# Patient Record
Sex: MALE | Race: WHITE | NOT HISPANIC OR LATINO | Employment: OTHER | URBAN - METROPOLITAN AREA
[De-identification: names, ages, dates, MRNs, and addresses within clinical notes are randomized per-mention and may not be internally consistent; named-entity substitution may affect disease eponyms.]

---

## 2017-01-23 ENCOUNTER — GENERIC CONVERSION - ENCOUNTER (OUTPATIENT)
Dept: OTHER | Facility: OTHER | Age: 47
End: 2017-01-23

## 2017-01-24 ENCOUNTER — GENERIC CONVERSION - ENCOUNTER (OUTPATIENT)
Dept: OTHER | Facility: OTHER | Age: 47
End: 2017-01-24

## 2017-01-25 ENCOUNTER — GENERIC CONVERSION - ENCOUNTER (OUTPATIENT)
Dept: OTHER | Facility: OTHER | Age: 47
End: 2017-01-25

## 2017-01-27 ENCOUNTER — GENERIC CONVERSION - ENCOUNTER (OUTPATIENT)
Dept: OTHER | Facility: OTHER | Age: 47
End: 2017-01-27

## 2017-02-07 ENCOUNTER — ALLSCRIPTS OFFICE VISIT (OUTPATIENT)
Dept: OTHER | Facility: CLINIC | Age: 47
End: 2017-02-07

## 2017-03-10 ENCOUNTER — GENERIC CONVERSION - ENCOUNTER (OUTPATIENT)
Dept: OTHER | Facility: OTHER | Age: 47
End: 2017-03-10

## 2017-03-29 ENCOUNTER — ALLSCRIPTS OFFICE VISIT (OUTPATIENT)
Dept: OTHER | Facility: CLINIC | Age: 47
End: 2017-03-29

## 2017-03-29 ENCOUNTER — GENERIC CONVERSION - ENCOUNTER (OUTPATIENT)
Dept: OTHER | Facility: OTHER | Age: 47
End: 2017-03-29

## 2017-04-01 DIAGNOSIS — Z13.6 ENCOUNTER FOR SCREENING FOR CARDIOVASCULAR DISORDERS: ICD-10-CM

## 2017-04-01 DIAGNOSIS — B20 HUMAN IMMUNODEFICIENCY VIRUS (HIV) DISEASE (HCC): ICD-10-CM

## 2017-04-01 DIAGNOSIS — Z11.3 ENCOUNTER FOR SCREENING FOR INFECTIONS WITH PREDOMINANTLY SEXUAL MODE OF TRANSMISSION: ICD-10-CM

## 2017-04-26 ENCOUNTER — GENERIC CONVERSION - ENCOUNTER (OUTPATIENT)
Dept: OTHER | Facility: OTHER | Age: 47
End: 2017-04-26

## 2017-04-28 ENCOUNTER — GENERIC CONVERSION - ENCOUNTER (OUTPATIENT)
Dept: OTHER | Facility: OTHER | Age: 47
End: 2017-04-28

## 2017-05-02 ENCOUNTER — GENERIC CONVERSION - ENCOUNTER (OUTPATIENT)
Dept: OTHER | Facility: OTHER | Age: 47
End: 2017-05-02

## 2017-05-02 ENCOUNTER — ALLSCRIPTS OFFICE VISIT (OUTPATIENT)
Dept: OTHER | Facility: CLINIC | Age: 47
End: 2017-05-02

## 2017-05-16 ENCOUNTER — ALLSCRIPTS OFFICE VISIT (OUTPATIENT)
Dept: OTHER | Facility: CLINIC | Age: 47
End: 2017-05-16

## 2017-05-24 ENCOUNTER — GENERIC CONVERSION - ENCOUNTER (OUTPATIENT)
Dept: OTHER | Facility: OTHER | Age: 47
End: 2017-05-24

## 2017-06-28 ENCOUNTER — ALLSCRIPTS OFFICE VISIT (OUTPATIENT)
Dept: OTHER | Facility: CLINIC | Age: 47
End: 2017-06-28

## 2017-07-01 DIAGNOSIS — B20 HUMAN IMMUNODEFICIENCY VIRUS (HIV) DISEASE (HCC): ICD-10-CM

## 2017-07-24 ENCOUNTER — GENERIC CONVERSION - ENCOUNTER (OUTPATIENT)
Dept: OTHER | Facility: OTHER | Age: 47
End: 2017-07-24

## 2017-07-26 ENCOUNTER — GENERIC CONVERSION - ENCOUNTER (OUTPATIENT)
Dept: OTHER | Facility: OTHER | Age: 47
End: 2017-07-26

## 2017-08-01 ENCOUNTER — ALLSCRIPTS OFFICE VISIT (OUTPATIENT)
Dept: OTHER | Facility: CLINIC | Age: 47
End: 2017-08-01

## 2017-08-15 ENCOUNTER — ALLSCRIPTS OFFICE VISIT (OUTPATIENT)
Dept: OTHER | Facility: CLINIC | Age: 47
End: 2017-08-15

## 2017-09-26 ENCOUNTER — ALLSCRIPTS OFFICE VISIT (OUTPATIENT)
Dept: OTHER | Facility: CLINIC | Age: 47
End: 2017-09-26

## 2017-09-26 DIAGNOSIS — B20 HUMAN IMMUNODEFICIENCY VIRUS (HIV) DISEASE (HCC): ICD-10-CM

## 2017-09-26 DIAGNOSIS — M79.10 MYALGIA: ICD-10-CM

## 2017-10-01 DIAGNOSIS — B20 HUMAN IMMUNODEFICIENCY VIRUS (HIV) DISEASE (HCC): ICD-10-CM

## 2017-10-10 ENCOUNTER — GENERIC CONVERSION - ENCOUNTER (OUTPATIENT)
Dept: OTHER | Facility: OTHER | Age: 47
End: 2017-10-10

## 2017-11-07 ENCOUNTER — ALLSCRIPTS OFFICE VISIT (OUTPATIENT)
Dept: OTHER | Facility: CLINIC | Age: 47
End: 2017-11-07

## 2017-11-14 ENCOUNTER — ALLSCRIPTS OFFICE VISIT (OUTPATIENT)
Dept: OTHER | Facility: CLINIC | Age: 47
End: 2017-11-14

## 2018-01-01 DIAGNOSIS — Z11.3 ENCOUNTER FOR SCREENING FOR INFECTIONS WITH PREDOMINANTLY SEXUAL MODE OF TRANSMISSION: ICD-10-CM

## 2018-01-01 DIAGNOSIS — B20 HUMAN IMMUNODEFICIENCY VIRUS (HIV) DISEASE (HCC): ICD-10-CM

## 2018-01-01 DIAGNOSIS — Z20.2 CONTACT WITH AND (SUSPECTED) EXPOSURE TO INFECTIONS WITH A PREDOMINANTLY SEXUAL MODE OF TRANSMISSION: ICD-10-CM

## 2018-01-09 NOTE — PROGRESS NOTES
Assessment   1  HIV disease (042) (B20)  2  MRSA infection (041 12) (A49 02)  3  Syphilis (097 9) (A53 9)    Discussion/Summary    Mr Yonatan Armstrong is a 40 yo white man with HIV and history of repeated low level viremia  1  DTG/TDF/FTC with subsequent viral suppression, VL pending  VL<20, CD4 529 April 2016  1     1  2  No longer on iron supplementation  3  RPR titer 1:4, >4-fold decline  Follow-up RPR 1:16 on 4/12/16  [address] [place] [facility] [place]  Repeated RPR of 1:8 WITHOUT PCN  Will repeat RPR in 1-2 months to confirm  LAS  1    4  Low energy reported and concern for low testosterone, will check free and total testosterone with blood draw  Will follow-up on whether this was completed  5  Bone health patient on TDF with strict vegan diet  May well have no need for increase in calcium intake as he eats leafy green vegetables  Consider switch to TAF/FTC  Possible side effects of new medications were reviewed with the patient/guardian today  The treatment plan was reviewed with the patient/guardian  The patient/guardian understands and agrees with the treatment plan     Education   general HIV education  adherence  prevention care  Education   general HIV education  adherence  prevention care  1 Amended By: Nohemi Bell; May 23 2016 1:09 PM EST    Chief Complaint  Pt offers no c/o at this time  Patient is here today for follow up of chronic conditions described in HPI  History of Present Illness    Pain Assessment   the patient states they do not have pain  Abuse And Domestic Violence Screen    Yes, the patient is safe at home  The patient states no one is hurting them  Depression And Suicide Screen  No, the patient has not had thoughts of hurting themself  No, the patient has not felt depressed in the past 7 days  The patient is being seen for a routine clinic follow-up of HIV infection  The last clinic visit was 3 month(s) ago   Management changes made at the last visit include ordering HIV VL and CD4  The patient is currently asymptomatic  No exacerbating factors are noted  No relieving factors are noted  No associated symptoms are reported  Current treatment includes antiretroviral regimen  By report, there is good compliance with treatment, good tolerance of treatment and good symptom control  The initial diagnosis of HIV was year(s) ago  The last CD4 count was 421 and performed on 1/7/16  The CD4 trend has been stable  The last viral load was 49 and performed on 1/7/16  The viral load has been stable  Since diagnosis the disease has been stable  Recently, the disease has been stable  There are no known disease complications  Pertinent medical history:  syphilis  The patient is currently able to do activities of daily living without limitations  He was previously evaluated in this clinic 3 month(s) ago  Past evaluation has included CD4 count, plasma viral load, complete blood count, urinalysis, metabolic profile, liver function panel, syphilis serology and RPR 1:16 after Bicillin 8/2015  Past treatment has included antiretroviral regimen of   The patient is being seen for a routine clinic follow-up of syphilis  Initial presentation was 8 month(s) ago  Presentation included early latent syphilis determined by RPR titer  Past evaluation has included RPR  Past treatment has included benzathine penicillin G  (Bicillin 8/2015 x3 IM) The patient is currently asymptomatic  No associated symptoms are reported  The patient is not currently being treated for this problem  (HIV treatment)   The patient is being seen for a routine clinic follow-up of methicillin-resistant Staphylococcus aureus  The patient is currently asymptomatic  No associated symptoms are reported  Review of Systems    Constitutional: No fever or chills, feels well, no tiredness, no recent weight gain or weight loss  Eyes: No complaints of eye pain, no red eyes, no discharge from eyes, no itchy eyes  ENT: no complaints of earache, no hearing loss, no nosebleeds, no nasal discharge, no sore throat, no hoarseness  Cardiovascular: No complaints of slow heart rate, no fast heart rate, no chest pain, no palpitations, no leg claudication, no lower extremity  Respiratory: No complaints of shortness of breath, no wheezing, no cough, no SOB on exertion, no orthopnea or PND  Gastrointestinal: No complaints of abdominal pain, no constipation, no nausea or vomiting, no diarrhea or bloody stools  Genitourinary: No complaints of dysuria, no incontinence, no hesitancy, no nocturia, no genital lesion, no testicular pain  Musculoskeletal: No complaints of arthralgia, no myalgias, no joint swelling or stiffness, no limb pain or swelling  Integumentary: No complaints of skin rash or skin lesions, no itching, no skin wound, no dry skin  Neurological: No compliants of headache, no confusion, no convulsions, no numbness or tingling, no dizziness or fainting, no limb weakness, no difficulty walking  Psychiatric: Is not suicidal, no sleep disturbances, no anxiety or depression, no change in personality, no emotional problems  Endocrine: No complaints of proptosis, no hot flashes, no muscle weakness, no erectile dysfunction, no deepening of the voice, no feelings of weakness  Hematologic/Lymphatic: No complaints of swollen glands, no swollen glands in the neck, does not bleed easily, no easy bruising  Active Problems    1  History of Anxiety (300 00) (F41 9)  2  History of stomatitis (V12 79) (Z87 19)  3  MRSA infection (041 12) (A49 02)  4  Musculoskeletal pain (729 1) (M79 1)  5  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)  6  Syphilis (097 9) (A53 9)    HIV disease (042) (B20)     Past Medical History   1  History of Anxiety (300 00) (F41 9)  2  History of Chest tightness (786 59) (R07 89)  3  History of Cough (786 2) (R05)  4  History of drug abuse (305 93) (Z87 898)  5   History of stomatitis (V12 79) (Z87 19)  6  History of syphilis (V12 09) (Z86 19)  7  History of tuberculosis (V12 01) (Z86 11)  8  History of Wound of right leg (891 0) (S81 801A)    The active problems and past medical history were reviewed and updated today  Surgical History    The surgical history was reviewed and updated today  Family History   1  Adopted (V68 89) (Z02 82)   2  Adopted (U69 55) (Z02 82)    The family history was reviewed and updated today  Social History    · Alcohol use (V49 89) (Z78 9)   · History of drug use (305 93) (F19 21)   · Housing   · Never a smoker   · No drug use   · Sexual abstinence   · Vegan (V49 89) (Z78 9)  The social history was reviewed and updated today  The social history was reviewed and is unchanged  Current Meds  1  Tivicay 50 MG Oral Tablet; take 1 tablet by mouth daily; Therapy: 20Zxl2934 to (Evaluate:39Mps7985)  Requested for: 63OAK9563; Last   Rx:26Gex8045 Ordered  2  Truvada 200-300 MG Oral Tablet; take 1 tablet by mouth daily; Therapy: 67ZEG0572 to (Evaluate:61Ark2194)  Requested for: 44KTF0998; Last   Rx:86Cty3758 Ordered    The medication list was reviewed and updated today  Allergies   1  codeine    Vitals  Signs [Data Includes: Current Encounter]   Recorded: 12Apr2016 02:15PM   Temperature: 98 F  Heart Rate: 64  Systolic: 90  Diastolic: 68  Weight: 518 lb 2 oz  BMI Calculated: 23 75  BSA Calculated: 2 01    Physical Exam    Constitutional   General appearance: No acute distress, well appearing and well nourished  Eyes   Conjunctiva and lids: No swelling, erythema or discharge  Pupils and irises: Equal, round and reactive to light  Ears, Nose, Mouth, and Throat   External inspection of ears and nose: Normal     Otoscopic examination: Tympanic membranes translucent with normal light reflex  Canals patent without erythema  Nasal mucosa, septum, and turbinates: Normal without edema or erythema      Oropharynx: Normal with no erythema, edema, exudate or lesions  Pulmonary   Respiratory effort: No increased work of breathing or signs of respiratory distress  Auscultation of lungs: Clear to auscultation  Cardiovascular   Palpation of heart: Normal PMI, no thrills  Auscultation of heart: Normal rate and rhythm, normal S1 and S2, without murmurs  Examination of extremities for edema and/or varicosities: Normal     Carotid pulses: Normal     Abdomen   Abdomen: Non-tender, no masses  Liver and spleen: No hepatomegaly or splenomegaly  Lymphatic   Palpation of lymph nodes in neck: No lymphadenopathy  Musculoskeletal   Gait and station: Normal     Digits and nails: Normal without clubbing or cyanosis  Inspection/palpation of joints, bones, and muscles: Normal     Muscle strength: Normal strength throughout  Skin   Skin and subcutaneous tissue: Normal without rashes or lesions  Neurologic   Cranial nerves: Cranial nerves 2-12 intact  Reflexes: 2+ and symmetric  Sensation: No sensory loss  Psychiatric   Orientation to person, place, and time: Normal     Mood and affect: Normal        Future Appointments    Date/Time Provider Specialty Site   06/15/2016 11:30 AM Christine Goetz [place] [id number]   08/02/2016 01:30 PM Ibrahima Solis MD PhD Internal Medicine [facility] [place]     HIV Health Forms    1  How often do you feel that you have difficulty taking your HIV medications on time? By 'on time' we mean no more than 2 hours before or 2 hours after the time your doctor told you to take it  (Que tan an menudo tiene usted dificulatad tomando prasad medicamentos de VIH a tiempo? "A tiempo" queremos decir no mas de 2 horas antes o dos horas despues del tiempo que scott doctor le dijo los tomara )   4  Never (Nunce)     2  On average, how many days per week would you say that you missed at least one dose of your HIV medication?  (En promedio, cuantos rivera a la semana diria useted que dejo de ana al Arlyne Douglas chaz dosis de scott medicamento de VIH?   6  Never (Nunce)     3  When was the last time you missed at least one dose of your HIV medication? (Cucando fue la ultima vez que usted dego de ana por lo menos chaz dosis de scott medicamento de VIH?   4  Between 1 and 3 months ago ([facility] 1 y 3 meses atras)   Score (Resultado) = 14      1  How often do you feel that you have difficulty taking your HIV medications on time? By 'on time' we mean no more than 2 hours before or 2 hours after the time your doctor told you to take it  (Que tan an menudo tiene usted dificulatad tomando prasad medicamentos de VIH a tiempo? "A tiempo" queremos decir no mas de 2 horas antes o dos horas despues del tiempo que scott doctor le dijo los tomara )   4  Never (Nunce)     2  On average, how many days per week would you say that you missed at least one dose of your HIV medication? (En promedio, cuantos rivera a la semana diria useted que dejo de ana al menos chaz dosis de scott medicamento de VIH?   6  Never (Nunce)     3  When was the last time you missed at least one dose of your HIV medication? (Cucando fue la ultima vez que usted dego de ana por lo menos chaz dosis de scott medicamento de VIH?   4  Between 1 and 3 months ago ([facility] 1 y 3 meses atras)   Score (Resultado) = 14        Signatures   Electronically signed by : Nba Demarco MD PhD; Apr 14 2016 12:03PM EST                       (Author)    Electronically signed by : Nba Demarco MD PhD; May 23 2016  1:09PM EST                       (Author)

## 2018-01-10 NOTE — PROGRESS NOTES
Assessment    1  HIV disease (042) (B20)   2  Nicotine dependence (305 1) (F17 200)   3  Occasional fatigue (780 99) (R68 89)   4  Syphilis (097 9) (A53 9)    Plan     1  3 - Jayna Nelson RD Dietitian Co-Management  *  Status: Hold For - Scheduling    Requested for: 55USM2655  are Referring to a non- Preferred Provider : Established Patient  Care Summary provided  : Yes    2  *1 - [facility] BEHAVIORAL HEALTH CONSULTANT Co-Management  *  Status: Hold For -   Scheduling  Requested for: 23VCU9243  Care Summary provided  : Yes    Follow-up visit in 3 months Evaluation and Treatment  Follow-up  Status: Hold For - Scheduling  Requested for: 28TQJ3716  Ordered; For: Syphilis; Ordered By: Jared Zapata  Performed:   Due: 68WGP2617     Discussion/Summary  Discussion Summary:   Yves Fatima is a 82-year-old male presents to the clinic today for three-month follow-up of chronic conditions  HIV: CD4 658 VL 75 ART Tivicay and Descovy  Continues to have a low level detectable VL despite pt  report of adherence  Educated on the importance of adherence and reminded to take medication separate from any supplements  Also educated to take medication with food  Will discuss with ID at f/u ID clinic scheduled 11/14/17  Syphilis: Repeat RPR titer 1:4  Last titer 1:2  Has not had any new sexual partners  We will discuss with ID if additional treatment is required  Occasional fatigue / concerns about inability to gain weight: Testosterone and TSH within normal limits  Andrea Cordova has had a 4 lb weight gain since September  BMI 22  Discussed in detail the important of eating a well balanced diet  Educated patient on realistic body expectations  Will refer her to dietitian for additional education  Nicotine dependence: Andrea Cordova continues to smoke cigarettes on social occasions  Reviewed health risks of smoking and continue to offer encouragement towards complete cessation   Referred to Isaac Bledsoe [facility] for support in smoking cessation program     PCP f/u scheduled for 3 months  Counseling Documentation With Imm: The patient was counseled regarding  Medication SE Review and Pt Understands Tx: Possible side effects of new medications were reviewed with the patient/guardian today  The treatment plan was reviewed with the patient/guardian  The patient/guardian understands and agrees with the treatment plan      Chief Complaint  Chief Complaint Free Text Note Form: Pt offers no c/o at this time  History of Present Illness  HPI: Bob Patricia is a 42year old male who presents to the clinic today for 3 month PCP follow up of chronic conditions  PMHx significant for HIV, syphilis, HZV, and nicotine dependence  Shannon Garcia was last seen at clinic September 26,/2017 and complained about inability to gain weight  Testosterone and TSH were evaluated and Magdy wishes to review those lab results today  Hospital Based Practices Required Assessment:   Pain Assessment   the patient states they do not have pain  Abuse And Domestic Violence Screen    Yes, the patient is safe at home  The patient states no one is hurting them  Depression And Suicide Screen  No, the patient has not had thoughts of hurting themself  No, the patient has not felt depressed in the past 7 days  Syphilis: The patient is being seen for a routine clinic follow-up of syphilis  The patient is currently asymptomatic  Smoking Cessation (Brief): The patient is being seen for clinic follow-up for tobacco cessation assistance  The patient has low interest in quitting  He has tried to quit several times  Patient perceived smoking benefits include fitting in with peers  Current treatment includes tobacco cessation program    HIV Follow-up (Brief): The patient is being seen for a routine clinic follow-up of HIV infection   Symptoms:  no fever, no lethargy, no depression, no weight loss, no decreased appetite, no cough, no shortness of breath, no skin lesions, no thrush, no nausea, no vomiting, no diarrhea and no headache  Current treatment includes antiretroviral regimen  By report, there is good compliance with treatment and good tolerance of treatment  Review of Systems  Focused-Male:   Constitutional: no fever or chills, feels well, no tiredness, no recent weight loss or weight gain  ENT: no complaints of earache, no loss of hearing, no nosebleeds or nasal discharge, no sore throat or hoarseness  Cardiovascular: no complaints of slow or fast heart rate, no chest pain, no palpitations, no leg claudication or lower extremity edema  Respiratory: no complaints of shortness of breath, no wheezing or cough, no dyspnea on exertion, no orthopnea or PND  Gastrointestinal: no complaints of abdominal pain, no constipation, no nausea or vomiting, no diarrhea or bloody stools  Genitourinary: no complaints of dysuria or incontinence, no hesitancy, no nocturia, no genital lesion, no inadequacy of penile erection  Musculoskeletal: no complaints of arthralgia, no myalgia, no joint swelling or stiffness, no limb pain or swelling  Integumentary: no complaints of skin rash or lesion, no itching or dry skin, no skin wounds  Neurological: no complaints of headache, no confusion, no numbness or tingling, no dizziness or fainting  Active Problems    1  History of Anxiety (300 00) (F41 9)   2  Atypical mole (216 9) (D22 9)   3  Encounter for screening examination for sexually transmitted disease (V74 5) (Z11 3)   4  Encounter for screening for cardiovascular disorders (V81 2) (Z13 6)   5  History of stomatitis (V12 79) (Z87 19)   6  HIV disease (042) (B20)   7  MRSA infection (041 12) (A49 02)   8  Musculoskeletal pain (729 1) (M79 1)   9  Need for prophylactic vaccination and inoculation against influenza (V04 81) (Z23)   10  Nicotine dependence (305 1) (F17 200)   11  Occasional fatigue (780 99) (R68 89)   12  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)   13   Rash of unknown cause (782 1) (R21)   14  Shingles (053 9) (B02 9)   15  Syphilis (097 9) (A53 9)   16  Viral upper respiratory infection (465 9) (J06 9,B97 89)    Past Medical History    1  History of Anxiety (300 00) (F41 9)   2  History of Chest tightness (786 59) (R07 89)   3  History of Cough (786 2) (R05)   4  History of drug abuse (305 93) (Z87 898)   5  History of stomatitis (V12 79) (Z87 19)   6  History of syphilis (V12 09) (Z86 19)   7  History of tuberculosis (V12 01) (Z86 11)   8  History of Wound of right leg (891 0) (U98 933Q)  Active Problems And Past Medical History Reviewed: The active problems and past medical history were reviewed and updated today  Family History  Mother    1  Adopted (V68 89) (Z02 82)  Father    2  Adopted (V68 89) (Z02 82)  Family History Reviewed: The family history was reviewed and updated today  Social History    · Alcohol use (V49 89) (Z78 9)   · History of drug use (305 93) (Z87 898)   · Housing   · History of Never a smoker   · No drug use   · Sexual abstinence   · Vegan (V49 89) (Z78 9)  Social History Reviewed: The social history was reviewed and updated today  The social history was reviewed and is unchanged  Surgical History  Surgical History Reviewed: The surgical history was reviewed and updated today  Current Meds   1  Descovy 200-25 MG Oral Tablet; Take 1 tablet daily; Therapy: 79Hre4582 to (Evaluate:15Pml3553)  Requested for: 02Ygz7648; Last   Rx:88Alc9674 Ordered   2  Tivicay 50 MG Oral Tablet; take 1 tablet by mouth daily; Therapy: 07Iye2220 to (Evaluate:06Pdu1940)  Requested for: 65MZJ2784; Last   Rx:00Ahq4112 Ordered  Medication List Reviewed: The medication list was reviewed and updated today        Allergies    1  codeine    Vitals  Signs   Recorded: 26OQD2797 11:00AM   Temperature: 97 9 F  Heart Rate: 69  Systolic: 548  Diastolic: 72  Height: 6 ft   Weight: 167 lb 8 oz  BMI Calculated: 22 72  BSA Calculated: 1 98  O2 Saturation: 98    Physical Exam    Constitutional   General appearance: No acute distress, well appearing and well nourished  Eyes   Conjunctiva and lids: No swelling, erythema or discharge  Pupils and irises: Equal, round and reactive to light  Ears, Nose, Mouth, and Throat   External inspection of ears and nose: Normal     Otoscopic examination: Tympanic membranes translucent with normal light reflex  Canals patent without erythema  Nasal mucosa, septum, and turbinates: Normal without edema or erythema  Oropharynx: Normal with no erythema, edema, exudate or lesions  Pulmonary   Respiratory effort: No increased work of breathing or signs of respiratory distress  Auscultation of lungs: Clear to auscultation  Cardiovascular   Auscultation of heart: Normal rate and rhythm, normal S1 and S2, without murmurs  Examination of extremities for edema and/or varicosities: Normal     Abdomen   Abdomen: Non-tender, no masses  Liver and spleen: No hepatomegaly or splenomegaly  Lymphatic   Palpation of lymph nodes in neck: No lymphadenopathy  Musculoskeletal   Gait and station: Normal     Digits and nails: Normal without clubbing or cyanosis  Inspection/palpation of joints, bones, and muscles: Normal     Muscle strength: Normal strength throughout  Skin   Skin and subcutaneous tissue: Normal without rashes or lesions  Psychiatric   Orientation to person, place, and time: Normal     Mood and affect: Normal        Attending Note  Collaborating Physician Note: Collaborating Physician: I agree with the Advanced Practitioner note        Future Appointments    Date/Time Provider Specialty Site   11/14/2017 05:15 PM Yodit Lovell MD Infectious Disease [facility]   02/06/2018 11:00 AM ROMAN [place] [facility]iology/[facility]   Electronically signed by : Marina Bermeo; Nov 7 2017 12:10PM EST                       (Author) Electronically signed by : Sam Salamanca DO; Nov 8 2017  8:53AM EST                       (Author)

## 2018-01-10 NOTE — PROGRESS NOTES
History of Present Illness  [facility]:   He is being seen in follow-up  Smoking Cessation (Brief): The patient is being seen for clinic follow-up for tobacco cessation assistance  The last clinic visit was 5 week(s) ago  Management changes made at the last visit include adding Chantix  The patient has high interest in quitting  He has tried to quit several times  Current treatment includes varenicline (Chantix)  The patient has quit tobacco use for 4 weeks  Physical Exam    Objective: Orientation: oriented to person, oriented to place and oriented to time  Appearance: well developed, appears healthy, well nourished, well groomed and no decreased eye contact  Observed mood and affect: euthymic, but appropriate  Harm to self or others: none reported or observed  Substance abuse: none reported or observed  Assessment    1  HIV disease (042) (B20)   2  Nicotine dependence (305 1) (F17 200)   3  Syphilis (097 9) (A53 9)   4  Rash of unknown cause (782 1) (R21)    Plan    1  (1) CBC/PLT/DIFF; Status:Active; Requested for:96Hfp5301;    2  (1) COMPREHENSIVE METABOLIC PANEL; Status:Active; Requested for:73Qnc6760;    3  (1) HEP C ANTIBODY; Status:Active; Requested for:81Lyo9873;    4  (1) HIV-1 RNA QUANTITATIVE; [Do Not Release]; Status:Active; Requested for:12Sgn8630;      5  (1) T LYMPH SUBSET (CD4); Status:Active; Requested for:58Axb1065;     6  Follow-up visit in 3 months Evaluation and Treatment  Follow-up  Status: Complete    Done: 33VML1764    Discussion/Summary  [facility]: Today, patient presents with no major issues or concerns  Discussion Summary [facility]:   [facility] checked in on PT's mental status and followed up on his smoking cessation progress  PT reported that he has no issues  In addition, PT reported that he has now been smoke free for 4 weeks  [facility] explored behavioral changes supporting PT's new smoke free status   PT explained that he has scrubbed/cleaned out his smoking room as well as removed all evidences of being a smoker from his home such as jenise trays  In addition, PT stated that he does not even tell people that he is a recovering smoker but rather he refers to himself as a non-smoker  PT confessed that it is still hard for him in some environments such as clubs where smoking is allowed because therein, the smoke is in his face all the time and he cannot escape because he is working ([address])  [employer] [facility] [place] encouraged PT to continue to use his coping mechanisms and to have plans of what to do if the urge to smoke becomes too strong in the club environment  PT agreed        Future Appointments    Date/Time Provider Specialty Site   08/01/2017 04:45 PM Hardie Phoenix, MD Infectious Disease [facility] AT [facility]   06/28/2017 09:30 AM ROMAN Hunt Epidemiology/Public Health ASC AT [address] [facility]   Electronically signed by : Kamran Krause Darius 87; May 16 2017  2:14PM EST                       (Author)

## 2018-01-10 NOTE — PROGRESS NOTES
Assessment    1  HIV disease (042) (B20)   2  Nicotine dependence (305 1) (F17 200)   3  Syphilis (097 9) (A53 9)   4  Rash of unknown cause (782 1) (R21)    Plan    1  (1) CBC/PLT/DIFF; Status:Active; Requested for:49Hzd3356;    2  (1) COMPREHENSIVE METABOLIC PANEL; Status:Active; Requested for:22Iww8121;    3  (1) HEP C ANTIBODY; Status:Active; Requested for:75Ukl9865;    4  (1) HIV-1 RNA QUANTITATIVE; [Do Not Release]; Status:Active; Requested for:97Ffu4050;      5  (1) T LYMPH SUBSET (CD4); Status:Active; Requested for:27Hcs9117;     6  Follow-up visit in 3 months Evaluation and Treatment  Follow-up  Status: Hold For -   Scheduling  Requested for: 63TTC5231    Discussion/Summary    HIV-doing well on Tivicay and Descovy with CD4 count in the 500s  He continues to have a low level detectable viral load  Continue ART, recheck labs in 2 months, follow-up in 3 months  Syphilis-status post treatment course, and the RPR has decreased to 1:2  We'll continue to monitor the RPR annually    Nicotine dependence-patient has now quit smoking and has stopped the Chantix  Encourage patient to continue tobacco cessation  Rash-possible mild zoster although the lesions have been present for about a week without transition to crusting  The rash is a bit atypical and he is only having pruritus  If this is zoster, it is probably too late to treat and the patient's immune system is relatively intact  We'll observe for now  If the lesions were progressed or persist would consider an empiric trial of Valtrex 1 g p o  q 8 hours for 7 days  Possible side effects of new medications were reviewed with the patient/guardian today  The treatment plan was reviewed with the patient/guardian  The patient/guardian understands and agrees with the treatment plan   The patient was counseled regarding diagnostic results, instructions for management, prognosis, risks and benefits of treatment options  Education   general HIV education  adherence  prevention care  Chief Complaint  Patient here for HIV f/u  Patient believes he has shingles his back and on his stomach  Patient states it started over the weekend      History of Present Illness  Routine follow-up for HIV  Patient claims 100% adherence with Tivicay and Descovy  He denies any notable side effects  He developed a few pruritic papules on the right side of his abdomen last week  Lesions have been present for almost a week  He is not having any pain, and the lesions have not crusted  Pain Assessment   the patient states they do not have pain  Abuse And Domestic Violence Screen    Yes, the patient is safe at home  The patient states no one is hurting them  Depression And Suicide Screen  No, the patient has not had thoughts of hurting themself  No, the patient has not felt depressed in the past 7 days  no fever no lethargy no depression no night sweats no weight loss no decreased appetite no cough no shortness of breath skin lesions no mouth sores no thrush no nausea no vomiting no diarrhea      Active Problems    1  History of Anxiety (300 00) (F41 9)   2  Atypical mole (216 9) (D22 9)   3  Encounter for screening examination for sexually transmitted disease (V74 5) (Z11 3)   4  Encounter for screening for cardiovascular disorders (V81 2) (Z13 6)   5  History of stomatitis (V12 79) (Z87 19)   6  HIV disease (042) (B20)   7  MRSA infection (041 12) (A49 02)   8  Musculoskeletal pain (729 1) (M79 1)   9  Need for prophylactic vaccination and inoculation against influenza (V04 81) (Z23)   10  Nicotine dependence (305 1) (F17 200)   11  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)   12  Shingles (053 9) (B02 9)   13  Syphilis (097 9) (A53 9)    Past Medical History    1  History of Anxiety (300 00) (F41 9)   2  History of Chest tightness (786 59) (R07 89)   3  History of Cough (786 2) (R05)   4  History of drug abuse (305 93) (Z87 898)   5   History of stomatitis (V12 79) (Z87 19)   6  History of syphilis (V12 09) (Z86 19)   7  History of tuberculosis (V12 01) (Z86 11)   8  History of Wound of right leg (891 0) (S81 801A)    Family History  Mother    1  Adopted (V68 89) (Z02 82)  Father    2  Adopted (V68 89) (Z02 82)    Social History    · Alcohol use (V49 89) (Z78 9)   · History of drug use (305 93) (Z87 898)   · Housing   · Never a smoker   · No drug use   · Sexual abstinence   · Vegan (V49 89) (Z78 9)    Current Meds   1  Chantix Starting Month Godfrey 0 5 MG X 11 & 1 MG X 42 Oral Tablet; take as directed; Therapy: 32YPB4099 to (Evaluate:28Apr2017)  Requested for: 31AMH2934; Last   Rx:29Mar2017 Ordered   2  Descovy 200-25 MG Oral Tablet; Take 1 tablet daily; Therapy: 29Apr2016 to (Evaluate:54Iqg1807)  Requested for: 20ZOX0706; Last   Rx:99Btq3448 Ordered   3  Tivicay 50 MG Oral Tablet; take 1 tablet by mouth daily; Therapy: 30Wre3656 to (Evaluate:21Jun2017)  Requested for: 57Azo8015; Last   Rx:51Cjj7426 Ordered    Allergies    1  codeine    Vitals  Signs   Recorded: 13EXD7999 04:53PM   Temperature: 97 7 F  Heart Rate: 68  Systolic: 834  Diastolic: 62  Height: 6 ft   Weight: 174 lb 6 4 oz  BMI Calculated: 23 65  BSA Calculated: 2 01  O2 Saturation: 97    Physical Exam    Constitutional   General appearance: No acute distress, well appearing and well nourished  Ears, Nose, Mouth, and Throat   Oropharynx: Normal with no erythema, edema, exudate or lesions  Pulmonary   Respiratory effort: No increased work of breathing or signs of respiratory distress  Auscultation of lungs: Clear to auscultation  Cardiovascular   Auscultation of heart: Normal rate and rhythm, normal S1 and S2, without murmurs  Examination of extremities for edema and/or varicosities: Normal     Abdomen   Abdomen: Non-tender, no masses  Liver and spleen: No hepatomegaly or splenomegaly  Lymphatic   Palpation of lymph nodes in neck: No lymphadenopathy      Skin   Skin and subcutaneous tissue: Abnormal   A few small papules right side of the abdomen, few papules also on the right flank  No vesicles or crusting        Future Appointments    Date/Time Provider Specialty Site   06/28/2017 09:30 AM ROMAN Calderon Epidemiology/Public Health [facility] AT [facility]     Signatures   Electronically signed by : Shankar Ashford MD; May  2 2017  5:12PM EST                       (Author)

## 2018-01-10 NOTE — PROGRESS NOTES
Assessment    1  HIV disease (042) (B20)   2  Syphilis (097 9) (A53 9)   3  Viral upper respiratory infection (465 9) (J06 9,B97 89)   4  Nicotine dependence (305 1) (F17 200)    Plan    1  Descovy 200-25 MG Oral Tablet; Take 1 tablet daily   2  (1) CBC/PLT/DIFF; Status:Active; Requested for:01Oct2017;    3  (1) COMPREHENSIVE METABOLIC PANEL; Status:Active; Requested for:01Oct2017;    4  (1) HEP C ANTIBODY; Status:Active; Requested for:01Oct2017;    5  (1) HIV-1 RNA QUANTITATIVE; [Do Not Release]; Status:Active; Requested for:01Oct2017;      6  (1) RPR; Status:Active; Requested for:01Oct2017;    7  (1) T LYMPH SUBSET (CD4); Status:Active; Requested for:01Oct2017;    8  Hepatitis B    9  Follow-up visit in 3 months Evaluation and Treatment  Follow-up  Status: Hold For -   Scheduling  Requested for: 75VSF7074    Discussion/Summary    HIV-doing reasonably well on Tivicay and Descovy but still with a low level detectable viral load  His CD4 count is in the 400s  Stressed adherence  Continue ART, recheck labs in 2 months, follow-up in 3 months  Syphilis-status post treatment with a good response with the last RPR titer at 1:2  We'll recheck labs next visit  Probable viral URI-patient with no clear overt evidence of an exudate in pharyngitis although he does have a sore throat  Symptomatic treatment for now  Told the patient to call back if the symptoms are worsening or do not clear  Nicotine dependence-the patient has quit smoking  Encouraged the patient to continue to be abstinent from tobacco use  Possible side effects of new medications were reviewed with the patient/guardian today  The treatment plan was reviewed with the patient/guardian  The patient/guardian understands and agrees with the treatment plan   The patient was counseled regarding diagnostic results, instructions for management, prognosis, risks and benefits of treatment options, importance of compliance with treatment       Education general HIV education  adherence  prevention care  Chief Complaint  Pt here for routine f/u  SPNS = 11      History of Present Illness  Routine follow-up for HIV  Patient claims near 100% adherence with Tivicay and Descovy  He missed one dose on a weekend  Last week he had a very brief febrile illness and now has a bit of a sore throat  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  Pain Assessment   the patient states they have pain  The pain is located in the throat, right ear  (on a scale of 0 to 10, the patient rates the pain at 4 )   Abuse And Domestic Violence Screen    Yes, the patient is safe at home  The patient states no one is hurting them  Depression And Suicide Screen  No, the patient has not had thoughts of hurting themself  No, the patient has not felt depressed in the past 7 days  The patient is being seen for a routine clinic follow-up of HIV infection  The patient is currently asymptomatic  fever lethargy depression night sweats weight loss decreased appetite cough shortness of breath skin lesions mouth sores thrush nausea vomiting diarrhea headache Associated symptoms:  Mild sore throat on the right  Active Problems    1  History of Anxiety (300 00) (F41 9)   2  Atypical mole (216 9) (D22 9)   3  Encounter for screening examination for sexually transmitted disease (V74 5) (Z11 3)   4  Encounter for screening for cardiovascular disorders (V81 2) (Z13 6)   5  History of stomatitis (V12 79) (Z87 19)   6  HIV disease (042) (B20)   7  MRSA infection (041 12) (A49 02)   8  Musculoskeletal pain (729 1) (M79 1)   9  Need for prophylactic vaccination and inoculation against influenza (V04 81) (Z23)   10  Nicotine dependence (305 1) (F17 200)   11  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)   12  Rash of unknown cause (782 1) (R21)   13  Shingles (053 9) (B02 9)   14  Syphilis (097 9) (A53 9)    Past Medical History    1   History of Anxiety (300 00) (F41 9)   2  History of Chest tightness (786 59) (R07 89)   3  History of Cough (786 2) (R05)   4  History of drug abuse (305 93) (Z87 898)   5  History of stomatitis (V12 79) (Z87 19)   6  History of syphilis (V12 09) (Z86 19)   7  History of tuberculosis (V12 01) (Z86 11)   8  History of Wound of right leg (891 0) (S81 801A)    Family History  Mother    1  Adopted (V68 89) (Z02 82)  Father    2  Adopted (V68 89) (Z02 82)    Social History    · Alcohol use (V49 89) (Z78 9)   · History of drug use (305 93) (Z87 898)   · Housing   · Never a smoker   · No drug use   · Sexual abstinence   · Vegan (V49 89) (Z78 9)    Current Meds   1  Descovy 200-25 MG Oral Tablet; Take 1 tablet daily; Therapy: 29Apr2016 to (Evaluate:84Mvh6200)  Requested for: 96UIS9277; Last   Rx:18Qbn3628 Ordered   2  Tivicay 50 MG Oral Tablet; take 1 tablet by mouth daily; Therapy: 67Ikl2223 to [phone])  Requested for: 28Jun2017; Last   Rx:28Jun2017 Ordered    Allergies    1  codeine    Vitals  Signs   Recorded: 15Aug2017 05:10PM   Temperature: 98 8 F  Heart Rate: 66  Systolic: 069  Diastolic: 82  Height: 6 ft   Weight: 169 lb 8 oz  BMI Calculated: 22 99  BSA Calculated: 1 99  O2 Saturation: 96    Physical Exam    Constitutional   General appearance: No acute distress, well appearing and well nourished  Ears, Nose, Mouth, and Throat   Oropharynx: Normal with no erythema, edema, exudate or lesions  Pulmonary   Respiratory effort: No increased work of breathing or signs of respiratory distress  Auscultation of lungs: Clear to auscultation  Cardiovascular   Auscultation of heart: Normal rate and rhythm, normal S1 and S2, without murmurs  Examination of extremities for edema and/or varicosities: Normal     Abdomen   Abdomen: Non-tender, no masses  Liver and spleen: No hepatomegaly or splenomegaly  Lymphatic   Palpation of lymph nodes in neck: No lymphadenopathy         Future Appointments    Date/Time Provider Specialty Site   09/26/2017 11:00 AM ROMAN Vallecillo Epidemiology/Public Health ASC AT [address] [facility]   Electronically signed by : Karan Marte MD; Aug 15 2017  5:41PM EST                       (Author)

## 2018-01-11 NOTE — PROGRESS NOTES
History of Present Illness  [facility]:   He is being seen in follow-up  The patient is being seen regarding wellness screener   Support/Coping: becoming more active in the community  [facility] Profile- [facility]:         1  I like who I am  Yes, describes me exactly (12)   2  I am not an easy person to get along with    No, doesn't describe me at all (22)   3  I am basically a healthy person    Yes, describes me exactly (32)   4  I give up to easily    No, doesn't describe me at all (42)   5  I have difficulty concentrating    No, doesn't describe me at all (52)   6  I am happy with my family relationships    Yes, describes me exactly (62)   7  I am comfortable being around people    Yes, describes me exactly (72)     8  Walking up a flight of stairs    None (82)   9  Running the length of a football field    None (92)     10  Sleeping    None (102)   11  Hurting or aching in any part of your body    None (112)   12  Getting tired easily    None (122)   13  Feeling depressed or sad    None (132)   14  Nervousness    None (142)     15  Socialize with other people (talk or visit with friends or relatives A Lot ()   12  Take part in social, Moravian, or recreation activities (meetings, Anabaptist, movies, sports, parties)    Monia Le A Lot (162)   17   Stay in your home, nursing home, or hospital because of sickness, injury, or other health problem None (172)   [address]     8 = 2   9 = 2   10 = 2   11 = 2   12 = 2   Sum = 10   PHYSICAL HEALTH SCORE 100      1 = 2   4 = 2   5 = 2   13 = 2   14 = 2   Sum = 10   MENTAL HEALTH SCORE 100      2 = 2   6 = 2   7 = 2   15 = 2   16 = 2 Sum = 10   SOCIAL HEALTH SCORE 100  Physical Health score = 100   Mental Health score = 100   Social Health score = 100   Sum = 300   GENERAL HEALTH SCORE 100      3 = 2   PERCEIVED HEALTH SCORE 100      1 = 2   2 = 2   4 = 2   6 = 2   7 = 2   Sum = 10   SELF-ESTEEM SCORE 100            2 = 2 and 0   5 = 2 and 0   7 = 2 and 0   10 = 2 and 0   12 = 2 and 0   14 = 2 and 0   Sum = 0   ANXIETY SCORE 0      4 = 2 and 0   5 = 2 and 0   10 = 2 and 0   12 = 2 and 0   13 = 2 and 0   Sum = 0   DEPRESSION SCORE 0      4 = 2, 0, 2 and 0   7 = 2 and 0   10 = 2 and 0   12 = 2 and 0   13 = 2 and 0   14 = 2 and 0   Sum = 0   ANXIETY-DEPRESSION ([facility]-AD) SCORE 0      11 = 2 and 0   PAIN SCORE 0      17 = 2 and 0   DISABILITY SCORE 0  Physical Exam    Objective: Orientation: oriented to person, oriented to place and oriented to time  Appearance: well developed, well nourished and appearance reflects stated age  Observed mood and affect: appropriate  Harm to self or others: None reported or observed  Substance abuse: None reported or observed  Discussion/Summary  [facility]: Today, patient presents with no major concerns  Patient will likely benefit from continued maintenance of wellbeing  The stage of change is maintenance  Behavioral recommendations: 1  F/U with [facility] as needed  2  Continue to use positive coping skills and supports  3  Stay active in the community   Discussion Summary [employer]:   PT was seen for a behavioral health consultation with [facility] services were reviewed  PT completed his yearly [facility] screener  PT denies any major concerns  PT reports that he has decided to put his old "self" in the past and focus on making his present self better  He has gotten a new job as a  and his Sandeep Done has picked up  PT is very happy with his life changes  [facility] encouraged him to continue staying active in his community and using his positive supports        Future Appointments    Date/Time Provider Specialty Site   06/15/2016 11:30 AM Earlyne FridayROMAN Epidemiology/Public Health [facility] SERVICES [place]   08/02/2016 01:30 PM Wen Pereyra MD PhD Internal Medicine [facility] [place]     Signatures   Electronically signed by : Celester Runner, MS; Apr 12 2016  3:46PM EST                       (Author)

## 2018-01-11 NOTE — MISCELLANEOUS
Provider Comments  Provider Comments:   Pt nc/ns for today's appt        Signatures   Electronically signed by : Freddie Olson; Nov 23 2016  1:52PM EST                       (Author)

## 2018-01-11 NOTE — PROGRESS NOTES
Patient Health Assessment    Date:            01/22/2016  Blood Pressure:  115/65  Pulse:           65  Age:             45  Weight:          172 lbs  Height/Length:   5' 11"  Body Mass Index: 24 0  Provider:        ZANDRA  Clinic:          [facility]  Medical Alert: [facility]  Medications: Tivicay    T[place]  Allergies:      Codeine  Since Last Visit: Medical Alert: No Change    Medications: No Change    Allergies:        No Change  Pain Scale Type: Numeric Pain ScalePain Level: 0  Description:  S: My gums are bleeding when I floss and it is a lot of blood       Pt reports that platelet count was 771  O: Sub g calc on the lower right anteriors  A: Localized inflammation of areas of his gingiva    PLAN:   Selective scale of area and rx for Peridex   nv 30 mod and re eval the gingiva    ----- Signed on Friday, January 22, 2016 at 8:38:48 AM  -----  ----- Provider: ZANDRA - Stan Ayoub DMD -- Clinic: [facility] -----

## 2018-01-12 NOTE — PROGRESS NOTES
Assessment    1  Shingles (053 9) (B02 9)   2  HIV disease (042) (B20)   3  Syphilis (097 9) (A53 9)    Plan  HIV disease    · Hepatitis B    Discussion/Summary    HIV: CD4 522 VL 77 ART Descovy and Tivicay, Revealed at last ID clinic that he was taking medication at sporadic intervals during the day  Has committed to taking medication first thing in the morning  Denies missing any doses and states he has better adherence  Stressed the importance of taking medication every day at the same time in order to prevent resistance  Given lab work today and instructed to have completed in January  ID follow-up scheduled February 7, 2017  Herpes Zoster: 3-4cm dermatomal crusted lesion located on the abdomen superior to the umbilicus  Reports prior history of herpes zoster and now has periodic outbreaks during times of stress and illness  Discussed treatment options, including use of valacyclovir  Due to small size of lesion, will use OTC [place] solution for supportive therapy  Educated to call clinic if outbreak increases in size  Syphilis: Serofast with last RPR 1:4  Will monitor annually  Health Maintenance: Magdy follows a strict vegan diet  Discussed the importance of getting adequate nutrients and obtaining protein from alternate sources  Continues to be sexually abstinent  Discussed the need to use condoms when he become sexually active  Reviewed signs and symptoms of prostate disease  We will perform a prostate exam if he experiences any symptoms  PCP f/u scheduled for 3 months  Possible side effects of new medications were reviewed with the patient/guardian today  The treatment plan was reviewed with the patient/guardian  The patient/guardian understands and agrees with the treatment plan   The patient was counseled regarding risks and benefits of treatment options, importance of compliance with treatment  Education   general HIV education  adherence  prevention care        Chief Complaint  Pt c/o dry cough, congestion and irritated throat x 3 days  History of Present Illness  Brian Molina is a 43-year-old male here today for primary care follow-up  Complains of viral URI symptoms  Afebrile  No dyspnea  Taking vitamin C, OTC cold medicine,resting and increasing fluid intake  Symptoms are steadily improving  No other complaints  Pain Assessment   the patient states they do not have pain  Abuse And Domestic Violence Screen    Yes, the patient is safe at home  The patient states no one is hurting them  Depression And Suicide Screen  No, the patient has not had thoughts of hurting themself  No, the patient has not felt depressed in the past 7 days  The patient is being seen for a routine clinic follow-up of HIV infection  no fever no lethargy no depression no night sweats no weight loss no decreased appetite no cough no shortness of breath no thrush no nausea no vomiting no diarrhea no headache no visual changes Current treatment includes antiretroviral regimen  By report, there is good compliance with treatment and good tolerance of treatment  CD4: 522  VL: 66    Time spent: 15 minutes  Strength: Strong desire to live a healthy lifestyle  Weakness: Active social life  Plan of Action: Take medication first thing upon waking  SUBSTANCE ABUSE: ETOH use  amount: once a week  not using drugs  SMOKING: He is a current smoker, uses cigs and occas  when he drinks packs per day  SEXUALLY ACTIVE: He is not sexually active  HOUSING: He has stable housing  There are 1 people living in the household (including children)  The household income is $1475 00/month  HEALTH MAINTENANCE: His last dental exam was 10/2016  His last eye exam was 10/2016  The patient is being seen for an initial evaluation of an existing diagnosis of herpes zoster  Symptoms:  dermatomal pain, skin redness and lesion crusting  The patient is currently experiencing symptoms   Associated symptoms:  no fever, no chills, no myalgias, no headache, no decreased visual acuity, no hearing loss and no vertigo  The patient is not currently being treated for this problem  Pertinent medical history:  chicken pox, previous herpes zoster and HIV infection  The patient is being seen for a routine clinic follow-up of syphilis  The patient is currently asymptomatic  Review of Systems    Constitutional: No fever or chills, feels well, no tiredness, no recent weight gain or weight loss  Eyes: No complaints of eye pain, no red eyes, no discharge from eyes, no itchy eyes  ENT: as noted in HPI  Cardiovascular: No complaints of slow heart rate, no fast heart rate, no chest pain, no palpitations, no leg claudication, no lower extremity  Respiratory: No complaints of shortness of breath, no wheezing, no cough, no SOB on exertion, no orthopnea or PND  Gastrointestinal: No complaints of abdominal pain, no constipation, no nausea or vomiting, no diarrhea or bloody stools  Genitourinary: No complaints of dysuria, no incontinence, no hesitancy, no nocturia, no genital lesion, no testicular pain  Musculoskeletal: No complaints of arthralgia, no myalgias, no joint swelling or stiffness, no limb pain or swelling  Integumentary: skin lesion and small 3-4 cm linear rash on abdomen, but no rashes, no itching, no dry skin and no skin wound  Neurological: No compliants of headache, no confusion, no convulsions, no numbness or tingling, no dizziness or fainting, no limb weakness, no difficulty walking  Psychiatric: Is not suicidal, no sleep disturbances, no anxiety or depression, no change in personality, no emotional problems  Endocrine: No complaints of proptosis, no hot flashes, no muscle weakness, no erectile dysfunction, no deepening of the voice, no feelings of weakness  Hematologic/Lymphatic: No complaints of swollen glands, no swollen glands in the neck, does not bleed easily, no easy bruising  Active Problems    1   History of Anxiety (300 00) (F41 9)   2  Encounter for screening examination for sexually transmitted disease (V74 5) (Z11 3)   3  History of stomatitis (V12 79) (Z87 19)   4  HIV disease (042) (B20)   5  MRSA infection (041 12) (A49 02)   6  Musculoskeletal pain (729 1) (M79 1)   7  Need for prophylactic vaccination and inoculation against influenza (V04 81) (Z23)   8  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)   9  Syphilis (097 9) (A53 9)    Past Medical History    1  History of Anxiety (300 00) (F41 9)   2  History of Chest tightness (786 59) (R07 89)   3  History of Cough (786 2) (R05)   4  History of drug abuse (305 93) (Z87 898)   5  History of stomatitis (V12 79) (Z87 19)   6  History of syphilis (V12 09) (Z86 19)   7  History of tuberculosis (V12 01) (Z86 11)   8  History of Wound of right leg (891 0) (S81 801A)    The active problems and past medical history were reviewed and updated today  Surgical History    The surgical history was reviewed and updated today  Family History  Mother    1  Adopted (V68 89) (Z02 82)  Father    2  Adopted (U38 06) (Z02 82)    The family history was reviewed and updated today  Social History    · Alcohol use (V49 89) (Z78 9)   · History of drug use (305 93) (Z87 898)   · Housing   · Never a smoker   · No drug use   · Sexual abstinence   · Vegan (V49 89) (Z78 9)  The social history was reviewed and updated today  The social history was reviewed and is unchanged  Current Meds   1  Descovy 200-25 MG Oral Tablet; Take 1 tablet daily; Therapy: 92Ruo1370 to (Evaluate:95Bvk9431)  Requested for: 70YQG4980; Last   Rx:[id number] Ordered   2  Tivicay 50 MG Oral Tablet; take 1 tablet by mouth daily; Therapy: 41Jez7833 to (Evaluate:72Onh0437)  Requested for: 43Tbo8059; Last   Rx:[id number] Ordered    The medication list was reviewed and updated today        Allergies    1  codeine    Vitals  Signs   Recorded: 47XTA5842 09:23AM   Temperature: 98 4 F  Heart Rate: 80  Systolic: 810  Diastolic: 80  Weight: 353 lb 4 oz  BMI Calculated: 23 63  BSA Calculated: 2 01    Physical Exam    Constitutional   General appearance: No acute distress, well appearing and well nourished  Eyes   Conjunctiva and lids: No swelling, erythema or discharge  Pupils and irises: Equal, round and reactive to light  Ears, Nose, Mouth, and Throat   External inspection of ears and nose: Normal     Otoscopic examination: Tympanic membranes translucent with normal light reflex  Canals patent without erythema  Nasal mucosa, septum, and turbinates: Abnormal   There was clear rhinorrhea from both nares  The bilateral nasal mucosa was edematous and red  Oropharynx: Abnormal   The posterior pharynx was not erythematous and did not have an exudate  Oral mucosa was moist, but was normal  The palate examination showed no abnormalities  The tongue was normal    Pulmonary   Respiratory effort: No increased work of breathing or signs of respiratory distress  Auscultation of lungs: Clear to auscultation  Cardiovascular   Auscultation of heart: Normal rate and rhythm, normal S1 and S2, without murmurs  Examination of extremities for edema and/or varicosities: Normal     Abdomen   Abdomen: Non-tender, no masses  Liver and spleen: No hepatomegaly or splenomegaly  Lymphatic   Palpation of lymph nodes in neck: No lymphadenopathy  Musculoskeletal   Gait and station: Normal     Digits and nails: Normal without clubbing or cyanosis  Inspection/palpation of joints, bones, and muscles: Normal     Muscle strength: Normal strength throughout  Skin crusted dermatomal lesion superior to umbilicus  Neurologic   Cranial nerves: Cranial nerves 2-12 intact  Reflexes: 2+ and symmetric  Sensation: No sensory loss  Psychiatric   Orientation to person, place, and time: Normal     Mood and affect: Normal     Lips, Teeth and Gums: The lips were normal with no lesions   Examination of the teeth revealed normal dentition  Examination of the gingiva showed no abnormalities  Attending Note  Collaborating Physician: I agree with the Advanced Practitioner note        Future Appointments    Date/Time Provider Specialty Site   02/07/2017 05:15 PM John Becerra MD Internal Medicine [facility]   03/15/2017 09:30 AM [facility]AN [place] [facility] [place] [address] [id number]   Electronically signed by : Marina Bermeo; Dec 14 2016 12:41PM EST                       (Author)    Electronically signed by : Maksim Vazquez DO; Dec 14 2016  5:43PM EST                       (Author)

## 2018-01-12 NOTE — PROGRESS NOTES
Patient Health Assessment    Date:            01/27/2017  Blood Pressure:  111/70  Pulse:           59  Age:             46  Weight:          172 lbs  Height/Length:   5' 11"  Body Mass Index: 24 0  Provider:        [email or web address]  Clinic:          [facility]      RECALL EXAM, ADULT PROPHY AND 4 BWX  Medical Alert: [facility]  Medications: Peridex 16 oz    Tivicay    Truvada  Allergies:      Codeine  Since Last Visit: Medical Alert: No Change    Medications: No Change    Allergies:        No Change  Pain Scale Type: Numeric Pain ScalePain Level: 0  Description: LL possibly broken tooth/ no pain/ just sharp to tongue  scaled, polished and flossed - cavitroned- light calculus, and stains, bleeding    fairly good home care/ bone levels look good  IntraOral exam= performed with negative findings  dr Julienne Rogers exam=  nv= op with Julienne Rogers    ----- Signed on Friday, January 27, 2017 at 9:55:41 AM  -----  ----- Provider: [email or web address] - Ken Sanabria RDH -- Clinic: [facility] -----

## 2018-01-12 NOTE — PROGRESS NOTES
Patient Health Assessment    Date:            07/20/2016  Blood Pressure:  119/83  Pulse:           95  Age:             45  Weight:          0 lbs  Height/Length:   0' 0"  Body Mass Index: 0 0  Provider:        ZANDRA  Clinic:          [facility]  Medical Alert: [facility]  Medications: Peridex 16 oz    Tivicay    Truvada  Allergies:      Codeine  Since Last Visit: Medical Alert: No Change    Medications: No Change    Allergies:        No Change  Pain Scale Type: Numeric Pain ScalePain Level: 0  Description:      Caries starting in places, pt told again to get rid of the "[employer]"  30 distal is cracked, resin on 4 is  from the tooth, pt could not  keep his mouth open for the appointment  He was told again to wear his  appliance  Lingual of 9 is suspect, attrition from wear  Perio seems stable and pt is happy with his appearance  AS this is a difficult jaw relationship it is requested that he be scheduled  w an attending              nv  simple restorative     ----- Signed on Wednesday, July 20, 2016 at 8:59:29 AM  -----  ----- Provider: ZANDRA - Renuka Mckeon DMD -- Clinic: [facility] -----

## 2018-01-12 NOTE — PROGRESS NOTES
Patient Health Assessment    Date:            08/31/2016  Blood Pressure:  115/71  Pulse:           57  Age:             45  Weight:          172 lbs  Height/Length:   5' 11"  Body Mass Index: 24 0  Provider:        ZANDRA  Clinic:          [facility]  Medical Alert: [facility]  Medications: Peridex 16 oz    Tivicay    Truvada  Allergies:      Codeine  Since Last Visit: Medical Alert: No Change    Medications: No Change    Allergies:        No Change  Pain Scale Type: Numeric Pain ScalePain Level: 0  Description:     30 MOD  etch vivid bond shofu gurjit and lopez A2   1 7 ml 4% octavio 100 epi            nv  more resins    ----- Signed on Wednesday, August 31, 2016 at 9:14:30 AM  -----  ----- Provider: ZANDRA - Ab Tyler DMD -- Clinic: [facility] -----

## 2018-01-13 VITALS
SYSTOLIC BLOOD PRESSURE: 110 MMHG | DIASTOLIC BLOOD PRESSURE: 82 MMHG | BODY MASS INDEX: 22.96 KG/M2 | OXYGEN SATURATION: 96 % | HEIGHT: 72 IN | WEIGHT: 169.5 LBS | TEMPERATURE: 98.8 F | HEART RATE: 66 BPM

## 2018-01-13 NOTE — MISCELLANEOUS
Provider Comments  Provider Comments:   Pt nc/ns for today's appt        Signatures   Electronically signed by : Zahida Vides; Jun 28 2016  2:06PM EST                       (Author)

## 2018-01-13 NOTE — PROGRESS NOTES
History of Present Illness  [facility]: The patient is being seen regarding smoking cessation follow up and wellness screener  [facility]-   [facility]:         1  I like who I am  Yes, describes me exactly (12)   2  I am not an easy person to get along with    No, doesn't describe me at all (22)   3  I am basically a healthy person    Yes, describes me exactly (32)   4  I give up to easily    No, doesn't describe me at all (42)   5  I have difficulty concentrating    Somewhat describes me (51)   6  I am happy with my family relationships    Yes, describes me exactly (62)   7  I am comfortable being around people    Yes, describes me exactly (72)     8  Walking up a flight of stairs    None (82)   9  Running the length of a football field    None (92)     10  Sleeping    None (102)   11  Hurting or aching in any part of your body    None (112)   12  Getting tired easily    None (122)   13  Feeling depressed or sad    None (132)   14  Nervousness    Some (141)     15  Socialize with other people (talk or visit with friends or relatives A Lot ()   12  Take part in social, Catholic, or recreation activities (meetings, Hindu, movies, sports, parties)    Karrie Nissen A Lot (162)   17  Stay in your home, nursing home, or hospital because of sickness, injury, or other health problem None (172)   [address]     8 = 2   9 = 2   10 = 2   11 = 2   12 = 2   Sum = 10   PHYSICAL HEALTH SCORE 100      1 = 2   4 = 2   5 = 1   13 = 2   14 = 1   Sum = 8   MENTAL HEALTH SCORE 80      2 = 2   6 = 2   7 = 2   15 = 2   16 = 2   Sum = 10   SOCIAL HEALTH SCORE 100     Physical Health score = [id number]   Mental Health score = 80   Social Health score = 100   Sum = 280   GENERAL HEALTH SCORE 93      3 = 2   PERCEIVED HEALTH SCORE 100      1 = 2   2 = 2   4 = 2   6 = 2   7 = 2   Sum = 10   SELF-ESTEEM SCORE 100            2 = 2 and 0   5 = 1 and 1   7 = 2 and 0   10 = 2 and 0   12 = 2 and 0   14 = 1 and 1   Sum = 2   ANXIETY SCORE 17      4 = 2 and 0   5 = 1 and 1   10 = 2 and 0   12 = 2 and 0   13 = 2 and 0   Sum = 1   DEPRESSION SCORE 10      4 = 2, 0, 1 and 1   7 = 2 and 0   10 = 2 and 0   12 = 2 and 0   13 = 2 and 0   14 = 1 and 1   Sum = 2   ANXIETY-DEPRESSION ([facility]) SCORE 14      11 = 2 and 0   PAIN SCORE 0      17 = 2 and 0   DISABILITY SCORE 0  Smoking Cessation (Brief): The patient is being seen for clinic follow-up for tobacco cessation assistance  Current treatment includes varenicline (Chantix)  The patient has quit tobacco use for 2+ months  Physical Exam    Objective: Orientation: oriented to person, oriented to place and oriented to time  Appearance: well developed, appears healthy, well nourished, well groomed and no decreased eye contact  Observed mood and affect: euthymic, but appropriate  Harm to self or others: denied  Substance abuse: none reported or observed  Assessment    1  HIV disease (042) (B20)   2  Nicotine dependence (305 1) (F17 200)   3  Syphilis (097 9) (A53 9)   4  Shingles (053 9) (B02 9)    Plan    1  Tivicay 50 MG Oral Tablet; take 1 tablet by mouth daily   2  Menactra Intramuscular Injectable    Discussion/Summary  [facility]: Today, patient presents with no major issues or concerns  Discussion Summary [facility]:   PT was seen for smoking cessation follow up and to complete wellness screener  PT was glad to report that he is still off tobacco and that the CRNP just evaluated his lungs and told him that they sound great   [facility] then asked PT if he noticed any physical differences since he stopped smoking to which PT responded that he has been able to do more aerobics and circuit training, things he shied away from in the past  PT did point out that although he has been experiencing this smoking cessation success, he would not say that he has lost all cravings  [employer] [facility] asked PT how he's coping  PT stated that he is modifying his habits that revolved around smoking such as drinking coffee as soon as he wakes up and having a cigarette along with it  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

## 2018-01-13 NOTE — PROGRESS NOTES
Patient Health Assessment    Date:            03/10/2017  Blood Pressure:  110/73  Pulse:           62  Age:             46  Weight:          172 lbs  Height/Length:   5' 11"  Body Mass Index: 24 0  Provider:        ZANDRA  Clinic:          [facility]  Medical Alert: [facility]  Medications: Peridex 16 oz    Tivicay    Truvada  Allergies:      Codeine  Since Last Visit: Medical Alert: No Change    Medications: No Change    Allergies:        No Change  Pain Scale Type: Numeric Pain ScalePain Level: 0  Description:    Rebuild of 20  etch (full) viva bond peason A2  1 7 ml 4% octavio 100 epi  Very large restoration, could not close the contact  Tooth needs endo and  the restoration but pt does not feel pain on this tooth at this time  He  will let us know if it bothers him         nv prophy    ----- Signed on Friday, March 10, 2017 at 9:16:07 AM  -----  ----- Provider: ZANDRA - Melisa Tony DMD -- Clinic: [facility] -----

## 2018-01-13 NOTE — PROGRESS NOTES
Assessment    1  Atypical mole (216 9) (D22 9)   2  HIV disease (042) (B20)   3  Nicotine dependence (305 1) (F17 200)    Plan   Nicotine dependence    · Chantix Starting Month Godfrey 0 5 MG X 11 & 1 MG X 42 Oral Tablet; take as directed    *0 - 254 [facility] Physician Referral  Consult Only: the expectation is that the referring provider will communicate back to the patient on treatment options  Evaluation and Treatment: the expectation is that the referred to provider will communicate back to the patient on treatment options  Status: Hold For - Scheduling  Requested for: 93VFF9805  Ordered; For: Atypical mole;  Ordered By: Caren Howell  Performed:   Due: 03Apr2017  *[facility] Physician Referral  Consult Only: the expectation is that the referring provider will communicate back to the patient on treatment options  Evaluation and Treatment: the expectation is that the referred to provider will communicate back to the patient on treatment options  Status: Hold For - Scheduling  Requested for: 93UQF1064  Ordered; For: Nicotine dependence;  Ordered By: Caren Howell  Performed:   Due: [facility], [address] [place] [address] Physician Referral  Consult Only: the expectation is that the referring provider will communicate back to the patient on treatment options  Evaluation and Treatment: the expectation is that the referred to provider will communicate back to the patient on treatment options  Status: Hold For - Scheduling  Requested for: 60MCF8750  Ordered; For: HIV disease;  Ordered By: Caren Howell  Performed:   Due: 03Apr2017  Follow-up visit in 3 months Evaluation and Treatment  Follow-up  Status: Hold For - Scheduling  Requested for: 96AOS0392  Ordered; For: HIV disease, Nicotine dependence;  Ordered By: Caren Howell  Performed:   Due: 03Apr2017     Discussion/Summary    Sherry Aragon is a 42year old male here today for 3 month PCP f/u       HIV: CD4 531 VL 32 Adherent with Descovy and Tivicay  Takes medication first thing after rising from bed  Instructed to keep all supplements separate from HIV medication by at least 12 hours  Skin lesion: Magdy complained of a small 0 2 cm red area on nose at last PCP appointment  Area has not healed and Magdy notes some discoloration around lesion  Refer to dermatology for further evaluation  Nicotine dependence: Counseled for greater than 15 minutes on the importance of smoking cessation  Educated on the health risks of smoking including COPD and elevated cardiac risk  Sheran Collet is willing to try Chantix in an effort to quit smoking  Will refer to Kanwal Mcmahon Dr for additional intervention  Health maintenance: Sheran Collet has slight concern regarding increase in body fat  Explained that Sheran Collet is at a healthy weight  Sheran Collet denies body dysmorphia and states he likes what he sees in the mirror  Continues to exercise daily and eat a low-fat high-protein vegan diet  Refer to dietician for further education  PCP f/u scheduled for one month  Possible side effects of new medications were reviewed with the patient/guardian today  The treatment plan was reviewed with the patient/guardian  The patient/guardian understands and agrees with the treatment plan   The patient was counseled regarding instructions for management, risks and benefits of treatment options, importance of compliance with treatment  Education   general HIV education  adherence  prevention care  Chief Complaint  Pt c/o spot on nose x few months  Pt c/o lower back pain x 1 month  History of Present Illness  Sheran Collet is a pleasant 61-year-old male who presents to the clinic today for primary care follow-up  Past medical history significant for HIV  Complains of nonhealing 0 2 cm red area on bridge of nose  Spot has remained the same in the size but Magdy now notices surrounding discoloration  Sheran Collet also complains of slight back pain upon rising from sleep   States this is most likely due to to poor quality of mattress and sleeping on the couch and floor  Pain Assessment   the patient states they do not have pain  Abuse And Domestic Violence Screen    Yes, the patient is safe at home  The patient states no one is hurting them  Depression And Suicide Screen  No, the patient has not had thoughts of hurting themself  No, the patient has not felt depressed in the past 7 days  The patient is being seen for a routine clinic follow-up of HIV infection  no fever no lethargy no depression no night sweats no weight loss no decreased appetite no cough no shortness of breath   The patient presents with complaints of skin lesions (0 2 cm Erythemic area on the bridge of the nose) no mouth sores no thrush no nausea no vomiting no diarrhea no headache no visual changes Current treatment includes antiretroviral regimen  By report, there is good compliance with treatment and good tolerance of treatment  CD4: 531  VL: 32  Time spent: 20 minutes  Strength: Strong desire to be well  Weakness: Variable schedule, takes multiple supplements  Plan of Action: Educated in the importance of taking supplements 12 hours apart from ART  SUBSTANCE ABUSE: ETOH use  amount: few drinks  week  not using drugs  SMOKING: He is not a current smoker  SEXUALLY ACTIVE: He is not sexually active  HOUSING: He has stable housing  There are 1 people living in the household (including children)  The household income is $1500 00/month  HEALTH MAINTENANCE: His last dental exam was 3/2017  His last eye exam was 9/2016  Associated symptoms include single skin lesion, but no fever and no bleeding  The patient is being seen for initial evaluation for tobacco cessation assistance  The patient smokes cigarettes  The patient has high interest in quitting  He has tried to quit several times  Patient perceived smoking benefits include stress management and fitting in with peers   Patient recognizes that smoking cessation benefits include saving money and improved health  The patient is not currently being treated for this problem  Review of Systems    Constitutional: No fever or chills, feels well, no tiredness, no recent weight gain or weight loss  Eyes: No complaints of eye pain, no red eyes, no discharge from eyes, no itchy eyes  ENT: no complaints of earache, no hearing loss, no nosebleeds, no nasal discharge, no sore throat, no hoarseness  Cardiovascular: No complaints of slow heart rate, no fast heart rate, no chest pain, no palpitations, no leg claudication, no lower extremity  Respiratory: No complaints of shortness of breath, no wheezing, no cough, no SOB on exertion, no orthopnea or PND  Gastrointestinal: No complaints of abdominal pain, no constipation, no nausea or vomiting, no diarrhea or bloody stools  Genitourinary: No complaints of dysuria, no incontinence, no hesitancy, no nocturia, no genital lesion, no testicular pain  Musculoskeletal: occasional back pain  Integumentary: as noted in HPI, no rashes, no itching, no dry skin and no skin wound  Neurological: No compliants of headache, no confusion, no convulsions, no numbness or tingling, no dizziness or fainting, no limb weakness, no difficulty walking  Psychiatric: Is not suicidal, no sleep disturbances, no anxiety or depression, no change in personality, no emotional problems  Endocrine: No complaints of proptosis, no hot flashes, no muscle weakness, no erectile dysfunction, no deepening of the voice, no feelings of weakness  Hematologic/Lymphatic: No complaints of swollen glands, no swollen glands in the neck, does not bleed easily, no easy bruising  Active Problems    1  History of Anxiety (300 00) (F41 9)   2  Encounter for screening examination for sexually transmitted disease (V74 5) (Z11 3)   3  Encounter for screening for cardiovascular disorders (V81 2) (Z13 6)   4  History of stomatitis (V12 79) (Z87 19)   5  HIV disease (042) (B20)   6  MRSA infection (041 12) (A49 02)   7  Musculoskeletal pain (729 1) (M79 1)   8  Need for prophylactic vaccination and inoculation against influenza (V04 81) (Z23)   9  Nicotine dependence (305 1) (F17 200)   10  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)   11  Shingles (053 9) (B02 9)   12  Syphilis (097 9) (A53 9)    Past Medical History    1  History of Anxiety (300 00) (F41 9)   2  History of Chest tightness (786 59) (R07 89)   3  History of Cough (786 2) (R05)   4  History of drug abuse (305 93) (Z87 898)   5  History of stomatitis (V12 79) (Z87 19)   6  History of syphilis (V12 09) (Z86 19)   7  History of tuberculosis (V12 01) (Z86 11)   8  History of Wound of right leg (891 0) (S81 801A)    The active problems and past medical history were reviewed and updated today  Surgical History    The surgical history was reviewed and updated today  Family History  Mother    1  Adopted (V68 89) (Z02 82)  Father    2  Adopted (V30 16) (Z02 82)    The family history was reviewed and updated today  Social History    · Alcohol use (V49 89) (Z78 9)   · History of drug use (305 93) (Z87 898)   · Housing   · Never a smoker   · No drug use   · Sexual abstinence   · Vegan (V49 89) (Z78 9)  The social history was reviewed and updated today  The social history was reviewed and is unchanged  Current Meds   1  Descovy 200-25 MG Oral Tablet; Take 1 tablet daily; Therapy: 30Xaf5115 to (Evaluate:42Hik5419)  Requested for: 37WQA1062; Last   Rx:06Lth5920 Ordered   2  Tivicay 50 MG Oral Tablet; take 1 tablet by mouth daily; Therapy: 98Zit4386 to (Evaluate:21Jun2017)  Requested for: 89Vlq9739; Last   Rx:02Emn6551 Ordered    The medication list was reviewed and updated today        Allergies    1  codeine    Vitals  Signs   Recorded: 28CVE3262 09:31AM   Temperature: 98 F  Heart Rate: 64  Systolic: 577  Diastolic: 70  Height: 6 ft   Weight: 171 lb 4 oz  BMI Calculated: 23 23  BSA Calculated: 1 99    Physical Exam    Constitutional   General appearance: No acute distress, well appearing and well nourished  Eyes   Conjunctiva and lids: No swelling, erythema or discharge  Pupils and irises: Equal, round and reactive to light  Ears, Nose, Mouth, and Throat   External inspection of ears and nose: Normal     Otoscopic examination: Tympanic membranes translucent with normal light reflex  Canals patent without erythema  Nasal mucosa, septum, and turbinates: Normal without edema or erythema  Oropharynx: Normal with no erythema, edema, exudate or lesions  Pulmonary   Respiratory effort: No increased work of breathing or signs of respiratory distress  Auscultation of lungs: Clear to auscultation  Cardiovascular   Auscultation of heart: Normal rate and rhythm, normal S1 and S2, without murmurs  Examination of extremities for edema and/or varicosities: Normal     Abdomen   Abdomen: Non-tender, no masses  Liver and spleen: No hepatomegaly or splenomegaly  Lymphatic   Palpation of lymph nodes in neck: No lymphadenopathy  Musculoskeletal   Gait and station: Normal     Digits and nails: Normal without clubbing or cyanosis  Inspection/palpation of joints, bones, and muscles: Normal     Muscle strength: Normal strength throughout  Skin   Skin and subcutaneous tissue: Normal without rashes or lesions  0 2 cm lesion on bridge of nose  Psychiatric   Orientation to person, place, and time: Normal     Mood and affect: Normal     Lips, Teeth and Gums: The lips were normal with no lesions  Examination of the teeth revealed normal dentition  Examination of the gingiva showed no abnormalities        Future Appointments    Date/Time Provider Specialty Site   05/09/2017 05:15 PM Sameera Rodriguez MD Infectious Disease [facility] AT [facility]   06/28/2017 09:30 AM ROMAN Figueroa [facility]iology[facility] AT [address] [facility] Electronically signed by : Cherri Coombs; Mar 29 2017 10:53AM EST                       (Author)    Electronically signed by : Mai Hill DO; Mar 29 2017  5:08PM EST                       (Author)

## 2018-01-13 NOTE — PROGRESS NOTES
Assessment    1  HIV disease (042) (B20)   2  Musculoskeletal pain (729 1) (M79 1)   3  Syphilis (097 9) (A53 9)    Plan    1  Cyclobenzaprine HCl - 5 MG Oral Tablet   2  Ibuprofen 800 MG Oral Tablet; TAKE 1 TABLET 3 TIMES DAILY WITH MEALS    3  Bicillin L-A 7332698 UNIT/4ML Intramuscular Suspension    Discussion/Summary    Mr Jaden Saavedra is a 38 yo white man with HIV and history of repeated low level viremia  1  DTG/TDF/FTC with subsequent viral suppression Now reporting episode of non-adherence over the holidays and returned low level viremia  2  No longer on iron supplementation  3  RPR titer 1:4, >4-fold decline  4  Low energy reported and concern for low testosterone, will check free and total testosterone with blood draw  Will follow-up on whether this was completed  5  Bone health patient on TDF with strict vegan diet  May well have no need for increase in calcium intake as he eats leafy green vegetables  Consider switch to TAF/FTC  Possible side effects of new medications were reviewed with the patient/guardian today  The treatment plan was reviewed with the patient/guardian  The patient/guardian understands and agrees with the treatment plan     Education   general HIV education  adherence  prevention care  Chief Complaint  Pt c/o lower back pain  Patient is here today for follow up of chronic conditions described in HPI  History of Present Illness    Pain Assessment   the patient states they have pain  The pain is located in the lower back  The patient describes the pain as dull  (on a scale of 0 to 10, the patient rates the pain at 4 )   Abuse And Domestic Violence Screen    Yes, the patient is safe at home  The patient states no one is hurting them  Depression And Suicide Screen  No, the patient has not had thoughts of hurting themself  No, the patient has not felt depressed in the past 7 days  The patient is being seen for a routine clinic follow-up of HIV infection  The last clinic visit was 3 month(s) ago  Management changes made at the last visit include ordering HIV VL and CD4  The patient is currently experiencing symptoms  of lower back pain Onset was gradual 1 week(s) ago  The symptoms occur intermittently  Current treatment includes antiretroviral regimen  By report, there is fair compliance with treatment, good tolerance of treatment and good symptom control  The initial diagnosis of HIV was year(s) ago  The last CD4 count was 421 and performed on 1/7/16  The CD4 trend has been stable  The last viral load was 49 and performed on 1/7/16  The viral load has been low level viremia  Since diagnosis the disease has been stable  Recently, the disease has been stable  There are no known disease complications  Pertinent medical history:  syphilis, but no hepatitis B, no hepatitis C, no tuberculosis, no pneumonia, no herpes simplex, no alcohol dependency, no drug dependency and no IV drug use  The patient is currently able to do activities of daily living without limitations  He was previously evaluated in this clinic 3 month(s) ago  Past evaluation has included CD4 count, plasma viral load, complete blood count, urinalysis, metabolic profile, liver function panel, hepatitis panel, tuberculosis skin testing and syphilis serology  Past treatment has included antiretroviral regimen of   Review of Systems    Constitutional: No fever or chills, feels well, no tiredness, no recent weight gain or weight loss  Eyes: No complaints of eye pain, no red eyes, no discharge from eyes, no itchy eyes  ENT: no complaints of earache, no hearing loss, no nosebleeds, no nasal discharge, no sore throat, no hoarseness  Cardiovascular: No complaints of slow heart rate, no fast heart rate, no chest pain, no palpitations, no leg claudication, no lower extremity  Respiratory: No complaints of shortness of breath, no wheezing, no cough, no SOB on exertion, no orthopnea or PND  Gastrointestinal: No complaints of abdominal pain, no constipation, no nausea or vomiting, no diarrhea or bloody stools  Genitourinary: No complaints of dysuria, no incontinence, no hesitancy, no nocturia, no genital lesion, no testicular pain  Musculoskeletal: as noted in HPI  Integumentary: No complaints of skin rash or skin lesions, no itching, no skin wound, no dry skin  Neurological: No compliants of headache, no confusion, no convulsions, no numbness or tingling, no dizziness or fainting, no limb weakness, no difficulty walking  Psychiatric: Is not suicidal, no sleep disturbances, no anxiety or depression, no change in personality, no emotional problems  Endocrine: No complaints of proptosis, no hot flashes, no muscle weakness, no erectile dysfunction, no deepening of the voice, no feelings of weakness  Hematologic/Lymphatic: No complaints of swollen glands, no swollen glands in the neck, does not bleed easily, no easy bruising  Active Problems    1  History of Anxiety (300 00) (F41 9)   2  History of stomatitis (V12 79) (Z87 19)   3  HIV disease (042) (B20)   4  Musculoskeletal pain (729 1) (M79 1)   5  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)   6  Syphilis (097 9) (A53 9)    Past Medical History    1  History of Anxiety (300 00) (F41 9)   2  History of Chest tightness (786 59) (R07 89)   3  History of Cough (786 2) (R05)   4  History of drug abuse (305 93) (F19 90)   5  History of stomatitis (V12 79) (Z87 19)   6  History of syphilis (V12 09) (Z86 19)   7  History of tuberculosis (V12 01) (Z86 11)   8  History of Wound of right leg (891 0) (S81 801A)    The active problems and past medical history were reviewed and updated today  Surgical History    The surgical history was reviewed and updated today  Family History    1  Adopted (V68 89) (Z02 82)    2  Adopted (R27 32) (Z02 82)    The family history was reviewed and updated today         Social History · Alcohol use (V49 89) (F10 99)   · History of drug use (305 93) (F19 21)   · Housing   · Never a smoker   · No drug use   · Sexual abstinence   · Vegan (V49 89) (Z78 9)  The social history was reviewed and updated today  The social history was reviewed and is unchanged  Current Meds   1  Bicillin L-A [id number] UNIT/4ML Intramuscular Suspension; 2 4 million units IM weekly x 3   doses; IM dose to be received at our office; Therapy: 41LRU6665 to (Last Rx:58Ows1063)  Requested for: 96Hlq9873 Ordered   2  Cyclobenzaprine HCl - 5 MG Oral Tablet; TAKE 1 TABLET 3 TIMES DAILY; Therapy: 53YLL7720 to (Evaluate:10Jan2016)  Requested for: 13Dpm5749; Last   Rx:02Sqk2423 Ordered   3  Multivitamin TABS; TAKE 1 TABLET DAILY; Therapy: (Recorded:50Uvf3902) to Recorded   4  Tivicay 50 MG Oral Tablet; TAKE 1 TABLET BY MOUTH DAILY (DISCONTINUE PREZISTA   AND NORVIR); Therapy: 71Trz4995 to (Evaluate:15Eem6617)  Requested for: 70Flw7228; Last   Rx:20Kbz0312 Ordered   5  Truvada 200-300 MG Oral Tablet; take 1 tablet by mouth daily; Therapy: 17MVK5517 to (Evaluate:19Jan2016)  Requested for: 85ODJ2279; Last   Rx:94Sub6498 Ordered    The medication list was reviewed and updated today  Allergies    1  codeine    Vitals  Signs [Data Includes: Current Encounter]   Recorded: 12Jan2016 01:27PM   Temperature: 97 6 F  Heart Rate: 64  Systolic: 997  Diastolic: 68  Weight: 044 lb   BMI Calculated: 23 06  BSA Calculated: 1 99    Physical Exam    Constitutional   General appearance: No acute distress, well appearing and well nourished  Eyes   Conjunctiva and lids: No swelling, erythema or discharge  Pupils and irises: Equal, round and reactive to light  Ears, Nose, Mouth, and Throat   External inspection of ears and nose: Normal     Otoscopic examination: Tympanic membranes translucent with normal light reflex  Canals patent without erythema  Nasal mucosa, septum, and turbinates: Normal without edema or erythema  Oropharynx: Normal with no erythema, edema, exudate or lesions  Pulmonary   Respiratory effort: No increased work of breathing or signs of respiratory distress  Auscultation of lungs: Clear to auscultation  Cardiovascular   Palpation of heart: Normal PMI, no thrills  Auscultation of heart: Normal rate and rhythm, normal S1 and S2, without murmurs  Examination of extremities for edema and/or varicosities: Normal     Carotid pulses: Normal     Abdomen   Abdomen: Non-tender, no masses  Liver and spleen: No hepatomegaly or splenomegaly  Lymphatic   Palpation of lymph nodes in neck: No lymphadenopathy  Musculoskeletal   Gait and station: Normal     Digits and nails: Normal without clubbing or cyanosis  Inspection/palpation of joints, bones, and muscles: Normal     Muscle strength: Normal strength throughout  Skin   Skin and subcutaneous tissue: Normal without rashes or lesions  Neurologic   Cranial nerves: Cranial nerves 2-12 intact  Reflexes: 2+ and symmetric  Sensation: No sensory loss  Psychiatric   Orientation to person, place, and time: Normal     Mood and affect: Normal        Future Appointments    Date/Time Provider Specialty Site   03/21/2016 12:30 PM Christine Dior [place] [address]   04/12/2016 02:00 PM Delroy Ambrosio MD PhD Internal Medicine [facility] SERVICES [place]     HIV Health Forms    1  How often do you feel that you have difficulty taking your HIV medications on time? By 'on time' we mean no more than 2 hours before or 2 hours after the time your doctor told you to take it  (Que tan an menudo tiene usted dificulatad tomando prasad medicamentos de VIH a tiempo? "A tiempo" queremos decir no mas de 2 horas antes o dos horas despues del tiempo que scott doctor le dijo los tomara )   4  Never (Nunce)     2  On average, how many days per week would you say that you missed at least one dose of your HIV medication?  (En promedio, cuantos rivera a la semana diria useted que dejo de ana al menos chaz dosis de scott medicamento de VIH?   6  Never (Nunce)     3  When was the last time you missed at least one dose of your HIV medication? (Cucando fue la ultima vez que usted dego de ana por lo menos chaz dosis de scott medicamento de VIH?   3  3-4 weeks ago (3 a 4 semanas atras)   Score (Resultado) = 13        Signatures   Electronically signed by : Milena Awad MD PhD; Jan 13 2016  9:30PM EST                       (Author)

## 2018-01-13 NOTE — PROGRESS NOTES
Assessment    1  HIV disease (042) (B20)    Plan    1  (1) CHLAMYDIA/GC AMPLIFIED DNA, PCR; Source:Urine, Unspecified Source;   Status:Active; Requested WNM:80LOL0578;     2  Follow-up visit in 3 months Evaluation and Treatment  Follow-up  Status: Hold For -   Scheduling  Requested for: 52LFC4097   3  (1) CBC/PLT/DIFF; Status:Active; Requested ZRR:71ZCO0225;    4  (1) COMPREHENSIVE METABOLIC PANEL; Status:Active; Requested MEGHA:71RAI8703;    5  (1) HIV-1 RNA QUANTITATIVE; [Do Not Release]; Status:Active; Requested   IXW:65ABR0965;    6  (1) QUANTIFERON - TB GOLD; Status:Active; Requested ZKI:28SDV2944;    7  (1) T LYMPH SUBSET (CD4); Status:Active; Requested WNH:99ZVE9390;    8  (1) URINALYSIS (will reflex a microscopy if leukocytes, occult blood, protein or nitrites are   not within normal limits); Status:Active; Requested OJU:96NWO0809;     9  Flulaval Quadrivalent Intramuscular Suspension; INJECT 0 5  ML   Intramuscular; To Be Done: 07ZOT9206    Discussion/Summary    HIV-doing relatively well and Tivicay and Descovy  His CD4 count has climbed over 500  However, he still remains with a low level detectable viral load  I suspect that his inconsistent timing of his doses during the day may be contributing to that  I also encouraged patient to make sure he's not taking any vitamins within 5 or 6 hours of taking the Tivicay  Recheck labs in 2 months and follow-up in 3 months  Education   general HIV education  adherence  prevention care  Chief Complaint  Pt here for routine f/u  History of Present Illness  Routine follow-up for HIV  Patient claims 100% adherence with Descovy and Tivicay  However he has been taking the medication inconsistent times sometimes in the morning and sometimes in the evening  He also takes some vitamins  He denies any notable side effects  Pain Assessment   the patient states they do not have pain  Abuse And Domestic Violence Screen    Yes, the patient is safe at home  The patient states no one is hurting them  Depression And Suicide Screen  No, the patient has not had thoughts of hurting themself  No, the patient has not felt depressed in the past 7 days  no fever no night sweats no weight loss no cough no shortness of breath no thrush no nausea no vomiting no diarrhea      Active Problems    1  History of Anxiety (300 00) (F41 9)   2  Encounter for screening examination for sexually transmitted disease (V74 5) (Z11 3)   3  History of stomatitis (V12 79) (Z87 19)   4  HIV disease (042) (B20)   5  MRSA infection (041 12) (A49 02)   6  Musculoskeletal pain (729 1) (M79 1)   7  Need for prophylactic vaccination and inoculation against influenza (V04 81) (Z23)   8  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)   9  Syphilis (097 9) (A53 9)    Past Medical History    1  History of Anxiety (300 00) (F41 9)   2  History of Chest tightness (786 59) (R07 89)   3  History of Cough (786 2) (R05)   4  History of drug abuse (305 93) (Z87 898)   5  History of stomatitis (V12 79) (Z87 19)   6  History of syphilis (V12 09) (Z86 19)   7  History of tuberculosis (V12 01) (Z86 11)   8  History of Wound of right leg (891 0) (S81 801A)    Family History  Mother    1  Adopted (V68 89) (Z02 82)  Father    2  Adopted (V68 89) (Z02 82)    Social History    · Alcohol use (V49 89) (Z78 9)   · History of drug use (305 93) (F19 21)   · Housing   · Never a smoker   · No drug use   · Sexual abstinence   · Vegan (V49 89) (Z78 9)    Current Meds   1  Descovy 200-25 MG Oral Tablet; Take 1 tablet daily; Therapy: 45Zmr2621 to (Evaluate:79Shy2618)  Requested for: 98SZC3424; Last   Rx:95Nyn4714 Ordered   2  Tivicay 50 MG Oral Tablet; take 1 tablet by mouth daily;    Therapy: 30Tyr0647 to (Evaluate:45Lgv4154)  Requested for: 94Vqf5214; Last   Rx:27Csc8599 Ordered    Allergies    1  codeine    Vitals  Signs   Recorded: 34ENR3480 07:88IR   Systolic: 487  Diastolic: 70  Heart Rate: 68  Temperature: 98 8 F  O2 Saturation: 98  Weight: 166 lb   BMI Calculated: 22 51  BSA Calculated: 1 97    Physical Exam    Constitutional   General appearance: No acute distress, well appearing and well nourished  Ears, Nose, Mouth, and Throat   Oropharynx: Normal with no erythema, edema, exudate or lesions  Pulmonary   Respiratory effort: No increased work of breathing or signs of respiratory distress  Auscultation of lungs: Clear to auscultation  Cardiovascular   Auscultation of heart: Normal rate and rhythm, normal S1 and S2, without murmurs  Examination of extremities for edema and/or varicosities: Normal     Abdomen   Abdomen: Non-tender, no masses  Liver and spleen: No hepatomegaly or splenomegaly  Lymphatic   Palpation of lymph nodes in neck: No lymphadenopathy         Future Appointments    Date/Time Provider Specialty Site   11/23/2016 09:30 AM ROMAN Salguero Epidemiology/Public Health [facility] [address] AT [address] [facility]   Electronically signed by : Donna Paz MD; Nov 1 2016  5:46PM EST                       (Author)

## 2018-01-14 VITALS
WEIGHT: 167.5 LBS | SYSTOLIC BLOOD PRESSURE: 106 MMHG | OXYGEN SATURATION: 98 % | DIASTOLIC BLOOD PRESSURE: 72 MMHG | BODY MASS INDEX: 22.69 KG/M2 | TEMPERATURE: 97.9 F | HEART RATE: 69 BPM | HEIGHT: 72 IN

## 2018-01-14 VITALS
HEIGHT: 72 IN | OXYGEN SATURATION: 97 % | WEIGHT: 174.4 LBS | BODY MASS INDEX: 23.62 KG/M2 | DIASTOLIC BLOOD PRESSURE: 62 MMHG | TEMPERATURE: 97.7 F | SYSTOLIC BLOOD PRESSURE: 108 MMHG | HEART RATE: 68 BPM

## 2018-01-15 NOTE — PROGRESS NOTES
Assessment    1  HIV disease (042) (B20)    Discussion/Summary    Intervention Diet Prescription   Energy 2200 kcal   28kcal /79kg   Protein 79g   1 0g /79kg   Fluid 2200ml   28ml /79kg <2300g Na  Estimated Intake: 2300kcal 80g Protein   His current intake is meeting estimated nutrition needs  Goal #1 - Improve/Maintain  Maintain good dietary intake r/t vegan diet  Goal initiated  Time Frame For Accomplishment: By next follow-up  Intervention Nutrition Education Provided  Person Educated: patient   Topics Discussed: Balanced vegan diet intake   Barriers To Learning: none  Readiness to Learn: Receptive  Teaching Method: verbal   Evaluation Of Learning: verbalized/demonstrated understanding   Juan Miguel Walter continues at a healthy wt of 174#, BMI 23  Regular exercise and active lifestyle  Pt maintains a vegan diet with B12 supplement of 2 4mcg 3-4x weekly  Continued dietary education r/t vegan intake  Encouraged healthy lifestyle  No nutritional needs at this time  History of Present Illness  Assessment: Clinical Data/Client History HIV - Yes  AIDS - No    His socio-economic status includes  cooks  He lives in [facility] apartment  Living Environment - He has access to refrigerator, stove and microwave  His functional status is  ambulatory  His activity level is  regular exercise  He eats breakfast at  Toast, banana, oatmeal, apple  He eats lunch at  [employer], whole wheat noodles, sauce w/ peppers, onions, mushrooms, banana  He eats dinner at  Peanut butter sandwiches, vegetables, banana  He snacks Detox juice, vegan protein supplement daily  His appetite is  good   He takes supplements  Nutrition Diagnosis   No nutrition diagnosis at this time  Active Problems    1  History of Anxiety (300 00) (F41 9)   2  Atypical mole (216 9) (D22 9)   3  Encounter for screening examination for sexually transmitted disease (V74 5) (Z11 3)   4   Encounter for screening for cardiovascular disorders (V81 2) (Z13 6)   5  History of stomatitis (V12 79) (Z87 19)   6  HIV disease (042) (B20)   7  MRSA infection (041 12) (A49 02)   8  Musculoskeletal pain (729 1) (M79 1)   9  Need for prophylactic vaccination and inoculation against influenza (V04 81) (Z23)   10  Nicotine dependence (305 1) (F17 200)   11  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)   12  Rash of unknown cause (782 1) (R21)   13  Shingles (053 9) (B02 9)   14  Syphilis (097 9) (A53 9)    Past Medical History    1  History of Anxiety (300 00) (F41 9)   2  History of Chest tightness (786 59) (R07 89)   3  History of Cough (786 2) (R05)   4  History of drug abuse (305 93) (Z87 898)   5  History of stomatitis (V12 79) (Z87 19)   6  History of syphilis (V12 09) (Z86 19)   7  History of tuberculosis (V12 01) (Z86 11)   8  History of Wound of right leg (891 0) (S81 801A)    Family History  Mother    1  Adopted (V68 89) (Z02 82)  Father    2  Adopted (V68 89) (Z02 82)    Social History    · Alcohol use (V49 89) (Z78 9)   · History of drug use (305 93) (Z87 898)   · Housing   · Never a smoker   · No drug use   · Sexual abstinence   · Vegan (V49 89) (Z78 9)    Current Meds   1  Chantix Starting Month Godfrey 0 5 MG X 11 & 1 MG X 42 Oral Tablet; take as directed; Therapy: 55INH3564 to (Evaluate:28Apr2017)  Requested for: 37RPY1366; Last Rx:29Mar2017   Ordered   2  Descovy 200-25 MG Oral Tablet; Take 1 tablet daily; Therapy: 29Apr2016 to (Evaluate:76Jap3340)  Requested for: 84KCF8391; Last Rx:37Cpo0920   Ordered   3  Tivicay 50 MG Oral Tablet; take 1 tablet by mouth daily;    Therapy: 93Wgn5835 to (Evaluate:21Jun2017)  Requested for: 29Akf4299; Last Rx:16Lml5655   Ordered    Allergies    1  codeine    Vitals  Vitals   Recorded: 85UAW5126 81:72NC   Systolic: 138  Diastolic: 62  Temperature: 97 7 F  Heart Rate: 68  Height: 6 ft   Weight: 174 lb 6 4 oz  BMI Calculated: 23 65  BSA Calculated: 2 01  O2 Saturation: 97  Recorded: 05FAY1761 09:31AM Systolic: 648  Diastolic: 70  Temperature: 98 F  Heart Rate: 64  Height: 6 ft   Weight: 171 lb 4 oz  BMI Calculated: 23 23  BSA Calculated: 1 99  Recorded: 18ZOV1938 55:30JL   Systolic: 364  Diastolic: 90  Temperature: 98 F  Heart Rate: 69  Weight: 173 lb 4 oz  BMI Calculated: 23 5  BSA Calculated: 2  O2 Saturation: 96  Recorded: 36KTX5883 57:43FO   Systolic: 985  Diastolic: 80  Temperature: 98 4 F  Heart Rate: 80  Weight: 174 lb 4 oz  BMI Calculated: 23 63  BSA Calculated: 2 01  Recorded: 14NKM0070 18:19IO   Systolic: 395  Diastolic: 70  Temperature: 98 8 F  Heart Rate: 68  Weight: 166 lb   BMI Calculated: 22 51  BSA Calculated: 1 97  O2 Saturation: 98  Recorded: 72XKK4718 23:99WB   Systolic: 869  Diastolic: 74  Temperature: 98 3 F  Heart Rate: 64  Weight: 169 lb 8 oz  BMI Calculated: 22 99  BSA Calculated: 1 99  Recorded: 14RUN8946 78:57ZB   Systolic: 647  Diastolic: 72  Temperature: 98 F  Heart Rate: 72  Weight: 168 lb 2 oz  BMI Calculated: 22 8  BSA Calculated: 1 98  Recorded: 63KDP0290 65:32TO   Systolic: 90  Diastolic: 68  Temperature: 98 F  Heart Rate: 64  Weight: 175 lb 2 oz  BMI Calculated: 23 75  BSA Calculated: 2 01  Recorded: 20FCS7642 28:92HG   Systolic: 92  Diastolic: 72  Temperature: 98 1 F  Heart Rate: 64  Weight: 172 lb 6 oz  BMI Calculated: 23 38  BSA Calculated: 2  Recorded: 26ODS4452 13:36BH   Systolic: 98  Diastolic: 66  Temperature: 98 5 F  Heart Rate: 64  Weight: 174 lb 2 oz  BMI Calculated: 23 62  BSA Calculated: 2 01  Recorded: 90XQC1003 45:96CX   Systolic: 881  Diastolic: 68  Temperature: 97 6 F  Heart Rate: 64  Weight: 170 lb   BMI Calculated: 23 06  BSA Calculated: 1 99  Recorded: 99FGX5211 23:22QB   Systolic: 978  Diastolic: 78  Temperature: 98 5 F  Heart Rate: 72  Weight: 165 lb 8 oz  BMI Calculated: 22 45  BSA Calculated: 1 97  Recorded: 85GTT3837 98:69SB   Systolic: 697  Diastolic: 72  Temperature: 98 3 F  Heart Rate: 76  Weight: 168 lb 4 oz  BMI Calculated: 22 82  BSA Calculated: 1 98  Recorded: 08HJZ7152 15:70XW   Systolic: 263  Diastolic: 72  Temperature: 97 9 F  Heart Rate: 68  Weight: 174 lb 1 9 oz  BMI Calculated: 23 61  BSA Calculated: 2 01  Recorded: 75GXU2062 93:14ZE   Systolic: 747  Diastolic: 72  Temperature: 98 4 F  Heart Rate: 72  Height: 6 ft   Weight: 169 lb 8 oz  BMI Calculated: 22 99  BSA Calculated: 1 99  Recorded: 46EOV4443 90:54OT   Systolic: 694  Diastolic: 72  Temperature: 98 2 F, Oral  Heart Rate: 68, L Radial  Height: 6 ft   Weight: 172 lb   BMI Calculated: 23 33  BSA Calculated: 2    Future Appointments    Date/Time Provider Specialty Site   08/01/2017 04:45 PM Yony Vasquez MD Infectious Disease [facility] AT [facility]   06/28/2017 09:30 AM Malon Riedel, CRNP Epidemiology/Public Health [facility] AT [address] [facility]   Electronically signed by :  JAZMIN Will; May  3 2017 10:54AM EST                       (Author)

## 2018-01-15 NOTE — MISCELLANEOUS
Provider Comments  Provider Comments:   Pt nc/ns for today's appt        Signatures   Electronically signed by : Karolyn Youngblood MD; Aug  1 2017  5:54PM EST                       (Author)

## 2018-01-15 NOTE — PROGRESS NOTES
Assessment    1  HIV disease (042) (B20)   2  Nicotine dependence (305 1) (F17 200)   3  Occasional fatigue (780 99) (R68 89)   4  Syphilis (097 9) (A53 9)    Plan    1  3 - Jayna Nelson RD Dietitian Co-Management  *  Status: Hold For - Scheduling  Requested for:   79MPU9504  are Referring to a non- Preferred Provider : Established Patient  Care Summary provided  : Yes    2  *1 - [facility] BEHAVIORAL HEALTH CONSULTANT Co-Management  *  Status: Hold For - Scheduling    Requested for: 10RGA0723  Care Summary provided  : Yes    History of Present Illness    Assessment:    PT desires wt gain/ continues with vegan diet  Nutrition Diagnosis Continue Previous Nutrition Diagnosis   Intervention Nutrition Education and recipes   Monitor And Evaluation Goal #1 - Maintain/Gain Wt   PT was concerned today with wanting to gain wt and feeling as though he is struggling to do so  Discussed current nutritional intake and increase in PA  PT currently reports weight lifiting 4x weekly/swimming  Continues with vegan diet  Discussed options for protein and fat and need to increase calories r/t increased PA for desired wt gain  PT will work towards increasing portion sizes at each meal  Provided recipe for bread and bean burgers for addition of added proteins such as seeds/fats  Wt is stable at 168# up 5# since last visit on 9/26/17  Will continue to follow as needed  Active Problems    1  History of Anxiety (300 00) (F41 9)   2  Atypical mole (216 9) (D22 9)   3  Encounter for screening examination for sexually transmitted disease (V74 5) (Z11 3)   4  Encounter for screening for cardiovascular disorders (V81 2) (Z13 6)   5  History of stomatitis (V12 79) (Z87 19)   6  HIV disease (042) (B20)   7  MRSA infection (041 12) (A49 02)   8  Musculoskeletal pain (729 1) (M79 1)   9  Need for prophylactic vaccination and inoculation against influenza (V04 81) (Z23)   10  Nicotine dependence (305 1) (F17 200)   11   Occasional fatigue (780 99) (R68 89)   12  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)   13  Rash of unknown cause (782 1) (R21)   14  Shingles (053 9) (B02 9)   15  Syphilis (097 9) (A53 9)   16  Viral upper respiratory infection (465 9) (J06 9,B97 89)    Past Medical History    1  History of Anxiety (300 00) (F41 9)   2  History of Chest tightness (786 59) (R07 89)   3  History of Cough (786 2) (R05)   4  History of drug abuse (305 93) (Z87 898)   5  History of stomatitis (V12 79) (Z87 19)   6  History of syphilis (V12 09) (Z86 19)   7  History of tuberculosis (V12 01) (Z86 11)   8  History of Wound of right leg (891 0) (S81 801A)    Family History  Mother    1  Adopted (V68 89) (Z02 82)  Father    2  Adopted (V68 89) (Z02 82)    Social History    · Alcohol use (V49 89) (Z78 9)   · History of drug use (305 93) (Z87 898)   · Housing   · History of Never a smoker   · No drug use   · Sexual abstinence   · Vegan (V49 89) (Z78 9)    Current Meds   1  Descovy 200-25 MG Oral Tablet; Take 1 tablet daily; Therapy: 49Vth1309 to (Evaluate:35Kxo1285)  Requested for: 85Mxa6010; Last Rx:67Vto4886   Ordered   2  Tivicay 50 MG Oral Tablet; take 1 tablet by mouth daily; Therapy: 24Nyp8720 to (Evaluate:34Hgy3089)  Requested for: 30Oct2017; Last Rx:31Kxr1999   Ordered    Allergies    1  codeine    Vitals  Signs   Recorded: 55EIM1811 11:00AM   Temperature: 97 9 F  Heart Rate: 69  Systolic: 497  Diastolic: 72  Height: 6 ft   Weight: 167 lb 8 oz  BMI Calculated: 22 72  BSA Calculated: 1 98  O2 Saturation: 98    Future Appointments    Date/Time Provider Specialty Site   11/14/2017 05:15 PM José Manuel Asif MD Infectious Disease ASC AT [facility]   02/06/2018 11:00 AM ROMAN Pretty Epidemiology/Public Health [facility]   Electronically signed by :  Astrid Dubin, LDN; Nov 7 2017  4:35PM EST                       (Author)

## 2018-01-15 NOTE — PROGRESS NOTES
Assessment    1  HIV disease (042) (B20)   2  Nicotine dependence (305 1) (F17 200)    Plan    1  (1) RPR; Status:Active; Requested for:01Apr2017;     2  (1) LIPID PANEL, FASTING; Status:Active; Requested for:01Apr2017;    3  (1) T LYMPH SUBSET (CD4); Status:Active; Requested for:01Apr2017;     4  (1) CBC/PLT/DIFF; Status:Active; Requested for:01Apr2017;    5  (1) COMPREHENSIVE METABOLIC PANEL; Status:Active; Requested for:01Apr2017;    6  (1) HIV-1 RNA QUANTITATIVE; [Do Not Release]; Status:Active; Requested for:01Apr2017;      7  Hepatitis B; INJECT 2  ML Intramuscular; To Be Done: 09NME7643    8  Follow-up visit in 3 months Evaluation and Treatment  Follow-up  Status: Hold For -   Scheduling  Requested for: 61ZQM9731    Discussion/Summary    HIV-doing reasonably well on Tivicay and Descovy  He continues to have low level detectable virus but it is lower than last visit  He feels like his adherence has been much better  The CD4 count remains above 500  Continue ART, recheck labs in 2 months and follow-up in 3 months  Nicotine dependence-the patient is definitely interested in tobacco cessation  I discussed this with the primary will intervene  I stressed the importance of smoking cessation with the patient  Possible side effects of new medications were reviewed with the patient/guardian today  The treatment plan was reviewed with the patient/guardian  The patient/guardian understands and agrees with the treatment plan     Education   general HIV education  adherence  prevention care  Chief Complaint  Pt here for routine f/u  History of Present Illness  Routine follow-up for HIV  Patient claims 100% adherence with Descovy and Tivicay  He denies any notable side effects  The patient has been smoking regularly  Pain Assessment   the patient states they do not have pain  Abuse And Domestic Violence Screen    Yes, the patient is safe at home  The patient states no one is hurting them  Depression And Suicide Screen  No, the patient has not had thoughts of hurting themself  No, the patient has not felt depressed in the past 7 days  no fever no depression no night sweats no weight loss no cough no shortness of breath no thrush no nausea no vomiting no diarrhea      Active Problems    1  History of Anxiety (300 00) (F41 9)   2  Encounter for screening examination for sexually transmitted disease (V74 5) (Z11 3)   3  Encounter for screening for cardiovascular disorders (V81 2) (Z13 6)   4  History of stomatitis (V12 79) (Z87 19)   5  HIV disease (042) (B20)   6  MRSA infection (041 12) (A49 02)   7  Musculoskeletal pain (729 1) (M79 1)   8  Need for prophylactic vaccination and inoculation against influenza (V04 81) (Z23)   9  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)   10  Shingles (053 9) (B02 9)   11  Syphilis (097 9) (A53 9)    Past Medical History    1  History of Anxiety (300 00) (F41 9)   2  History of Chest tightness (786 59) (R07 89)   3  History of Cough (786 2) (R05)   4  History of drug abuse (305 93) (Z87 898)   5  History of stomatitis (V12 79) (Z87 19)   6  History of syphilis (V12 09) (Z86 19)   7  History of tuberculosis (V12 01) (Z86 11)   8  History of Wound of right leg (891 0) (S81 801A)    Family History  Mother    1  Adopted (V68 89) (Z02 82)  Father    2  Adopted (V68 89) (Z02 82)    Social History    · Alcohol use (V49 89) (Z78 9)   · History of drug use (305 93) (Z87 898)   · Housing   · Never a smoker   · No drug use   · Sexual abstinence   · Vegan (V49 89) (Z78 9)    Current Meds   1  Descovy 200-25 MG Oral Tablet; Take 1 tablet daily; Therapy: 43Opy3708 to (Evaluate:75Fuc8128)  Requested for: 00FYW7697; Last   Rx:16Ztd3398 Ordered   2  Tivicay 50 MG Oral Tablet; take 1 tablet by mouth daily;    Therapy: 27Res8765 to (Evaluate:01Daz1569)  Requested for: 16Ehj1554; Last   Rx:61Hap4119 Ordered    Allergies    1  codeine    Vitals  Signs Recorded: 22VJS2653 05:13PM   Temperature: 98 F  Heart Rate: 69  Systolic: 121  Diastolic: 90  Weight: 213 lb 4 oz  BMI Calculated: 23 5  BSA Calculated: 2  O2 Saturation: 96    Physical Exam    Constitutional   General appearance: No acute distress, well appearing and well nourished  Ears, Nose, Mouth, and Throat   Oropharynx: Normal with no erythema, edema, exudate or lesions  Pulmonary   Respiratory effort: No increased work of breathing or signs of respiratory distress  Auscultation of lungs: Clear to auscultation  Cardiovascular   Auscultation of heart: Normal rate and rhythm, normal S1 and S2, without murmurs  Examination of extremities for edema and/or varicosities: Normal     Abdomen   Abdomen: Non-tender, no masses  Liver and spleen: No hepatomegaly or splenomegaly  Lymphatic   Palpation of lymph nodes in neck: No lymphadenopathy         Future Appointments    Date/Time Provider Specialty Site   03/15/2017 09:30 AM ROMAN Guidry Epidemiology/Public Health ASC AT [facility]     Signatures   Electronically signed by : Venetta Mohs, MD; Feb 7 2017  5:40PM EST                       (Author)

## 2018-01-15 NOTE — PROGRESS NOTES
History of Present Illness  [facility]:   He is being seen for an initial consultation  The patient is being seen regarding meeting new [facility], reviewing [facility] services and checking for any MH needs  Smoking Cessation (Brief): The patient is being seen for clinic follow-up for and smoking cessation  PT agreed to give Chantix a try for tobacco cessation assistance  The patient has high interest in quitting  Physical Exam    Objective: Orientation: oriented to person, oriented to place and oriented to time  Appearance: within normal limits of ideal weight, but well developed, appears healthy, well nourished, well groomed, no decreased eye contact and appearance reflects stated age  Observed mood and affect: euthymic, but appropriate  Harm to self or others: none reported or observed  Substance abuse: none reported or observed  Assessment    1  Atypical mole (216 9) (D22 9)   2  HIV disease (042) (B20)   3  Nicotine dependence (305 1) (F17 200)    Plan     1  Chantix Starting Month Godfrey 0 5 MG X 11 & 1 MG X 42 Oral Tablet; take as directed    *1 - [facility] Physician Referral  Consult Only: the expectation is that the referring provider will communicate back to the patient on treatment options  Evaluation and Treatment: the expectation is that the referred to provider will communicate back to the patient on treatment options  Status: Hold For - Scheduling  Requested for: [id number]  Ordered; For: Atypical mole;  Ordered By: Bere Lerma  Performed:   Due: 03Apr2017     [address] [facility]: Today, patient presents with no major issues or concerns  Patient will likely benefit from continued maintenance of well being  The stage of change is maintenance  Behavioral recommendations: 1  F/U with [facility] as needed   Discussion Summary [facility]:   [facility] introduced self and services to pt  Pt stated that he is doing great   [facility] asked about pt's activity level as it appeared that pt leads an active lifestyle  Pt confirmed observation by stating that he is heading to the gym right after this appt  and that he tries to walk or take the bus to everywhere he can rather than drive  For work, 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 [facility] complimented pt for filling his life with positive pursuits and encouraged him to keep it up to maintain his mental well-being  Pt agreed        Future Appointments    Date/Time Provider Specialty Site   05/09/2017 05:15 PM Vanessa Escobedo MD Infectious Disease ASC AT [facility]   06/28/2017 09:30 AM ROMAN Britton Epidemiology/Public Health ASC AT [id number] [place] [address]   Electronically signed by : Kamran Hall Darius 87; Mar 31 2017 10:48AM EST                       (Author)    Electronically signed by : Rocio Aragon MS; Apr 10 2017  2:30PM EST                       (Author)

## 2018-01-15 NOTE — PROGRESS NOTES
Assessment    1  HIV disease (042) (B20)   2  Nicotine dependence (305 1) (F17 200)   3  Syphilis (097 9) (A53 9)   4  Shingles (053 9) (B02 9)    Plan  HIV disease    · Tivicay 50 MG Oral Tablet; take 1 tablet by mouth daily   · Menactra Intramuscular Injectable    Discussion/Summary    Shannon Garcia is a 27-year-old male who presents with clinic today for primary care follow-up of chronic conditions  HIV: CD4 533 VL 35  ART Tivicay and Descovy  Educated Magdy to take medications separate from all other supplements  Stressed the importance of adherence  Reminded of ID appointment scheduled 8/1/17  Syphilis: Status post treatment  RPR1:2  Continue to monitor annually, due April 2018  Currently Magdy reports abstinence  Educated to use condoms if he decides to pursue a sexual relationship to prevent risk of STIs  HZV: Recent small outbreak  Lesions have disappeared after a 7 day course of valacyclovir  Nicotine dependence: Continues to report complete cessation  Encouraged continued progress and reviewed the health benefits of abstaining from smoking  Health maintenance: UTD with vision and dental screening  Magdy eats a healthy diet and exercises regularly  PCP f/u scheduled for 3 months  Possible side effects of new medications were reviewed with the patient/guardian today  The treatment plan was reviewed with the patient/guardian  The patient/guardian understands and agrees with the treatment plan   The patient was counseled regarding risks and benefits of treatment options, importance of compliance with treatment  Education   general HIV education  adherence  prevention care  Chief Complaint  Pt offers no c/o at this time  History of Present Illness  Shannon Garcia is a 27-year-old male who presents to the clinic today for primary care follow-up of chronic conditions  Past medical history significant for HIV, HZV, syphilis, and nicotine dependence   Last seen in the clinic 5/16/17 for a small outbreak of HZV  Treated successfully with a short course of valacyclovir per ID recommendation  Raiza Kent is doing quite well and offers no acute complaints  Pain Assessment   the patient states they do not have pain  Abuse And Domestic Violence Screen    Yes, the patient is safe at home  The patient states no one is hurting them  Depression And Suicide Screen  No, the patient has not had thoughts of hurting themself  No, the patient has not felt depressed in the past 7 days  The patient is being seen for a routine clinic follow-up of HIV infection  no fever no depression no weight loss no decreased appetite no shortness of breath no nausea no vomiting no diarrhea no headache Current treatment includes antiretroviral regimen  By report, there is good compliance with treatment and good tolerance of treatment  CD4: 533  VL: 35  Time spent: 20 minutes  Strength: strong desire to be well  Weakness: busy travel schedule  Plan of Action: discuss ways to remember medication  SUBSTANCE ABUSE: ETOH use  amount: few drinks 3x /week  not using drugs  SMOKING: He is not a current smoker and uses cigs  He is a former smoker  SEXUALLY ACTIVE: He is not sexually active  HOUSING: He has stable housing  There are 1 people living in the household (including children)  The household income is $1600 00/month  HEALTH MAINTENANCE: His last dental exam was 5/2017  His last eye exam was 1/2017  The patient is being seen for a routine clinic follow-up of syphilis  Symptoms:  no genital ulcer and no rash  The patient is currently asymptomatic  Review of Systems    Constitutional: No fever or chills, feels well, no tiredness, no recent weight gain or weight loss  Eyes: No complaints of eye pain, no red eyes, no discharge from eyes, no itchy eyes  ENT: no complaints of earache, no hearing loss, no nosebleeds, no nasal discharge, no sore throat, no hoarseness     Cardiovascular: No complaints of slow heart rate, no fast heart rate, no chest pain, no palpitations, no leg claudication, no lower extremity  Respiratory: No complaints of shortness of breath, no wheezing, no cough, no SOB on exertion, no orthopnea or PND  Gastrointestinal: No complaints of abdominal pain, no constipation, no nausea or vomiting, no diarrhea or bloody stools  Genitourinary: No complaints of dysuria, no incontinence, no hesitancy, no nocturia, no genital lesion, no testicular pain  Musculoskeletal: No complaints of arthralgia, no myalgias, no joint swelling or stiffness, no limb pain or swelling  Integumentary: No complaints of skin rash or skin lesions, no itching, no skin wound, no dry skin  Neurological: No compliants of headache, no confusion, no convulsions, no numbness or tingling, no dizziness or fainting, no limb weakness, no difficulty walking  Psychiatric: Is not suicidal, no sleep disturbances, no anxiety or depression, no change in personality, no emotional problems  Endocrine: No complaints of proptosis, no hot flashes, no muscle weakness, no erectile dysfunction, no deepening of the voice, no feelings of weakness  Hematologic/Lymphatic: No complaints of swollen glands, no swollen glands in the neck, does not bleed easily, no easy bruising  Active Problems    1  History of Anxiety (300 00) (F41 9)   2  Atypical mole (216 9) (D22 9)   3  Encounter for screening examination for sexually transmitted disease (V74 5) (Z11 3)   4  Encounter for screening for cardiovascular disorders (V81 2) (Z13 6)   5  History of stomatitis (V12 79) (Z87 19)   6  HIV disease (042) (B20)   7  MRSA infection (041 12) (A49 02)   8  Musculoskeletal pain (729 1) (M79 1)   9  Need for prophylactic vaccination and inoculation against influenza (V04 81) (Z23)   10  Nicotine dependence (305 1) (F17 200)   11  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)   12  Rash of unknown cause (782 1) (R21)   13   Shingles (053  9) (B02 9)   14  Syphilis (097 9) (A53 9)    Past Medical History    1  History of Anxiety (300 00) (F41 9)   2  History of Chest tightness (786 59) (R07 89)   3  History of Cough (786 2) (R05)   4  History of drug abuse (305 93) (Z87 898)   5  History of stomatitis (V12 79) (Z87 19)   6  History of syphilis (V12 09) (Z86 19)   7  History of tuberculosis (V12 01) (Z86 11)   8  History of Wound of right leg (891 0) (S81 801A)    The active problems and past medical history were reviewed and updated today  Surgical History    The surgical history was reviewed and updated today  Family History  Mother    1  Adopted (V68 89) (Z02 82)  Father    2  Adopted (W29 57) (Z02 82)    The family history was reviewed and updated today  Social History    · Alcohol use (V49 89) (Z78 9)   · History of drug use (305 93) (Z87 898)   · Housing   · Never a smoker   · No drug use   · Sexual abstinence   · Vegan (V49 89) (Z78 9)  The social history was reviewed and updated today  The social history was reviewed and is unchanged  Current Meds   1  Descovy 200-25 MG Oral Tablet; Take 1 tablet daily; Therapy: 29Apr2016 to (Evaluate:79Stg3229)  Requested for: 49AYB9953; Last   Rx:60Ipe0188 Ordered   2  Tivicay 50 MG Oral Tablet; take 1 tablet by mouth daily; Therapy: 83Wjr3258 to (Evaluate:21Jun2017)  Requested for: 34Hgj4557; Last   Rx:99Sam0760 Ordered   3  ValACYclovir HCl - 1 GM Oral Tablet; TAKE 1 TABLET Every 8 hours; Therapy: 63QSP4322 to (Brock Terrell)  Requested for: 57ZLQ8644; Last   Rx:03Khb2427 Ordered    The medication list was reviewed and updated today  Allergies    1  codeine    Vitals  Signs   Recorded: 28Jun2017 09:54AM   Temperature: 98 4 F  Heart Rate: 72  Systolic: 088  Diastolic: 68  Height: 6 ft   Weight: 173 lb 2 oz  BMI Calculated: 23 48  BSA Calculated: 2    Physical Exam    Constitutional   General appearance: No acute distress, well appearing and well nourished      Eyes Conjunctiva and lids: No swelling, erythema or discharge  Pupils and irises: Equal, round and reactive to light  Ears, Nose, Mouth, and Throat   External inspection of ears and nose: Normal     Otoscopic examination: Tympanic membranes translucent with normal light reflex  Canals patent without erythema  Nasal mucosa, septum, and turbinates: Normal without edema or erythema  Oropharynx: Normal with no erythema, edema, exudate or lesions  Pulmonary   Respiratory effort: No increased work of breathing or signs of respiratory distress  Auscultation of lungs: Clear to auscultation  Cardiovascular   Auscultation of heart: Normal rate and rhythm, normal S1 and S2, without murmurs  Examination of extremities for edema and/or varicosities: Normal     Abdomen   Abdomen: Non-tender, no masses  Liver and spleen: No hepatomegaly or splenomegaly  Lymphatic   Palpation of lymph nodes in neck: No lymphadenopathy  Musculoskeletal   Gait and station: Normal     Digits and nails: Normal without clubbing or cyanosis  Inspection/palpation of joints, bones, and muscles: Normal     Muscle strength: Normal strength throughout  Skin   Skin and subcutaneous tissue: Normal without rashes or lesions  Psychiatric   Orientation to person, place, and time: Normal     Mood and affect: Normal     Lips, Teeth and Gums: The lips were normal with no lesions  Examination of the teeth revealed normal dentition  Examination of the gingiva showed no abnormalities  Attending Note  Collaborating Physician: I agree with the Advanced Practitioner note        Future Appointments    Date/Time Provider Specialty Site   08/01/2017 04:45 PM Marva Early MD Infectious Disease ASC AT [facility]   09/26/2017 11:00 AM ROMAN Mixon Epidemiology/Public Health [address] [place] [address]   Electronically signed by : Dario Norwood; Jun 28 2017  1:40PM EST                       (Author) Electronically signed by : Jodie Be DO; Jun 29 2017  4:26PM EST                       (Author)

## 2018-01-15 NOTE — PROGRESS NOTES
Medical Alert: ASC  Medications: Peridex 16 oz    Tivicay    OTHER  Allergies:      Codeine  Since Last Visit: Medical Alert: No Change    Medications: No Change    Allergies:        No Change  Pain Scale Type: Numeric Pain ScalePain Level: 0  Description:    Patient Health Assessment    Date:            10/10/2017  Blood Pressure:  113/78  Pulse:           79  Age:             46  Weight:          175 lbs  Height/Length:   5' 11"  Body Mass Index: 24 3  Provider:        [email or web address]  Clinic:          [place]      Pt presents for limited exam with CC of my tooth filling chipped off   clinical findings revealed lingual cusp of #4 was fractured off and patient  complained of sensitivity  etched, bonded, and placed flowable onto the lingual   fractured cusp to help relieve the sensitivity    patient to follow up at [place] [address] clinic for treatment planning to complete   a crown on #4    /ALEXUS    ----- Signed on Tuesday, October 10, 2017 at 4:41:49 PM  -----  ----- Provider: SOM_P - [facility], Dentist -- Clinic: [facility] -----

## 2018-01-15 NOTE — PROGRESS NOTES
Assessment    1  HIV disease (042) (B20)    Plan    1  Follow-up visit in 3 months Evaluation and Treatment  Follow-up  Status: Hold For -   Scheduling  Requested for: 02Aug2016   2  (1) CBC/PLT/DIFF; Status:Active; Requested for:01Oct2016;    3  (1) COMPREHENSIVE METABOLIC PANEL; Status:Active; Requested for:01Oct2016;    4  (1) HEP B SURFACE ANTIBODY; Status:Active; Requested for:01Oct2016;    5  (1) HIV-1 RNA QUANTITATIVE; [Do Not Release]; Status:Active; Requested for:01Oct2016;      6  (1) T LYMPH SUBSET (CD4); Status:Active; Requested for:01Oct2016;     7  (1) RPR; Status:Active; Requested for:01Oct2016;     Discussion/Summary    HIV-overall doing relatively well on Tivicay and Descovy  He is noted to have a small blip in his viral load  His CD4 count has also drifted below 400  Suspect that the blip is clinically inconsequential  He seems to be tolerating the medications well  We'll recheck labs in 2 months and follow-up in 3 months  Syphilis-appears to be serofast status post treatment in the past  No need for additional treatment for now  Continue to monitor RPR  Education   general HIV education  adherence  prevention care  Chief Complaint  Pt here for routine f/u  History of Present Illness  Routine follow-up for HIV  Patient claims 100% adherence with Tivicay and is Descovy  He overall feels well and is staying very busy this summer  Pain Assessment   the patient states they do not have pain  Abuse And Domestic Violence Screen    Yes, the patient is safe at home  The patient states no one is hurting them  Depression And Suicide Screen  No, the patient has not had thoughts of hurting themself  No, the patient has not felt depressed in the past 7 days  no fever no depression no night sweats no weight loss no cough no shortness of breath no thrush no nausea no vomiting no diarrhea      Active Problems    1  History of Anxiety (300 00) (F41 9)   2   History of stomatitis (V12 79) (Z87 19)   3  HIV disease (042) (B20)   4  MRSA infection (041 12) (A49 02)   5  Musculoskeletal pain (729 1) (M79 1)   6  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)   7  Syphilis (097 9) (A53 9)    Past Medical History    1  History of Anxiety (300 00) (F41 9)   2  History of Chest tightness (786 59) (R07 89)   3  History of Cough (786 2) (R05)   4  History of drug abuse (305 93) (Z87 898)   5  History of stomatitis (V12 79) (Z87 19)   6  History of syphilis (V12 09) (Z86 19)   7  History of tuberculosis (V12 01) (Z86 11)   8  History of Wound of right leg (891 0) (S81 801A)    Family History  Mother    1  Adopted (V68 89) (Z02 82)  Father    2  Adopted (V68 89) (Z02 82)    Social History    · Alcohol use (V49 89) (Z78 9)   · History of drug use (305 93) (F19 21)   · Housing   · Never a smoker   · No drug use   · Sexual abstinence   · Vegan (V49 89) (Z78 9)    Current Meds   1  Bicillin L-A [id number] UNIT/4ML Intramuscular Suspension; INJECT 4  ML Intramuscular   in office; Therapy: 08Iuj6080 to (Last Rx:43Wtp7695)  Requested for: 29Kio7246 Ordered   2  Descovy 200-25 MG Oral Tablet; Take 1 tablet daily; Therapy: 19Nmw0651 to (Evaluate:16Nov2016)  Requested for: 51WPD2800; Last   Rx:95Llr9400 Ordered   3  Tivicay 50 MG Oral Tablet; take 1 tablet by mouth daily; Therapy: 08Xbl8301 to (Evaluate:91Wws2684)  Requested for: 54LYS2403; Last   Rx:06Evd2032 Ordered    Allergies    1  codeine    Vitals  Signs   Recorded: 91ZDE9031 59:12PK   Systolic: 338  Diastolic: 72  Heart Rate: 72  Temperature: 98 F  Weight: 168 lb 2 oz  BMI Calculated: 22 8  BSA Calculated: 1 98    Physical Exam    Constitutional   General appearance: No acute distress, well appearing and well nourished  Ears, Nose, Mouth, and Throat   Oropharynx: Normal with no erythema, edema, exudate or lesions  Pulmonary   Respiratory effort: No increased work of breathing or signs of respiratory distress      Auscultation of lungs: Clear to auscultation  Cardiovascular   Auscultation of heart: Normal rate and rhythm, normal S1 and S2, without murmurs  Examination of extremities for edema and/or varicosities: Normal     Abdomen   Abdomen: Non-tender, no masses  Liver and spleen: No hepatomegaly or splenomegaly  Lymphatic   Palpation of lymph nodes in neck: No lymphadenopathy         Future Appointments    Date/Time Provider Specialty Site   08/09/2016 01:00 PM ROMAN Goetz Epidemiology/Public Health [address] [place] [address]   Electronically signed by : Kade Rodrigues MD; Aug  2 2016  5:55PM EST                       (Author)

## 2018-01-15 NOTE — PROGRESS NOTES
Assessment    1  HIV disease (042) (B20)   2  Musculoskeletal pain (729 1) (M79 1)   3  Syphilis (097 9) (A53 9)    Plan    1  [facility] Physician Referral  Consult  Status: Hold For - Scheduling  Requested   for: 44HTL1720  are Referring to a non- Preferred Provider : Established Patient  Care Summary provided  : Yes    2  Tivicay 50 MG Oral Tablet; take 1 tablet by mouth daily    History of Present Illness    Assessment:    Healthy eating for HIV//vegan diet/past periodental issues  Nutrition Diagnosis Continue Previous Nutrition Diagnosis   Intervention Nutrition Education and Handouts r/t vegan diet   Monitor And Evaluation Goal #1 - Balanced nutritional intake, Goal #1 is extended  Pt continues with a healthy nutrition intake, normal BMI, daily physical activity  Previous dental issues resolved as per pt  Discussed vegan diet, protein and B12 intake  Provided handouts r/t various protein sources, vegetarian diet  Active Problems    1  History of Anxiety (300 00) (F41 9)   2  History of stomatitis (V12 79) (Z87 19)   3  HIV disease (042) (B20)   4  MRSA infection (041 12) (A49 02)   5  Musculoskeletal pain (729 1) (M79 1)   6  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)   7  Syphilis (097 9) (A53 9)    Past Medical History    1  History of Anxiety (300 00) (F41 9)   2  History of Chest tightness (786 59) (R07 89)   3  History of Cough (786 2) (R05)   4  History of drug abuse (305 93) (Z87 898)   5  History of stomatitis (V12 79) (Z87 19)   6  History of syphilis (V12 09) (Z86 19)   7  History of tuberculosis (V12 01) (Z86 11)   8  History of Wound of right leg (891 0) (S81 801A)    Family History  Mother    1  Adopted (V68 89) (Z02 82)  Father    2   Adopted (V68 89) (Z02 82)    Social History    · Alcohol use (V49 89) (Z78 9)   · History of drug use (305 93) (F19 21)   · Housing   · Never a smoker   · No drug use   · Sexual abstinence   · Vegan (V49 89) (Z78 9)    Current Meds   1  Descovy 200-25 MG Oral Tablet; Take 1 tablet daily; Therapy: 24Zrn0720 to (Evaluate:16Nov2016)  Requested for: 36JEA1797; Last Rx:47Qlo6071   Ordered   2  Tivicay 50 MG Oral Tablet; take 1 tablet by mouth daily; Therapy: 43Trv7915 to (Evaluate:71Hjg6450)  Requested for: 77CDR7255; Last Rx:75Prf8460   Ordered    Allergies    1  codeine    Vitals  Signs   Recorded: 97CRZ1197 86:59NT   Systolic: 434  Diastolic: 74  Heart Rate: 64  Temperature: 98 3 F  Weight: 169 lb 8 oz  BMI Calculated: 22 99  BSA Calculated: 1 99    Future Appointments    Date/Time Provider Specialty Site   11/01/2016 04:45 PM Opal Padilla MD Internal Medicine [facility] AT [facility]   11/23/2016 09:30 AM ROMAN Ramirez Epidemiology/Public Health ASC AT [address] [facility]   Electronically signed by : JAZMIN Patricia;  Aug 26 2016  9:15AM EST                       (Author)

## 2018-01-16 NOTE — PROGRESS NOTES
Assessment    1  MRSA infection (041 12) (A49 02)   2  HIV disease (042) (B20)    Plan    1  1 - Anurag Guo MD Infectious Disease Physician Referral  Consult  Status: Hold For -   Scheduling  Requested for: [id number]  Care Summary provided  : Yes    2  Sulfamethoxazole-Trimethoprim 800-160 MG Oral Tablet (Bactrim DS); Take 1   tablet daily    Discussion/Summary  Discussion Summary:   Gabriel Denver is an active, healthy 39year old  male who presents to the clinic today with compliant of abscess formation on the right side of his gluteus steve  Has had previous MRSA infection and states symptoms are very similar  Noticed eruption on Friday after shaving hair on that area  Increased tenderness but pain has resolved slightly since carbuncle started draining  Today he has three 1-2cm lesions on the right side of gluteus  Area is firm and draining white thick purulent fluid  I&D evacuated small amount of pus  Wound cleaned and dressed  Bactrim X10 days prescribed and will have follow up visit in 7 days  Instructed to use warm compresses to encourage drainage and use 1/2 cup of bleach in bath water to encourage healing  Counseling Documentation With Imm: The patient was counseled regarding diagnostic results, instructions for management, risk factor reductions, prognosis, patient and family education, impressions, risks and benefits of treatment options, importance of compliance with treatment  Medication SE Review and Pt Understands Tx: Possible side effects of new medications were reviewed with the patient/guardian today  The treatment plan was reviewed with the patient/guardian  The patient/guardian understands and agrees with the treatment plan      Chief Complaint  Chief Complaint Free Text Note Form: Pt c/o 3 bumps on right side of buttocks x 3 days  The largest broke open yesterday        History of Present Illness  Hospital Based Practices Required Assessment:   Pain Assessment   the patient states they have pain  The pain is located in the right buttock  The patient describes the pain as dull  (on a scale of 0 to 10, the patient rates the pain at 3 )   Abuse And Domestic Violence Screen    Yes, the patient is safe at home  The patient states no one is hurting them  Depression And Suicide Screen  No, the patient has not had thoughts of hurting themself  No, the patient has not felt depressed in the past 7 days  Methicillin Resistant Staph Aureus: The patient is being seen for an initial evaluation of methicillin-resistant Staphylococcus aureus  Disease involvement: methicillin-resistant Staphylococcus aureus skin abscess  Symptoms:  skin lesion(s), skin redness, skin tenderness and lesion drainage, but no chest pain, no cough, no shortness of breath, no fever and no chills  The patient is currently experiencing symptoms  Associated symptoms:  no fatigue, no myalgias, no arthralgias, no headache and no lightheadedness  The patient has had 3 previous episodes of MRSA  Pertinent medical history:  HIV infection  Risk factors:  known methicillin-resistant Staphylococcus aureus exposure  He was previously evaluated in this clinic  Previous presentation included skin ulcer and lesion drainage  Past treatment has included trimethoprim/sulfamethoxazole, incision and drainage and surgical debridement  HIV Adherence [facility] [place]:   CD4: 421  VL: 49  Time spent: 10 minutes  Strength: good insight into disease process  Weakness: occasional indulgence in unhealthy activities  Plan of Action: encourage safe sex practices and aviodance of ETOH  Review of Systems  Focused-Male:   Constitutional: no fever or chills, feels well, no tiredness, no recent weight loss or weight gain  ENT: no complaints of earache, no loss of hearing, no nosebleeds or nasal discharge, no sore throat or hoarseness     Cardiovascular: no complaints of slow or fast heart rate, no chest pain, no palpitations, no leg claudication or lower extremity edema  Respiratory: no complaints of shortness of breath, no wheezing or cough, no dyspnea on exertion, no orthopnea or PND  Gastrointestinal: no complaints of abdominal pain, no constipation, no nausea or vomiting, no diarrhea or bloody stools  Genitourinary: no complaints of dysuria or incontinence, no hesitancy, no nocturia, no genital lesion, no inadequacy of penile erection  Musculoskeletal: no complaints of arthralgia, no myalgia, no joint swelling or stiffness, no limb pain or swelling  Integumentary: skin lesion, but as noted in HPI, no rashes, no itching and no dry skin  Neurological: no complaints of headache, no confusion, no numbness or tingling, no dizziness or fainting  Active Problems    1  History of Anxiety (300 00) (F41 9)   2  History of stomatitis (V12 79) (Z87 19)   3  HIV disease (042) (B20)   4  Musculoskeletal pain (729 1) (M79 1)   5  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)   6  Syphilis (097 9) (A53 9)    Past Medical History    1  History of Anxiety (300 00) (F41 9)   2  History of Chest tightness (786 59) (R07 89)   3  History of Cough (786 2) (R05)   4  History of drug abuse (305 93) (Z87 898)   5  History of stomatitis (V12 79) (Z87 19)   6  History of syphilis (V12 09) (Z86 19)   7  History of tuberculosis (V12 01) (Z86 11)   8  History of Wound of right leg (891 0) (Y00 143L)  Active Problems And Past Medical History Reviewed: The active problems and past medical history were reviewed and updated today  Family History    1  Adopted (V68 89) (Z02 82)    2  Adopted (V68 89) (Z02 82)  Family History Reviewed: The family history was reviewed and updated today  Social History    · Alcohol use (V49 89) (Z78 9)   · History of drug use (305 93) (F19 21)   · Housing   · Never a smoker   · No drug use   · Sexual abstinence   · Vegan (V49 89) (Z78 9)  Social History Reviewed: The social history was reviewed and updated today   The social history was reviewed and is unchanged  Surgical History  Surgical History Reviewed: The surgical history was reviewed and updated today  Current Meds   1  Tivicay 50 MG Oral Tablet; take 1 tablet by mouth daily; Therapy: 04Gwz2625 to (Evaluate:45Znw1465)  Requested for: 45EMW4120; Last   Rx:01Ewl9692 Ordered   2  Truvada 200-300 MG Oral Tablet; take 1 tablet by mouth daily; Therapy: 96GOQ9817 to (Evaluate:56Uya5361)  Requested for: 71OAT4586; Last   Rx:43Xod5418 Ordered  Medication List Reviewed: The medication list was reviewed and updated today  Allergies    1  codeine    Vitals  Signs [Data Includes: Current Encounter]   Recorded: 79RCV2223 11:24AM   Temperature: 98 5 F  Heart Rate: 64  Systolic: 98  Diastolic: 66  Weight: 088 lb 2 oz  BMI Calculated: 23 62  BSA Calculated: 2 01    Physical Exam    Constitutional   General appearance: No acute distress, well appearing and well nourished  Eyes   Conjunctiva and lids: No swelling, erythema or discharge  Pupils and irises: Equal, round and reactive to light  Ears, Nose, Mouth, and Throat   External inspection of ears and nose: Normal     Otoscopic examination: Tympanic membranes translucent with normal light reflex  Canals patent without erythema  Nasal mucosa, septum, and turbinates: Normal without edema or erythema  Oropharynx: Normal with no erythema, edema, exudate or lesions  Pulmonary   Respiratory effort: No increased work of breathing or signs of respiratory distress  Auscultation of lungs: Clear to auscultation  Cardiovascular   Palpation of heart: Normal PMI, no thrills  Auscultation of heart: Normal rate and rhythm, normal S1 and S2, without murmurs  Examination of extremities for edema and/or varicosities: Normal     Carotid pulses: Normal     Abdomen   Abdomen: Non-tender, no masses  Liver and spleen: No hepatomegaly or splenomegaly      Lymphatic   Palpation of lymph nodes in neck: No lymphadenopathy  Musculoskeletal   Gait and station: Normal     Digits and nails: Normal without clubbing or cyanosis  Inspection/palpation of joints, bones, and muscles: Normal     Muscle strength: Normal strength throughout  Skin   Examination of the skin for lesions: Abnormal   Multiple abscess(es)  The abscesses range in size from 1 cm to 2 5 cm  Described as exudative, tender and hard, but not warm and non-malodorous  Neurologic   Cranial nerves: Cranial nerves 2-12 intact  Reflexes: 2+ and symmetric  Sensation: No sensory loss  Psychiatric   Orientation to person, place, and time: Normal     Mood and affect: Normal        Procedure    Procedure: incision and drainage of an abscess  Infection risk were discussed with the patient  The site was prepped with Betadine  Anesthesia:   Procedure Note:   The area was aspirated with a 20 gauge needle  A small white, thick and purulent  amount of fluid was drained  Post-Procedure:   Patient Status:  the patient tolerated the procedure well  Complications: there were no complications  Wound care instructions given to the patient include washing with soap and water, gently pat dry and applying a non-adhesive dressing  Wound care instructions were given to the patient  Follow-up in the office in 7 day(s)  Attending Note  Collaborating Physician Note: Agree with Advanced Practitioner Note Except: Discussed case after visit, I am most familiar with BID dosing for Bactrim for MRSA abscess disease  Agree I&D is definitive treatment  Will further review the literature with q day dosing  Bleach bath reviewed, dilute in ample bath water, may be best for prevention, caution with open wound   Need to consider sinus tract disease if recurring or no improvement depending on location on buttock/?perianal       Future Appointments    Date/Time Provider Specialty Site   03/09/2016 01:30 PM Pastora Doss, [address] [facility] [place]   03/21/2016 12:30 PM [facility] [place]   04/12/2016 02:00 PM Mendel Athens, MD PhD [facility] Medicine [facility] [place]     Signatures   Electronically signed by : Jose Garcia; Mar  1 2016  2:37PM EST                       (Author)    Electronically signed by : Janine Toney DO; Mar  2 2016  4:47PM EST                       (Author)

## 2018-01-16 NOTE — PROGRESS NOTES
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)    Discussion/Summary  Discussion Summary:   Amy Reddy is here for a wound evaluation  Was seen 3/1/2016 for three MRSA abscess on the R gluteus steve  Abscesses are greatly improved and almost completely healed  Will finish course of Bactrim and contact office if there is another occurrence  Educated on preventative measures to stop a return infection  Referral was made at the request of Dr Florentin Birch to hematology/oncology for delayed clotting after an acute injury  Patient navigator will contact Magdy with appointment details  Counseling Documentation With Imm: The patient was counseled regarding diagnostic results, instructions for management, prognosis, risks and benefits of treatment options  Medication SE Review and Pt Understands Tx: Possible side effects of new medications were reviewed with the patient/guardian today  The treatment plan was reviewed with the patient/guardian  The patient/guardian understands and agrees with the treatment plan      Chief Complaint  Chief Complaint Free Text Note Form: Pt here for wound check  History of Present Illness  HPI: Amy Reddy is here for a wound check  MRSA abscess X3 to the R gluteus steve  Compliant with Bactrim  Hospital Based Practices Required Assessment:   Pain Assessment   the patient states they do not have pain  Abuse And Domestic Violence Screen    Yes, the patient is safe at home  The patient states no one is hurting them  Depression And Suicide Screen  No, the patient has not had thoughts of hurting themself  No, the patient has not felt depressed in the past 7 days  Methicillin Resistant Staph Aureus: The patient is being seen for a routine clinic follow-up of methicillin-resistant Staphylococcus aureus  Symptoms:  skin lesion(s), but no skin redness, no skin tenderness, no lesion drainage, no fever and no chills   The patient is currently experiencing symptoms  Current treatment includes trimethoprim/sulfamethoxazole  By report, there is good compliance with treatment  Pertinent medical history:  HIV infection  Review of Systems  Focused-Male:   Constitutional: no fever or chills, feels well, no tiredness, no recent weight loss or weight gain  ENT: no complaints of earache, no loss of hearing, no nosebleeds or nasal discharge, no sore throat or hoarseness  Cardiovascular: no complaints of slow or fast heart rate, no chest pain, no palpitations, no leg claudication or lower extremity edema  Respiratory: no complaints of shortness of breath, no wheezing or cough, no dyspnea on exertion, no orthopnea or PND  Gastrointestinal: no complaints of abdominal pain, no constipation, no nausea or vomiting, no diarrhea or bloody stools  Genitourinary: no complaints of dysuria or incontinence, no hesitancy, no nocturia, no genital lesion, no inadequacy of penile erection  Musculoskeletal: no complaints of arthralgia, no myalgia, no joint swelling or stiffness, no limb pain or swelling  Integumentary: no complaints of skin rash or lesion, no itching or dry skin, no skin wounds  Neurological: no complaints of headache, no confusion, no numbness or tingling, no dizziness or fainting  Active Problems    1  History of Anxiety (300 00) (F41 9)   2  History of stomatitis (V12 79) (Z87 19)   3  HIV disease (042) (B20)   4  MRSA infection (041 12) (A49 02)   5  Musculoskeletal pain (729 1) (M79 1)   6  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)   7  Syphilis (097 9) (A53 9)    Past Medical History    1  History of Anxiety (300 00) (F41 9)   2  History of Chest tightness (786 59) (R07 89)   3  History of Cough (786 2) (R05)   4  History of drug abuse (305 93) (Z87 898)   5  History of stomatitis (V12 79) (Z87 19)   6  History of syphilis (V12 09) (Z86 19)   7  History of tuberculosis (V12 01) (Z86 11)   8  History of Wound of right leg (891 0) (S96 760N)  Active Problems And Past Medical History Reviewed: The active problems and past medical history were reviewed and updated today  Family History    1  Adopted (V68 89) (Z02 82)    2  Adopted (V68 89) (Z02 82)  Family History Reviewed: The family history was reviewed and updated today  Social History    · Alcohol use (V49 89) (Z78 9)   · History of drug use (305 93) (F19 21)   · Housing   · Never a smoker   · No drug use   · Sexual abstinence   · Vegan (V49 89) (Z78 9)  Social History Reviewed: The social history was reviewed and updated today  The social history was reviewed and is unchanged  Surgical History  Surgical History Reviewed: The surgical history was reviewed and updated today  Current Meds   1  Sulfamethoxazole-Trimethoprim 800-160 MG Oral Tablet; TAKE 1 TABLET TWICE DAILY; Therapy: 33VUZ1924 to (Evaluate:14Mar2016)  Requested for: 00JTL8266; Last   Rx:04Mar2016 Ordered   2  Tivicay 50 MG Oral Tablet; take 1 tablet by mouth daily; Therapy: 23Zqm5987 to (Evaluate:84Yrc7972)  Requested for: 91AZP2047; Last   Rx:89Luh0305 Ordered   3  Truvada 200-300 MG Oral Tablet; take 1 tablet by mouth daily; Therapy: 12UTH5710 to (Evaluate:00Zgw3937)  Requested for: 21TXV0051; Last   Rx:14Jan2016 Ordered  Medication List Reviewed: The medication list was reviewed and updated today  Allergies    1  codeine    Vitals  Signs [Data Includes: Current Encounter]   Recorded: 99UNJ8269 01:24PM   Temperature: 98 1 F  Heart Rate: 64  Systolic: 92  Diastolic: 72  Weight: 403 lb 6 oz  BMI Calculated: 23 38  BSA Calculated: 2    Physical Exam    Constitutional   General appearance: No acute distress, well appearing and well nourished  Eyes   Conjunctiva and lids: No swelling, erythema or discharge  Pupils and irises: Equal, round and reactive to light      Ears, Nose, Mouth, and Throat   External inspection of ears and nose: Normal  Pulmonary   Respiratory effort: No increased work of breathing or signs of respiratory distress  Auscultation of lungs: Clear to auscultation  Cardiovascular   Auscultation of heart: Normal rate and rhythm, normal S1 and S2, without murmurs  Examination of extremities for edema and/or varicosities: Normal     Carotid pulses: Normal     Abdomen   Abdomen: Non-tender, no masses  Liver and spleen: No hepatomegaly or splenomegaly  Lymphatic   Palpation of lymph nodes in neck: No lymphadenopathy  Musculoskeletal   Gait and station: Normal     Digits and nails: Normal without clubbing or cyanosis  Inspection/palpation of joints, bones, and muscles: Normal     Muscle strength: Normal strength throughout  Skin   Skin and subcutaneous tissue: Normal without rashes or lesions  Examination of the skin for lesions: Abnormal   Multiple abscess(es) 3 less then 1 cm MRSA abscess on R gluteus steve  Healing  No drainage  Improved from last eval  Described as non-exudative, not hot, non-tender and not warm  Neurologic   Cranial nerves: Cranial nerves 2-12 intact  Reflexes: 2+ and symmetric  Sensation: No sensory loss  Psychiatric   Orientation to person, place, and time: Normal     Mood and affect: Normal        Attending Note  Collaborating Physician Note: Agree with Advanced Practitioner Note Except: See prior note, Bactrim was increased to twice daily  Seems to be responding to treatment nicely  See note regarding bleeding concerns  I do not have those specific examples, although he did develop a hematoma and extensive bruising after an IM PCN injection in the gluteal region  His CBC with plts was normal 1/2016  He is adopted and the family medical history is not known        Future Appointments    Date/Time Provider Specialty Site   06/15/2016 12:30 PM [facility] [place]   04/12/2016 02:00 PM Mayte Garcia MD PhD Internal Medicine [facility] [place]     Signatures   Electronically signed by : Nasir Diggs; Mar  9 2016  2:52PM EST                       (Author)    Electronically signed by : Jorden Payton DO; Mar 10 2016  1:19PM EST                       (Author)

## 2018-01-16 NOTE — PROGRESS NOTES
Assessment    1  HIV disease (042) (B20)    Discussion/Summary    Intervention Diet Prescription   Energy 2200 kcal   27 - 28kcal /80kg   Protein 85g   1 1g /80kg   Fluid 2200ml   27 - 28ml /80kg  Estimated Intake: 2200kcal 75g Protein   His current intake is meeting estimated nutrition needs  Nutrition Recommendations:  PT  is a Vegan, he is making healthy food choices within scope of Vegan foods, pt states he feels great  Goal #1 - Improve/Maintain  healthy eating for HIV  Goal initiated  Time Frame For Accomplishment: By next follow-up  Intervention Nutrition Education Provided  Person Educated: patient   Topics Discussed: healthy eating for HIV   Barriers To Learning: none  Readiness to Learn: Receptive  Teaching Method: verbal   Evaluation Of Learning: verbalized/demonstrated understanding       History of Present Illness  Assessment: Clinical Data/Client History HIV - Yes  His socio-economic status includes  cooks  He lives in [facility]  Living Environment - He has access to refrigerator, stove and microwave  Psycho-Social factors are  anxiety  His functional status is  ambulatory, able to food shop and prepares own meals  His activity level is  regular exercise  He eats breakfast at  Vegan breakfast s/w orSmoothie made with almond milk & pea protein  He eats lunch at  [employer] or quinoa , grilled veggies, tofu, water  He eats dinner at  [employer], veggies, tofu, water  He snacks Fruits, veggies or smoothie  His appetite is  good   He does not take supplements  Nutrition Diagnosis   Problem related to no nutrition diagnosis at this time  Active Problems    1  History of Anxiety (300 00) (F41 9)   2  History of stomatitis (V12 79) (Z87 19)   3  HIV disease (042) (B20)   4  MRSA infection (041 12) (A49 02)   5  Musculoskeletal pain (729 1) (M79 1)   6  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)   7   Syphilis (097 9) (A53 9)    Past Medical History    1  History of Anxiety (300 00) (F41 9)   2  History of Chest tightness (786 59) (R07 89)   3  History of Cough (786 2) (R05)   4  History of drug abuse (305 93) (Z87 898)   5  History of stomatitis (V12 79) (Z87 19)   6  History of syphilis (V12 09) (Z86 19)   7  History of tuberculosis (V12 01) (Z86 11)   8  History of Wound of right leg (891 0) (S81 801A)    Family History    1  Adopted (V68 89) (Z02 82)    2  Adopted (V68 89) (Z02 82)    Social History    · Alcohol use (V49 89) (Z78 9)   · History of drug use (305 93) (F19 21)   · Housing   · Never a smoker   · No drug use   · Sexual abstinence   · Vegan (V49 89) (Z78 9)    Current Meds   1  Tivicay 50 MG Oral Tablet; take 1 tablet by mouth daily; Therapy: 74Unq7503 to (Evaluate:36Ufx8881)  Requested for: 47BHQ7221; Last Rx:92Fcq5304   Ordered   2  Truvada 200-300 MG Oral Tablet; take 1 tablet by mouth daily;    Therapy: 77JUZ2806 to (Evaluate:82Ulo0363)  Requested for: 87DGM3119; Last Rx:19Ejc7835   Ordered    Allergies    1  codeine    Vitals  Vitals [Data Includes: All]   Recorded: 14IUQ2804 78:92UF   Systolic: 90  Diastolic: 68  Temperature: 98 F  Heart Rate: 64  Weight: 175 lb 2 oz  BMI Calculated: 23 75  BSA Calculated: 2 01  Recorded: 08TYC4074 69:53QC   Systolic: 92  Diastolic: 72  Temperature: 98 1 F  Heart Rate: 64  Weight: 172 lb 6 oz  BMI Calculated: 23 38  BSA Calculated: 2  Recorded: 07BHE0146 19:54KH   Systolic: 98  Diastolic: 66  Temperature: 98 5 F  Heart Rate: 64  Weight: 174 lb 2 oz  BMI Calculated: 23 62  BSA Calculated: 2 01  Recorded: 49KMU2764 52:96ED   Systolic: 533  Diastolic: 68  Temperature: 97 6 F  Heart Rate: 64  Weight: 170 lb   BMI Calculated: 23 06  BSA Calculated: 1 99  Recorded: 97VGR7054 54:48FY   Systolic: 318  Diastolic: 78  Temperature: 98 5 F  Heart Rate: 72  Weight: 165 lb 8 oz  BMI Calculated: 22 45  BSA Calculated: 1 97  Recorded: 88CKL0580 61:75CL   Systolic: 211  Diastolic: 72  Temperature: 98 3 F  Heart Rate: 76  Weight: 168 lb 4 oz  BMI Calculated: 22 82  BSA Calculated: 1 98  Recorded: 34OZM8069 67:56KN   Systolic: 703  Diastolic: 72  Temperature: 97 9 F  Heart Rate: 68  Weight: 174 lb 1 9 oz  BMI Calculated: 23 61  BSA Calculated: 2 01  Recorded: 41PCN7779 99:06AX   Systolic: 329  Diastolic: 72  Temperature: 98 4 F  Heart Rate: 72  Height: 6 ft   Weight: 169 lb 8 oz  BMI Calculated: 22 99  BSA Calculated: 1 99  Recorded: 59VVM2877 08:68KG   Systolic: 889  Diastolic: 72  Temperature: 98 2 F, Oral  Heart Rate: 68, L Radial  Height: 6 ft   Weight: 172 lb   BMI Calculated: 23 33  BSA Calculated: 2    Future Appointments    Date/Time Provider Specialty Site   06/15/2016 11:30 AM [facility] [place]   08/02/2016 01:30 PM Olu Rivers MD PhD [facility] [place]     Signatures   Electronically signed by : José Miguel Spivey, [email or web address],SMITH,LILIANA; Apr 13 2016 11:13AM EST                       (Author)

## 2018-01-16 NOTE — PROGRESS NOTES
History of Present Illness  [facility]:   He is being seen for  The patient is being seen regarding checking in on MH wellbeing   Smoking Cessation (Brief): The patient is being seen for clinic follow-up for and PT continues to report completely off smoking tobacco cessation assistance  The patient has quit tobacco use  Physical Exam    Objective: Orientation: oriented to person, oriented to place and oriented to time  Appearance: well developed, appears healthy and well nourished  Observed mood and affect: euthymic, but appropriate  Harm to self or others: none reported or observed  Substance abuse: none reported or observed  Assessment    1  HIV disease (042) (B20)   2  Syphilis (097 9) (A53 9)   3  Viral upper respiratory infection (465 9) (J06 9,B97 89)   4  Nicotine dependence (305 1) (F17 200)    Plan    1  Descovy 200-25 MG Oral Tablet; Take 1 tablet daily   2  (1) CBC/PLT/DIFF; Status:Active; Requested for:01Oct2017;    3  (1) COMPREHENSIVE METABOLIC PANEL; Status:Active; Requested for:01Oct2017;    4  (1) HEP C ANTIBODY; Status:Active; Requested for:01Oct2017;    5  (1) HIV-1 RNA QUANTITATIVE; [Do Not Release]; Status:Active; Requested for:01Oct2017;      6  (1) RPR; Status:Active; Requested for:01Oct2017;    7  (1) T LYMPH SUBSET (CD4); Status:Active; Requested for:01Oct2017;    8  Hepatitis B    9  Follow-up visit in 3 months Evaluation and Treatment  Follow-up  Status: Complete    Done: [id number]    Discussion/Summary  [facility]: Today, patient presents with no issues or concerns  Discussion Summary [facility]:   PT continues to report complete smoking cessation  [facility] encouraged continued efforts and using coping skills if stressful situations should arise        Future Appointments    Date/Time Provider Specialty Site   11/14/2017 05:15 PM Dominique Cadena MD Infectious Disease [facility]   09/26/2017 11:00 AM ROMAN Goetz Epidemiology/Public [facility]     Signatures   Electronically signed by : Kamran Ho Darius 87; Aug 23 2017 10:31AM EST                       (Author)

## 2018-01-16 NOTE — PROGRESS NOTES
Patient Health Assessment    Date:            09/14/2016  Blood Pressure:  115/70  Pulse:           65  Age:             45  Weight:          172 lbs  Height/Length:   5' 11"  Body Mass Index: 24 0  Provider:        ZANDRA  Clinic:          [facility]  Medical Alert: [facility]  Medications: Peridex 16 oz    Tivicay    Truvada  Allergies:      Codeine  Since Last Visit: Medical Alert: No Change    Medications: No Change    Allergies:        No Change  Pain Scale Type: Numeric Pain ScalePain Level: 0  Description:     4 DO etch vivid bond lopez A2  1 7ml 2% lido 100 epi   Very large resin so the distal contact was difficult to attain  Pt told to  take his time flossing          sage james    ----- Signed on Wednesday, September 14, 2016 at 9:00:28 AM  -----  ----- Provider: ZANDRA - Malorie Hernandez DMD -- Clinic: [address] [place] [address] -----

## 2018-01-17 NOTE — PROGRESS NOTES
Assessment    1  HIV disease (042) (B20)   2  Nicotine dependence (305 1) (F17 200)   3  Syphilis (097 9) (A53 9)   4  Occasional fatigue (780 99) (R68 89)    Plan  HIV disease    · (1) TESTOSTERONE, FREE (DIRECT) AND TOTAL; Status:Active; Requested  PLH:[id number];    · Menactra Intramuscular Injectable  HIV disease, Need for prophylactic vaccination and inoculation against influenza    · Flulaval Quadrivalent Intramuscular Suspension  HIV disease, Occasional fatigue    · Follow-up visit in 6 weeks Evaluation and Treatment  Follow-up  Status: Hold For -  Scheduling  Requested for: 29WXI2116  Musculoskeletal pain    · (1) TSH WITH FT4 REFLEX; Status:Active; Requested for:83Aff3161;   Nicotine dependence    · *1 - [facility] Co-Management  *  Status: Hold For -  Scheduling  Requested for: 03BEM1938  Care Summary provided  : Yes  Occasional fatigue    · 3 - Omar FRAGOSO, Jayna Dietitian Co-Management  *  Status: Hold For - Scheduling   Requested for: 95YBN8412  are Referring to a non- Preferred Provider : Established Patient  Care Summary provided  : Yes    Shelia Mena is a 42year old male who presents to the office today for PCP f/u of chronic conditions  C/O occasional fatigue and is concerned he may have low testosterone  Most like multifactorial  Magdy works long,variable hours and does not have a routine sleeping pattern  Educated on the need for self care, including eating a healthy diet, getting adequate fluid intake, and getting 8 hours of sleep a night if possible  Will check TSH,CMP, and testosterone  HIV: CD4 417 VL 70 ART Tivicay and Descovy  Continues to miss the occasional dose due to working away from home and not having medication with him  Advised to always have a spare dose with him to improve adherence  Syphilis: RPR will be checked with next labs  Serofast RPR 1:2 s/p treatment  Started a new relationship   Counseled to use condoms to prevent the spread of STIs  HZV: Rash resolved  Nicotine Dependence: Continues to report cessation success  Admits to smoking an occasional cigarette when out with friends  Reviewed health risks of smoking and continue to offer encouragement towards complete cessation  Health maintenance: UTD with eye and dental exams  Magdy eats a vegan diet  Will refer to dietician for nutrition education  Exercises regularly  Possible side effects of new medications were reviewed with the patient/guardian today  The treatment plan was reviewed with the patient/guardian  The patient/guardian understands and agrees with the treatment plan   The patient was counseled regarding instructions for management, risks and benefits of treatment options, importance of compliance with treatment  Education   general HIV education  adherence  prevention care  Chief Complaint  Pt offers no c/o at this time  Pt would like to discuss testosterone supplement  History of Present Illness  Mr Arlyn Deng is a 42year old male here today for PCP f/u of chronic conditions  PMHx significant for HIV, syphilis, HZV, and nicotine dependence  Sadia Lopez is c/o occasional fatigue  He has lost weight and does not feel as energetic as he once did  Sadia Lopez admits to working variable hours as a [employer] and often stays up late  Sadia Lopez is requesting testosterone supplements  Pain Assessment   the patient states they do not have pain  Abuse And Domestic Violence Screen    Yes, the patient is safe at home  The patient states no one is hurting them  Depression And Suicide Screen  No, the patient has not had thoughts of hurting themself  No, the patient has not felt depressed in the past 7 days  The patient is being seen for a routine clinic follow-up of HIV infection   no fever no lethargy no depression no weight loss no decreased appetite no cough no shortness of breath no thrush no nausea no vomiting no diarrhea Current treatment includes antiretroviral regimen  By report, there is good compliance with treatment and good tolerance of treatment  CD4: 471  VL: 70  Strength: Strong desire to be well  Weakness: Admits to missing doses when away from home  Plan of Action: Take medication with him when he travels  SUBSTANCE ABUSE: ETOH use  amount: weekends  illicit drug use  type: mj  amount: weekends  SMOKING: He is not a current smoker  SEXUALLY ACTIVE: He is not sexually active  HOUSING: He has stable housing  There are 2 people living in the household (including children)  The household income is $1400 00/month  HEALTH MAINTENANCE: His last dental exam was 9/2017  His last eye exam was 1/2017  The patient is being seen for a routine clinic follow-up of syphilis  The patient is currently asymptomatic  The patient is being seen for clinic follow-up for tobacco cessation assistance  The patient has high interest in quitting  He has tried to quit several times  Patient perceived smoking benefits include fitting in with peers  Patient recognizes that smoking cessation benefits include saving money and improved health  Current treatment includes tobacco cessation program  The patient has reduced tobacco use substantially  Review of Systems    Constitutional: No fever or chills, feels well, no tiredness, no recent weight gain or weight loss  Eyes: No complaints of eye pain, no red eyes, no discharge from eyes, no itchy eyes  ENT: no complaints of earache, no hearing loss, no nosebleeds, no nasal discharge, no sore throat, no hoarseness  Cardiovascular: No complaints of slow heart rate, no fast heart rate, no chest pain, no palpitations, no leg claudication, no lower extremity  Respiratory: No complaints of shortness of breath, no wheezing, no cough, no SOB on exertion, no orthopnea or PND  Gastrointestinal: No complaints of abdominal pain, no constipation, no nausea or vomiting, no diarrhea or bloody stools     Genitourinary: No complaints of dysuria, no incontinence, no hesitancy, no nocturia, no genital lesion, no testicular pain  Musculoskeletal: No complaints of arthralgia, no myalgias, no joint swelling or stiffness, no limb pain or swelling  Integumentary: No complaints of skin rash or skin lesions, no itching, no skin wound, no dry skin  Neurological: No compliants of headache, no confusion, no convulsions, no numbness or tingling, no dizziness or fainting, no limb weakness, no difficulty walking  Psychiatric: Is not suicidal, no sleep disturbances, no anxiety or depression, no change in personality, no emotional problems  Endocrine: No complaints of proptosis, no hot flashes, no muscle weakness, no erectile dysfunction, no deepening of the voice, no feelings of weakness  Hematologic/Lymphatic: No complaints of swollen glands, no swollen glands in the neck, does not bleed easily, no easy bruising  Active Problems    1  History of Anxiety (300 00) (F41 9)   2  Atypical mole (216 9) (D22 9)   3  Encounter for screening examination for sexually transmitted disease (V74 5) (Z11 3)   4  Encounter for screening for cardiovascular disorders (V81 2) (Z13 6)   5  History of stomatitis (V12 79) (Z87 19)   6  HIV disease (042) (B20)   7  MRSA infection (041 12) (A49 02)   8  Musculoskeletal pain (729 1) (M79 1)   9  Need for prophylactic vaccination and inoculation against influenza (V04 81) (Z23)   10  Nicotine dependence (305 1) (F17 200)   11  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)   12  Rash of unknown cause (782 1) (R21)   13  Shingles (053 9) (B02 9)   14  Syphilis (097 9) (A53 9)   15  Viral upper respiratory infection (465 9) (J06 9,B97 89)    Past Medical History    1  History of Anxiety (300 00) (F41 9)   2  History of Chest tightness (786 59) (R07 89)   3  History of Cough (786 2) (R05)   4  History of drug abuse (305 93) (Z87 898)   5  History of stomatitis (V12 79) (Z87 19)   6   History of syphilis (V12 09) (Z86 19)   7  History of tuberculosis (V12 01) (Z86 11)   8  History of Wound of right leg (891 0) (S87 801A)    The active problems and past medical history were reviewed and updated today  Surgical History    The surgical history was reviewed and updated today  Family History  Mother    1  Adopted (V68 89) (Z02 82)  Father    2  Adopted (X14 25) (Z02 82)    The family history was reviewed and updated today  Social History    · Alcohol use (V49 89) (Z78 9)   · History of drug use (305 93) (Z87 898)   · Housing   · Never a smoker   · No drug use   · Sexual abstinence   · Vegan (V49 89) (Z78 9)  The social history was reviewed and updated today  The social history was reviewed and is unchanged  Current Meds   1  Descovy 200-25 MG Oral Tablet; Take 1 tablet daily; Therapy: 29Apr2016 to (Evaluate:69Flp7021)  Requested for: 78Gff8111; Last   Rx:02Mdc3060 Ordered   2  Tivicay 50 MG Oral Tablet; take 1 tablet by mouth daily; Therapy: 43Rzk8331 to [phone])  Requested for: 03FSR9222; Last   Rx:74Jpv6571 Ordered    The medication list was reviewed and updated today  Allergies    1  codeine    Vitals  Signs   Recorded: 18VQL9920 11:01AM   Temperature: 98 9 F  Heart Rate: 77  Systolic: 994  Diastolic: 68  Height: 6 ft   Weight: 163 lb 2 oz  BMI Calculated: 22 12  BSA Calculated: 1 95  O2 Saturation: 97    Physical Exam    Constitutional   General appearance: No acute distress, well appearing and well nourished  Eyes   Conjunctiva and lids: No swelling, erythema or discharge  Ears, Nose, Mouth, and Throat   External inspection of ears and nose: Normal     Otoscopic examination: Abnormal   The left tympanic membrane was normal  The right external canal had a cerumen impaction, but was not tender, was not swollen and was not erythematous  The left external canal was not tender, was not swollen, was not erythematous and did not have a cerumen impaction     Nasal mucosa, septum, and turbinates: Normal without edema or erythema  Oropharynx: Normal with no erythema, edema, exudate or lesions  Pulmonary   Respiratory effort: No increased work of breathing or signs of respiratory distress  Auscultation of lungs: Clear to auscultation  Cardiovascular   Auscultation of heart: Normal rate and rhythm, normal S1 and S2, without murmurs  Examination of extremities for edema and/or varicosities: Normal     Abdomen   Abdomen: Non-tender, no masses  Liver and spleen: No hepatomegaly or splenomegaly  Musculoskeletal   Gait and station: Normal     Digits and nails: Normal without clubbing or cyanosis  Inspection/palpation of joints, bones, and muscles: Normal     Muscle strength: Normal strength throughout  Skin   Skin and subcutaneous tissue: Normal without rashes or lesions  Psychiatric   Orientation to person, place, and time: Normal     Mood and affect: Normal     Lips, Teeth and Gums: The lips were normal with no lesions  Examination of the teeth revealed normal dentition  Examination of the gingiva showed no abnormalities  Attending Note  Collaborating Physician: I agree with the Advanced Practitioner note        Future Appointments    Date/Time Provider Specialty Site   11/14/2017 05:15 PM Jasen Pineda MD Infectious Disease [facility] AT [facility]   11/07/2017 11:00 AM ROMAN Salguero Epidemiology/Public Health [facility]   Electronically signed by : Monse Llamas; Sep 26 2017 12:59PM EST                       (Author)    Electronically signed by : Rosy Estes DO; Sep 26 2017  1:13PM EST                       (Author)

## 2018-01-17 NOTE — PROGRESS NOTES
Patient Health Assessment    Date:            02/03/2016  Blood Pressure:  116/63  Pulse:           65  Age:             45  Weight:          172 lbs  Height/Length:   5' 11"  Body Mass Index: 24 0  Provider:        ZANDRA  Clinic:          [facility]  Medical Alert: [facility]  Medications: Peridex 16 oz    Tivicay    Truvada  Allergies:      Codeine  Since Last Visit: Medical Alert: No Change    Medications: No Change    Allergies:        No Change  Pain Scale Type: Numeric Pain ScalePain Level: 0  Description:    Pt reports back w acute ulcerative gingiva in the area of #27    1 7 ml 4% octavio 100 epi and deep scale of the area  New script given to pt for Peridex, not sure that he used the first one and  emphasized the need to floss after every meal      Checked w [facility] an the numbers were acceptable:  CD4 421          Viral Load at 49      Platelet Count at 087     nv   reeval and do probe exam w intent to do 4 quads of perio followed by a  perio maintenance visit     ----- Signed on Wednesday, February 03, 2016 at 9:16:00 AM  -----  ----- Provider: ZANDRA - Daisy Borrero DMD -- Clinic: [facility] -----

## 2018-01-17 NOTE — PROGRESS NOTES
Assessment    1  HIV disease (042) (B20)   2  Nicotine dependence (305 1) (F17 200)   3  Syphilis (097 9) (A53 9)    Plan    1  (1) CHLAMYDIA/GC AMPLIFIED DNA, PCR; Source:Urine, Unspecified Source;   Status:Active; Requested ANTONIO:10AVS3355;     2  (1) CBC/PLT/DIFF; Status:Active; Requested HPW:25RHS9185;    3  (1) COMPREHENSIVE METABOLIC PANEL; Status:Active; Requested OHP:59VKQ1606;    4  (1) HEP B SURFACE ANTIBODY; Status:Active; Requested HMM:98MOX6134;    5  (1) HIV-1 RNA QUANTITATIVE; [Do Not Release]; Status:Active; Requested   XUM:08CMP4880;    6  (1) QUANTIFERON - TB GOLD; Status:Active; Requested ANITA:31ZCI2766;    7  (1) T LYMPH SUBSET (CD4); Status:Active; Requested EAT:09YAN2847;    8  (1) URINALYSIS (will reflex a microscopy if leukocytes, occult blood, protein or nitrites are   not within normal limits); Status:Active; Requested YYS:67UCU6034;     9  Follow-up visit in 3 months Evaluation and Treatment  Follow-up  Status: Hold For -   Scheduling  Requested for: 26XMD5827    Discussion/Summary    HIV-doing reasonably well on Tivicay and Descovy but still with a low-level detectable viral load  His CD4 count is 658  Continue Tivicay and Descovy, recheck labs in 2 months, follow up in 3 months  Stressed adherence  Nicotine dependence-patient very interested in complete tobacco cessation  Discussed in detail with the primary who will talk about Chantix with the patient  Syphilis-patient status post treatment and his RPR is relatively stable at 1:4  He had drifted as low as 1:2  Will continue to monitor the RPR and only retreat if a 4 fold increase in titer recurs  Possible side effects of new medications were reviewed with the patient/guardian today  The treatment plan was reviewed with the patient/guardian   The patient/guardian understands and agrees with the treatment plan   The patient was counseled regarding diagnostic results, instructions for management, prognosis, risks and benefits of treatment options, importance of compliance with treatment  Education   general HIV education  adherence  prevention care  Chief Complaint  Pt here for routine f/u  SPNS = 9      History of Present Illness  Routine follow-up for HIV  Patient claims 100% adherence with Tivicay and Descovy  He denies any notable side effects  He did miss 1 dose of his Tivicay and Descovy a few days ago  He has increased his smoking briefly over last weekend but has now stopped once again  He is very interested and complete tobacco cessation as his new partner does not smoke  Pain Assessment   the patient states they do not have pain  Abuse And Domestic Violence Screen    Yes, the patient is safe at home  The patient states no one is hurting them  Depression And Suicide Screen  No, the patient has not had thoughts of hurting themself  No, the patient has not felt depressed in the past 7 days  The patient is being seen for a routine clinic follow-up of HIV infection  The patient is currently asymptomatic  Active Problems    1  History of Anxiety (300 00) (F41 9)   2  Atypical mole (216 9) (D22 9)   3  Contact with and (suspected) exposure to infections with a predominantly sexual mode   of transmission (V01 6) (Z20 2)   4  Encounter for screening examination for sexually transmitted disease (V74 5) (Z11 3)   5  Encounter for screening for cardiovascular disorders (V81 2) (Z13 6)   6  History of stomatitis (V12 79) (Z87 19)   7  HIV disease (042) (B20)   8  MRSA infection (041 12) (A49 02)   9  Musculoskeletal pain (729 1) (M79 1)   10  Need for prophylactic vaccination and inoculation against influenza (V04 81) (Z23)   11  Nicotine dependence (305 1) (F17 200)   12  Occasional fatigue (780 99) (R68 89)   13  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)   14  Rash of unknown cause (782 1) (R21)   15  Shingles (053 9) (B02 9)   16  Syphilis (097 9) (A53 9)   17   Viral upper respiratory infection (465 9) (J06 9,B97 89)    Past Medical History    1  History of Anxiety (300 00) (F41 9)   2  History of Chest tightness (786 59) (R07 89)   3  History of Cough (786 2) (R05)   4  History of drug abuse (305 93) (Z87 898)   5  History of stomatitis (V12 79) (Z87 19)   6  History of syphilis (V12 09) (Z86 19)   7  History of tuberculosis (V12 01) (Z86 11)   8  History of Wound of right leg (891 0) (S81 801A)    Family History  Mother    1  Adopted (V68 89) (Z02 82)  Father    2  Adopted (V68 89) (Z02 82)    Social History    · Alcohol use (V49 89) (Z78 9)   · History of drug use (305 93) (Z87 898)   · Housing   · History of Never a smoker   · No drug use   · Sexual abstinence   · Vegan (V49 89) (Z78 9)    Current Meds   1  Descovy 200-25 MG Oral Tablet; Take 1 tablet daily; Therapy: 29Apr2016 to (Evaluate:09Ano2393)  Requested for: 56Yrg2509; Last   Rx:49Vyf7669 Ordered   2  Tivicay 50 MG Oral Tablet; take 1 tablet by mouth daily; Therapy: 20Ewr6015 to (Evaluate:28Apr2018)  Requested for: 30Oct2017; Last   Rx:30Oct2017 Ordered    Allergies    1  codeine    Vitals  Signs   Recorded: 96IFD4907 05:24PM   Temperature: 97 9 F  Heart Rate: 67  Systolic: 052  Diastolic: 62  Height: 6 ft   Weight: 175 lb 2 oz  BMI Calculated: 23 75  BSA Calculated: 2 01  O2 Saturation: 97    Physical Exam    Constitutional   General appearance: No acute distress, well appearing and well nourished  Ears, Nose, Mouth, and Throat   Oropharynx: Normal with no erythema, edema, exudate or lesions  Pulmonary   Respiratory effort: No increased work of breathing or signs of respiratory distress  Auscultation of lungs: Clear to auscultation  Cardiovascular   Auscultation of heart: Normal rate and rhythm, normal S1 and S2, without murmurs  Examination of extremities for edema and/or varicosities: Normal     Abdomen   Abdomen: Non-tender, no masses  Liver and spleen: No hepatomegaly or splenomegaly      Lymphatic Palpation of lymph nodes in neck: No lymphadenopathy         Future Appointments    Date/Time Provider Specialty Site   02/06/2018 11:00 AM ROMAN Chavez Epidemiology/Public Health [facility] AT [address] [facility]   Electronically signed by : Amina Conklin MD; Nov 14 2017  5:52PM EST                       (Author)

## 2018-01-18 NOTE — PROGRESS NOTES
Plan    1  Chantix Starting Month Godfrey 0 5 MG X 11 & 1 MG X 42 Oral Tablet    2  ValACYclovir HCl - 1 GM Oral Tablet; TAKE 1 TABLET Every 8 hours    Discussion/Summary  Discussion Summary:   Kahlil Oseguera is a 44-year-old male who presents to the clinic today due to increasing HZV rash  Will treat per IDs recommendation with valacyclovir 1 g every 8 hours Ã7 days  Provided education on preventing disease transmission to patient  Magdy voiced understanding of plan and will contact clinic if rash progresses  HIV: CD4 553, VL <20  ART Tivicay and Descovy  Stressed the importance of adherence  PCP f/u scheduled 6/28/17  Counseling Documentation With Imm: The patient was counseled regarding instructions for management, risks and benefits of treatment options  Medication SE Review and Pt Understands Tx: Possible side effects of new medications were reviewed with the patient/guardian today  The treatment plan was reviewed with the patient/guardian  The patient/guardian understands and agrees with the treatment plan      Chief Complaint  Chief Complaint Free Text Note Form: Pt c/o rash on abdomen x 1 day  History of Present Illness  HPI: Kahlil Oseguera is a 44-year-old  male who presents to the clinic today due to complaint of advancing HZV rash on abdomen  Last seen in clinic on May 2 by Dr Sujey Brownlee who advised treatment with valacyclovir if rash continued to progress  Hospital Based Practices Required Assessment:   Pain Assessment   the patient states they do not have pain  Abuse And Domestic Violence Screen    Yes, the patient is safe at home  The patient states no one is hurting them  Depression And Suicide Screen  No, the patient has not had thoughts of hurting themself  No, the patient has not felt depressed in the past 7 days  Review of Systems  Focused-Male:   Constitutional: no fever or chills, feels well, no tiredness, no recent weight loss or weight gain     ENT: no complaints of earache, no loss of hearing, no nosebleeds or nasal discharge, no sore throat or hoarseness  Cardiovascular: no complaints of slow or fast heart rate, no chest pain, no palpitations, no leg claudication or lower extremity edema  Respiratory: no complaints of shortness of breath, no wheezing or cough, no dyspnea on exertion, no orthopnea or PND  Gastrointestinal: no complaints of abdominal pain, no constipation, no nausea or vomiting, no diarrhea or bloody stools  Genitourinary: no complaints of dysuria or incontinence, no hesitancy, no nocturia, no genital lesion, no inadequacy of penile erection  Musculoskeletal: no complaints of arthralgia, no myalgia, no joint swelling or stiffness, no limb pain or swelling  Integumentary: a rash  Neurological: no complaints of headache, no confusion, no numbness or tingling, no dizziness or fainting  Active Problems    1  History of Anxiety (300 00) (F41 9)   2  Atypical mole (216 9) (D22 9)   3  Encounter for screening examination for sexually transmitted disease (V74 5) (Z11 3)   4  Encounter for screening for cardiovascular disorders (V81 2) (Z13 6)   5  History of stomatitis (V12 79) (Z87 19)   6  HIV disease (042) (B20)   7  MRSA infection (041 12) (A49 02)   8  Musculoskeletal pain (729 1) (M79 1)   9  Need for prophylactic vaccination and inoculation against influenza (V04 81) (Z23)   10  Nicotine dependence (305 1) (F17 200)   11  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)   12  Rash of unknown cause (782 1) (R21)   13  Shingles (053 9) (B02 9)   14  Syphilis (097 9) (A53 9)    Past Medical History    1  History of Anxiety (300 00) (F41 9)   2  History of Chest tightness (786 59) (R07 89)   3  History of Cough (786 2) (R05)   4  History of drug abuse (305 93) (Z87 898)   5  History of stomatitis (V12 79) (Z87 19)   6  History of syphilis (V12 09) (Z86 19)   7  History of tuberculosis (V12 01) (Z86 11)   8   History of Wound of right leg (891 0) (A68 992R)  Active Problems And Past Medical History Reviewed: The active problems and past medical history were reviewed and updated today  Family History  Mother    1  Adopted (V68 89) (Z02 82)  Father    2  Adopted (V68 89) (Z02 82)  Family History Reviewed: The family history was reviewed and updated today  Social History    · Alcohol use (V49 89) (Z78 9)   · History of drug use (305 93) (Z87 898)   · Housing   · Never a smoker   · No drug use   · Sexual abstinence   · Vegan (V49 89) (Z78 9)  Social History Reviewed: The social history was reviewed and updated today  The social history was reviewed and is unchanged  Surgical History  Surgical History Reviewed: The surgical history was reviewed and updated today  Current Meds   1  Chantix Starting Month Godfrey 0 5 MG X 11 & 1 MG X 42 Oral Tablet; take as directed; Therapy: 41SFO2694 to (Evaluate:28Apr2017)  Requested for: 86LJB8644; Last   Rx:29Mar2017 Ordered   2  Descovy 200-25 MG Oral Tablet; Take 1 tablet daily; Therapy: 29Apr2016 to (Evaluate:57Uey3986)  Requested for: 83QSS8665; Last   Rx:19Qmo6184 Ordered   3  Tivicay 50 MG Oral Tablet; take 1 tablet by mouth daily; Therapy: 72Psf0379 to (Evaluate:21Jun2017)  Requested for: 02Hnd6399; Last   Rx:39Bii0052 Ordered  Medication List Reviewed: The medication list was reviewed and updated today  Allergies    1  codeine    Vitals  Signs   Recorded: 61WAT9652 01:02PM   Temperature: 98 3 F  Heart Rate: 76  Systolic: 672  Diastolic: 64  Height: 6 ft   Weight: 174 lb 6 oz  BMI Calculated: 23 65  BSA Calculated: 2 01    Physical Exam    Constitutional   General appearance: No acute distress, well appearing and well nourished  Eyes   Conjunctiva and lids: No swelling, erythema or discharge  Ears, Nose, Mouth, and Throat   External inspection of ears and nose: Normal     Pulmonary   Respiratory effort: No increased work of breathing or signs of respiratory distress  Auscultation of lungs: Clear to auscultation  Cardiovascular   Auscultation of heart: Normal rate and rhythm, normal S1 and S2, without murmurs  Examination of extremities for edema and/or varicosities: Normal     Skin   Skin and subcutaneous tissue: Abnormal   (Scattered vesicle HZV rash in dermatomes 7,8,and 9 )      Attending Note  Collaborating Physician Note: Collaborating Physician: I agree with the Advanced Practitioner note        Future Appointments    Date/Time Provider Specialty Site   08/01/2017 04:45 PM Phu Walker MD Infectious Disease ASC AT [facility]   06/28/2017 09:30 AM ROMAN Virk Epidemiology/Public Health ASC AT [address] [place] [address]   Electronically signed by : Noe Ace; May 16 2017  1:44PM EST                       (Author)    Electronically signed by : Yaritza León DO; May 16 2017  3:39PM EST                       (Author)

## 2018-01-18 NOTE — PROGRESS NOTES
Assessment    1  HIV disease (042) (B20)   2  Musculoskeletal pain (729 1) (M79 1)   3  Syphilis (097 9) (A53 9)    Plan  Health Maintenance    · [address] [facility], [facility] Physician Referral  Consult  Status: Hold For - Scheduling   Requested for: 13Tke2740  are Referring to a non- Preferred Provider : Established Patient  Care Summary provided  : Yes  HIV disease    · Tivicay 50 MG Oral Tablet; take 1 tablet by mouth daily    Discussion/Summary    Yoli Whitlock is a 39-year-old pleasant  male who is here today for primary care follow-up  Is doing well  Acute complaint of transient right bicep pain with flexion while lifting heavy objects  Most likely overuse injury  Recommend RICE: rest, ice, compression, and elevation  Continue to use Ibuprofen PRN  HIV: CD4 393 VL 35 ART Descovy and Tivicay  Stressed the importance of adherence  Educated on the importance of taking medication with him when he travels  F/U appointment with ID clinic scheduled 11/1/2016  Syphilis: S/P treatment  Serofast  Continue to trend RPR  Health maintenance: Last dental appointment August 2016  Will schedule an eye exam independently  Currently not sexually active  Educated on the importance of using condoms when sexually active to prevent transmission of STI's  Eats a vegan diet  Consult dietitian for education on proper supplementation to maintain adequate protein intake  Primary care follow-up scheduled for 3 months  Will review lab results and assess resolution of right arm pain  Possible side effects of new medications were reviewed with the patient/guardian today  The treatment plan was reviewed with the patient/guardian  The patient/guardian understands and agrees with the treatment plan   The patient was counseled regarding instructions for management, risks and benefits of treatment options, importance of compliance with treatment       Counseling   Patient reports there are no people in their life who should be tested for HIV  Education   general HIV education  adherence  prevention care  Chief Complaint  Pt c/o right arm pain x couple of weeks  History of Present Illness  Brian Molina is a pleasant 59-year-old  male here today for primary care follow-up  Acute complaint of right bicep pain persisting for 2 weeks  Only occurs with flexion while lifting a heavy object  Pain is absence with every day movement  Has complete ROM  Ibuprofen and rest successfully relieve pain  Pain Assessment   the patient states they have pain  The pain is located in the right arm  The patient describes the pain as dull and aching  (on a scale of 0 to 10, the patient rates the pain at 3 )   Abuse And Domestic Violence Screen    Yes, the patient is safe at home  The patient states no one is hurting them  Depression And Suicide Screen  No, the patient has not had thoughts of hurting themself  No, the patient has not felt depressed in the past 7 days  CD4: 393  VL: 35  Time spent: 20 minutes  Strength: Understands disease process  Weakness: Inconsistent work schedule, travels often  Plan of Action: Reinforced the need for daily adherence  SUBSTANCE ABUSE: ETOH use  amount: 1-2/week  not using drugs  SMOKING: He is not a current smoker  SEXUALLY ACTIVE: He is not sexually active  HOUSING: He has stable housing  There are 1 people living in the household (including children)  The household income is $650 00/month  HEALTH MAINTENANCE: His last dental exam was 8/2016  His last eye exam was needs  Review of Systems    Constitutional: No fever or chills, feels well, no tiredness, no recent weight gain or weight loss  Eyes: No complaints of eye pain, no red eyes, no discharge from eyes, no itchy eyes  ENT: no complaints of earache, no hearing loss, no nosebleeds, no nasal discharge, no sore throat, no hoarseness     Cardiovascular: No complaints of slow heart rate, no fast heart rate, no chest pain, no palpitations, no leg claudication, no lower extremity  Respiratory: No complaints of shortness of breath, no wheezing, no cough, no SOB on exertion, no orthopnea or PND  Gastrointestinal: No complaints of abdominal pain, no constipation, no nausea or vomiting, no diarrhea or bloody stools  Genitourinary: No complaints of dysuria, no incontinence, no hesitancy, no nocturia, no genital lesion, no testicular pain  Musculoskeletal: No complaints of arthralgia, no myalgias, no joint swelling or stiffness, no limb pain or swelling and as noted in HPI  Integumentary: No complaints of skin rash or skin lesions, no itching, no skin wound, no dry skin  Neurological: No compliants of headache, no confusion, no convulsions, no numbness or tingling, no dizziness or fainting, no limb weakness, no difficulty walking  Psychiatric: Is not suicidal, no sleep disturbances, no anxiety or depression, no change in personality, no emotional problems  Endocrine: No complaints of proptosis, no hot flashes, no muscle weakness, no erectile dysfunction, no deepening of the voice, no feelings of weakness  Hematologic/Lymphatic: No complaints of swollen glands, no swollen glands in the neck, does not bleed easily, no easy bruising  Active Problems    1  History of Anxiety (300 00) (F41 9)   2  History of stomatitis (V12 79) (Z87 19)   3  HIV disease (042) (B20)   4  MRSA infection (041 12) (A49 02)   5  Musculoskeletal pain (729 1) (M79 1)   6  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)   7  Syphilis (097 9) (A53 9)    Past Medical History    1  History of Anxiety (300 00) (F41 9)   2  History of Chest tightness (786 59) (R07 89)   3  History of Cough (786 2) (R05)   4  History of drug abuse (305 93) (Z87 898)   5  History of stomatitis (V12 79) (Z87 19)   6  History of syphilis (V12 09) (Z86 19)   7  History of tuberculosis (V12 01) (Z86 11)   8   History of Wound of right leg (891 0) (X14 325M)    The active problems and past medical history were reviewed and updated today  Surgical History    The surgical history was reviewed and updated today  Family History  Mother    1  Adopted (V68 89) (Z02 82)  Father    2  Adopted (A31 73) (Z02 82)    The family history was reviewed and updated today  Social History    · Alcohol use (V49 89) (Z78 9)   · History of drug use (305 93) (F19 21)   · Housing   · Never a smoker   · No drug use   · Sexual abstinence   · Vegan (V49 89) (Z78 9)  The social history was reviewed and updated today  The social history was reviewed and is unchanged  Current Meds   1  Descovy 200-25 MG Oral Tablet; Take 1 tablet daily; Therapy: 36Jmh6328 to (Evaluate:40Hiy3331)  Requested for: 57QJC3370; Last   Rx:43Yzh1515 Ordered   2  Tivicay 50 MG Oral Tablet; take 1 tablet by mouth daily; Therapy: 97Dvt0515 to (Evaluate:86Irs2554)  Requested for: 08YFP0854; Last   Rx:15Zeu4531 Ordered    The medication list was reviewed and updated today  Allergies    1  codeine    Vitals  Signs   Recorded: 04KQI6235 28:43FI   Systolic: 211  Diastolic: 74  Heart Rate: 64  Temperature: 98 3 F  Weight: 169 lb 8 oz  BMI Calculated: 22 99  BSA Calculated: 1 99    Physical Exam    Constitutional   General appearance: No acute distress, well appearing and well nourished  Eyes   Conjunctiva and lids: No swelling, erythema or discharge  Pupils and irises: Equal, round and reactive to light  Ears, Nose, Mouth, and Throat   External inspection of ears and nose: Normal     Otoscopic examination: Tympanic membranes translucent with normal light reflex  Canals patent without erythema  Nasal mucosa, septum, and turbinates: Normal without edema or erythema  Oropharynx: Normal with no erythema, edema, exudate or lesions  Pulmonary   Respiratory effort: No increased work of breathing or signs of respiratory distress  Auscultation of lungs: Clear to auscultation  Cardiovascular   Auscultation of heart: Normal rate and rhythm, normal S1 and S2, without murmurs  Examination of extremities for edema and/or varicosities: Normal     Abdomen   Abdomen: Non-tender, no masses  Liver and spleen: No hepatomegaly or splenomegaly  Lymphatic   Palpation of lymph nodes in neck: No lymphadenopathy  Musculoskeletal   Gait and station: Normal     Digits and nails: Normal without clubbing or cyanosis  Inspection/palpation of joints, bones, and muscles: Normal     Muscle strength: Normal strength throughout  Skin   Skin and subcutaneous tissue: Normal without rashes or lesions  Neurologic   Cranial nerves: Cranial nerves 2-12 intact  Reflexes: 2+ and symmetric  Sensation: No sensory loss  Psychiatric   Orientation to person, place, and time: Normal     Mood and affect: Normal     Lips, Teeth and Gums: The lips were normal with no lesions  Examination of the teeth revealed normal dentition  Examination of the gingiva showed no abnormalities  Attending Note  Collaborating Physician: I agree with the Advanced Practitioner note  Future Appointments    Date/Time Provider Specialty Site   11/01/2016 04:45 PM Rusty Bailey MD Internal Medicine [facility]   11/23/2016 09:30 AM ROMAN Salguero Epidemiology/Public Health ASC AT [address] [facility]   Electronically signed by : Monse Llamas;  Aug 24 2016 10:35AM EST                       (Author)    Electronically signed by : Rosy Estes DO; Aug 24 2016 10:52AM EST                       (Author)

## 2018-01-18 NOTE — PROGRESS NOTES
History of Present Illness    Assessment:  BMI/ Fat mass  Nutrition Diagnosis No Nutrition Diagnosis Now   Intervention Nutrition Education   Monitor And Evaluation Goal #1 - Wt maintenance   Pt seemed confused at PCP encounter regarding BMI and fat mass indicating prior clinical paperwork from clinic showed a fat mass from 9% to 23%  Explained to [facility] the biometric information on clinical printouts and that fat mass is not one of the measurements  Reading was for his BMI which is in a normal range at 23  Pt indicates he is increasing his activity level to where it had been in the past including weight bearing exercises, swimming, and walking  Encouraged pt to follow through with goals and continue with balanced diet intake  Active Problems    1  History of Anxiety (300 00) (F41 9)   2  Atypical mole (216 9) (D22 9)   3  Encounter for screening examination for sexually transmitted disease (V74 5) (Z11 3)   4  Encounter for screening for cardiovascular disorders (V81 2) (Z13 6)   5  History of stomatitis (V12 79) (Z87 19)   6  HIV disease (042) (B20)   7  MRSA infection (041 12) (A49 02)   8  Musculoskeletal pain (729 1) (M79 1)   9  Need for prophylactic vaccination and inoculation against influenza (V04 81) (Z23)   10  Nicotine dependence (305 1) (F17 200)   11  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)   12  Shingles (053 9) (B02 9)   13  Syphilis (097 9) (A53 9)    Past Medical History    1  History of Anxiety (300 00) (F41 9)   2  History of Chest tightness (786 59) (R07 89)   3  History of Cough (786 2) (R05)   4  History of drug abuse (305 93) (Z87 898)   5  History of stomatitis (V12 79) (Z87 19)   6  History of syphilis (V12 09) (Z86 19)   7  History of tuberculosis (V12 01) (Z86 11)   8  History of Wound of right leg (891 0) (S81 801A)    Family History  Mother    1  Adopted (V68 89) (Z02 82)  Father    2   Adopted (V68 89) (Z02 82)    Social History    · Alcohol use (V49 89) (Z78 9)   · History of drug use (305 93) (Z87 898)   · Housing   · Never a smoker   · No drug use   · Sexual abstinence   · Vegan (V49 89) (Z78 9)    Current Meds   1  Chantix Starting Month Godfrey 0 5 MG X 11 & 1 MG X 42 Oral Tablet; take as directed; Therapy: 15WUM3775 to (Evaluate:28Apr2017)  Requested for: 50KZI3908; Last Rx:29Mar2017   Ordered   2  Descovy 200-25 MG Oral Tablet; Take 1 tablet daily; Therapy: 29Apr2016 to (Evaluate:30Hxz1820)  Requested for: 67OCG5485; Last Rx:40Lbp9550   Ordered   3  Tivicay 50 MG Oral Tablet; take 1 tablet by mouth daily; Therapy: 28Obk8553 to (Evaluate:21Jun2017)  Requested for: 62Stk5345; Last Rx:37Yvc2192   Ordered    Allergies    1  codeine    Future Appointments    Date/Time Provider Specialty Site   05/09/2017 05:15 PM Giancarlo Buckley MD Infectious Disease ASC AT [facility]   06/28/2017 09:30 AM Darryle Corti, CRNP Epidemiology/Public Health ASC AT [facility]     Signatures   Electronically signed by :  JAZMIN Gant; Mar 29 2017  3:12PM EST                       (Author)

## 2018-01-19 ENCOUNTER — GENERIC CONVERSION - ENCOUNTER (OUTPATIENT)
Dept: OTHER | Facility: OTHER | Age: 48
End: 2018-01-19

## 2018-01-22 VITALS
SYSTOLIC BLOOD PRESSURE: 100 MMHG | BODY MASS INDEX: 23.62 KG/M2 | WEIGHT: 174.38 LBS | HEIGHT: 72 IN | TEMPERATURE: 98.3 F | HEART RATE: 76 BPM | DIASTOLIC BLOOD PRESSURE: 64 MMHG

## 2018-01-22 VITALS
OXYGEN SATURATION: 96 % | WEIGHT: 173.25 LBS | BODY MASS INDEX: 23.5 KG/M2 | SYSTOLIC BLOOD PRESSURE: 124 MMHG | HEART RATE: 69 BPM | DIASTOLIC BLOOD PRESSURE: 90 MMHG | TEMPERATURE: 98 F

## 2018-01-22 VITALS
TEMPERATURE: 98.9 F | WEIGHT: 163.13 LBS | OXYGEN SATURATION: 97 % | HEIGHT: 72 IN | DIASTOLIC BLOOD PRESSURE: 68 MMHG | HEART RATE: 77 BPM | BODY MASS INDEX: 22.09 KG/M2 | SYSTOLIC BLOOD PRESSURE: 116 MMHG

## 2018-01-22 VITALS
BODY MASS INDEX: 23.45 KG/M2 | TEMPERATURE: 98 F | SYSTOLIC BLOOD PRESSURE: 102 MMHG | HEIGHT: 72 IN | HEART RATE: 64 BPM | WEIGHT: 171.25 LBS | WEIGHT: 173.13 LBS | BODY MASS INDEX: 23.2 KG/M2 | DIASTOLIC BLOOD PRESSURE: 68 MMHG | HEIGHT: 72 IN | DIASTOLIC BLOOD PRESSURE: 70 MMHG | TEMPERATURE: 98.4 F | HEART RATE: 72 BPM | SYSTOLIC BLOOD PRESSURE: 110 MMHG

## 2018-01-22 VITALS
TEMPERATURE: 97.9 F | WEIGHT: 175.13 LBS | HEIGHT: 72 IN | OXYGEN SATURATION: 97 % | DIASTOLIC BLOOD PRESSURE: 62 MMHG | SYSTOLIC BLOOD PRESSURE: 102 MMHG | HEART RATE: 67 BPM | BODY MASS INDEX: 23.72 KG/M2

## 2018-01-23 ENCOUNTER — GENERIC CONVERSION - ENCOUNTER (OUTPATIENT)
Dept: OTHER | Facility: OTHER | Age: 48
End: 2018-01-23

## 2018-01-24 NOTE — MISCELLANEOUS
Message  Called pt  Tamiflu and cough medicine sent into pharmacy  Will call clinic if symptoms do not contiune to improve        Plan  Viral upper respiratory infection    · GuaiFENesin 100 MG/5ML Oral Syrup; TAKE 10 ML EVERY 4 HOURS AS NEEDED   · Oseltamivir Phosphate 75 MG Oral Capsule (Tamiflu); TAKE 1 CAPSULE TWICE  DAILY WITH MEALS    Signatures   Electronically signed by : Cherri Coombs; Jan 23 2018  4:49PM EST                       (Author)

## 2018-01-26 ENCOUNTER — TELEPHONE (OUTPATIENT)
Dept: SURGERY | Facility: CLINIC | Age: 48
End: 2018-01-26

## 2018-01-26 NOTE — TELEPHONE ENCOUNTER
Robitussin sent to [facility]  Spoke to [facility]- med will be sent overnight via mail and pt will have by tomorrow morning  Pt made aware by CCN  Also called pt-feeling alittle better after 3 days of tamiflu  Pt still very weak and lightheaded  Encouraged pt to take last 2 doses, robitussin, rest and hydrate  If pt still not well, pt will f/u on Mon or early next week

## 2018-02-01 RX ORDER — GUAIFENESIN 100 MG/5ML
10 SYRUP ORAL EVERY 4 HOURS PRN
COMMUNITY
Start: 2018-01-23 | End: 2018-02-06 | Stop reason: ALTCHOICE

## 2018-02-01 RX ORDER — OSELTAMIVIR PHOSPHATE 75 MG/1
1 CAPSULE ORAL 2 TIMES DAILY
COMMUNITY
Start: 2018-01-23 | End: 2018-02-06 | Stop reason: ALTCHOICE

## 2018-02-05 PROBLEM — F17.200 NICOTINE DEPENDENCE: Status: ACTIVE | Noted: 2017-02-07

## 2018-02-06 ENCOUNTER — TELEPHONE (OUTPATIENT)
Dept: SURGERY | Facility: CLINIC | Age: 48
End: 2018-02-06

## 2018-02-06 ENCOUNTER — OFFICE VISIT (OUTPATIENT)
Dept: SURGERY | Facility: CLINIC | Age: 48
End: 2018-02-06
Payer: MEDICARE

## 2018-02-06 VITALS
WEIGHT: 177 LBS | SYSTOLIC BLOOD PRESSURE: 118 MMHG | DIASTOLIC BLOOD PRESSURE: 72 MMHG | HEIGHT: 72 IN | TEMPERATURE: 98.2 F | OXYGEN SATURATION: 97 % | HEART RATE: 74 BPM | BODY MASS INDEX: 23.98 KG/M2

## 2018-02-06 DIAGNOSIS — R76.12 POSITIVE QUANTIFERON-TB GOLD TEST: Primary | ICD-10-CM

## 2018-02-06 DIAGNOSIS — B20 HIV DISEASE (HCC): ICD-10-CM

## 2018-02-06 DIAGNOSIS — F17.211 CIGARETTE NICOTINE DEPENDENCE IN REMISSION: ICD-10-CM

## 2018-02-06 PROCEDURE — 99214 OFFICE O/P EST MOD 30 MIN: CPT | Performed by: NURSE PRACTITIONER

## 2018-02-06 NOTE — ASSESSMENT & PLAN NOTE
Previous two years quant gold negative  Positive 1/10/18  Treated for active TB in [place] at the age of 30 by the health department  Quarantined for three months and  then continued treatment at the health department   Will check a CXR and discuss further with ID

## 2018-02-06 NOTE — TELEPHONE ENCOUNTER
Spoke to pt  To let him know that no further treatment or testing is nessacary in reference to + quant gold

## 2018-02-06 NOTE — PATIENT INSTRUCTIONS
Problem List Items Addressed This Visit     HIV disease ([facility] [address] )       Cd4:  626  Viral load: 70   ART: Bety Cedeno  Denies misses doses  Denies side effects  Stressed the importance of adherence  Continue follow up with ID clinic  Reviewed most recent labs, including Cd4 and viral load  Discussed the risks and benefits of treatment options, instructions for management, importance of treatment adherence, and reduction of risk factor  Educated on possible  medication side effects  Counseled on routes of HIV transmission, including the risk of  infection  Emphasized that viral suppression is the best method to prevent HIV transmission  At this time pt denies the need for HIV testing of anyone in their life  Total encounter time was 45 minutes  Greater then 20 minutes were spent on counseling and patient education  Pt voices understanding and agreement with treatment plan  Relevant Medications    emtricitabine-tenofovir AF (DESCOVY) 200-25 MG tablet    dolutegravir (TIVICAY) 50 MG TABS    Nicotine dependence     Stopped smoking  Congratulated on cessation success  Positive QuantiFERON-TB Gold test - Primary     Previous two years quant gold negative  Positive 1/10/18  Treated for active TB in [place] at the age of 30 by the health department  Quarantined for three months and  then continued treatment at the health department   Will check a CXR and discuss further with ID

## 2018-02-06 NOTE — ASSESSMENT & PLAN NOTE
Cd4:  626  Viral load: 70   ART: Tivicay, Descovy  Denies misses doses  Denies side effects  Stressed the importance of adherence  Continue follow up with ID clinic  Reviewed most recent labs, including Cd4 and viral load  Discussed the risks and benefits of treatment options, instructions for management, importance of treatment adherence, and reduction of risk factor  Educated on possible  medication side effects  Counseled on routes of HIV transmission, including the risk of  infection  Emphasized that viral suppression is the best method to prevent HIV transmission  At this time pt denies the need for HIV testing of anyone in their life  Total encounter time was 45 minutes  Greater then 20 minutes were spent on counseling and patient education  Pt voices understanding and agreement with treatment plan

## 2018-02-06 NOTE — PROGRESS NOTES
Assessment/Plan:  Problem List Items Addressed This Visit     HIV disease ([facility] [address] )       Cd4:  626  Viral load: 70   ART: Ingrid Aguayo  Denies misses doses  Denies side effects  Stressed the importance of adherence  Continue follow up with ID clinic  Reviewed most recent labs, including Cd4 and viral load  Discussed the risks and benefits of treatment options, instructions for management, importance of treatment adherence, and reduction of risk factor  Educated on possible  medication side effects  Counseled on routes of HIV transmission, including the risk of  infection  Emphasized that viral suppression is the best method to prevent HIV transmission  At this time pt denies the need for HIV testing of anyone in their life  Total encounter time was 45 minutes  Greater then 20 minutes were spent on counseling and patient education  Pt voices understanding and agreement with treatment plan  Relevant Medications    emtricitabine-tenofovir AF (DESCOVY) 200-25 MG tablet    oseltamivir (TAMIFLU) 75 mg capsule    dolutegravir (TIVICAY) 50 MG TABS    Nicotine dependence     Stopped smoking  Congratulated on cessation success  Positive QuantiFERON-TB Gold test - Primary     Previous two years quant gold negative  Positive 1/10/18  Treated for active TB in [place] at the age of 30 by the health department  Quarantined for three months and  then continued treatment at the health department  Will check a CXR and discuss further with ID  Subjective:      Patient ID: Paco Murguia is a 41 y o  male  Steffi Garsia is here today for PCP f/u of chronic conditions  Steffi Garsia is doing well and denies any acute complaints  Stopped smoking and drinking ETOH  Working on limiting caffiene           The following portions of the patient's history were reviewed and updated as appropriate: allergies, current medications, past family history, past medical history, past social history, past surgical history and problem list     Review of Systems   Constitutional: Negative for activity change, appetite change, chills, diaphoresis, fatigue, fever and unexpected weight change  HENT: Negative for congestion, dental problem, ear pain, hearing loss, mouth sores, rhinorrhea and sore throat  Eyes: Negative for pain, redness and visual disturbance  Respiratory: Negative for shortness of breath and wheezing  Cardiovascular: Negative for chest pain and leg swelling  Gastrointestinal: Negative for abdominal pain, constipation, diarrhea, nausea and vomiting  Endocrine: Negative for polydipsia, polyphagia and polyuria  Genitourinary: Negative for difficulty urinating and dysuria  Musculoskeletal: Negative for back pain, joint swelling and myalgias  Skin: Negative for rash  Neurological: Negative for syncope and headaches  Psychiatric/Behavioral: Negative for behavioral problems and suicidal ideas  Objective:     Physical Exam   Constitutional: He is oriented to person, place, and time  He appears well-developed and well-nourished  No distress  HENT:   Head: Normocephalic  Right Ear: External ear normal    Left Ear: External ear normal    Nose: Nose normal    Mouth/Throat: Oropharynx is clear and moist  No oropharyngeal exudate  Eyes: Conjunctivae are normal  Pupils are equal, round, and reactive to light  Right eye exhibits no discharge  Left eye exhibits no discharge  Neck: Normal range of motion  No thyromegaly present  Cardiovascular: Normal rate, regular rhythm, normal heart sounds and intact distal pulses  No murmur heard  Pulmonary/Chest: Effort normal and breath sounds normal  He has no wheezes  Abdominal: Soft  Bowel sounds are normal  He exhibits no mass  There is no tenderness  Musculoskeletal: Normal range of motion  He exhibits no edema or tenderness  Lymphadenopathy:     He has no cervical adenopathy     Neurological: He is alert and oriented to person, place, and time  Skin: Skin is warm and dry  No rash noted  Psychiatric: He has a normal mood and affect   His behavior is normal

## 2018-02-07 NOTE — PROGRESS NOTES
I have reviewed the notes, assessments, and/or procedures performed by CRNP, I concur with her/his documentation of Armando Nava

## 2018-02-13 ENCOUNTER — OFFICE VISIT (OUTPATIENT)
Dept: SURGERY | Facility: CLINIC | Age: 48
End: 2018-02-13
Payer: MEDICARE

## 2018-02-13 VITALS
SYSTOLIC BLOOD PRESSURE: 110 MMHG | BODY MASS INDEX: 24.34 KG/M2 | DIASTOLIC BLOOD PRESSURE: 78 MMHG | TEMPERATURE: 97.4 F | HEART RATE: 66 BPM | OXYGEN SATURATION: 98 % | WEIGHT: 177 LBS

## 2018-02-13 DIAGNOSIS — F17.211 CIGARETTE NICOTINE DEPENDENCE IN REMISSION: ICD-10-CM

## 2018-02-13 DIAGNOSIS — B20 HIV (HUMAN IMMUNODEFICIENCY VIRUS INFECTION) (HCC): Primary | ICD-10-CM

## 2018-02-13 DIAGNOSIS — B20 HIV DISEASE (HCC): ICD-10-CM

## 2018-02-13 DIAGNOSIS — Z13.6 ENCOUNTER FOR SCREENING FOR CARDIOVASCULAR DISORDERS: ICD-10-CM

## 2018-02-13 DIAGNOSIS — A53.9 SYPHILIS: ICD-10-CM

## 2018-02-13 DIAGNOSIS — R76.12 POSITIVE QUANTIFERON-TB GOLD TEST: ICD-10-CM

## 2018-02-13 PROCEDURE — 99214 OFFICE O/P EST MOD 30 MIN: CPT | Performed by: INTERNAL MEDICINE

## 2018-02-13 NOTE — PROGRESS NOTES
Assessment/Plan:     [employer]     Today patient present with positive outlook on life, multiple positive changes incld  stopped smoking cigarettes and marijuana and no EtOH use  Patient would likely benefit from continuing with his positive life changes    Stage of change: Maintenance  Plan/ Behavioral Recommendations: n/a      Diagnoses and all orders for this visit:    HIV (human immunodeficiency virus infection) ([facility] )  -     CBC and differential; Future  -     Comprehensive metabolic panel; Future  -     HIV-1 RNA, quantitative, PCR; Future  -     RPR; Future  -     T-helper cells CD4/CD8 %; Future    Encounter for screening for cardiovascular disorders  -     Lipid Panel with Direct LDL reflex; Future    HIV disease ([facility] )    Cigarette nicotine dependence in remission    Syphilis    Positive QuantiFERON-TB Gold test          Discussion:     Patient mood is stable  and Patient denies acute behavioral health issues  He has made a number of positive changes in 2018 and is feeling great about them  Quit smoking, no alcohol and no marijuana  He reports having more time and energy to focus on advancing his career  He has enrolled in college and 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 [employer]  Subjective:     Patient ID: Alf Christian is a 41 y o  male  HPI    History of Present Illness: The patient is seeing the [facility] today for a routine behavioral health follow up      Review of Systems      Objective:     Physical Exam      [facility]    Orientation     Person: yes    Place: yes    Time: yes    Appearance    Well Developed: yes healthy    Uncomfortable: no    Normal Body Odor: yes    Smells of Feces: no    Smells of Urine: no    Disheveled: no    Well Nourished: yes weight WNL of ideal    Grooming Unkempt: no    Poor Eye Contact: no    Hirsute: no    Looks Tired: no    Acutely Exhausted: no    Mood and Affect:     Appropriate: yes    Euthymicyes    Irritable: no    Angry: no    Anxious: no    Depressed:no    Blunted:no    Labile: no    Restricted: no    Harm to Self or Others: denied     Substance Abuse: denied

## 2018-02-13 NOTE — PROGRESS NOTES
Progress Note - Infectious Disease   Kwan Suggs 41 y o  male MRN: [id number]  Unit/Bed#:  Encounter: [id number]      Impression/Plan:  1  HIV-doing reasonably well on Tivicay and Descovy with a near undetectable viral load  He continues to have low level viremia of unclear significance but only at 70 copies  His CD4 count remains over 600  Continue antiretrovirals, recheck labs in 2 months, follow-up in 3 months  Stressed adherence  2   Nicotine dependence-the patient has now quit smoking  Continue to encourage the patient to continue to have complete tobacco cessation  3   Positive QuantiFERON gold-the patient has a history of active tuberculosis in the past, so I suspect this or main positive indefinitely  He has no current evidence of active TB  No need to ever recheck a QuantiFERON gold or PPD  Will continue to monitor for any symptoms of active TB  4   Syphilis-status post treatment with a sero fast RPR  Will continue to monitor the RPR annually  Patient was provided medication, adherence and prevention education    Subjective:  Routine follow-up for HIV  Patient claims 100% adherence with Tivicay and Descovy  Patient denies any notable side effects  Overall the feeling well  The patient denies any fever chills or sweats, denies any nausea vomiting or diarrhea, denies any cough or shortness of breath  He has been ill multiple times over the last few weeks including influenza, another viral respiratory infection, and viral gastroenteritis  These illnesses have now all resolved  He is completely stop smoking and drinking any alcohol and is no longer smoking marijuana      ROS: A complete 12 point ROS is negative other than that noted in the HPI    Objective:  Vitals:  Vitals:    02/13/18 1720   BP: 110/78   Pulse: 66   Temp: (!) 97 4 °F (36 3 °C)   SpO2: 98%   Weight: 80 3 kg (177 lb)       Physical Exam:   General Appearance:  Alert, interactive, appearing well,  nontoxic, no acute distress  Neck:   Supple without lymphadenopathy, no thyromegaly or masses   Throat: Oropharynx moist without lesions  Lungs:   Clear to auscultation bilaterally; no wheezes, rhonchi or rales; respirations unlabored   Heart:  RRR; no murmur, rub or gallop   Abdomen:   Soft, non-tender, non-distended, positive bowel sounds  Extremities: No clubbing, cyanosis or edema   Skin: No new rashes or lesions  No draining wounds noted         Labs, Imaging, & Other studies:   All pertinent labs and imaging studies were personally reviewed    CD4 626, HIV RNA 70 copies, white cell count 5 7, creatinine 1 03, liver function tests normal, QuantiFERON gold positive      Current Outpatient Prescriptions:     dolutegravir (TIVICAY) 50 MG TABS, Take 1 tablet by mouth daily, Disp: , Rfl:     emtricitabine-tenofovir AF (DESCOVY) 200-25 MG tablet, Take 1 tablet by mouth daily, Disp: , Rfl:

## 2018-04-11 DIAGNOSIS — B20 HIV (HUMAN IMMUNODEFICIENCY VIRUS INFECTION) (HCC): Primary | ICD-10-CM

## 2018-04-11 RX ORDER — DOLUTEGRAVIR SODIUM 50 MG/1
TABLET, FILM COATED ORAL DAILY
Qty: 30 TABLET | Refills: 5 | Status: SHIPPED | OUTPATIENT
Start: 2018-04-11 | End: 2018-05-08 | Stop reason: HOSPADM

## 2018-04-11 RX ORDER — EMTRICITABINE AND TENOFOVIR ALAFENAMIDE 200; 25 MG/1; MG/1
1 TABLET ORAL DAILY
Qty: 30 TABLET | Refills: 5 | Status: SHIPPED | OUTPATIENT
Start: 2018-04-11 | End: 2018-05-08 | Stop reason: HOSPADM

## 2018-04-12 DIAGNOSIS — Z13.6 ENCOUNTER FOR SCREENING FOR CARDIOVASCULAR DISORDERS: ICD-10-CM

## 2018-04-12 DIAGNOSIS — B20 HIV (HUMAN IMMUNODEFICIENCY VIRUS INFECTION) (HCC): ICD-10-CM

## 2018-04-12 DIAGNOSIS — B20 HIV (HUMAN IMMUNODEFICIENCY VIRUS INFECTION) (HCC): Primary | ICD-10-CM

## 2018-05-07 ENCOUNTER — OFFICE VISIT (OUTPATIENT)
Dept: SURGERY | Facility: CLINIC | Age: 48
End: 2018-05-07
Payer: MEDICARE

## 2018-05-07 VITALS
OXYGEN SATURATION: 98 % | SYSTOLIC BLOOD PRESSURE: 100 MMHG | BODY MASS INDEX: 25.27 KG/M2 | WEIGHT: 186.6 LBS | TEMPERATURE: 98.4 F | DIASTOLIC BLOOD PRESSURE: 68 MMHG | HEIGHT: 72 IN | HEART RATE: 62 BPM

## 2018-05-07 DIAGNOSIS — H61.21 IMPACTED CERUMEN OF RIGHT EAR: ICD-10-CM

## 2018-05-07 DIAGNOSIS — B20 HIV DISEASE (HCC): ICD-10-CM

## 2018-05-07 DIAGNOSIS — A53.9 SYPHILIS: Primary | ICD-10-CM

## 2018-05-07 PROBLEM — F17.200 NICOTINE DEPENDENCE: Status: RESOLVED | Noted: 2017-02-07 | Resolved: 2018-05-07

## 2018-05-07 PROCEDURE — 99214 OFFICE O/P EST MOD 30 MIN: CPT | Performed by: NURSE PRACTITIONER

## 2018-05-07 NOTE — PATIENT INSTRUCTIONS
Problem List Items Addressed This Visit     HIV disease ([facility] [address] )       Cd4: 500  Viral load:155  ART: IselaJoyce Curran states he is 100% complaint  Denies missing any doses  Does not take supplements  Nothing in diet would interfere with medication  Denies side effects  Stressed the importance of adherence  Continue follow up with ID clinic  Reviewed most recent labs, including Cd4 and viral load  Discussed the risks and benefits of treatment options, instructions for management, importance of treatment adherence, and reduction of risk factor  Educated on possible  medication side effects  Counseled on routes of HIV transmission, including the risk of  infection  Emphasized that viral suppression is the best method to prevent HIV transmission  At this time pt denies the need for HIV testing of anyone in their life  Total encounter time was 45 minutes  Greater then 20 minutes were spent on counseling and patient education  Pt voices understanding and agreement with treatment plan  Syphilis - Primary     Serofast  RPR 1:2  Monitor annually  Other Visit Diagnoses     Impacted cerumen of right ear            Cerumen Impaction   WHAT YOU NEED TO KNOW:   Cerumen impaction is the blockage of the outer ear canal by tightly packed cerumen (earwax)  It is generally treated with procedures such as flushing or suctioning the ear canal or the use of instruments to remove the impaction  DISCHARGE INSTRUCTIONS:   Medicines:  · Ear drops: These are used to soften the wax in your ear  Wax softening ear drops may be bought without a prescription  Ask your healthcare provider how often you should use this medicine  Read the instructions carefully before you use the ear drops  Do the following when you put in ear drops:     ¨ Warm the drops by holding the bottle in your hands for a few minutes  Cold ear drops may make you dizzy      ¨ Lie down with the affected ear toward the ceiling  You may also stand with your head tilted to one side  ¨ Pull your ear lobe up and back, and place the correct number of drops into the ear  ¨ Keep your ear facing up for 5 to 10 minutes so the drops coat the outer ear canal      ¨ Gently clean the outer part of the ear with a cotton swab  Do not  place the cotton swab or anything inside your ear canal  This increases the risk of damaging your eardrum  · Take your medicine as directed  Contact your healthcare provider if you think your medicine is not helping or if you have side effects  Tell him of her if you are allergic to any medicine  Keep a list of the medicines, vitamins, and herbs you take  Include the amounts, and when and why you take them  Bring the list or the pill bottles to follow-up visits  Carry your medicine list with you in case of an emergency  Follow up with your healthcare provider as directed:  Write down your questions so you remember to ask them during your visits  Contact your healthcare provider if:   · You have a fever  · You have trouble hearing or ringing in your ear  · You have questions about your condition or care  Return to the emergency department if:   · You feel dizzy  · You have discharge or blood coming out of your ear  · Your ear pain does not go away or gets worse  © 2017 [employer] Information is for End User's use only and may not be sold, redistributed or otherwise used for commercial purposes  All illustrations and images included in CareNotes® are the copyrighted property of [employer]  or Adrian Duran  The above information is an  only  It is not intended as medical advice for individual conditions or treatments  Talk to your doctor, nurse or pharmacist before following any medical regimen to see if it is safe and effective for you

## 2018-05-07 NOTE — ASSESSMENT & PLAN NOTE
Cd4: 500  Viral load:155  ART: Raquel Curran states he is 100% complaint  Denies missing any doses  Does not take supplements  Nothing in diet would interfere with medication  Denies side effects  Stressed the importance of adherence  Continue follow up with ID clinic  Reviewed most recent labs, including Cd4 and viral load  Discussed the risks and benefits of treatment options, instructions for management, importance of treatment adherence, and reduction of risk factor  Educated on possible  medication side effects  Counseled on routes of HIV transmission, including the risk of  infection  Emphasized that viral suppression is the best method to prevent HIV transmission  At this time pt denies the need for HIV testing of anyone in their life  Total encounter time was 45 minutes  Greater then 20 minutes were spent on counseling and patient education  Pt voices understanding and agreement with treatment plan

## 2018-05-07 NOTE — PROGRESS NOTES
Assessment/Plan:    Impacted cerumen: Ordered Debrox drops  Instructed on proper use  HIV disease ([facility] [address] )    Cd4: 500  Viral load:155  ART: Raquel Curran states he is 100% complaint  Denies missing any doses  Does not take supplements  Nothing in diet would interfere with medication  Denies side effects  Stressed the importance of adherence  Continue follow up with ID clinic  Reviewed most recent labs, including Cd4 and viral load  Discussed the risks and benefits of treatment options, instructions for management, importance of treatment adherence, and reduction of risk factor  Educated on possible  medication side effects  Counseled on routes of HIV transmission, including the risk of  infection  Emphasized that viral suppression is the best method to prevent HIV transmission  At this time pt denies the need for HIV testing of anyone in their life  Total encounter time was 45 minutes  Greater then 20 minutes were spent on counseling and patient education  Pt voices understanding and agreement with treatment plan  Syphilis  Serofast  RPR 1:2  Monitor annually  Diagnoses and all orders for this visit:    Syphilis    Impacted cerumen of right ear  -     carbamide peroxide (DEBROX) 6 5 % otic solution; Administer 5 drops into both ears 2 (two) times a day    HIV disease (HCC)          Subjective:      Patient ID: Naresh Bajwa is a 41 y o  male  Cristina Pérez is here today for PCP f/u of chronic conditions  Recently stopped drinking alcohol and smoking cigarettes for greater then 3 months  Encouraged continued progress towards healthy living goals           The following portions of the patient's history were reviewed and updated as appropriate: allergies, current medications, past family history, past medical history, past social history, past surgical history and problem list     Review of Systems   Constitutional: Negative for activity change, appetite change, chills, diaphoresis, fatigue, fever and unexpected weight change  HENT: Negative for congestion, dental problem, ear pain, hearing loss, mouth sores, rhinorrhea and sore throat  Eyes: Positive for visual disturbance (blurriness in R eye, scheduled for opthamology appointment later this month)  Negative for pain and redness  Respiratory: Negative for shortness of breath and wheezing  Cardiovascular: Negative for chest pain and leg swelling  Gastrointestinal: Negative for abdominal pain, constipation, diarrhea, nausea and vomiting  Slight bulge of umbilicus  Endocrine: Negative for polydipsia, polyphagia and polyuria  Genitourinary: Negative for decreased urine volume, difficulty urinating, dysuria and hematuria  Musculoskeletal: Positive for back pain (occasional, due to poor quailty mattress )  Negative for joint swelling and myalgias  Skin: Negative for rash  Neurological: Negative for syncope and headaches  Psychiatric/Behavioral: Negative for behavioral problems and suicidal ideas  Objective:      /68   Pulse 62   Temp 98 4 °F (36 9 °C)   Ht 5' 11 5" (1 816 m)   Wt 84 6 kg (186 lb 9 6 oz)   SpO2 98%   BMI 25 66 kg/m²          Physical Exam   Constitutional: He is oriented to person, place, and time  He appears well-developed and well-nourished  No distress  HENT:   Head: Normocephalic  Right Ear: External ear normal    Left Ear: External ear normal    Nose: Nose normal    Mouth/Throat: Oropharynx is clear and moist  No oropharyngeal exudate  Eyes: Conjunctivae are normal  Pupils are equal, round, and reactive to light  Right eye exhibits no discharge  Left eye exhibits no discharge  Neck: Normal range of motion  No thyromegaly present  Cardiovascular: Normal rate, regular rhythm, normal heart sounds and intact distal pulses  No murmur heard  Pulmonary/Chest: Effort normal and breath sounds normal  He has no wheezes  Abdominal: Soft   Bowel sounds are normal  He exhibits no mass  There is no tenderness  Musculoskeletal: Normal range of motion  He exhibits no edema or tenderness  Lymphadenopathy:     He has no cervical adenopathy  Neurological: He is alert and oriented to person, place, and time  Skin: Skin is warm and dry  No rash noted  Psychiatric: He has a normal mood and affect   His behavior is normal

## 2018-05-08 ENCOUNTER — OFFICE VISIT (OUTPATIENT)
Dept: SURGERY | Facility: CLINIC | Age: 48
End: 2018-05-08
Payer: MEDICARE

## 2018-05-08 VITALS
BODY MASS INDEX: 25.14 KG/M2 | WEIGHT: 185.6 LBS | HEART RATE: 64 BPM | SYSTOLIC BLOOD PRESSURE: 110 MMHG | DIASTOLIC BLOOD PRESSURE: 80 MMHG | OXYGEN SATURATION: 99 % | HEIGHT: 72 IN | TEMPERATURE: 98 F

## 2018-05-08 DIAGNOSIS — B20 HIV (HUMAN IMMUNODEFICIENCY VIRUS INFECTION) (HCC): Primary | ICD-10-CM

## 2018-05-08 DIAGNOSIS — A53.9 SYPHILIS: ICD-10-CM

## 2018-05-08 PROCEDURE — 99214 OFFICE O/P EST MOD 30 MIN: CPT | Performed by: INTERNAL MEDICINE

## 2018-05-08 NOTE — PROGRESS NOTES
Progress Note - Infectious Disease   Rosangela Best 41 y o  male MRN: [id number]  Unit/Bed#:  Encounter: [id number]      Impression/Plan:  1  HIV-doing reasonably well on Tivicay and Descovy but his viral load has increased to 155 copies  His CD4 count is at 500  Patient is very concerned about the increased viral load  Will change the antiretrovirals to [facility]  Recheck labs in 4 weeks and follow up in 6 weeks  Stressed adherence  Told the patient not to take his protein drink until at least 4 hr after the [facility]      2  Syphilis-status post treatment with a sero fast RPR  His RPR is now at 1:2   Will continue to monitor the RPR annually  Patient was provided medication, adherence and prevention education    Subjective:  Routine follow-up for HIV  Patient claims 100% adherence with the Tivicay and Descovy  Patient denies any notable side effects  Overall the feeling well  The patient denies any fever chills or sweats, denies any nausea vomiting or diarrhea, denies any cough or shortness of breath  ROS: A complete 12 point ROS is negative other than that noted in the HPI    Objective:  Vitals:  Vitals:    05/08/18 1743   BP: 110/80   Pulse: 64   Temp: 98 °F (36 7 °C)   SpO2: 99%   Weight: 84 2 kg (185 lb 9 6 oz)   Height: 5' 11 5" (1 816 m)       Physical Exam:   General Appearance:  Alert, interactive, appearing well,  nontoxic, no acute distress  Neck:   Supple without lymphadenopathy, no thyromegaly or masses   Throat: Oropharynx moist without lesions  Lungs:   Clear to auscultation bilaterally; no wheezes, rhonchi or rales; respirations unlabored   Heart:  RRR; no murmur, rub or gallop   Abdomen:   Soft, non-tender, non-distended, positive bowel sounds  Extremities: No clubbing, cyanosis or edema   Skin: No new rashes or lesions  No draining wounds noted       Labs, Imaging, & Other studies:   All pertinent labs and imaging studies were personally reviewed    HIV , CD4 500, creatinine 1 05, white blood cell count 4 6, liver function tests normal    Current Outpatient Prescriptions:     carbamide peroxide (DEBROX) 6 5 % otic solution, Administer 5 drops into both ears 2 (two) times a day, Disp: 15 mL, Rfl: 0    DESCOVY 200-25 MG tablet, TAKE 1 TABLET BY MOUTH DAILY, Disp: 30 tablet, Rfl: 5    TIVICAY 50 MG TABS, TAKE 1 TABLET BY MOUTH DAILY, Disp: 30 tablet, Rfl: 5

## 2018-05-09 DIAGNOSIS — H61.21 IMPACTED CERUMEN OF RIGHT EAR: ICD-10-CM

## 2018-05-09 RX ORDER — ADHESIVE BANDAGE 3/4"
BANDAGE TOPICAL
Qty: 15 ML | OUTPATIENT
Start: 2018-05-09

## 2018-05-10 ENCOUNTER — TELEPHONE (OUTPATIENT)
Dept: SURGERY | Facility: CLINIC | Age: 48
End: 2018-05-10

## 2018-05-10 NOTE — TELEPHONE ENCOUNTER
DYLAN Teixeira at [facility] - d/c Tivicay and Descovy  Pt is now on [facility]

## 2018-05-14 NOTE — PROGRESS NOTES
Assessment/Plan:     [employer]     Today patient present with   Chief Complaint   Patient presents with    HIV Positive     3mth f/u-no c/os     Patient would likely benefit from continued maintenance of well-being  Consider/focus/continue n/a  Stage of change: Maintenance  Plan/ Behavioral Recommendations: n/a      Diagnoses and all orders for this visit:    HIV (human immunodeficiency virus infection) ([facility] [address] )  -     CBC and differential; Future  -     T-helper cells CD4/CD8 %; Future  -     HIV-1 RNA, quantitative, PCR; Future  -     Comprehensive metabolic panel; Future  -     Bictegravir-Emtricitab-Tenofov (BIKTARVY) -25 MG TABS; Take 1 tablet by mouth daily    Syphilis          Discussion:     Patient mood is stable  and Patient denies acute behavioral health issues  He is still [employer] and enjoying the work  Smoking cessation discussion and PHQ-9 surveys were completed (see SC notes)  Pt quit smoking months ago and has maintained this status  PHQ-9 screening revealed no MH issues  Subjective:     Patient ID: Rosangela Best is a 41 y o  male  HPI    History of Present Illness: The patient is seeing the [facility] today for a routine behavioral health follow up      Review of Systems      Objective:     Physical Exam      [facility]    Orientation     Person: yes    Place: yes    Time: yes    Appearance    Well Developed: yes healthy    Uncomfortable: no    Normal Body Odor: yes    Smells of Feces: no    Smells of Urine: no    Disheveled: no    Well Nourished: yes weight WNL of ideal    Grooming Unkempt: no    Poor Eye Contact: no    Hirsute: no    Looks Tired: no    Acutely Exhausted: no    Mood and Affect:     Appropriate: yes    Euthymicyes    Irritable: no    Angry: no    Anxious: no    Depressed:no    Blunted:no    Labile: no    Restricted: no    Harm to Self or Others: denied     Substance Abuse: none reported or observed

## 2018-05-14 NOTE — PROGRESS NOTES
Assessment/Plan:      Diagnoses and all orders for this visit:    HIV (human immunodeficiency virus infection) ([facility] [address] )  -     CBC and differential; Future  -     T-helper cells CD4/CD8 %; Future  -     HIV-1 RNA, quantitative, PCR; Future  -     Comprehensive metabolic panel; Future  -     Bictegravir-Emtricitab-Tenofov (BIKTARVY) -25 MG TABS; Take 1 tablet by mouth daily    Syphilis          Subjective:     Patient ID: Lauretha Najjar is a 41 y o  male  Reason for Visit: Nicotine Dependence    HPI    Smoking Cessation:       Current Status: has quit smoking    Treatment Plan: none     Additional Treatments: none    Patient Discussion: patient    Discussion   Pt quit smoking over 3 months ago  He reports still being smoke free  [employer] [facility] congratulated  Pt described that at this point he does not even consider himself a former smoker, just someone that does not smoke  Pt reported using an louisa called [employer] which calculates how much a former smoker has saved so far  Pt stated that he has saved so much that he is planning to replace his work ([address]) equipment with his savings  [facility] [employer] [facility] congratulated pt on this milestone and encouraged continuation       Review of Systems      Objective:     Physical Exam

## 2018-06-04 ENCOUNTER — TELEPHONE (OUTPATIENT)
Dept: SURGERY | Facility: CLINIC | Age: 48
End: 2018-06-04

## 2018-06-04 DIAGNOSIS — K42.9 UMBILICAL HERNIA WITHOUT OBSTRUCTION AND WITHOUT GANGRENE: Primary | ICD-10-CM

## 2018-06-04 NOTE — TELEPHONE ENCOUNTER
Called and spoke to patient   He is going to set up an appointment with general surgery at [place] [address]

## 2018-06-12 ENCOUNTER — TELEPHONE (OUTPATIENT)
Dept: SURGERY | Facility: CLINIC | Age: 48
End: 2018-06-12

## 2018-06-12 NOTE — TELEPHONE ENCOUNTER
Spoke at length with pt  Will post pone surgery for now and resume f/u in the fall  Will need referral to a different surgeon

## 2018-06-19 ENCOUNTER — OFFICE VISIT (OUTPATIENT)
Dept: SURGERY | Facility: CLINIC | Age: 48
End: 2018-06-19
Payer: MEDICARE

## 2018-06-19 VITALS
SYSTOLIC BLOOD PRESSURE: 118 MMHG | DIASTOLIC BLOOD PRESSURE: 60 MMHG | HEART RATE: 64 BPM | HEIGHT: 72 IN | OXYGEN SATURATION: 96 % | WEIGHT: 184.6 LBS | BODY MASS INDEX: 25 KG/M2 | TEMPERATURE: 97.9 F

## 2018-06-19 DIAGNOSIS — A53.9 SYPHILIS: ICD-10-CM

## 2018-06-19 DIAGNOSIS — B20 HIV (HUMAN IMMUNODEFICIENCY VIRUS INFECTION) (HCC): Primary | ICD-10-CM

## 2018-06-19 PROCEDURE — 99214 OFFICE O/P EST MOD 30 MIN: CPT | Performed by: INTERNAL MEDICINE

## 2018-06-19 NOTE — PROGRESS NOTES
Progress Note - Infectious Disease   Serafin Romero 41 y o  male MRN: [id number]  Unit/Bed#:  Encounter: [id number]      Impression/Plan:  1   HIV-doing well on Biktarvy with a decreased viral load of 99 copies, and a CD4 count remains above 500  Recheck labs in 2 months and follow up in 3 months  Stressed adherence      2   Syphilis-has been serofast   Will continue to monitor with annual RPR  He has remained at 1:2  Patient was provided medication, adherence and prevention education    Subjective:  Routine follow-up for HIV  Patient claims 100% adherence with Biktarvy  He has recently changed from [address] [place] [address] and [facility] to [place]  Patient denies any notable side effects  Overall the feeling well  The patient denies any fever chills or sweats, denies any nausea vomiting or diarrhea, denies any cough or shortness of breath  ROS: A complete 12 point ROS is negative other than that noted in the HPI    Objective:  Vitals:  Vitals:    06/19/18 1719   BP: 118/60   Pulse: 64   Temp: 97 9 °F (36 6 °C)   SpO2: 96%   Weight: 83 7 kg (184 lb 9 6 oz)   Height: 5' 11 5" (1 816 m)       Physical Exam:   General Appearance:  Alert, interactive, appearing well,  nontoxic, no acute distress  Neck:   Supple without lymphadenopathy, no thyromegaly or masses   Throat: Oropharynx moist without lesions  Lungs:   Clear to auscultation bilaterally; no wheezes, rhonchi or rales; respirations unlabored   Heart:  RRR; no murmur, rub or gallop   Abdomen:   Soft, non-tender, non-distended, positive bowel sounds  Extremities: No clubbing, cyanosis or edema   Skin: No new rashes or lesions  No draining wounds noted           Labs, Imaging, & Other studies:   All pertinent labs and imaging studies were personally reviewed    WBC 4 8, creatinine 1 1, liver function tests normal, CD4 533, HIV RNA 99 copies    Current Outpatient Prescriptions:     Bictegravir-Emtricitab-Tenofov (BIKTARVY) -25 MG TABS, Take 1 tablet by mouth daily, Disp: 30 tablet, Rfl: 3    carbamide peroxide (DEBROX) 6 5 % otic solution, Administer 5 drops into both ears 2 (two) times a day, Disp: 15 mL, Rfl: 0

## 2018-06-20 DIAGNOSIS — B20 HIV (HUMAN IMMUNODEFICIENCY VIRUS INFECTION) (HCC): ICD-10-CM

## 2018-06-25 NOTE — PROGRESS NOTES
Assessment    Annual Nutrition Assessment    Clinical Data/Client History    HIV: YES  AIDS: NO    : Melodie Santiago  Socio- Economic Status: Eats Out and Cooks    Living Situation: Apartment    Food Prep/Access: Refrigerator, Stove and Microwave    Psycho Social Factors: N/A    Functional Status: Ambulatory, Able to Food Shop and Prepared Own Meals    Activity Level: Normal    Ambulation Difficulty with N/A    Oral Problems: None reported at this time    Last Dental Exam: 4 months ago    Procedures Performed: Cavity filled    Typical Food/Beverage Intake:    · Breakfast Coffee  · Lunch Beat Juice/Orange drink  Temph with cabbage/peas/mushrooms/kale  · Dinner [employer] with vegetables  · Snacks Smoothies  · Fluids Water/smoothies/coffee    Appetite: Good    Supplements: YES Varying protein supplements after working out at [employer] Intolerance: None reported    Usual Body Weight: 173#    Current Body Weight: 184#    Nutrition Diagnosis    Problem: No nutrition diagnosis at this time      Intervention Diet Prescription    Nutritional needs based on: 84kg    · 2352 kcal  · 84g protein  · 2352mLfluid  · 2 0g Na    Current intake: adequate    Nutrition Recommendations: Continue with current intake    Goals: Maintain CBW    Nutrition Education Intervention: Provided     Person Educated:Patient    Topics Discussed: Vegan diet    Teaching Method: Verbal    Readiness to Change: Maintenance    Visit Summary    Lacy Whiatker was seen for his annual nutrition assessment today at ID clinic  # (wt gain of 11# over past year)  Desired wt gain as he had lost weight due to lifestyle habits and substance abuse  Currently reports no ETOH or substance intake at this time  Continues with vegan diet, discussed intake  Reports regular physical activity including going to the gym 4 days per week, utilizes protein supplements after workouts  Overall dietary recall indicates adequate protein and nutrient intake    Receives regular dental care, last procedure was filling ~4 months ago  Will continue to provide interventions as needed      Jayna Nelson RD,LDN,CHC

## 2018-07-20 ENCOUNTER — DOCUMENTATION (OUTPATIENT)
Dept: SURGERY | Facility: CLINIC | Age: 48
End: 2018-07-20

## 2018-08-01 DIAGNOSIS — B20 HIV (HUMAN IMMUNODEFICIENCY VIRUS INFECTION) (HCC): ICD-10-CM

## 2018-08-01 RX ORDER — BICTEGRAVIR SODIUM, EMTRICITABINE, AND TENOFOVIR ALAFENAMIDE FUMARATE 50; 200; 25 MG/1; MG/1; MG/1
1 TABLET ORAL DAILY
Qty: 30 TABLET | Refills: 3 | Status: SHIPPED | OUTPATIENT
Start: 2018-08-01 | End: 2018-11-21 | Stop reason: SDUPTHER

## 2018-08-06 ENCOUNTER — OFFICE VISIT (OUTPATIENT)
Dept: SURGERY | Facility: CLINIC | Age: 48
End: 2018-08-06
Payer: MEDICARE

## 2018-08-06 VITALS
WEIGHT: 187.9 LBS | DIASTOLIC BLOOD PRESSURE: 78 MMHG | TEMPERATURE: 98.2 F | BODY MASS INDEX: 26.31 KG/M2 | HEART RATE: 62 BPM | SYSTOLIC BLOOD PRESSURE: 108 MMHG | HEIGHT: 71 IN | OXYGEN SATURATION: 97 %

## 2018-08-06 DIAGNOSIS — K42.9 UMBILICAL HERNIA WITHOUT OBSTRUCTION AND WITHOUT GANGRENE: ICD-10-CM

## 2018-08-06 DIAGNOSIS — R21 RASH: ICD-10-CM

## 2018-08-06 DIAGNOSIS — R76.12 POSITIVE QUANTIFERON-TB GOLD TEST: ICD-10-CM

## 2018-08-06 DIAGNOSIS — B20 HIV DISEASE (HCC): Primary | ICD-10-CM

## 2018-08-06 DIAGNOSIS — A53.9 SYPHILIS: ICD-10-CM

## 2018-08-06 PROCEDURE — 99214 OFFICE O/P EST MOD 30 MIN: CPT | Performed by: NURSE PRACTITIONER

## 2018-08-06 RX ORDER — CLOTRIMAZOLE 1 %
CREAM (GRAM) TOPICAL 2 TIMES DAILY
Qty: 30 G | Refills: 0 | Status: SHIPPED | OUTPATIENT
Start: 2018-08-06 | End: 2019-02-04

## 2018-08-06 NOTE — PROGRESS NOTES
Assessment/Plan:     [employer]     Today patient present with   Chief Complaint   Patient presents with    HIV Positive     Pt here for 3mth f/u    Rash     R upper arm one area of redness about size of dime for past 2 wks     Patient would likely benefit from continued maintenance of well-being  Stage of change: Maintenance  Plan/ Behavioral Recommendations: n/a      Diagnoses and all orders for this visit:    HIV disease ([address] )    Syphilis    Positive QuantiFERON-TB Gold test    Rash  -     clotrimazole (LOTRIMIN) 1 % cream; Apply topically 2 (two) times a day    Umbilical hernia without obstruction and without gangrene          Discussion: [facility] met with pt for his behavioral health consultation  Pt reported doing well  [facility] inquired about pt's smoking status  Pt was pleased to report that he is now 6 months and a week tobacco, alcohol and drug free  [facility] congratulated  Pt reported having a rough week last week with depression and missing the feeling of drugs and tobacco  [facility] processed triggers and feelings with pt  [facility]  highlighted pt's strengths and coping skills which got him through his episode  Pt seemed pleased with validation received from [facility] evidenced by his responses during the dialog  Subjective:     Patient ID: Santana Ramirez is a 41 y o  male  HPI    History of Present Illness: The patient is seeing the [facility] today for a routine behavioral health follow up      Review of Systems      Objective:     Physical Exam      [facility]    Orientation     Person: yes    Place: yes    Time: yes    Appearance    Well Developed: YES, healthy    Uncomfortable: no    Normal Body Odor: yes    Smells of Feces: no    Smells of Urine: no    Disheveled: no    Well Nourished: yes weight WNL of ideal    Grooming Unkempt: no    Poor Eye Contact: no    Hirsute: no    Looks Tired: no    Acutely Exhausted: no    Mood and Affect:     Appropriate: yes    Euthymicyes    Irritable: no    Angry: no    Anxious: no    Depressed:no    Blunted:no    Labile: no    Restricted: no    Harm to Self or Others: denied    Substance Abuse: denied

## 2018-08-06 NOTE — PROGRESS NOTES
Assessment/Plan:    HIV disease ([facility] )    Cd4:  500  Viral load: 99  ART: Biktarvy  Changed from [facility] and Femi Herrmann  Tolerating medication well  Denies side effects  Stressed the importance of adherence  Continue follow up with ID clinic  Reviewed most recent labs, including Cd4 and viral load  Discussed the risks and benefits of treatment options, instructions for management, importance of treatment adherence, and reduction of risk factor  Educated on possible  medication side effects  Counseled on routes of HIV transmission, including the risk of  infection  Emphasized that viral suppression is the best method to prevent HIV transmission  At this time pt denies the need for HIV testing of anyone in their life  Total encounter time was 45 minutes  Greater then 20 minutes were spent on counseling and patient education  Pt voices understanding and agreement with treatment plan  Syphilis  Serofast 1:2  Continue annual screening  Positive QuantiFERON-TB Gold test  Received treatment in [facility]  Discussed with Dr Odell Cat, no further treatment required  Umbilical hernia without obstruction and without gangrene  Will refer to a different surgeon for second opinion at patient request      Tinea Corporis:   Apply clotrimazole 1% topically BID  Diagnoses and all orders for this visit:    HIV disease ([facility] )    Syphilis    Positive QuantiFERON-TB Gold test    Rash  -     clotrimazole (LOTRIMIN) 1 % cream; Apply topically 2 (two) times a day    Umbilical hernia without obstruction and without gangrene          Subjective:      Patient ID: Merlin Mealing is a 41 y o  male  Agustin Mejia presents to the clinic today for PCP 6 month follow up of chronic conditions  Agustin Mejia is doing well  Continue to eat a vegan diet and exercise daily  C/O small raised area on right arrn, 2cmX 1cm  Is not painful or puritic  Has been present for one week  Using hydrocortisone cream without relief           The following portions of the patient's history were reviewed and updated as appropriate: allergies, current medications, past family history, past medical history, past social history, past surgical history and problem list     Review of Systems   Constitutional: Negative for activity change, appetite change, chills, diaphoresis, fatigue, fever and unexpected weight change  HENT: Negative for congestion, dental problem, ear pain, hearing loss, mouth sores, rhinorrhea and sore throat  Eyes: Negative for pain, redness and visual disturbance  Respiratory: Negative for shortness of breath and wheezing  Cardiovascular: Negative for chest pain and leg swelling  Gastrointestinal: Negative for abdominal pain, constipation, diarrhea, nausea and vomiting  Endocrine: Negative for polydipsia, polyphagia and polyuria  Genitourinary: Negative for difficulty urinating and dysuria  Musculoskeletal: Negative for back pain, joint swelling and myalgias  Skin: Negative for rash  Neurological: Negative for syncope and headaches  Psychiatric/Behavioral: Negative for behavioral problems and suicidal ideas  Objective:      /78   Pulse 62   Temp 98 2 °F (36 8 °C) (Oral)   Ht 5' 11" (1 803 m)   Wt 85 2 kg (187 lb 14 4 oz)   SpO2 97%   BMI 26 21 kg/m²     WBC 4 8 RBC 4 44 Hgb 13 6 HCT 40 2 Creatinine 1 05 BUN 11 platelets 914 ED GFR 85 AST 32 ALT 58 alk-phos 58         Physical Exam   Constitutional: He is oriented to person, place, and time  He appears well-developed and well-nourished  No distress  HENT:   Head: Normocephalic  Right Ear: External ear normal    Left Ear: External ear normal    Nose: Nose normal    Mouth/Throat: Oropharynx is clear and moist  No oropharyngeal exudate  Eyes: Conjunctivae are normal  Pupils are equal, round, and reactive to light  Right eye exhibits no discharge  Left eye exhibits no discharge  Neck: Normal range of motion  No thyromegaly present  Cardiovascular: Normal rate, regular rhythm, normal heart sounds and intact distal pulses  No murmur heard  Pulmonary/Chest: Effort normal and breath sounds normal  He has no wheezes  Abdominal: Soft  Bowel sounds are normal  He exhibits no mass  There is no tenderness  Small umbilical hernia  Musculoskeletal: Normal range of motion  He exhibits no edema or tenderness  Lymphadenopathy:     He has no cervical adenopathy  Neurological: He is alert and oriented to person, place, and time  Skin: Skin is warm and dry  Rash noted  Psychiatric: He has a normal mood and affect   His behavior is normal

## 2018-08-06 NOTE — PROGRESS NOTES
08/06/18 1200   Clinical Encounter Type   Visited With Patient   Routine Visit Follow-up   Continue Visiting Yes   Restoration Encounters   Restoration Needs (Pt has own sense of spirituality and connection to that)   Patient Spiritual Encounters   Spiritual Assessment 4   Fear Level 4   Feelings of Loneliness None   Feelings of Hopelessness None   Coping 5      08/06/18 [address] Affiliation None   Spiritual Beliefs/Perceptions   Concept of God Other (Comment)  (Uncertain how to define a "God" See Notes)   Support Systems Spouse/significant other;Family members;Friends/neighbors   Stress Factors   Patient Stress Factors Other (Comment)  (In recovery successfully for 6 mos and mood ups and downs )   Coping Responses   Patient Coping Accepting;Open/discussion; Other (Comment)  (Pt is happy with life as a sober non-smoker)      Visit     visited with pt during his primary care visit  Pt stated he has been in good spirits and celebrating 6 months in recovery without a slip  Pt has shared that he has a set of spiritual practices and beliefs but doesn't define "God" in any one way  Pt said he is very connected to the source of his beliefs and it sustains him  Pt and his SO practice meditation daily together  Pt shared how last week his mood was a bit down and the urge to smoke was strong but he was able to resist   Pt said in his work he sees a lot of people that remind him of the value of sobriety and being smoke free   introduced freezer pack and 5 Wishes form and addressed Pt's questions   provided card and invitation for Pt to reach out if he needs assistance completing the form    Pt stated that he sees the importance of the form and will "give it some thought "    Kathleen Dunn, [address] [place] [address]

## 2018-08-06 NOTE — ASSESSMENT & PLAN NOTE
Cd4:  500  Viral load: 99  ART: Biktarvy  Changed from [facility] and Femi Bernalnifer  Tolerating medication well  Denies side effects  Stressed the importance of adherence  Continue follow up with ID clinic  Reviewed most recent labs, including Cd4 and viral load  Discussed the risks and benefits of treatment options, instructions for management, importance of treatment adherence, and reduction of risk factor  Educated on possible  medication side effects  Counseled on routes of HIV transmission, including the risk of  infection  Emphasized that viral suppression is the best method to prevent HIV transmission  At this time pt denies the need for HIV testing of anyone in their life  Total encounter time was 45 minutes  Greater then 20 minutes were spent on counseling and patient education  Pt voices understanding and agreement with treatment plan

## 2018-08-06 NOTE — PATIENT INSTRUCTIONS
Umbilical Hernia   WHAT YOU NEED TO KNOW:   What is an umbilical hernia? An umbilical hernia is a bulge through the abdominal muscles in the area of the navel (belly button)  The hernia may contain tissue from the abdomen, part of an organ (such as the intestine), or fluid  What increases my risk for an umbilical hernia? Umbilical hernias usually happen because of a hole or a weak area in the muscles of the abdominal wall  This can happen at any age  Umbilical hernias happen more often in women than in men  You may be more likely to have a hernia if other family members have them  You may also have an increased risk if you have any of the following:  · Overweight     · Ascites, which is fluid in the abdomen     · A large growth in your abdomen     · Pregnancy now or at any time in the past     · A very long labor when delivering your baby  What are the signs and symptoms of an umbilical hernia? · The most common sign is a bulge or swelling in the area of the navel  You may be able to see the lump, or you may feel it when you gently press on your navel  · The bulge may get bigger when you bend, cough, or strain to have a bowel movement  The bulge may get smaller and hurt less when you lie down  · You may have pain or burning in your abdomen  The pain may get worse when you cough, sneeze, lift, or stand for a long time  · The skin over the bulge may be swollen and red, gray, or blue  How is an umbilical hernia diagnosed? Your healthcare provider will usually find the hernia during an exam  Your healthcare provider may check to see if the hernia can be reduced (gently pushed back into the abdomen)  You may need tests, such as x-rays of the abdomen or an ultrasound  These tests will help healthcare providers decide how to treat your hernia  How is an umbilical hernia treated? · Surgery:  Most adults with umbilical hernias will need surgery to fix their hernias   Always tell your healthcare provider if you have new or worse pain in the area of your hernia  You may need emergency surgery if a loop of intestine becomes trapped in the hernia  · Ibuprofen or acetaminophen:  These medicines decrease pain  They are available without a doctor's order  Ask your healthcare provider which medicine is right for you  Ask how much to take and how often to take it  Follow directions  These medicines can cause stomach bleeding if not taken correctly  Ibuprofen can cause kidney damage  Do not take ibuprofen if you have kidney disease, an ulcer, or allergies to aspirin  Acetaminophen can cause liver damage  Do not drink alcohol if you take acetaminophen  What are the risks of an umbilical hernia? If your hernia is not treated, the following serious problems may happen:  · Incarcerated hernia: This happens when your healthcare provider cannot push your hernia back into your abdomen  The tissue becomes stuck or trapped, which can cause serious problems  Umbilical hernias have a high risk of becoming incarcerated  If this happens, your intestines may become blocked  You may have very bad pain in your abdomen  You may also have nausea or vomiting  · Strangulated hernia:  A loop of intestine in the hernia may become pinched or strangulated  This means that the blood supply to that area of intestine is decreased or cut off  If this happens, you may feel very bad pain in your abdomen  Other signs include nausea, vomiting, or a fever  You may have constipation or blood in your bowel movements  If your intestine becomes blocked, you may not be able to pass gas or have a bowel movement  If the hernia is not treated right away, that part of your intestine may die  This is called gangrene, and it can be life-threatening  When should I contact my healthcare provider? · You have nausea or vomiting  · You cannot gently push your hernia back into your abdomen      · You are constipated or have blood in your bowel movements  · Your hernia is getting bigger  · You have questions or concerns about your condition or care  When should I seek immediate care or call 911? · You have a fever  · Your hernia is stuck outside the abdomen and is painful, swollen, or feels hard  · You completely stop having bowel movements and stop passing gas  · Your abdominal pain is bad or getting worse  CARE AGREEMENT:   You have the right to help plan your care  Learn about your health condition and how it may be treated  Discuss treatment options with your caregivers to decide what care you want to receive  You always have the right to refuse treatment  The above information is an  only  It is not intended as medical advice for individual conditions or treatments  Talk to your doctor, nurse or pharmacist before following any medical regimen to see if it is safe and effective for you  © 2017 [address] [employer] Information is for End User's use only and may not be sold, redistributed or otherwise used for commercial purposes  All illustrations and images included in CareNotes® are the copyrighted property of A D A M [employer] Inc  or Adrian Duran

## 2018-08-27 ENCOUNTER — DOCUMENTATION (OUTPATIENT)
Dept: SURGERY | Facility: CLINIC | Age: 48
End: 2018-08-27

## 2018-08-30 ENCOUNTER — TELEPHONE (OUTPATIENT)
Dept: SURGERY | Facility: CLINIC | Age: 48
End: 2018-08-30

## 2018-09-11 ENCOUNTER — DOCUMENTATION (OUTPATIENT)
Dept: SURGERY | Facility: CLINIC | Age: 48
End: 2018-09-11

## 2018-09-17 ENCOUNTER — TELEPHONE (OUTPATIENT)
Dept: SURGERY | Facility: CLINIC | Age: 48
End: 2018-09-17

## 2018-09-21 ENCOUNTER — OFFICE VISIT (OUTPATIENT)
Dept: SURGERY | Facility: CLINIC | Age: 48
End: 2018-09-21
Payer: MEDICARE

## 2018-09-21 VITALS
HEART RATE: 63 BPM | WEIGHT: 189.4 LBS | BODY MASS INDEX: 26.52 KG/M2 | DIASTOLIC BLOOD PRESSURE: 80 MMHG | HEIGHT: 71 IN | OXYGEN SATURATION: 97 % | SYSTOLIC BLOOD PRESSURE: 120 MMHG | TEMPERATURE: 98.2 F

## 2018-09-21 DIAGNOSIS — K12.0 APHTHOUS ULCER: Primary | ICD-10-CM

## 2018-09-21 DIAGNOSIS — B20 HIV DISEASE (HCC): ICD-10-CM

## 2018-09-21 PROCEDURE — 99214 OFFICE O/P EST MOD 30 MIN: CPT | Performed by: NURSE PRACTITIONER

## 2018-09-21 RX ORDER — METHYLPREDNISOLONE 4 MG/1
TABLET ORAL
Qty: 21 TABLET | Refills: 0 | Status: SHIPPED | OUTPATIENT
Start: 2018-09-21 | End: 2018-10-02 | Stop reason: SDUPTHER

## 2018-09-21 NOTE — PROGRESS NOTES
Assessment/Plan:    HIV disease ([place] [address] )  CD4 578  VL 54      ART: Biktarvy    Claims 100% adherence, denies missing any doses of medication  Denies side effects  Stressed the importance of adherence  Continue follow up with ID clinic  Reviewed most recent labs, including Cd4 and viral load  Discussed the risks and benefits of treatment options, instructions for management, importance of treatment adherence, and reduction of risk factor  Educated on possible  medication side effects  Counseled on routes of HIV transmission, including the risk of  infection  Emphasized that viral suppression is the best method to prevent HIV transmission  At this time pt denies the need for HIV testing of anyone in their life  Total encounter time was 45 minutes  Greater then 20 minutes were spent on counseling and patient education  Pt voices understanding and agreement with treatment plan  Aphthous ulcer  Four shallow oval white ulcers on gums  Three ulcers measure less then  5cm, the fourth located on the underside of the left gumline is larger and approximately 1 cm wide  Magdy  had dental work one week ago and states ulcer appeared 24 hours later  Ordered methylprednisolone dose pack  Advised to avoid acidic foods  Instructed to use a soft tooth brush and avoid toothpaste and mouthwash that contains sodium lauryl  Provided with a patient instruction sheet detailing instructions  Follow up with clinic if symptoms worsen or do not improve with treatment  Diagnoses and all orders for this visit:    Aphthous ulcer  -     Methylprednisolone 4 MG TBPK; Use as directed on package    HIV disease ([place] [address] )          Subjective:      Patient ID: Stacey Lopez is a 41 y o  male      HPI    The following portions of the patient's history were reviewed and updated as appropriate: allergies, current medications, past family history, past medical history, past social history, past surgical history and problem list     Review of Systems   Constitutional: Negative for activity change, appetite change, chills, diaphoresis, fatigue, fever and unexpected weight change  HENT: Positive for mouth sores  Negative for congestion, dental problem, ear pain, hearing loss, rhinorrhea and sore throat  Eyes: Negative for pain, redness and visual disturbance  Respiratory: Negative for shortness of breath and wheezing  Cardiovascular: Negative for chest pain and leg swelling  Gastrointestinal: Negative for abdominal pain, constipation, diarrhea, nausea and vomiting  Endocrine: Negative for polydipsia, polyphagia and polyuria  Genitourinary: Negative for difficulty urinating and dysuria  Musculoskeletal: Negative for back pain, joint swelling and myalgias  Skin: Negative for rash  Neurological: Negative for syncope and headaches  Psychiatric/Behavioral: Negative for behavioral problems and suicidal ideas  Objective:      /80   Pulse 63   Temp 98 2 °F (36 8 °C)   Ht 5' 11" (1 803 m)   Wt 85 9 kg (189 lb 6 4 oz)   SpO2 97%   BMI 26 42 kg/m²                Physical Exam   Constitutional: He is oriented to person, place, and time  He appears well-developed and well-nourished  No distress  HENT:   Head: Normocephalic  Right Ear: External ear normal    Left Ear: External ear normal    Nose: Nose normal    Mouth/Throat: Oropharynx is clear and moist  Oral lesions present  No oropharyngeal exudate  Four small painful white oval lesions present   Eyes: Conjunctivae are normal  Pupils are equal, round, and reactive to light  Right eye exhibits no discharge  Left eye exhibits no discharge  Neck: Normal range of motion  No thyromegaly present  Cardiovascular: Normal rate, regular rhythm, normal heart sounds and intact distal pulses  No murmur heard  Pulmonary/Chest: Effort normal and breath sounds normal  He has no wheezes  Abdominal: Soft   Bowel sounds are normal  He exhibits no mass  There is no tenderness  Musculoskeletal: Normal range of motion  He exhibits no edema or tenderness  Lymphadenopathy:     He has no cervical adenopathy  Neurological: He is alert and oriented to person, place, and time  Skin: Skin is warm and dry  No rash noted  Psychiatric: He has a normal mood and affect   His behavior is normal

## 2018-09-21 NOTE — ASSESSMENT & PLAN NOTE
Four shallow oval white ulcers on gums  Three ulcers measure less then  5cm, the fourth located on the underside of the left gumline is larger and approximately 1 cm wide  Magdy  had dental work one week ago and states ulcer appeared 24 hours later  Ordered methylprednisolone dose pack  Advised to avoid acidic foods  Instructed to use a soft tooth brush and avoid toothpaste and mouthwash that contains sodium lauryl  Provided with a patient instruction sheet detailing instructions  Follow up with clinic if symptoms worsen or do not improve with treatment

## 2018-09-21 NOTE — PATIENT INSTRUCTIONS
Canker Sores   AMBULATORY CARE:   Canker sores  are small ulcers that develop inside your mouth  Ulcers are open sores that may be shallow or deep  You may have one or more sores at a time, and they may grow in clusters  Common signs and symptoms of canker sores:   · One or more sores on the back or floor of your mouth, the inner side of your cheeks and lips, or under your tongue    · Round or oval-shaped red sores that may be covered with a white or yellow film    · Pain, burning, or tingling in your mouth    · Fever and fatigue    · Difficulty chewing and swallowing  Seek care immediately if:   · You cannot eat or drink because of your mouth pain  Contact your healthcare provider if:   · Your canker sores are not gone after 3 to 4 weeks  · Your pain does not go away after you take medicines  · Your sores are getting worse or you are getting more sores, even after treatment  · You have questions or concerns about your condition or care  Treatment  may not be needed  Canker sores cannot be cured  The sores may go away for a time, and then come back again  You may need pain medicine given as a cream, gel, or mouthwash  You may also need steroid medicine to decrease inflammation  Manage your symptoms:   · Eat soft, plain foods until your canker sores heal   Examples include yogurt, eggs, and creamy soups  You may need to change some foods you usually eat  Do not have crunchy, dry, or salty foods, such as dry toast, popcorn, or chips  These can cause pain  Do not have foods or drinks that contain citric acid, such as grapefruit, orange juice, chela, and limes  These may make your pain worse or cause more sores to form  · Care for your mouth as directed  Gently brush your teeth and tongue every day  Use a soft toothbrush  If you have dentures, clean them every day  If your braces or dentures do not feel comfortable, have a dentist check them to see that they fit well    Follow up with your healthcare provider as directed:  Write down your questions so you remember to ask them during your visits  © 2017 [employer] Information is for End User's use only and may not be sold, redistributed or otherwise used for commercial purposes  All illustrations and images included in CareNotes® are the copyrighted property of A D [employer] M [employer] Inc  or [employer]  The above information is an  only  It is not intended as medical advice for individual conditions or treatments  Talk to your doctor, nurse or pharmacist before following any medical regimen to see if it is safe and effective for you

## 2018-09-21 NOTE — ASSESSMENT & PLAN NOTE
CD4 578  VL 54      ART: Biktarvy    Claims 100% adherence, denies missing any doses of medication  Denies side effects  Stressed the importance of adherence  Continue follow up with ID clinic  Reviewed most recent labs, including Cd4 and viral load  Discussed the risks and benefits of treatment options, instructions for management, importance of treatment adherence, and reduction of risk factor  Educated on possible  medication side effects  Counseled on routes of HIV transmission, including the risk of  infection  Emphasized that viral suppression is the best method to prevent HIV transmission  At this time pt denies the need for HIV testing of anyone in their life  Total encounter time was 45 minutes  Greater then 20 minutes were spent on counseling and patient education  Pt voices understanding and agreement with treatment plan

## 2018-09-25 ENCOUNTER — OFFICE VISIT (OUTPATIENT)
Dept: SURGERY | Facility: CLINIC | Age: 48
End: 2018-09-25
Payer: MEDICARE

## 2018-09-25 VITALS
DIASTOLIC BLOOD PRESSURE: 78 MMHG | HEIGHT: 71 IN | SYSTOLIC BLOOD PRESSURE: 128 MMHG | OXYGEN SATURATION: 97 % | TEMPERATURE: 97.8 F | BODY MASS INDEX: 27.02 KG/M2 | WEIGHT: 193 LBS | HEART RATE: 72 BPM

## 2018-09-25 DIAGNOSIS — R63.5 WEIGHT GAIN: ICD-10-CM

## 2018-09-25 DIAGNOSIS — Z13.1 SCREENING FOR DIABETES MELLITUS: ICD-10-CM

## 2018-09-25 DIAGNOSIS — B20 HIV (HUMAN IMMUNODEFICIENCY VIRUS INFECTION) (HCC): Primary | ICD-10-CM

## 2018-09-25 PROCEDURE — 99214 OFFICE O/P EST MOD 30 MIN: CPT | Performed by: INTERNAL MEDICINE

## 2018-09-25 NOTE — PROGRESS NOTES
Progress Note - Infectious Disease   Ky Brink 41 y o  male MRN: [id number]  Unit/Bed#:  Encounter: [id number]      Impression/Plan:  1   HIV-doing well on Biktarvy with a decreased viral load of 54 copies, and a CD4 count remains above 500  Recheck labs in 2 months and follow up in 3 months  Stressed adherence      2   Syphilis-has been serofast   Will continue to monitor with annual RPR  3   Weight gain-stressed the importance of diet and exercise  He will definitely start addressing this issue  Discussed in detail with the primary    Patient was provided medication, adherence and prevention education    Subjective:  Routine follow-up for HIV  Patient claims 100% adherence with Biktarvy  Patient denies any notable side effects  Overall the feeling well  The patient denies any fever chills or sweats, denies any nausea vomiting or diarrhea, denies any cough or shortness of breath  ROS: A complete 12 point ROS is negative other than that noted in the HPI    Followup portions patient history reviewed and updated as: Allergies, current medications, past medical history, past social history, past surgical history, and the problem list    Objective:  Vitals:  Vitals:    09/25/18 1637   BP: 128/78   Pulse: 72   Temp: 97 8 °F (36 6 °C)   SpO2: 97%   Weight: 87 5 kg (193 lb)   Height: 5' 11" (1 803 m)       Physical Exam:   General Appearance:  Alert, interactive, appearing well,  nontoxic, no acute distress  Neck:   Supple without lymphadenopathy, no thyromegaly or masses   Throat: Oropharynx moist without lesions  Lungs:   Clear to auscultation bilaterally; no wheezes, rhonchi or rales; respirations unlabored   Heart:  RRR; no murmur, rub or gallop   Abdomen:   Soft, non-tender, non-distended, positive bowel sounds  Extremities: No clubbing, cyanosis or edema   Skin: No new rashes or lesions  No draining wounds noted         Labs, Imaging, & Other studies:   All pertinent labs and imaging studies were personally reviewed    Creatinine 1 08, liver function tests normal, white blood cell count 4 8, CD4 578, HIV RNA 54 copies      Current Outpatient Prescriptions:     BIKTARVY -25 MG TABS, TAKE 1 TABLET BY MOUTH DAILY, Disp: 30 tablet, Rfl: 3    carbamide peroxide (DEBROX) 6 5 % otic solution, Administer 5 drops into both ears 2 (two) times a day, Disp: 15 mL, Rfl: 0    clotrimazole (LOTRIMIN) 1 % cream, Apply topically 2 (two) times a day, Disp: 30 g, Rfl: 0    Methylprednisolone 4 MG TBPK, Use as directed on package, Disp: 21 tablet, Rfl: 0

## 2018-09-26 DIAGNOSIS — B20 HIV (HUMAN IMMUNODEFICIENCY VIRUS INFECTION) (HCC): ICD-10-CM

## 2018-10-02 ENCOUNTER — OFFICE VISIT (OUTPATIENT)
Dept: SURGERY | Facility: CLINIC | Age: 48
End: 2018-10-02
Payer: MEDICARE

## 2018-10-02 ENCOUNTER — TELEPHONE (OUTPATIENT)
Dept: SURGERY | Facility: CLINIC | Age: 48
End: 2018-10-02

## 2018-10-02 VITALS
TEMPERATURE: 98.4 F | OXYGEN SATURATION: 96 % | DIASTOLIC BLOOD PRESSURE: 78 MMHG | HEIGHT: 71 IN | BODY MASS INDEX: 26.65 KG/M2 | WEIGHT: 190.4 LBS | HEART RATE: 74 BPM | SYSTOLIC BLOOD PRESSURE: 114 MMHG

## 2018-10-02 DIAGNOSIS — B20 HIV DISEASE (HCC): ICD-10-CM

## 2018-10-02 DIAGNOSIS — Z23 NEED FOR INFLUENZA VACCINATION: Primary | ICD-10-CM

## 2018-10-02 DIAGNOSIS — H61.23 BILATERAL IMPACTED CERUMEN: ICD-10-CM

## 2018-10-02 DIAGNOSIS — K12.0 APHTHOUS ULCER: ICD-10-CM

## 2018-10-02 PROCEDURE — 90682 RIV4 VACC RECOMBINANT DNA IM: CPT

## 2018-10-02 PROCEDURE — 99214 OFFICE O/P EST MOD 30 MIN: CPT | Performed by: NURSE PRACTITIONER

## 2018-10-02 PROCEDURE — 90471 IMMUNIZATION ADMIN: CPT

## 2018-10-02 RX ORDER — METHYLPREDNISOLONE 4 MG/1
TABLET ORAL
Qty: 21 TABLET | Refills: 0 | Status: SHIPPED | OUTPATIENT
Start: 2018-10-02 | End: 2019-02-04 | Stop reason: HOSPADM

## 2018-10-02 NOTE — ASSESSMENT & PLAN NOTE
CD4: 578  VL 54    ART: Nieves Milder  Denies missing any doses, Claims 100% adherence  Current Outpatient Prescriptions:     BIKTARVY -25 MG TABS, TAKE 1 TABLET BY MOUTH DAILY, Disp: 30 tablet, Rfl: 3    carbamide peroxide (DEBROX) 6 5 % otic solution, Administer 5 drops into both ears 2 (two) times a day, Disp: 15 mL, Rfl: 0    clotrimazole (LOTRIMIN) 1 % cream, Apply topically 2 (two) times a day, Disp: 30 g, Rfl: 0    Methylprednisolone 4 MG TBPK, Use as directed on package, Disp: 21 tablet, Rfl: 0        Denies side effects  Stressed the importance of adherence  Continue follow up with ID clinic  Reviewed most recent labs, including Cd4 and viral load  Discussed the risks and benefits of treatment options, instructions for management, importance of treatment adherence, and reduction of risk factor  Educated on possible  medication side effects  Counseled on routes of HIV transmission, including the risk of  infection  Emphasized that viral suppression is the best method to prevent HIV transmission  At this time pt denies the need for HIV testing of anyone in their life  Total encounter time was 45 minutes  Greater then 20 minutes were spent on counseling and patient education  Pt voices understanding and agreement with treatment plan

## 2018-10-02 NOTE — PROGRESS NOTES
Subjective:      Efraín Hurtado is a 41 y o  male whom I am asked to see for evaluation of diminished hearing in both ears for the past 7 days  There is not a prior history of cerumen impaction  The patient has not been using ear drops to loosen wax immediately prior to this visit  The patient complains of ear pain  The following portions of the patient's history were reviewed and updated as appropriate: allergies, current medications, past family history, past medical history, past social history, past surgical history and problem list     Review of Systems  Pertinent items are noted in HPI  Objective: Auditory canal(s) of both ears are completely obstructed with cerumen  Cerumen was removed using gentle irrigation  Tympanic membranes are intact following the procedure  Auditory canals are normal       Assessment:      Cerumen Impaction without otitis externa  Plan:      1  Care instructions given  2  Home treatment: none  3  Follow-up as needed  Assessment/Plan:    [facility] disease ([facility] )  CD4: 578  VL 54    ART: Rosary Milder  Denies missing any doses, Claims 100% adherence  Current Outpatient Prescriptions:     BIKTARVY -25 MG TABS, TAKE 1 TABLET BY MOUTH DAILY, Disp: 30 tablet, Rfl: 3    carbamide peroxide (DEBROX) 6 5 % otic solution, Administer 5 drops into both ears 2 (two) times a day, Disp: 15 mL, Rfl: 0    clotrimazole (LOTRIMIN) 1 % cream, Apply topically 2 (two) times a day, Disp: 30 g, Rfl: 0    Methylprednisolone 4 MG TBPK, Use as directed on package, Disp: 21 tablet, Rfl: 0        Denies side effects  Stressed the importance of adherence  Continue follow up with ID clinic  Reviewed most recent labs, including Cd4 and viral load  Discussed the risks and benefits of treatment options, instructions for management, importance of treatment adherence, and reduction of risk factor  Educated on possible  medication side effects       Counseled on routes of HIV transmission, including the risk of  infection  Emphasized that viral suppression is the best method to prevent HIV transmission  At this time pt denies the need for HIV testing of anyone in their life  Total encounter time was 45 minutes  Greater then 20 minutes were spent on counseling and patient education  Pt voices understanding and agreement with treatment plan  Diagnoses and all orders for this visit:    Need for influenza vaccination  -     influenza vaccine, [id number], quadrivalent, recombinant, PF, 0 5 mL, for patients 18-49 yr with comorbidities (FLUBLOK)    Aphthous ulcer  -     Methylprednisolone 4 MG TBPK; Use as directed on package    [facility] )          Subjective:      Patient ID: Flores Rodriguez is a 41 y o  male  HPI    The following portions of the patient's history were reviewed and updated as appropriate: allergies, current medications, past family history, past medical history, past social history, past surgical history and problem list     Review of Systems   Constitutional: Negative for activity change, appetite change, chills, diaphoresis, fatigue, fever and unexpected weight change  HENT: Positive for ear pain and mouth sores  Negative for congestion, dental problem, ear discharge, facial swelling, hearing loss, postnasal drip, rhinorrhea, sore throat and tinnitus  Eyes: Negative for pain, redness and visual disturbance  Respiratory: Negative for shortness of breath and wheezing  Cardiovascular: Negative for chest pain and leg swelling  Gastrointestinal: Negative for abdominal pain, constipation, diarrhea, nausea and vomiting  Endocrine: Negative for polydipsia, polyphagia and polyuria  Genitourinary: Negative for difficulty urinating and dysuria  Musculoskeletal: Negative for back pain, joint swelling and myalgias  Skin: Negative for rash  Neurological: Negative for syncope and headaches     Psychiatric/Behavioral: Negative for behavioral problems and suicidal ideas  Objective:      /78   Pulse 74   Temp 98 4 °F (36 9 °C)   Ht 5' 11" (1 803 m)   Wt 86 4 kg (190 lb 6 4 oz)   SpO2 96%   BMI 26 56 kg/m²          Physical Exam   Constitutional: He is oriented to person, place, and time  He appears well-developed and well-nourished  No distress  HENT:   Head: Normocephalic  Right Ear: External ear normal    Left Ear: External ear normal    Nose: Nose normal    Mouth/Throat: Oropharynx is clear and moist  No oropharyngeal exudate  B/l impacted cerumen,  5cm oval ulcer on right gum line, multiple ulcers on tongue  Eyes: Pupils are equal, round, and reactive to light  Conjunctivae are normal  Right eye exhibits no discharge  Left eye exhibits no discharge  Neck: Normal range of motion  No thyromegaly present  Cardiovascular: Normal rate, regular rhythm, normal heart sounds and intact distal pulses  No murmur heard  Pulmonary/Chest: Effort normal and breath sounds normal  He has no wheezes  Abdominal: Soft  Bowel sounds are normal  He exhibits no mass  There is no tenderness  Musculoskeletal: Normal range of motion  He exhibits no edema or tenderness  Lymphadenopathy:     He has no cervical adenopathy  Neurological: He is alert and oriented to person, place, and time  Skin: Skin is warm and dry  No rash noted  Psychiatric: He has a normal mood and affect   His behavior is normal

## 2018-10-02 NOTE — TELEPHONE ENCOUNTER
Changed patient's appointment to 2pm today  Called and spoke with patient to confirm with him a 2 pm appointment

## 2018-10-02 NOTE — ASSESSMENT & PLAN NOTE
Previous ulcers have improved with steroid taper  , Gabriel Denver now a new outbreak on the right side of the gum line

## 2018-10-05 ENCOUNTER — PATIENT OUTREACH (OUTPATIENT)
Dept: SURGERY | Facility: CLINIC | Age: 48
End: 2018-10-05

## 2018-10-12 ENCOUNTER — TELEPHONE (OUTPATIENT)
Dept: SURGERY | Facility: CLINIC | Age: 48
End: 2018-10-12

## 2018-10-15 ENCOUNTER — PATIENT OUTREACH (OUTPATIENT)
Dept: SURGERY | Facility: CLINIC | Age: 48
End: 2018-10-15

## 2018-10-15 NOTE — LETTER
[facility] 29 at [address] [place] [address]  HIV and Non-HIV Medication Assessment    Date: 10/15/18  Client Name: Ethan Carter  Client Address: [address] [address]  Client Phone: [phone] (home)   :Melodie Santiago    Is the client currently receiving medical care for HIV?: No  When was the last time the client had a CD4 (T-cell) count? (Choose one): Within the last 3 months     When was the last time the client had a viral load count? (Choose one): Within the last 3 months  What was the viral load count at the client's last measurement? (Choose one): 48 to 999  Has client ever been prescribed HIV antiretroviral (ARV) drug therapy by a doctor?: Yes       Client is currently being prescribed the following medications:  Medications: HIV ARV Meds  ARV: How many pills is the client supposed to take a day?: 1  ARV: How many pills did the client miss taking yesterday?: 0  ARV: How many pills did the client miss taking last week?: 0  ARV: Were there any pills missed within the last month?: 0                                                    Other Medication Questions:  Are there other medications you are supposed to be taking and are not taking?: No           Client's understanding of mediation?: Thorough  Has the client ever stopped taking ARV without the doctor's permission?: No        Is medical provider aware of adherence problems?: No     Is medical provider aware of complementary therapies or other medical problems?: No    Barriers to Drug Adherence:                      Summary:  Summary: Drug Adherence Assessment (Choose one): Client has an adequate understading and support to maintain medication adherence  No interventions needed    Staff Summary/Recommendation(s): No Interventions needed now

## 2018-10-15 NOTE — LETTER
[facility]  Dental Assessment Referral    Date: 10/15/18    Client Name: Anitra Wellington  Client Address: [address] [address]  Client Phone: [phone] (home)   Primary Dentist: Rhonda Ferrell         1  When was your last dental visit?: appt  scheduled for 10/16/18  2  Do you have trouble eating?: No     3  Do your gums bleed at any time?: No  4  Is your mouth sore?: No  5  Do you have any toothaches?: No  6  Do you have any loose teeth?: No  7   Have you noticed any white coating on your tongue or in your mouth?: No            ASC : Joao Katz  Date: 10/15/18

## 2018-10-15 NOTE — LETTER
HIV Prevention Counseling Risk Reduction Plan    Client Name: Maggie Frank   Client #:    Client Signature:     LEIFN #:    CM Signature:     CHIS Score: ***   Date: 10/15/18 RAT Score: ***     Current Risk Behaviors  Unprotected sex with: opposite sex partners     Sharing needs with a partner/multiple partners?: a partner  Number of sex partners in the past three months: 1                                  Previous Successes  Previous Successes: One sex partner          Safer Goal Behavior(s)  Safer Goal Behavior(s): Use latex condoms for vaginal, oral and anal sex       Barrier(s) to Safer Goal Behavior          Benefit(s) to Safer Goal Behavior          Action Steps  Action Steps: Continue adhering to medical care/medication adherence       Referral          Comments (relating to goals, benefits, barriers and action steps identified above)  Comments: (relating to goals, benefits, barriers and action steps identified above):  No interventions needed

## 2018-10-19 ENCOUNTER — TELEPHONE (OUTPATIENT)
Dept: SURGERY | Facility: CLINIC | Age: 48
End: 2018-10-19

## 2018-10-19 NOTE — TELEPHONE ENCOUNTER
called Pt to let him know that I am leaving [facility]  Pt was happy for me and thanked me for the support when he was "getting my stuff together"   wished Peace for Pt and for his future which seems very bright  Pt again thanked me and said that if it hadn't been for  [facility] and staff he might not be where he is      Chaplain Chloe

## 2018-11-09 ENCOUNTER — TELEPHONE (OUTPATIENT)
Dept: SURGERY | Facility: CLINIC | Age: 48
End: 2018-11-09

## 2018-11-09 ENCOUNTER — PATIENT OUTREACH (OUTPATIENT)
Dept: SURGERY | Facility: CLINIC | Age: 48
End: 2018-11-09

## 2018-11-09 NOTE — PROGRESS NOTES
MELLY spoke w/ Ms Smith at the [facility] office  She stated ct's benefits were processed and his renewal is due in P[place]

## 2018-11-09 NOTE — TELEPHONE ENCOUNTER
CT called cm and said he received a L/IN from the welfare office due to his KYLAH  CM stated she'll follow up and let him know if it was an error on their end or on ours

## 2018-11-09 NOTE — PROGRESS NOTES
CT in [facility] for recert/RST  Ct states no missed doses of meds  Ct States no new health concerns  Ct sexually active w/ 1 partner, always wears protection  Ct employed and receives SSD  Ct receives MAWD for insurance  Ct brought KYLAH form w/ him to appt  Ct states no current issues w/ D/A , legal or D/V concerns  Ct independent in ADL's  Ct recently in dental care  Had sores in his mouth due to the dental work, treated by Bob Webb  Ct states he has a support system

## 2018-11-12 ENCOUNTER — DOCUMENTATION (OUTPATIENT)
Dept: SURGERY | Facility: CLINIC | Age: 48
End: 2018-11-12

## 2018-11-21 ENCOUNTER — DOCUMENTATION (OUTPATIENT)
Dept: SURGERY | Facility: CLINIC | Age: 48
End: 2018-11-21

## 2018-11-21 DIAGNOSIS — B20 HIV (HUMAN IMMUNODEFICIENCY VIRUS INFECTION) (HCC): ICD-10-CM

## 2018-11-27 ENCOUNTER — TELEPHONE (OUTPATIENT)
Dept: SURGERY | Facility: CLINIC | Age: 48
End: 2018-11-27

## 2018-11-27 RX ORDER — BICTEGRAVIR SODIUM, EMTRICITABINE, AND TENOFOVIR ALAFENAMIDE FUMARATE 50; 200; 25 MG/1; MG/1; MG/1
1 TABLET ORAL DAILY
Qty: 30 TABLET | Refills: 5 | Status: SHIPPED | OUTPATIENT
Start: 2018-11-27 | End: 2019-05-29 | Stop reason: SDUPTHER

## 2018-11-28 ENCOUNTER — TELEPHONE (OUTPATIENT)
Dept: SURGERY | Facility: CLINIC | Age: 48
End: 2018-11-28

## 2018-11-29 ENCOUNTER — PATIENT OUTREACH (OUTPATIENT)
Dept: SURGERY | Facility: CLINIC | Age: 48
End: 2018-11-29

## 2018-11-29 ENCOUNTER — DOCUMENTATION (OUTPATIENT)
Dept: SURGERY | Facility: CLINIC | Age: 48
End: 2018-11-29

## 2018-11-29 NOTE — PROGRESS NOTES
CM received fax back from [address] and sent check request and letter for mawd premium to supervisor

## 2018-11-29 NOTE — PROGRESS NOTES
Ct dropped off Mawd premium and lab work bill  CM faxed new auth request for MAWD to [address]

## 2018-11-30 DIAGNOSIS — B20 HIV (HUMAN IMMUNODEFICIENCY VIRUS INFECTION) (HCC): ICD-10-CM

## 2018-11-30 DIAGNOSIS — Z13.1 SCREENING FOR DIABETES MELLITUS: ICD-10-CM

## 2018-12-04 ENCOUNTER — OFFICE VISIT (OUTPATIENT)
Dept: SURGERY | Facility: CLINIC | Age: 48
End: 2018-12-04
Payer: MEDICARE

## 2018-12-04 VITALS
DIASTOLIC BLOOD PRESSURE: 72 MMHG | HEIGHT: 71 IN | WEIGHT: 197.2 LBS | TEMPERATURE: 97.9 F | HEART RATE: 67 BPM | SYSTOLIC BLOOD PRESSURE: 102 MMHG | BODY MASS INDEX: 27.61 KG/M2 | OXYGEN SATURATION: 95 %

## 2018-12-04 DIAGNOSIS — Z11.3 ENCOUNTER FOR SCREENING FOR INFECTIONS WITH A PREDOMINANTLY SEXUAL MODE OF TRANSMISSION: ICD-10-CM

## 2018-12-04 DIAGNOSIS — Z20.2 CONTACT WITH AND (SUSPECTED) EXPOSURE TO INFECTIONS WITH A PREDOMINANTLY SEXUAL MODE OF TRANSMISSION: ICD-10-CM

## 2018-12-04 DIAGNOSIS — D72.89 OTHER SPECIFIED DISORDERS OF WHITE BLOOD CELLS: ICD-10-CM

## 2018-12-04 DIAGNOSIS — B20 HIV (HUMAN IMMUNODEFICIENCY VIRUS INFECTION) (HCC): Primary | ICD-10-CM

## 2018-12-04 DIAGNOSIS — Z72.89 OTHER PROBLEMS RELATED TO LIFESTYLE: ICD-10-CM

## 2018-12-04 DIAGNOSIS — Z23 NEED FOR PNEUMOCOCCAL VACCINATION: ICD-10-CM

## 2018-12-04 DIAGNOSIS — K42.9 UMBILICAL HERNIA WITHOUT OBSTRUCTION AND WITHOUT GANGRENE: ICD-10-CM

## 2018-12-04 DIAGNOSIS — A53.9 SYPHILIS: ICD-10-CM

## 2018-12-04 DIAGNOSIS — J06.9 VIRAL UPPER RESPIRATORY TRACT INFECTION: ICD-10-CM

## 2018-12-04 DIAGNOSIS — R63.5 WEIGHT GAIN: ICD-10-CM

## 2018-12-04 PROCEDURE — G0009 ADMIN PNEUMOCOCCAL VACCINE: HCPCS

## 2018-12-04 PROCEDURE — 90732 PPSV23 VACC 2 YRS+ SUBQ/IM: CPT

## 2018-12-04 PROCEDURE — 99215 OFFICE O/P EST HI 40 MIN: CPT | Performed by: INTERNAL MEDICINE

## 2018-12-04 NOTE — PROGRESS NOTES
Progress Note - Infectious Disease   Mary Look 57 y o  male MRN: [id number]  Unit/Bed#:  Encounter: [id number]      Impression/Plan:  1   HIV-doing well on Biktarvy but still with a low level detectable viral load of 121 copies  , and a CD4 count remains above 600  Will change the timing of the dose to evening to avoid any interaction with dairy products in the morning    Recheck labs in 2 months and follow up in 3 months   Stressed adherence      2   Syphilis-has been serofast  The RPR titer remains quite low without any increase          3  Weight gain-stressed the importance of diet and exercise  He continues to slowly gain weight  4   Mild umbilical hernia-seems to be reproducible  Will refer back to surgery is increasing in size  5   Upper respiratory infection-mild and improving  No clinical evidence of a lower respiratory tract infection  Symptomatic treatment      Discussed in detail with the primary      Patient was provided medication, adherence and prevention education    Subjective:  Routine follow-up for HIV  Patient claims 100% adherence with Biktarvy    Patient denies any notable side effects  Overall the feeling well  The patient denies any fever chills or sweats, denies any nausea vomiting or diarrhea, denies any cough or shortness of breath  ROS: A complete 12 point ROS is negative other than that noted in the HPI    Followup portions patient history reviewed and updated as: Allergies, current medications, past medical history, past social history, past surgical history, and the problem list    Objective:  Vitals:  Vitals:    12/04/18 1617   BP: 102/72   Pulse: 67   Temp: 97 9 °F (36 6 °C)   SpO2: 95%   Weight: 89 4 kg (197 lb 3 2 oz)   Height: 5' 11" (1 803 m)       Physical Exam:   General Appearance:  Alert, interactive, appearing well,  nontoxic, no acute distress     Neck:   Supple without lymphadenopathy, no thyromegaly or masses   Throat: Oropharynx moist without lesions  Lungs:   Clear to auscultation bilaterally; no wheezes, rhonchi or rales; respirations unlabored   Heart:  RRR; no murmur, rub or gallop   Abdomen:   Soft, non-tender, non-distended, positive bowel sounds  Extremities: No clubbing, cyanosis or edema   Skin: No new rashes or lesions  No draining wounds noted         Labs, Imaging, & Other studies:   All pertinent labs and imaging studies were personally reviewed    White blood cell count 4 8, CD4 618, creatinine 0 97, liver function tests normal, RPR 1:2, HIV       Current Outpatient Prescriptions:     BIKTARVY -25 MG tablet, TAKE 1 TABLET BY MOUTH DAILY, Disp: 30 tablet, Rfl: 5    carbamide peroxide (DEBROX) 6 5 % otic solution, Administer 5 drops into both ears 2 (two) times a day (Patient not taking: Reported on 12/4/2018 ), Disp: 15 mL, Rfl: 0    clotrimazole (LOTRIMIN) 1 % cream, Apply topically 2 (two) times a day (Patient not taking: Reported on 12/4/2018 ), Disp: 30 g, Rfl: 0    Methylprednisolone 4 MG TBPK, Use as directed on package (Patient not taking: Reported on 12/4/2018 ), Disp: 21 tablet, Rfl: 0

## 2018-12-18 ENCOUNTER — PATIENT OUTREACH (OUTPATIENT)
Dept: SURGERY | Facility: CLINIC | Age: 48
End: 2018-12-18

## 2019-01-17 ENCOUNTER — TELEPHONE (OUTPATIENT)
Dept: SURGERY | Facility: CLINIC | Age: 49
End: 2019-01-17

## 2019-01-18 NOTE — TELEPHONE ENCOUNTER
Returned phone call  Established care plan and will f/u in one month  Advised to keep a food journal and activity log  If he continues to feel that Elena Sharif is the source of his discomfort will discuss with ID and change back to [facility] UNIT

## 2019-01-24 ENCOUNTER — PATIENT OUTREACH (OUTPATIENT)
Dept: SURGERY | Facility: CLINIC | Age: 49
End: 2019-01-24

## 2019-01-24 NOTE — PROGRESS NOTES
Ct in [place] to drop off MAWD premium, ct asked a question regarding his SSD, ct concerned he might lose his benefits and wanted to know if they would give him time to look for a job if they end his SSD, cm contacted Ms John Vinson at [employer] who stated they'll give him 30-60 days notice that his benefits are ending, CM relayed info to ct

## 2019-02-04 ENCOUNTER — OFFICE VISIT (OUTPATIENT)
Dept: SURGERY | Facility: CLINIC | Age: 49
End: 2019-02-04
Payer: MEDICARE

## 2019-02-04 VITALS
SYSTOLIC BLOOD PRESSURE: 112 MMHG | OXYGEN SATURATION: 96 % | HEART RATE: 63 BPM | TEMPERATURE: 98 F | DIASTOLIC BLOOD PRESSURE: 80 MMHG | HEIGHT: 71 IN | BODY MASS INDEX: 28.36 KG/M2 | WEIGHT: 202.6 LBS

## 2019-02-04 DIAGNOSIS — B20 HIV DISEASE (HCC): ICD-10-CM

## 2019-02-04 DIAGNOSIS — K42.9 UMBILICAL HERNIA WITHOUT OBSTRUCTION AND WITHOUT GANGRENE: Primary | ICD-10-CM

## 2019-02-04 DIAGNOSIS — K12.0 APHTHOUS ULCER: ICD-10-CM

## 2019-02-04 PROCEDURE — 99214 OFFICE O/P EST MOD 30 MIN: CPT | Performed by: NURSE PRACTITIONER

## 2019-02-04 NOTE — ASSESSMENT & PLAN NOTE
CD4 618  ART Maemanav Curran call the clinic and had initial concerns that medication was causing weight gain, fatigue, and general malaise  Yoli Whitlock has also recently started attending [employer] for the past year  He is now in a stent was relationship  Yoli Whitlock has stopped using nicotine, alcohol, and all other drugs over the past year  Although he consumes a vegan diet, he does not watch portion sizes and often over indulge in snack products  Yoli Whitlock also stopped going to the gym on a regular basis and now has a much more sedentary lifestyle  Encouraged patient to keep a food diary and activity log  Explained that stress continue to weight gain and since he was no longer using unhealthy coping mechanisms he needed to now develop healthy alternatives  Encouraged meeting with [facility] for suggestions on stress management techniques  Yoli Whitlock is due for labs later this month and will discuss concerns with Dr Wesley Tilley at next ID clinic appointment scheduled in March  Current Outpatient Prescriptions:     BIKTARVY -25 MG tablet, TAKE 1 TABLET BY MOUTH DAILY, Disp: 30 tablet, Rfl: 5        Denies side effects  Stressed the importance of adherence  Continue follow up with ID clinic  Reviewed most recent labs, including Cd4 and viral load  Discussed the risks and benefits of treatment options, instructions for management, importance of treatment adherence, and reduction of risk factor  Educated on possible  medication side effects  Counseled on routes of HIV transmission, including the risk of  infection  Emphasized that viral suppression is the best method to prevent HIV transmission  At this time pt denies the need for HIV testing of anyone in their life  Total encounter time was 45 minutes  Greater then 20 minutes were spent on counseling and patient education  Pt voices understanding and agreement with treatment plan

## 2019-02-04 NOTE — ASSESSMENT & PLAN NOTE
One ulcer is present on inside of posterior lip  Using symptomatic treatment    Educated to contact clinic if outbreak becomes more severe

## 2019-02-04 NOTE — PROGRESS NOTES
Assessment/Plan:    Umbilical hernia  Refer to a different surgeon at patient request     HIV disease ([facility] [address] )   [address] call the clinic and had initial concerns that medication was causing weight gain, fatigue, and general malaise  Wilmer Akbar has also recently started attending [employer] for the past year  He is now in a stent was relationship  Wilmer Akbar has stopped using nicotine, alcohol, and all other drugs over the past year  Although he consumes a vegan diet, he does not watch portion sizes and often over indulge in snack products  Wilmer Akbar also stopped going to the gym on a regular basis and now has a much more sedentary lifestyle  Encouraged patient to keep a food diary and activity log  Explained that stress continue to weight gain and since he was no longer using unhealthy coping mechanisms he needed to now develop healthy alternatives  Encouraged meeting with [facility] for suggestions on stress management techniques  Wilmer Akbar is due for labs later this month and will discuss concerns with Dr Nichole Dennis at next ID clinic appointment scheduled in March  Current Outpatient Prescriptions:     BIKTARVY -25 MG tablet, TAKE 1 TABLET BY MOUTH DAILY, Disp: 30 tablet, Rfl: 5        Denies side effects  Stressed the importance of adherence  Continue follow up with ID clinic  Reviewed most recent labs, including Cd4 and viral load  Discussed the risks and benefits of treatment options, instructions for management, importance of treatment adherence, and reduction of risk factor  Educated on possible  medication side effects  Counseled on routes of HIV transmission, including the risk of  infection  Emphasized that viral suppression is the best method to prevent HIV transmission  At this time pt denies the need for HIV testing of anyone in their life  Total encounter time was 45 minutes   Greater then 20 minutes were spent on counseling and patient education  Pt voices understanding and agreement with treatment plan  Aphthous ulcer  One ulcer is present on inside of posterior lip  Using symptomatic treatment  Educated to contact clinic if outbreak becomes more severe       Diagnoses and all orders for this visit:    Umbilical hernia without obstruction and without gangrene  -     Ambulatory referral to General Surgery; Future    HIV disease ([facility] [address] )    Aphthous ulcer          Subjective:      Patient ID: Jana Conway is a 57 y o  male  Ajay Ojeda presents to the clinic today for primary care follow-up of chronic conditions  Past medical history significant for HIV, TB status post treatment, and umbilical hernia  Ajay Ojeda is concerned over weight gain of 30 lb over the last year and increased feelings of stress  Ajay Ojeda has madehealthy life choices and given up alcohol, nicotine, and illegal drugs  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  He feels increased stress and is concerned he does not have a proper tools to cope with it  The following portions of the patient's history were reviewed and updated as appropriate: allergies, current medications, past family history, past medical history, past social history, past surgical history and problem list     Review of Systems   Constitutional: Positive for unexpected weight change (Weight gain 30 lb over 1 year)  Negative for activity change, appetite change, chills, diaphoresis, fatigue and fever  HENT: Negative for congestion, dental problem, ear pain, hearing loss, mouth sores, rhinorrhea and sore throat  Eyes: Negative for pain, redness and visual disturbance  Respiratory: Negative for shortness of breath and wheezing  Cardiovascular: Negative for chest pain and leg swelling  Gastrointestinal: Negative for abdominal pain, constipation, diarrhea, nausea and vomiting  Endocrine: Negative for polydipsia, polyphagia and polyuria  Genitourinary: Negative for difficulty urinating and dysuria  Musculoskeletal: Negative for back pain, joint swelling and myalgias  Skin: Negative for rash  Neurological: Negative for syncope and headaches  Psychiatric/Behavioral: Negative for behavioral problems and suicidal ideas  Objective:      /80   Pulse 63   Temp 98 °F (36 7 °C)   Ht 5' 11" (1 803 m)   Wt 91 9 kg (202 lb 9 6 oz)   SpO2 96%   BMI 28 26 kg/m²          Physical Exam   Constitutional: He is oriented to person, place, and time  He appears well-developed and well-nourished  No distress  HENT:   Head: Normocephalic  Right Ear: External ear normal    Left Ear: External ear normal    Nose: Nose normal    Mouth/Throat: Oropharynx is clear and moist  No oropharyngeal exudate  Eyes: Pupils are equal, round, and reactive to light  Conjunctivae are normal  Right eye exhibits no discharge  Left eye exhibits no discharge  Neck: Normal range of motion  No thyromegaly present  Cardiovascular: Normal rate, regular rhythm, normal heart sounds and intact distal pulses  No murmur heard  Pulmonary/Chest: Effort normal and breath sounds normal  He has no wheezes  Abdominal: Soft  Bowel sounds are normal  He exhibits no mass  There is no tenderness  Musculoskeletal: Normal range of motion  He exhibits no edema or tenderness  Lymphadenopathy:     He has no cervical adenopathy  Neurological: He is alert and oriented to person, place, and time  Skin: Skin is warm and dry  No rash noted  Psychiatric: He has a normal mood and affect   His behavior is normal

## 2019-02-22 ENCOUNTER — TELEPHONE (OUTPATIENT)
Dept: SURGERY | Facility: CLINIC | Age: 49
End: 2019-02-22

## 2019-02-25 ENCOUNTER — PATIENT OUTREACH (OUTPATIENT)
Dept: SURGERY | Facility: CLINIC | Age: 49
End: 2019-02-25

## 2019-02-25 NOTE — PROGRESS NOTES
Ct in [facility] to meet w/ cm regarding notice of SSD cessation  CT was concerned that he might lose his MA, CM assured him that at this point he will  Not be in jeopardy of losing his MA  He will lose his Medicare,but maintain his MA, just might no longer be MAWD eligible  Ct will need to find alternative income to supplement the $1000 he was receiving from [facility]  Ct understands  Ct also dropped off his February MAWD bill

## 2019-03-04 LAB
C TRACH RRNA SPEC QL PROBE: NOT DETECTED
N GONORRHOEA RRNA SPEC QL PROBE: NORMAL

## 2019-03-06 ENCOUNTER — DOCUMENTATION (OUTPATIENT)
Dept: SURGERY | Facility: CLINIC | Age: 49
End: 2019-03-06

## 2019-03-11 ENCOUNTER — DOCUMENTATION (OUTPATIENT)
Dept: SURGERY | Facility: CLINIC | Age: 49
End: 2019-03-11

## 2019-03-12 ENCOUNTER — OFFICE VISIT (OUTPATIENT)
Dept: SURGERY | Facility: CLINIC | Age: 49
End: 2019-03-12
Payer: MEDICARE

## 2019-03-12 VITALS
HEART RATE: 72 BPM | OXYGEN SATURATION: 95 % | BODY MASS INDEX: 28.28 KG/M2 | HEIGHT: 71 IN | DIASTOLIC BLOOD PRESSURE: 82 MMHG | WEIGHT: 202 LBS | TEMPERATURE: 98.3 F | SYSTOLIC BLOOD PRESSURE: 110 MMHG

## 2019-03-12 DIAGNOSIS — Z72.89 OTHER PROBLEMS RELATED TO LIFESTYLE: ICD-10-CM

## 2019-03-12 DIAGNOSIS — Z13.220 ENCOUNTER FOR LIPID SCREENING FOR CARDIOVASCULAR DISEASE: ICD-10-CM

## 2019-03-12 DIAGNOSIS — D72.89 OTHER SPECIFIED DISORDERS OF WHITE BLOOD CELLS: ICD-10-CM

## 2019-03-12 DIAGNOSIS — R63.5 WEIGHT GAIN: ICD-10-CM

## 2019-03-12 DIAGNOSIS — Z13.6 ENCOUNTER FOR LIPID SCREENING FOR CARDIOVASCULAR DISEASE: ICD-10-CM

## 2019-03-12 DIAGNOSIS — Z20.2 CONTACT WITH AND (SUSPECTED) EXPOSURE TO INFECTIONS WITH A PREDOMINANTLY SEXUAL MODE OF TRANSMISSION: ICD-10-CM

## 2019-03-12 DIAGNOSIS — K42.9 UMBILICAL HERNIA WITHOUT OBSTRUCTION AND WITHOUT GANGRENE: ICD-10-CM

## 2019-03-12 DIAGNOSIS — B20 HIV DISEASE (HCC): Primary | ICD-10-CM

## 2019-03-12 DIAGNOSIS — Z11.3 ENCOUNTER FOR SCREENING FOR INFECTIONS WITH A PREDOMINANTLY SEXUAL MODE OF TRANSMISSION: ICD-10-CM

## 2019-03-12 DIAGNOSIS — A53.9 SYPHILIS: ICD-10-CM

## 2019-03-12 PROCEDURE — 99214 OFFICE O/P EST MOD 30 MIN: CPT | Performed by: INTERNAL MEDICINE

## 2019-03-12 NOTE — PROGRESS NOTES
Assessment/Plan:      Diagnoses and all orders for this visit:    HIV disease ([facility] [address] )  -     CBC and differential; Future  -     T-helper cells CD4/CD8 %; Future  -     HIV-1 RNA, quantitative, PCR; Future  -     Comprehensive metabolic panel; Future  -     Lipid Panel with Direct LDL reflex; Future  -     RPR; Future    Encounter for lipid screening for cardiovascular disease  -     Lipid Panel with Direct LDL reflex; Future    Contact with and (suspected) exposure to infections with a predominantly sexual mode of transmission  -     RPR; Future    Encounter for screening for infections with a predominantly sexual mode of transmission  -     RPR; Future    Other problems related to lifestyle  -     RPR; Future    Other specified disorders of white blood cells  -     RPR; Future    Syphilis    Umbilical hernia without obstruction and without gangrene    Weight gain          Discussion: Today patient presents with stress  [facility] discussed pt's life stressors and potential approaches to resolving them  Pt disclosed that he recently got denied his SSID which is causing some financial strain as he is still in school trying to finish up and would prefer not to have to work at the same time  Pt is now torn as to how to proceed - get a full-time job to make ends meet but will have to quit school to do this, or find a part time job which he is afraid would not be sufficient for paying the bills  Pt discuss the limitations on the type of work he is willing to do and the hours he is available for working due to his class schedule  [facility] empathized and gave some suggestions  [facility] reaffirmed pt's resiliency and commitment as he is still drug, alcohol and tobacco free  Pt seemed positive and optimistic that he will find a way to make it all work out  Subjective:     Patient ID: Starlyn Claude is a 57 y o  male      HPI: The patient is being seen at the [facility] today for a routine behavioral health follow up Objective:    Orientation    Person: Yes   Place: Yes   Time: Yes     Appearance     Well Developed: Yes   Healthy: Yes   Normal Body Odor: Yes   Smells of Feces: No  Grooming Unkempt: No  Poor Eye Contact: No    Mood and Affect    Appropriate: Yes   Euthymic: Yes     Harm to Self or Others: denied    Substance Abuse: none reported or observed

## 2019-03-12 NOTE — PROGRESS NOTES
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    Recheck labs in 2 months and follow up in 3 months  Stressed adherence      2   Syphilis-has been serofast  The RPR titer remains quite low without any increase  Recheck RPR next visit          3   Weight gain-stressed the importance of diet and exercise  He continues to slowly gain weight but he is now more satisfied with his weight as he feels it is becoming healthy weight as his muscle mass is increased      4   Mild umbilical hernia-seems to be reproducible  Patient to get surgery after done with school  Patient was provided medication, adherence and prevention education    Subjective:  Routine follow-up for HIV  Patient claims near 100% adherence with Biktarvy   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  Patient denies any notable side effects  Overall the feeling well  The patient denies any fever chills or sweats, denies any nausea vomiting or diarrhea, denies any cough or shortness of breath  He is concerned about his weight gain    ROS: A complete 12 point ROS is negative other than that noted in the HPI    Followup portions patient history reviewed and updated as: Allergies, current medications, past medical history, past social history, past surgical history, and the problem list    Objective:  Vitals:  Vitals:    03/12/19 1702   BP: 110/82   Pulse: 72   Temp: 98 3 °F (36 8 °C)   SpO2: 95%   Weight: 91 6 kg (202 lb)   Height: 5' 11" (1 803 m)       Physical Exam:   General Appearance:  Alert, interactive, appearing well,  nontoxic, no acute distress     Neck:   Supple without lymphadenopathy, no thyromegaly or masses   Throat: Oropharynx moist without lesions  Lungs:   Clear to auscultation bilaterally; no wheezes, rhonchi or rales; respirations unlabored   Heart:  RRR; no murmur, rub or gallop   Abdomen:   Soft, non-tender, non-distended, positive bowel sounds  Extremities: No clubbing, cyanosis or edema   Skin: No new rashes or lesions  No draining wounds noted         Labs, Imaging, & Other studies:   All pertinent labs and imaging studies were personally reviewed    HIV RNA 71, CD4 532, creatinine 0 99, liver function tests normal, white blood cell count 4 4, urinalysis negative, GC and Chlamydia negative,      Current Outpatient Medications:     BIKTARVY -25 MG tablet, TAKE 1 TABLET BY MOUTH DAILY, Disp: 30 tablet, Rfl: 5

## 2019-03-13 NOTE — PROGRESS NOTES
03/12/19 Angelina Alex 3861 not active in support   Spiritual Beliefs/Perceptions   Concept of God Accepting   God's Role in Disease Other (Comment)  (His spirituality is a source of strength)   Spiritual Strengths Optimistism    Psychosocial   Psychosocial (WDL) WDL   Length of Time/Family Visitation 16-30 min   Stress Factors   Patient Stress Factors None identified  (He advised he is in a good place right now )   Coping Responses   Patient Coping Accepting   Plan of Care   Assessment Completed by: Other (Comment)  (ID Clinic)     The  visited Mr Jaden Saavedra during [facility] clinic to introduce self and pastoral care services  He advised he was spiritual but was not involved in any Pentecostalism  Nature, veganism and justice were important elements of his spirituality  He advised his partner and inner optimism and fighter keeps him going during difficulty  He admitted right now he is in a good place and might respond different why things are not going right for him  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

## 2019-03-13 NOTE — PROGRESS NOTES
03/12/19 1645   Clinical Encounter Type   Visited With Patient   Routine Visit Introduction   Patient Spiritual Encounters   Spiritual Assessment 4   Spiritual Encounter Notes See progress note

## 2019-03-26 ENCOUNTER — TELEPHONE (OUTPATIENT)
Dept: SURGERY | Facility: CLINIC | Age: 49
End: 2019-03-26

## 2019-03-27 ENCOUNTER — PATIENT OUTREACH (OUTPATIENT)
Dept: SURGERY | Facility: CLINIC | Age: 49
End: 2019-03-27

## 2019-03-27 NOTE — LETTER
[facility] at [address]  HIV and Non-HIV Medication Assessment    Date: 03/27/19  Client Name: Santy Scherer  Client Address: [address] [address]  Client Phone: [phone] (home)   : ***    Is the client currently receiving medical care for HIV?: Yes  When was the last time the client had a CD4 (T-cell) count? (Choose one): Within the last 3 months     When was the last time the client had a viral load count? (Choose one): Within the last 3 months  What was the viral load count at the client's last measurement? (Choose one): 48 to 999  Has client ever been prescribed HIV antiretroviral (ARV) drug therapy by a doctor?: Yes       Client is currently being prescribed the following medications:  Medications: HIV ARV Meds  ARV: How many pills is the client supposed to take a day?: 1  ARV: How many pills did the client miss taking yesterday?: 0  ARV: How many pills did the client miss taking last week?: 0  ARV: Were there any pills missed within the last month?: 0                                                    Other Medication Questions:                                     Barriers to Drug Adherence:                      Summary:  Summary: Drug Adherence Assessment (Choose one): Client has an adequate understading and support to maintain medication adherence  No interventions needed    Staff Summary/Recommendation(s): No missed doses, ct 100% adherent

## 2019-03-27 NOTE — PROGRESS NOTES
Ct in [facility] for RST/Recert  Ct sexually active w/ same partner-condoms and disclosure    Ct adherent to med regimen  Ct employed and receives SSD until may  Ct has MAWD  Ct independent in ADL's  Ct states no D/A,DV, MH or Legal concerns  Ct has an upcoming dental appt, last there in October, ct goes to [facility]  Ct states he has a support system

## 2019-03-27 NOTE — LETTER
[facility] at [address]  Dental Assessment Referral    Date: 03/27/19    Client Name: Efraín Hurtado  Client Address: [address] [address]  Client Phone: [phone] (home)   Primary Dentist: [facility]    Do you have dental insurance coverage? If yes, note it comments: Yes    1  When was your last dental visit?: October 2018  2  Do you have trouble eating?: No     3  Do your gums bleed at any time?: No  4  Is your mouth sore?: No  5  Do you have any toothaches?: No  6  Do you have any loose teeth?: No  7   Have you noticed any white coating on your tongue or in your mouth?: No            ASC : ***  Date: 03/27/19

## 2019-03-27 NOTE — LETTER
HIV Prevention Counseling Risk Reduction Plan    Client Name: Abby Glaser   Client #: ***   Client Signature:     MERRITT #: ***   CM Signature:     CHIS Score: ***   Date: 03/27/19 RAT Score: ***     Current Risk Behaviors  Unprotected sex with: opposite sex partners     Sharing needs with a partner/multiple partners?: a partner  Number of sex partners in the past three months: 1        Has sex for drugs or money: No  Has sex while using drugs and alcohol: No  Drug or Alcohol Abuse: No        Has not disclosed HIV/STD D to partner(s): No  Victim of sexual abuse/assault: No       Previous Successes             Safer Goal Behavior(s)          Barrier(s) to Safer Goal Behavior          Benefit(s) to Safer Goal Behavior          Action Steps  Action Steps: Continue adhering to medical care/medication adherence       Referral          Comments (relating to goals, benefits, barriers and action steps identified above)

## 2019-03-27 NOTE — LETTER
[facility] 29 at [facility] [address]  Dental Assessment Referral    Date: 03/27/19    Client Name: Lyn Christine  Client Address: [address] [address]  Client Phone: [phone] (home)   Primary Dentist: [facility]    Do you have dental insurance coverage? If yes, note it comments: Yes    1  When was your last dental visit?: October 2018  2  Do you have trouble eating?: No     3  Do your gums bleed at any time?: No  4  Is your mouth sore?: No  5  Do you have any toothaches?: No  6  Do you have any loose teeth?: No  7   Have you noticed any white coating on your tongue or in your mouth?: No            ASC : ***  Date: 03/27/19

## 2019-04-19 ENCOUNTER — PATIENT OUTREACH (OUTPATIENT)
Dept: SURGERY | Facility: CLINIC | Age: 49
End: 2019-04-19

## 2019-05-06 ENCOUNTER — OFFICE VISIT (OUTPATIENT)
Dept: SURGERY | Facility: CLINIC | Age: 49
End: 2019-05-06
Payer: MEDICARE

## 2019-05-06 VITALS
HEIGHT: 71 IN | SYSTOLIC BLOOD PRESSURE: 106 MMHG | DIASTOLIC BLOOD PRESSURE: 72 MMHG | OXYGEN SATURATION: 97 % | HEART RATE: 63 BPM | BODY MASS INDEX: 27.3 KG/M2 | WEIGHT: 195 LBS | TEMPERATURE: 97.8 F

## 2019-05-06 DIAGNOSIS — K42.0 INCARCERATED UMBILICAL HERNIA: ICD-10-CM

## 2019-05-06 DIAGNOSIS — B20 HIV DISEASE (HCC): Primary | ICD-10-CM

## 2019-05-06 PROCEDURE — 99214 OFFICE O/P EST MOD 30 MIN: CPT | Performed by: NURSE PRACTITIONER

## 2019-05-24 ENCOUNTER — PATIENT OUTREACH (OUTPATIENT)
Dept: SURGERY | Facility: CLINIC | Age: 49
End: 2019-05-24

## 2019-05-29 ENCOUNTER — DOCUMENTATION (OUTPATIENT)
Dept: SURGERY | Facility: CLINIC | Age: 49
End: 2019-05-29

## 2019-05-29 DIAGNOSIS — B20 HIV (HUMAN IMMUNODEFICIENCY VIRUS INFECTION) (HCC): ICD-10-CM

## 2019-05-31 DIAGNOSIS — Z72.89 OTHER PROBLEMS RELATED TO LIFESTYLE: ICD-10-CM

## 2019-05-31 DIAGNOSIS — Z20.2 CONTACT WITH AND (SUSPECTED) EXPOSURE TO INFECTIONS WITH A PREDOMINANTLY SEXUAL MODE OF TRANSMISSION: ICD-10-CM

## 2019-05-31 DIAGNOSIS — D72.89 OTHER SPECIFIED DISORDERS OF WHITE BLOOD CELLS: ICD-10-CM

## 2019-05-31 DIAGNOSIS — Z13.6 ENCOUNTER FOR LIPID SCREENING FOR CARDIOVASCULAR DISEASE: ICD-10-CM

## 2019-05-31 DIAGNOSIS — Z11.3 ENCOUNTER FOR SCREENING FOR INFECTIONS WITH A PREDOMINANTLY SEXUAL MODE OF TRANSMISSION: ICD-10-CM

## 2019-05-31 DIAGNOSIS — B20 HIV DISEASE (HCC): ICD-10-CM

## 2019-05-31 DIAGNOSIS — Z13.220 ENCOUNTER FOR LIPID SCREENING FOR CARDIOVASCULAR DISEASE: ICD-10-CM

## 2019-05-31 RX ORDER — BICTEGRAVIR SODIUM, EMTRICITABINE, AND TENOFOVIR ALAFENAMIDE FUMARATE 50; 200; 25 MG/1; MG/1; MG/1
1 TABLET ORAL DAILY
Qty: 30 TABLET | Refills: 5 | Status: SHIPPED | OUTPATIENT
Start: 2019-05-31 | End: 2019-11-20 | Stop reason: SDUPTHER

## 2019-06-04 ENCOUNTER — OFFICE VISIT (OUTPATIENT)
Dept: SURGERY | Facility: CLINIC | Age: 49
End: 2019-06-04
Payer: COMMERCIAL

## 2019-06-04 VITALS
HEIGHT: 71 IN | SYSTOLIC BLOOD PRESSURE: 100 MMHG | HEART RATE: 63 BPM | BODY MASS INDEX: 27.05 KG/M2 | DIASTOLIC BLOOD PRESSURE: 70 MMHG | OXYGEN SATURATION: 97 % | WEIGHT: 193.2 LBS | TEMPERATURE: 98.1 F

## 2019-06-04 DIAGNOSIS — A53.9 SYPHILIS: ICD-10-CM

## 2019-06-04 DIAGNOSIS — K12.0 APHTHOUS ULCER: ICD-10-CM

## 2019-06-04 DIAGNOSIS — R63.5 WEIGHT GAIN: ICD-10-CM

## 2019-06-04 DIAGNOSIS — B20 HIV DISEASE (HCC): Primary | ICD-10-CM

## 2019-06-04 DIAGNOSIS — K12.30 MUCOSITIS: Primary | ICD-10-CM

## 2019-06-04 DIAGNOSIS — K42.0 INCARCERATED UMBILICAL HERNIA: ICD-10-CM

## 2019-06-04 PROCEDURE — 99215 OFFICE O/P EST HI 40 MIN: CPT | Performed by: INTERNAL MEDICINE

## 2019-06-04 PROCEDURE — 3725F SCREEN DEPRESSION PERFORMED: CPT | Performed by: INTERNAL MEDICINE

## 2019-06-04 RX ORDER — TRIAMCINOLONE ACETONIDE 0.1 %
1 PASTE (GRAM) DENTAL 2 TIMES DAILY
Qty: 5 G | Refills: 1 | Status: SHIPPED | OUTPATIENT
Start: 2019-06-04 | End: 2019-06-05 | Stop reason: HOSPADM

## 2019-06-05 ENCOUNTER — TELEPHONE (OUTPATIENT)
Dept: SURGERY | Facility: CLINIC | Age: 49
End: 2019-06-05

## 2019-06-05 DIAGNOSIS — K12.30 MUCOSITIS: ICD-10-CM

## 2019-06-05 RX ORDER — TRIAMCINOLONE ACETONIDE 0.1 %
1 PASTE (GRAM) DENTAL 2 TIMES DAILY
Qty: 5 G | Refills: 1 | Status: SHIPPED | OUTPATIENT
Start: 2019-06-05 | End: 2019-08-05 | Stop reason: HOSPADM

## 2019-06-27 ENCOUNTER — PATIENT OUTREACH (OUTPATIENT)
Dept: SURGERY | Facility: CLINIC | Age: 49
End: 2019-06-27

## 2019-06-28 ENCOUNTER — PATIENT OUTREACH (OUTPATIENT)
Dept: SURGERY | Facility: CLINIC | Age: 49
End: 2019-06-28

## 2019-07-08 NOTE — ASSESSMENT & PLAN NOTE
Bed: [address]  Expected date:   Expected time:   Means of arrival:   Comments:  1   Received treatment in [facility]  Discussed with Dr Willow Kearney, no further treatment required

## 2019-07-22 DIAGNOSIS — K12.30 MUCOSITIS: ICD-10-CM

## 2019-07-22 RX ORDER — TRIAMCINOLONE ACETONIDE 0.1 %
PASTE (GRAM) DENTAL
Qty: 5 G | Refills: 1 | OUTPATIENT
Start: 2019-07-22

## 2019-08-01 ENCOUNTER — PATIENT OUTREACH (OUTPATIENT)
Dept: SURGERY | Facility: CLINIC | Age: 49
End: 2019-08-01

## 2019-08-05 ENCOUNTER — OFFICE VISIT (OUTPATIENT)
Dept: SURGERY | Facility: CLINIC | Age: 49
End: 2019-08-05
Payer: COMMERCIAL

## 2019-08-05 VITALS
WEIGHT: 191.6 LBS | TEMPERATURE: 97.8 F | OXYGEN SATURATION: 97 % | BODY MASS INDEX: 26.82 KG/M2 | HEART RATE: 70 BPM | SYSTOLIC BLOOD PRESSURE: 104 MMHG | DIASTOLIC BLOOD PRESSURE: 80 MMHG | HEIGHT: 71 IN

## 2019-08-05 DIAGNOSIS — K42.0 INCARCERATED UMBILICAL HERNIA: ICD-10-CM

## 2019-08-05 DIAGNOSIS — B20 HIV DISEASE (HCC): Primary | ICD-10-CM

## 2019-08-05 DIAGNOSIS — A53.9 SYPHILIS: ICD-10-CM

## 2019-08-05 PROCEDURE — 99214 OFFICE O/P EST MOD 30 MIN: CPT | Performed by: NURSE PRACTITIONER

## 2019-08-05 NOTE — PROGRESS NOTES
Assessment/Plan:    HIV disease (HCC)  Vl <20  CD4  654            Current Outpatient Medications:     BIKTARVY -25 MG tablet, TAKE 1 TABLET BY MOUTH DAILY, Disp: 30 tablet, Rfl: 5        Denies side effects  Stressed the importance of adherence  Continue follow up with ID clinic  Reviewed most recent labs, including Cd4 and viral load  Discussed the risks and benefits of treatment options, instructions for management, importance of treatment adherence, and reduction of risk factor  Educated on possible  medication side effects  Counseled on routes of HIV transmission, including the risk of  infection  Emphasized that viral suppression is the best method to prevent HIV transmission  At this time pt denies the need for HIV testing of anyone in their life  Total encounter time was 45 minutes  Greater then 20 minutes were spent on counseling and patient education  Pt voices understanding and agreement with treatment plan  Syphilis  Status post treatment  Serofast Titer 1:4  Continue to monitor annually  Incarcerated umbilical hernia  Stable  Declines surgical intervention at this time  Educated on using a supportive weight belt when lifting to prevent hernia size from increasing  Educated to seek emergency treatment if he noticed increased abdominal pain, inability to have a bowel movement, or nausea and vomiting  Diagnoses and all orders for this visit:    HIV disease ([facility] [address] )    Syphilis    Incarcerated umbilical hernia          Subjective:      Patient ID: Serafin Romero is a 57 y o  male  Neo Amin presents to the clinic today for primary care follow-up of chronic conditions  Past medical history significant for HIV, TB status post treatment, and umbilical hernia  Neo Amin is doing well and offers no acute complaints         The following portions of the patient's history were reviewed and updated as appropriate: allergies, current medications, past family history, past medical history, past social history, past surgical history and problem list     Review of Systems   Constitutional: Negative for activity change, appetite change, chills, diaphoresis, fatigue, fever and unexpected weight change  HENT: Negative for congestion, dental problem, ear pain, hearing loss, mouth sores, rhinorrhea and sore throat  Eyes: Negative for pain, redness and visual disturbance  Respiratory: Negative for shortness of breath and wheezing  Cardiovascular: Negative for chest pain and leg swelling  Gastrointestinal: Negative for abdominal pain, constipation, diarrhea, nausea and vomiting  Endocrine: Negative for polydipsia, polyphagia and polyuria  Genitourinary: Negative for difficulty urinating and dysuria  Musculoskeletal: Negative for back pain, joint swelling and myalgias  Skin: Negative for rash  Neurological: Negative for syncope and headaches  Psychiatric/Behavioral: Negative for behavioral problems and suicidal ideas  Objective:      /80   Pulse 70   Temp 97 8 °F (36 6 °C)   Ht 5' 11" (1 803 m)   Wt 86 9 kg (191 lb 9 6 oz)   SpO2 97%   BMI 26 72 kg/m²     Total cholesterol 165 HDL 34  triglycerides 98 BUN 7 creatinine 1 08 hemoglobin A1c 5 6 RPR reactive 1:4 platelet 087 GFR 82 AST 94 ALT 45 glucose 85 alk phos 50 before         Physical Exam   Constitutional: He is oriented to person, place, and time  He appears well-developed and well-nourished  No distress  HENT:   Head: Normocephalic  Right Ear: External ear normal    Left Ear: External ear normal    Nose: Nose normal    Mouth/Throat: Oropharynx is clear and moist  No oropharyngeal exudate  Eyes: Pupils are equal, round, and reactive to light  Conjunctivae are normal  Right eye exhibits no discharge  Left eye exhibits no discharge  Neck: Normal range of motion  No thyromegaly present  Cardiovascular: Normal rate, regular rhythm, normal heart sounds and intact distal pulses     No murmur heard  Pulmonary/Chest: Effort normal and breath sounds normal  He has no wheezes  Abdominal: Soft  Bowel sounds are normal  He exhibits no mass  There is no tenderness  A hernia (Umbilical) is present  Musculoskeletal: Normal range of motion  He exhibits no edema or tenderness  Lymphadenopathy:     He has no cervical adenopathy  Neurological: He is alert and oriented to person, place, and time  Skin: Skin is warm and dry  No rash noted  Psychiatric: He has a normal mood and affect   His behavior is normal

## 2019-08-05 NOTE — ASSESSMENT & PLAN NOTE
Stable  Declines surgical intervention at this time  Educated on using a supportive weight belt when lifting to prevent hernia size from increasing  Educated to seek emergency treatment if he noticed increased abdominal pain, inability to have a bowel movement, or nausea and vomiting

## 2019-08-05 NOTE — ASSESSMENT & PLAN NOTE
Vl <20  CD4  654            Current Outpatient Medications:     BIKTARVY -25 MG tablet, TAKE 1 TABLET BY MOUTH DAILY, Disp: 30 tablet, Rfl: 5        Denies side effects  Stressed the importance of adherence  Continue follow up with ID clinic  Reviewed most recent labs, including Cd4 and viral load  Discussed the risks and benefits of treatment options, instructions for management, importance of treatment adherence, and reduction of risk factor  Educated on possible  medication side effects  Counseled on routes of HIV transmission, including the risk of  infection  Emphasized that viral suppression is the best method to prevent HIV transmission  At this time pt denies the need for HIV testing of anyone in their life  Total encounter time was 45 minutes  Greater then 20 minutes were spent on counseling and patient education  Pt voices understanding and agreement with treatment plan

## 2019-08-23 ENCOUNTER — TRANSCRIBE ORDERS (OUTPATIENT)
Dept: LAB | Facility: CLINIC | Age: 49
End: 2019-08-23

## 2019-08-23 ENCOUNTER — PATIENT OUTREACH (OUTPATIENT)
Dept: SURGERY | Facility: CLINIC | Age: 49
End: 2019-08-23

## 2019-08-23 ENCOUNTER — APPOINTMENT (OUTPATIENT)
Dept: LAB | Facility: CLINIC | Age: 49
End: 2019-08-23
Payer: COMMERCIAL

## 2019-08-23 DIAGNOSIS — B20 HUMAN IMMUNODEFICIENCY VIRUS (HIV) DISEASE (HCC): Primary | ICD-10-CM

## 2019-08-23 DIAGNOSIS — B20 HIV DISEASE (HCC): ICD-10-CM

## 2019-08-23 LAB
ALBUMIN SERPL BCP-MCNC: 3.9 G/DL (ref 3.5–5)
ALP SERPL-CCNC: 83 U/L (ref 46–116)
ALT SERPL W P-5'-P-CCNC: 48 U/L (ref 12–78)
ANION GAP SERPL CALCULATED.3IONS-SCNC: 5 MMOL/L (ref 4–13)
AST SERPL W P-5'-P-CCNC: 24 U/L (ref 5–45)
BASOPHILS # BLD AUTO: 0.07 THOUSANDS/ΜL (ref 0–0.1)
BASOPHILS NFR BLD AUTO: 1 % (ref 0–1)
BILIRUB SERPL-MCNC: 0.5 MG/DL (ref 0.2–1)
BUN SERPL-MCNC: 18 MG/DL (ref 5–25)
CALCIUM SERPL-MCNC: 9 MG/DL (ref 8.3–10.1)
CHLORIDE SERPL-SCNC: 106 MMOL/L (ref 100–108)
CO2 SERPL-SCNC: 27 MMOL/L (ref 21–32)
CREAT SERPL-MCNC: 1.07 MG/DL (ref 0.6–1.3)
EOSINOPHIL # BLD AUTO: 0.11 THOUSAND/ΜL (ref 0–0.61)
EOSINOPHIL NFR BLD AUTO: 2 % (ref 0–6)
ERYTHROCYTE [DISTWIDTH] IN BLOOD BY AUTOMATED COUNT: 12.8 % (ref 11.6–15.1)
GFR SERPL CREATININE-BSD FRML MDRD: 83 ML/MIN/1.73SQ M
GLUCOSE SERPL-MCNC: 85 MG/DL (ref 65–140)
HCT VFR BLD AUTO: 42.3 % (ref 36.5–49.3)
HGB BLD-MCNC: 14.2 G/DL (ref 12–17)
IMM GRANULOCYTES # BLD AUTO: 0.01 THOUSAND/UL (ref 0–0.2)
IMM GRANULOCYTES NFR BLD AUTO: 0 % (ref 0–2)
LYMPHOCYTES # BLD AUTO: 1.79 THOUSANDS/ΜL (ref 0.6–4.47)
LYMPHOCYTES NFR BLD AUTO: 35 % (ref 14–44)
MCH RBC QN AUTO: 31.8 PG (ref 26.8–34.3)
MCHC RBC AUTO-ENTMCNC: 33.6 G/DL (ref 31.4–37.4)
MCV RBC AUTO: 95 FL (ref 82–98)
MONOCYTES # BLD AUTO: 0.46 THOUSAND/ΜL (ref 0.17–1.22)
MONOCYTES NFR BLD AUTO: 9 % (ref 4–12)
NEUTROPHILS # BLD AUTO: 2.73 THOUSANDS/ΜL (ref 1.85–7.62)
NEUTS SEG NFR BLD AUTO: 53 % (ref 43–75)
NRBC BLD AUTO-RTO: 0 /100 WBCS
PLATELET # BLD AUTO: 246 THOUSANDS/UL (ref 149–390)
PMV BLD AUTO: 10.6 FL (ref 8.9–12.7)
POTASSIUM SERPL-SCNC: 4.6 MMOL/L (ref 3.5–5.3)
PROT SERPL-MCNC: 7.6 G/DL (ref 6.4–8.2)
RBC # BLD AUTO: 4.47 MILLION/UL (ref 3.88–5.62)
SODIUM SERPL-SCNC: 138 MMOL/L (ref 136–145)
WBC # BLD AUTO: 5.17 THOUSAND/UL (ref 4.31–10.16)

## 2019-08-23 PROCEDURE — 85025 COMPLETE CBC W/AUTO DIFF WBC: CPT

## 2019-08-23 PROCEDURE — 36415 COLL VENOUS BLD VENIPUNCTURE: CPT

## 2019-08-23 PROCEDURE — 80053 COMPREHEN METABOLIC PANEL: CPT

## 2019-08-23 PROCEDURE — 87536 HIV-1 QUANT&REVRSE TRNSCRPJ: CPT

## 2019-08-23 PROCEDURE — 86360 T CELL ABSOLUTE COUNT/RATIO: CPT

## 2019-08-24 LAB
BASOPHILS # BLD AUTO: 0 X10E3/UL (ref 0–0.2)
BASOPHILS NFR BLD AUTO: 1 %
CD3+CD4+ CELLS # BLD: 520 /UL (ref 359–1519)
CD3+CD4+ CELLS NFR BLD: 28.9 % (ref 30.8–58.5)
CD3+CD4+ CELLS/CD3+CD8+ CLL BLD: 0.72 % (ref 0.92–3.72)
CD3+CD8+ CELLS # BLD: 720 /UL (ref 109–897)
CD3+CD8+ CELLS NFR BLD: 40 % (ref 12–35.5)
EOSINOPHIL # BLD AUTO: 0.1 X10E3/UL (ref 0–0.4)
EOSINOPHIL NFR BLD AUTO: 2 %
ERYTHROCYTE [DISTWIDTH] IN BLOOD BY AUTOMATED COUNT: 13.4 % (ref 12.3–15.4)
HCT VFR BLD AUTO: 44.1 % (ref 37.5–51)
HGB BLD-MCNC: 14.4 G/DL (ref 13–17.7)
IMM GRANULOCYTES # BLD: 0 X10E3/UL (ref 0–0.1)
IMM GRANULOCYTES NFR BLD: 0 %
LYMPHOCYTES # BLD AUTO: 1.8 X10E3/UL (ref 0.7–3.1)
LYMPHOCYTES NFR BLD AUTO: 35 %
MCH RBC QN AUTO: 30.8 PG (ref 26.6–33)
MCHC RBC AUTO-ENTMCNC: 32.7 G/DL (ref 31.5–35.7)
MCV RBC AUTO: 94 FL (ref 79–97)
MONOCYTES # BLD AUTO: 0.4 X10E3/UL (ref 0.1–0.9)
MONOCYTES NFR BLD AUTO: 8 %
NEUTROPHILS # BLD AUTO: 2.8 X10E3/UL (ref 1.4–7)
NEUTROPHILS NFR BLD AUTO: 54 %
PLATELET # BLD AUTO: 263 X10E3/UL (ref 150–450)
RBC # BLD AUTO: 4.67 X10E6/UL (ref 4.14–5.8)
WBC # BLD AUTO: 5.1 X10E3/UL (ref 3.4–10.8)

## 2019-08-26 LAB
HIV1 RNA # SERPL NAA+PROBE: 90 COPIES/ML
HIV1 RNA SERPL NAA+PROBE-LOG#: 1.95 LOG10COPY/ML

## 2019-08-29 ENCOUNTER — PATIENT OUTREACH (OUTPATIENT)
Dept: SURGERY | Facility: CLINIC | Age: 49
End: 2019-08-29

## 2019-09-03 ENCOUNTER — OFFICE VISIT (OUTPATIENT)
Dept: SURGERY | Facility: CLINIC | Age: 49
End: 2019-09-03
Payer: COMMERCIAL

## 2019-09-03 VITALS
OXYGEN SATURATION: 96 % | HEART RATE: 67 BPM | BODY MASS INDEX: 26.94 KG/M2 | DIASTOLIC BLOOD PRESSURE: 70 MMHG | HEIGHT: 71 IN | SYSTOLIC BLOOD PRESSURE: 110 MMHG | WEIGHT: 192.4 LBS | TEMPERATURE: 97.6 F

## 2019-09-03 DIAGNOSIS — K12.0 APHTHOUS ULCER: ICD-10-CM

## 2019-09-03 DIAGNOSIS — A53.9 SYPHILIS: ICD-10-CM

## 2019-09-03 DIAGNOSIS — B20 HIV DISEASE (HCC): Primary | ICD-10-CM

## 2019-09-03 DIAGNOSIS — K42.0 INCARCERATED UMBILICAL HERNIA: ICD-10-CM

## 2019-09-03 DIAGNOSIS — R63.5 WEIGHT GAIN: ICD-10-CM

## 2019-09-03 PROCEDURE — 99215 OFFICE O/P EST HI 40 MIN: CPT | Performed by: INTERNAL MEDICINE

## 2019-09-03 NOTE — PROGRESS NOTES
Progress Note - Infectious Disease   Abagail Babinski 57 y o  male MRN: [id number]  Unit/Bed#:  Encounter: [id number]      Impression/Plan:  1   HIV-doing well on Biktarvy with an undetectable viral load , and a CD4 count remains above 500   Recheck labs in 2 months and follow up in 3 months  Stressed adherence      2   Syphilis-has been serofast  The RPR titer remains quite low without any significant increase   Will continue to monitor the RPR      3   Weight gain-his weight is now stabilized after losing some weight      He will continue his aggressive dietary and exercise regimen      4   Mild umbilical hernia-seems to be reproducible   Not very symptomatic and the patient cannot take time away from school/work and therefore will not be proceeding with surgical evaluation      5  Aphthous stomatitis-recurrent and bothersome  Will try topical steroid paced to speed healing and perhaps some symptomatic relief  No reported recurrence    Discussed the above plan with the primary    Patient was provided medication, adherence and prevention education    Subjective:  Routine follow-up for HIV  Patient claims 100% adherence with Biktarvy   Patient denies any notable side effects  Overall the feeling well  The patient denies any fever chills or sweats, denies any nausea vomiting or diarrhea, denies any cough or shortness of breath  ROS: A complete 12 point ROS is negative other than that noted in the HPI    Followup portions patient history reviewed and updated as: Allergies, current medications, past medical history, past social history, past surgical history, and the problem list    Objective:  Vitals:  Vitals:    09/03/19 1635   BP: 110/70   Pulse: 67   Temp: 97 6 °F (36 4 °C)   SpO2: 96%   Weight: 87 3 kg (192 lb 6 4 oz)   Height: 5' 11" (1 803 m)       Physical Exam:   General Appearance:  Alert, interactive, appearing well,  nontoxic, no acute distress     Neck:   Supple without lymphadenopathy, no thyromegaly or masses   Throat: Oropharynx moist without lesions  Lungs:   Clear to auscultation bilaterally; no wheezes, rhonchi or rales; respirations unlabored   Heart:  RRR; no murmur, rub or gallop   Abdomen:   Soft, non-tender, non-distended, positive bowel sounds  Extremities: No clubbing, cyanosis or edema   Skin: No new rashes or lesions  No draining wounds noted  Labs, Imaging, & Other studies:   All pertinent labs and imaging studies were personally reviewed    Lab Results   Component Value Date    K 4 6 08/23/2019     08/23/2019    CO2 27 08/23/2019    BUN 18 08/23/2019    CREATININE 1 07 08/23/2019    CALCIUM 9 0 08/23/2019    AST 24 08/23/2019    ALT 48 08/23/2019    ALKPHOS 83 08/23/2019    EGFR 83 08/23/2019     Lab Results   Component Value Date    WBC 5 17 08/23/2019    WBC 5 1 08/23/2019    HGB 14 2 08/23/2019    HGB 14 4 08/23/2019    HCT 42 3 08/23/2019    HCT 44 1 08/23/2019    MCV 95 08/23/2019    MCV 94 08/23/2019     08/23/2019     08/23/2019       CD4 ABS   Date/Time Value Ref Range Status   08/23/2019 11:45  359 - 1519 /uL Final     HIV-1 RNA by PCR, Qn   Date/Time Value Ref Range Status   08/23/2019 11:45 AM 90 copies/mL Final     Comment:     The reportable range for this assay is 20 to 10,000,000  copies HIV-1 RNA/mL             Current Outpatient Medications:     BIKTARVY -25 MG tablet, TAKE 1 TABLET BY MOUTH DAILY, Disp: 30 tablet, Rfl: 5

## 2019-09-26 ENCOUNTER — PATIENT OUTREACH (OUTPATIENT)
Dept: SURGERY | Facility: CLINIC | Age: 49
End: 2019-09-26

## 2019-09-30 ENCOUNTER — PATIENT OUTREACH (OUTPATIENT)
Dept: SURGERY | Facility: CLINIC | Age: 49
End: 2019-09-30

## 2019-09-30 NOTE — PROGRESS NOTES
Ct in [facility] AT [employer] for RST/Recert  Ct states no missed doses  Ct in a long term relationship, partner knows dx  Ct in [facility] care [facility]/ [facility], has appt in November  No MH concerns  Has employment, as a DJ  No legal concerns  Ct independent in ADL's  Ct has MAWD for insurance  CM contacted [facility] as ct was unable to change the PCP that they chose for him, CM called [facility] and changed ct's PCP to Gumaro Said

## 2019-10-01 ENCOUNTER — PATIENT OUTREACH (OUTPATIENT)
Dept: SURGERY | Facility: CLINIC | Age: 49
End: 2019-10-01

## 2019-10-02 ENCOUNTER — PATIENT OUTREACH (OUTPATIENT)
Dept: SURGERY | Facility: CLINIC | Age: 49
End: 2019-10-02

## 2019-10-28 ENCOUNTER — PATIENT OUTREACH (OUTPATIENT)
Dept: SURGERY | Facility: CLINIC | Age: 49
End: 2019-10-28

## 2019-10-29 ENCOUNTER — PATIENT OUTREACH (OUTPATIENT)
Dept: SURGERY | Facility: CLINIC | Age: 49
End: 2019-10-29

## 2019-11-19 ENCOUNTER — TRANSCRIBE ORDERS (OUTPATIENT)
Dept: LAB | Facility: CLINIC | Age: 49
End: 2019-11-19

## 2019-11-19 ENCOUNTER — APPOINTMENT (OUTPATIENT)
Dept: LAB | Facility: CLINIC | Age: 49
End: 2019-11-19
Payer: COMMERCIAL

## 2019-11-19 DIAGNOSIS — Z72.89 OTHER PROBLEMS RELATED TO LIFESTYLE: ICD-10-CM

## 2019-11-19 DIAGNOSIS — D72.89 OTHER SPECIFIED DISORDERS OF WHITE BLOOD CELLS: ICD-10-CM

## 2019-11-19 DIAGNOSIS — Z11.3 ENCOUNTER FOR SCREENING FOR INFECTIONS WITH A PREDOMINANTLY SEXUAL MODE OF TRANSMISSION: ICD-10-CM

## 2019-11-19 DIAGNOSIS — Z20.2 CONTACT WITH AND (SUSPECTED) EXPOSURE TO INFECTIONS WITH A PREDOMINANTLY SEXUAL MODE OF TRANSMISSION: ICD-10-CM

## 2019-11-19 DIAGNOSIS — B20 HIV (HUMAN IMMUNODEFICIENCY VIRUS INFECTION) (HCC): ICD-10-CM

## 2019-11-19 DIAGNOSIS — B20 HIV DISEASE (HCC): ICD-10-CM

## 2019-11-19 LAB
ALBUMIN SERPL BCP-MCNC: 4.2 G/DL (ref 3.5–5)
ALP SERPL-CCNC: 68 U/L (ref 46–116)
ALT SERPL W P-5'-P-CCNC: 37 U/L (ref 12–78)
ANION GAP SERPL CALCULATED.3IONS-SCNC: 5 MMOL/L (ref 4–13)
AST SERPL W P-5'-P-CCNC: 16 U/L (ref 5–45)
BASOPHILS # BLD AUTO: 0.04 THOUSANDS/ΜL (ref 0–0.1)
BASOPHILS NFR BLD AUTO: 1 % (ref 0–1)
BILIRUB SERPL-MCNC: 0.44 MG/DL (ref 0.2–1)
BILIRUB UR QL STRIP: NEGATIVE
BUN SERPL-MCNC: 14 MG/DL (ref 5–25)
CALCIUM SERPL-MCNC: 9.1 MG/DL (ref 8.3–10.1)
CHLORIDE SERPL-SCNC: 108 MMOL/L (ref 100–108)
CLARITY UR: CLEAR
CO2 SERPL-SCNC: 27 MMOL/L (ref 21–32)
COLOR UR: YELLOW
CREAT SERPL-MCNC: 1.12 MG/DL (ref 0.6–1.3)
EOSINOPHIL # BLD AUTO: 0.12 THOUSAND/ΜL (ref 0–0.61)
EOSINOPHIL NFR BLD AUTO: 3 % (ref 0–6)
ERYTHROCYTE [DISTWIDTH] IN BLOOD BY AUTOMATED COUNT: 12.7 % (ref 11.6–15.1)
GFR SERPL CREATININE-BSD FRML MDRD: 78 ML/MIN/1.73SQ M
GLUCOSE P FAST SERPL-MCNC: 90 MG/DL (ref 65–99)
GLUCOSE UR STRIP-MCNC: NEGATIVE MG/DL
HCT VFR BLD AUTO: 43 % (ref 36.5–49.3)
HCV AB SER QL: NORMAL
HGB BLD-MCNC: 13.9 G/DL (ref 12–17)
HGB UR QL STRIP.AUTO: NEGATIVE
IMM GRANULOCYTES # BLD AUTO: 0.01 THOUSAND/UL (ref 0–0.2)
IMM GRANULOCYTES NFR BLD AUTO: 0 % (ref 0–2)
KETONES UR STRIP-MCNC: NEGATIVE MG/DL
LEUKOCYTE ESTERASE UR QL STRIP: NEGATIVE
LYMPHOCYTES # BLD AUTO: 1.5 THOUSANDS/ΜL (ref 0.6–4.47)
LYMPHOCYTES NFR BLD AUTO: 32 % (ref 14–44)
MCH RBC QN AUTO: 31.4 PG (ref 26.8–34.3)
MCHC RBC AUTO-ENTMCNC: 32.3 G/DL (ref 31.4–37.4)
MCV RBC AUTO: 97 FL (ref 82–98)
MONOCYTES # BLD AUTO: 0.47 THOUSAND/ΜL (ref 0.17–1.22)
MONOCYTES NFR BLD AUTO: 10 % (ref 4–12)
NEUTROPHILS # BLD AUTO: 2.53 THOUSANDS/ΜL (ref 1.85–7.62)
NEUTS SEG NFR BLD AUTO: 54 % (ref 43–75)
NITRITE UR QL STRIP: NEGATIVE
NRBC BLD AUTO-RTO: 0 /100 WBCS
PH UR STRIP.AUTO: 7.5 [PH]
PLATELET # BLD AUTO: 246 THOUSANDS/UL (ref 149–390)
PMV BLD AUTO: 10.6 FL (ref 8.9–12.7)
POTASSIUM SERPL-SCNC: 4.4 MMOL/L (ref 3.5–5.3)
PROT SERPL-MCNC: 7.3 G/DL (ref 6.4–8.2)
PROT UR STRIP-MCNC: NEGATIVE MG/DL
RBC # BLD AUTO: 4.43 MILLION/UL (ref 3.88–5.62)
SODIUM SERPL-SCNC: 140 MMOL/L (ref 136–145)
SP GR UR STRIP.AUTO: 1.01 (ref 1–1.03)
UROBILINOGEN UR QL STRIP.AUTO: 0.2 E.U./DL
WBC # BLD AUTO: 4.67 THOUSAND/UL (ref 4.31–10.16)

## 2019-11-19 PROCEDURE — 87591 N.GONORRHOEAE DNA AMP PROB: CPT

## 2019-11-19 PROCEDURE — 85025 COMPLETE CBC W/AUTO DIFF WBC: CPT

## 2019-11-19 PROCEDURE — 81003 URINALYSIS AUTO W/O SCOPE: CPT

## 2019-11-19 PROCEDURE — 87536 HIV-1 QUANT&REVRSE TRNSCRPJ: CPT

## 2019-11-19 PROCEDURE — 80053 COMPREHEN METABOLIC PANEL: CPT

## 2019-11-19 PROCEDURE — 87491 CHLMYD TRACH DNA AMP PROBE: CPT

## 2019-11-19 PROCEDURE — 86360 T CELL ABSOLUTE COUNT/RATIO: CPT

## 2019-11-19 PROCEDURE — 36415 COLL VENOUS BLD VENIPUNCTURE: CPT

## 2019-11-19 PROCEDURE — 86803 HEPATITIS C AB TEST: CPT

## 2019-11-20 DIAGNOSIS — B20 HIV (HUMAN IMMUNODEFICIENCY VIRUS INFECTION) (HCC): ICD-10-CM

## 2019-11-20 LAB
BASOPHILS # BLD AUTO: 0 X10E3/UL (ref 0–0.2)
BASOPHILS NFR BLD AUTO: 1 %
C TRACH DNA SPEC QL NAA+PROBE: NEGATIVE
CD3+CD4+ CELLS # BLD: 468 /UL (ref 359–1519)
CD3+CD4+ CELLS NFR BLD: 27.5 % (ref 30.8–58.5)
CD3+CD4+ CELLS/CD3+CD8+ CLL BLD: 0.69 % (ref 0.92–3.72)
CD3+CD8+ CELLS # BLD: 673 /UL (ref 109–897)
CD3+CD8+ CELLS NFR BLD: 39.6 % (ref 12–35.5)
EOSINOPHIL # BLD AUTO: 0.1 X10E3/UL (ref 0–0.4)
EOSINOPHIL NFR BLD AUTO: 2 %
ERYTHROCYTE [DISTWIDTH] IN BLOOD BY AUTOMATED COUNT: 13.6 % (ref 12.3–15.4)
HCT VFR BLD AUTO: 44.2 % (ref 37.5–51)
HGB BLD-MCNC: 14.3 G/DL (ref 13–17.7)
IMM GRANULOCYTES # BLD: 0 X10E3/UL (ref 0–0.1)
IMM GRANULOCYTES NFR BLD: 0 %
LYMPHOCYTES # BLD AUTO: 1.7 X10E3/UL (ref 0.7–3.1)
LYMPHOCYTES NFR BLD AUTO: 36 %
MCH RBC QN AUTO: 31.9 PG (ref 26.6–33)
MCHC RBC AUTO-ENTMCNC: 32.4 G/DL (ref 31.5–35.7)
MCV RBC AUTO: 99 FL (ref 79–97)
MONOCYTES # BLD AUTO: 0.5 X10E3/UL (ref 0.1–0.9)
MONOCYTES NFR BLD AUTO: 10 %
N GONORRHOEA DNA SPEC QL NAA+PROBE: NEGATIVE
NEUTROPHILS # BLD AUTO: 2.4 X10E3/UL (ref 1.4–7)
NEUTROPHILS NFR BLD AUTO: 51 %
PLATELET # BLD AUTO: 265 X10E3/UL (ref 150–450)
RBC # BLD AUTO: 4.48 X10E6/UL (ref 4.14–5.8)
WBC # BLD AUTO: 4.7 X10E3/UL (ref 3.4–10.8)

## 2019-11-21 LAB
HIV1 RNA # SERPL NAA+PROBE: <20 COPIES/ML
HIV1 RNA SERPL NAA+PROBE-LOG#: NORMAL LOG10COPY/ML

## 2019-11-22 ENCOUNTER — PATIENT OUTREACH (OUTPATIENT)
Dept: SURGERY | Facility: CLINIC | Age: 49
End: 2019-11-22

## 2019-11-22 RX ORDER — BICTEGRAVIR SODIUM, EMTRICITABINE, AND TENOFOVIR ALAFENAMIDE FUMARATE 50; 200; 25 MG/1; MG/1; MG/1
1 TABLET ORAL DAILY
Qty: 30 TABLET | Refills: 5 | Status: SHIPPED | OUTPATIENT
Start: 2019-11-22 | End: 2020-05-01

## 2019-11-25 ENCOUNTER — TELEPHONE (OUTPATIENT)
Dept: SURGERY | Facility: CLINIC | Age: 49
End: 2019-11-25

## 2019-11-26 ENCOUNTER — PATIENT OUTREACH (OUTPATIENT)
Dept: SURGERY | Facility: CLINIC | Age: 49
End: 2019-11-26

## 2019-11-26 ENCOUNTER — OFFICE VISIT (OUTPATIENT)
Dept: SURGERY | Facility: CLINIC | Age: 49
End: 2019-11-26
Payer: COMMERCIAL

## 2019-11-26 VITALS
WEIGHT: 195.8 LBS | BODY MASS INDEX: 27.41 KG/M2 | TEMPERATURE: 98.5 F | SYSTOLIC BLOOD PRESSURE: 120 MMHG | DIASTOLIC BLOOD PRESSURE: 82 MMHG | HEART RATE: 69 BPM | HEIGHT: 71 IN | OXYGEN SATURATION: 96 %

## 2019-11-26 DIAGNOSIS — Z23 NEED FOR INFLUENZA VACCINATION: ICD-10-CM

## 2019-11-26 DIAGNOSIS — K42.9 UMBILICAL HERNIA WITHOUT OBSTRUCTION AND WITHOUT GANGRENE: ICD-10-CM

## 2019-11-26 DIAGNOSIS — B20 HIV DISEASE (HCC): Primary | ICD-10-CM

## 2019-11-26 DIAGNOSIS — R63.5 WEIGHT GAIN: ICD-10-CM

## 2019-11-26 DIAGNOSIS — Z13.1 SCREENING FOR DIABETES MELLITUS: ICD-10-CM

## 2019-11-26 DIAGNOSIS — Z79.899 OTHER LONG TERM (CURRENT) DRUG THERAPY: ICD-10-CM

## 2019-11-26 DIAGNOSIS — A53.9 SYPHILIS: ICD-10-CM

## 2019-11-26 PROCEDURE — 90682 RIV4 VACC RECOMBINANT DNA IM: CPT

## 2019-11-26 PROCEDURE — 99214 OFFICE O/P EST MOD 30 MIN: CPT | Performed by: INTERNAL MEDICINE

## 2019-11-26 PROCEDURE — 90471 IMMUNIZATION ADMIN: CPT

## 2019-11-26 NOTE — PROGRESS NOTES
Progress Note - Infectious Disease   June Bailey 54 y o  male MRN: [id number]  Unit/Bed#:  Encounter: [id number]      Impression/Plan:  1   HIV-doing well on Biktarvy with an undetectable viral load, and a CD4 count remains above 400   Recheck labs in 2 months and follow up in 3 months  Stressed adherence      2   Syphilis-has been serofast  The RPR titer remains quite low without any significant increase   Will continue to monitor the RPR      3   Weight gain-his weight is now stabilized after losing some weight    He will continue his aggressive dietary and exercise regimen      4   Mild umbilical hernia-seems to be reproducible   Not very symptomatic and the patient cannot take time away from school/work and therefore will not be proceeding with surgical evaluation  Patient was provided medication, adherence and prevention education    Subjective:  Routine follow-up for HIV  Patient claims 100% adherence with Biktarvy    Patient denies any notable side effects  Overall the feeling well  The patient denies any fever chills or sweats, denies any nausea vomiting or diarrhea, denies any cough or shortness of breath  ROS: A complete 12 point ROS is negative other than that noted in the HPI    Followup portions patient history reviewed and updated as: Allergies, current medications, past medical history, past social history, past surgical history, and the problem list    Objective:  Vitals:  Vitals:    11/26/19 1625   BP: 120/82   Pulse: 69   Temp: 98 5 °F (36 9 °C)   SpO2: 96%   Weight: 88 8 kg (195 lb 12 8 oz)   Height: 5' 11" (1 803 m)       Physical Exam:   General Appearance:  Alert, interactive, appearing well,  nontoxic, no acute distress  Neck:   Supple without lymphadenopathy, no thyromegaly or masses   Throat: Oropharynx moist without lesions      Lungs:   Clear to auscultation bilaterally; no wheezes, rhonchi or rales; respirations unlabored   Heart:  RRR; no murmur, rub or gallop   Abdomen: Soft, non-tender, non-distended, positive bowel sounds  Extremities: No clubbing, cyanosis or edema   Skin: No new rashes or lesions  No draining wounds noted  Labs, Imaging, & Other studies:   All pertinent labs and imaging studies were personally reviewed    Lab Results   Component Value Date    K 4 4 11/19/2019     11/19/2019    CO2 27 11/19/2019    BUN 14 11/19/2019    CREATININE 1 12 11/19/2019    GLUF 90 11/19/2019    CALCIUM 9 1 11/19/2019    AST 16 11/19/2019    ALT 37 11/19/2019    ALKPHOS 68 11/19/2019    EGFR 78 11/19/2019     Lab Results   Component Value Date    WBC 4 67 11/19/2019    WBC 4 7 11/19/2019    HGB 13 9 11/19/2019    HGB 14 3 11/19/2019    HCT 43 0 11/19/2019    HCT 44 2 11/19/2019    MCV 97 11/19/2019    MCV 99 (H) 11/19/2019     11/19/2019     11/19/2019     Lab Results   Component Value Date    HEPCAB Non-reactive 11/19/2019     Lab Results   Component Value Date    HEPCAB Non-reactive 11/19/2019     No results found for: RPR  CD4 ABS   Date/Time Value Ref Range Status   11/19/2019 07:14  359 - 1519 /uL Final     HIV-1 RNA by PCR, Qn   Date/Time Value Ref Range Status   11/19/2019 07:14 AM <20 copies/mL Final     Comment:     HIV-1 RNA detected  The reportable range for this assay is 20 to 10,000,000  copies HIV-1 RNA/mL             Current Outpatient Medications:     BIKTARVY -25 MG tablet, TAKE 1 TABLET BY MOUTH DAILY, Disp: 30 tablet, Rfl: 5

## 2019-12-17 ENCOUNTER — PATIENT OUTREACH (OUTPATIENT)
Dept: SURGERY | Facility: CLINIC | Age: 49
End: 2019-12-17

## 2019-12-19 ENCOUNTER — PATIENT OUTREACH (OUTPATIENT)
Dept: SURGERY | Facility: CLINIC | Age: 49
End: 2019-12-19

## 2020-01-24 ENCOUNTER — PATIENT OUTREACH (OUTPATIENT)
Dept: SURGERY | Facility: CLINIC | Age: 50
End: 2020-01-24

## 2020-01-29 ENCOUNTER — APPOINTMENT (OUTPATIENT)
Dept: LAB | Facility: CLINIC | Age: 50
End: 2020-01-29
Payer: COMMERCIAL

## 2020-01-29 ENCOUNTER — TRANSCRIBE ORDERS (OUTPATIENT)
Dept: LAB | Facility: CLINIC | Age: 50
End: 2020-01-29

## 2020-01-29 DIAGNOSIS — Z13.1 SCREENING FOR DIABETES MELLITUS: ICD-10-CM

## 2020-01-29 DIAGNOSIS — Z79.899 OTHER LONG TERM (CURRENT) DRUG THERAPY: ICD-10-CM

## 2020-01-29 DIAGNOSIS — B20 HIV DISEASE (HCC): ICD-10-CM

## 2020-01-29 LAB
ALBUMIN SERPL BCP-MCNC: 3.8 G/DL (ref 3.5–5)
ALP SERPL-CCNC: 72 U/L (ref 46–116)
ALT SERPL W P-5'-P-CCNC: 34 U/L (ref 12–78)
ANION GAP SERPL CALCULATED.3IONS-SCNC: 2 MMOL/L (ref 4–13)
AST SERPL W P-5'-P-CCNC: 16 U/L (ref 5–45)
BASOPHILS # BLD AUTO: 0.05 THOUSANDS/ΜL (ref 0–0.1)
BASOPHILS NFR BLD AUTO: 1 % (ref 0–1)
BILIRUB SERPL-MCNC: 0.47 MG/DL (ref 0.2–1)
BUN SERPL-MCNC: 12 MG/DL (ref 5–25)
CALCIUM SERPL-MCNC: 9.6 MG/DL (ref 8.3–10.1)
CHLORIDE SERPL-SCNC: 105 MMOL/L (ref 100–108)
CO2 SERPL-SCNC: 30 MMOL/L (ref 21–32)
CREAT SERPL-MCNC: 1.03 MG/DL (ref 0.6–1.3)
EOSINOPHIL # BLD AUTO: 0.1 THOUSAND/ΜL (ref 0–0.61)
EOSINOPHIL NFR BLD AUTO: 3 % (ref 0–6)
ERYTHROCYTE [DISTWIDTH] IN BLOOD BY AUTOMATED COUNT: 12.6 % (ref 11.6–15.1)
EST. AVERAGE GLUCOSE BLD GHB EST-MCNC: 103 MG/DL
GFR SERPL CREATININE-BSD FRML MDRD: 86 ML/MIN/1.73SQ M
GLUCOSE P FAST SERPL-MCNC: 83 MG/DL (ref 65–99)
HBA1C MFR BLD: 5.2 % (ref 4.2–6.3)
HCT VFR BLD AUTO: 42.6 % (ref 36.5–49.3)
HGB BLD-MCNC: 14 G/DL (ref 12–17)
IMM GRANULOCYTES # BLD AUTO: 0 THOUSAND/UL (ref 0–0.2)
IMM GRANULOCYTES NFR BLD AUTO: 0 % (ref 0–2)
LYMPHOCYTES # BLD AUTO: 1.69 THOUSANDS/ΜL (ref 0.6–4.47)
LYMPHOCYTES NFR BLD AUTO: 42 % (ref 14–44)
MCH RBC QN AUTO: 31.5 PG (ref 26.8–34.3)
MCHC RBC AUTO-ENTMCNC: 32.9 G/DL (ref 31.4–37.4)
MCV RBC AUTO: 96 FL (ref 82–98)
MONOCYTES # BLD AUTO: 0.48 THOUSAND/ΜL (ref 0.17–1.22)
MONOCYTES NFR BLD AUTO: 12 % (ref 4–12)
NEUTROPHILS # BLD AUTO: 1.68 THOUSANDS/ΜL (ref 1.85–7.62)
NEUTS SEG NFR BLD AUTO: 42 % (ref 43–75)
NRBC BLD AUTO-RTO: 0 /100 WBCS
PLATELET # BLD AUTO: 262 THOUSANDS/UL (ref 149–390)
PMV BLD AUTO: 10.3 FL (ref 8.9–12.7)
POTASSIUM SERPL-SCNC: 4.2 MMOL/L (ref 3.5–5.3)
PROT SERPL-MCNC: 7.5 G/DL (ref 6.4–8.2)
RBC # BLD AUTO: 4.44 MILLION/UL (ref 3.88–5.62)
SODIUM SERPL-SCNC: 137 MMOL/L (ref 136–145)
WBC # BLD AUTO: 4 THOUSAND/UL (ref 4.31–10.16)

## 2020-01-29 PROCEDURE — 87536 HIV-1 QUANT&REVRSE TRNSCRPJ: CPT

## 2020-01-29 PROCEDURE — 83036 HEMOGLOBIN GLYCOSYLATED A1C: CPT

## 2020-01-29 PROCEDURE — 80053 COMPREHEN METABOLIC PANEL: CPT

## 2020-01-29 PROCEDURE — 85025 COMPLETE CBC W/AUTO DIFF WBC: CPT

## 2020-01-29 PROCEDURE — 36415 COLL VENOUS BLD VENIPUNCTURE: CPT

## 2020-01-29 PROCEDURE — 86360 T CELL ABSOLUTE COUNT/RATIO: CPT

## 2020-01-30 LAB
BASOPHILS # BLD AUTO: 0 X10E3/UL (ref 0–0.2)
BASOPHILS NFR BLD AUTO: 1 %
CD3+CD4+ CELLS # BLD: 468 /UL (ref 359–1519)
CD3+CD4+ CELLS NFR BLD: 23.4 % (ref 30.8–58.5)
CD3+CD4+ CELLS/CD3+CD8+ CLL BLD: 0.57 % (ref 0.92–3.72)
CD3+CD8+ CELLS # BLD: 824 /UL (ref 109–897)
CD3+CD8+ CELLS NFR BLD: 41.2 % (ref 12–35.5)
EOSINOPHIL # BLD AUTO: 0.1 X10E3/UL (ref 0–0.4)
EOSINOPHIL NFR BLD AUTO: 2 %
ERYTHROCYTE [DISTWIDTH] IN BLOOD BY AUTOMATED COUNT: 13.5 % (ref 11.6–15.4)
HCT VFR BLD AUTO: 42.1 % (ref 37.5–51)
HGB BLD-MCNC: 14.2 G/DL (ref 13–17.7)
IMM GRANULOCYTES # BLD: 0 X10E3/UL (ref 0–0.1)
IMM GRANULOCYTES NFR BLD: 1 %
LYMPHOCYTES # BLD AUTO: 2 X10E3/UL (ref 0.7–3.1)
LYMPHOCYTES NFR BLD AUTO: 47 %
MCH RBC QN AUTO: 31.6 PG (ref 26.6–33)
MCHC RBC AUTO-ENTMCNC: 33.7 G/DL (ref 31.5–35.7)
MCV RBC AUTO: 94 FL (ref 79–97)
MONOCYTES # BLD AUTO: 0.5 X10E3/UL (ref 0.1–0.9)
MONOCYTES NFR BLD AUTO: 11 %
NEUTROPHILS # BLD AUTO: 1.6 X10E3/UL (ref 1.4–7)
NEUTROPHILS NFR BLD AUTO: 38 %
PLATELET # BLD AUTO: 286 X10E3/UL (ref 150–450)
RBC # BLD AUTO: 4.5 X10E6/UL (ref 4.14–5.8)
WBC # BLD AUTO: 4.2 X10E3/UL (ref 3.4–10.8)

## 2020-01-31 ENCOUNTER — TELEPHONE (OUTPATIENT)
Dept: SURGERY | Facility: CLINIC | Age: 50
End: 2020-01-31

## 2020-01-31 LAB
HIV1 RNA # SERPL NAA+PROBE: 1850 COPIES/ML
HIV1 RNA SERPL NAA+PROBE-LOG#: 3.27 LOG10COPY/ML

## 2020-02-04 ENCOUNTER — TELEPHONE (OUTPATIENT)
Dept: SURGERY | Facility: CLINIC | Age: 50
End: 2020-02-04

## 2020-02-05 ENCOUNTER — OFFICE VISIT (OUTPATIENT)
Dept: SURGERY | Facility: CLINIC | Age: 50
End: 2020-02-05
Payer: COMMERCIAL

## 2020-02-05 ENCOUNTER — PATIENT OUTREACH (OUTPATIENT)
Dept: SURGERY | Facility: CLINIC | Age: 50
End: 2020-02-05

## 2020-02-05 VITALS
SYSTOLIC BLOOD PRESSURE: 110 MMHG | HEIGHT: 71 IN | TEMPERATURE: 97.7 F | BODY MASS INDEX: 27.52 KG/M2 | DIASTOLIC BLOOD PRESSURE: 76 MMHG | HEART RATE: 74 BPM | WEIGHT: 196.6 LBS | OXYGEN SATURATION: 99 %

## 2020-02-05 DIAGNOSIS — B20 HIV INFECTION, UNSPECIFIED SYMPTOM STATUS (HCC): Primary | ICD-10-CM

## 2020-02-05 DIAGNOSIS — B20 HIV DISEASE (HCC): ICD-10-CM

## 2020-02-05 DIAGNOSIS — K42.9 UMBILICAL HERNIA WITHOUT OBSTRUCTION AND WITHOUT GANGRENE: ICD-10-CM

## 2020-02-05 PROCEDURE — 99214 OFFICE O/P EST MOD 30 MIN: CPT | Performed by: NURSE PRACTITIONER

## 2020-02-05 NOTE — ASSESSMENT & PLAN NOTE
No results found for: [id number]  CD4 ABS   Date/Time Value Ref Range Status   2020 09:29  359 - 1519 /uL Final     HIV-1 RNA by PCR, Qn   Date/Time Value Ref Range Status   2020 09:29 AM 1850 copies/mL Final     Comment:     The reportable range for this assay is 20 to 10,000,000  copies HIV-1 RNA/mL  HIV-1 RNA Viral Load Log   Date/Time Value Ref Range Status   2020 09:29 AM 3 267 [id number]/mL Final           Current Outpatient Medications:     BIKTARVY -25 MG tablet, TAKE 1 TABLET BY MOUTH DAILY, Disp: 30 tablet, Rfl: 5    Patient reports 100 percent adherence  Patient denies taking supplement or other medication with ART  He denies illicit drug use or acute illness  Viral load is elevated at 1850  Checked genotype and intergrase  Repeat viral load at patient request     Denies side effects  Stressed the importance of adherence  Continue follow up with ID clinic  Reviewed most recent labs, including Cd4 and viral load  Discussed the risks and benefits of treatment options, instructions for management, importance of treatment adherence, and reduction of risk factor  Educated on possible  medication side effects  Counseled on routes of HIV transmission, including the risk of  infection  Emphasized that viral suppression is the best method to prevent HIV transmission  Patient is currently engaging in unprotected sex partner  He is the receptive partner  Stressed the importance having partner tested  Reviewed the importance of using condoms to prevent transmission of HIV  Total encounter time was 45 minutes  Greater then 20 minutes were spent on counseling and patient education  Pt voices understanding and agreement with treatment plan

## 2020-02-05 NOTE — PROGRESS NOTES
Assessment/Plan:    HIV disease ([facility] [address] )  No results found for: [id number]  CD4 ABS   Date/Time Value Ref Range Status   2020 09:29  359 - 1519 /uL Final     HIV-1 RNA by PCR, Qn   Date/Time Value Ref Range Status   2020 09:29 AM 1850 copies/mL Final     Comment:     The reportable range for this assay is 20 to 10,000,000  copies HIV-1 RNA/mL  HIV-1 RNA Viral Load Log   Date/Time Value Ref Range Status   2020 09:29 AM 3 267 [id number]/mL Final           Current Outpatient Medications:     BIKTARVY -25 MG tablet, TAKE 1 TABLET BY MOUTH DAILY, Disp: 30 tablet, Rfl: 5    Patient reports 100 percent adherence  Patient denies taking supplement or other medication with ART  He denies illicit drug use or acute illness  Viral load is elevated at 1850  Checked genotype and intergrase  Repeat viral load at patient request     Denies side effects  Stressed the importance of adherence  Continue follow up with ID clinic  Reviewed most recent labs, including Cd4 and viral load  Discussed the risks and benefits of treatment options, instructions for management, importance of treatment adherence, and reduction of risk factor  Educated on possible  medication side effects  Counseled on routes of HIV transmission, including the risk of  infection  Emphasized that viral suppression is the best method to prevent HIV transmission  Patient is currently engaging in unprotected sex partner  He is the receptive partner  Stressed the importance having partner tested  Reviewed the importance of using condoms to prevent transmission of HIV  Total encounter time was 45 minutes  Greater then 20 minutes were spent on counseling and patient education  Pt voices understanding and agreement with treatment plan  Umbilical hernia without obstruction and without gangrene  Stable  Declines surgical intervention at this time    Educated on using a supportive weight belt when lifting to prevent hernia size from increasing  Educated to seek emergency treatment if he noticed increased abdominal pain, inability to have a bowel movement, or nausea and vomiting  Diagnoses and all orders for this visit:    HIV infection, unspecified symptom status ([facility] )  -     HIV Genotype; Future  -     HIV-1 RNA, quantitative, PCR; Future  -     MISCELLANEOUS LAB TEST; Future  -     CBC and differential; Future    HIV disease ([facility] )    Umbilical hernia without obstruction and without gangrene          Subjective:      Patient ID: Danay Hart is a 54 y o  male  Nelia Morris presents to the clinic today for primary care follow-up of chronic conditions  Past medical history significant for HIV, TB status post treatment, and umbilical hernia  Nelia Morris is doing well and offers no acute complaints  The following portions of the patient's history were reviewed and updated as appropriate: allergies, current medications, past family history, past medical history, past social history, past surgical history and problem list     Review of Systems   Constitutional: Negative for activity change, appetite change, chills, diaphoresis, fatigue, fever and unexpected weight change  HENT: Negative for congestion, dental problem, ear pain, hearing loss, mouth sores, rhinorrhea and sore throat  Eyes: Negative for pain, redness and visual disturbance  Respiratory: Negative for shortness of breath and wheezing  Cardiovascular: Negative for chest pain and leg swelling  Gastrointestinal: Negative for abdominal pain, constipation, diarrhea, nausea and vomiting  Endocrine: Negative for polydipsia, polyphagia and polyuria  Genitourinary: Negative for difficulty urinating and dysuria  Musculoskeletal: Negative for back pain, joint swelling and myalgias  Skin: Negative for rash  Neurological: Negative for syncope and headaches  Psychiatric/Behavioral: Negative for behavioral problems and suicidal ideas  Objective:      /76   Pulse 74   Temp 97 7 °F (36 5 °C)   Ht 5' 11" (1 803 m)   Wt 89 2 kg (196 lb 9 6 oz)   SpO2 99%   BMI 27 42 kg/m²       Lab Results   Component Value Date    K 4 2 01/29/2020     01/29/2020    CO2 30 01/29/2020    BUN 12 01/29/2020    CREATININE 1 03 01/29/2020    GLUF 83 01/29/2020    CALCIUM 9 6 01/29/2020    AST 16 01/29/2020    ALT 34 01/29/2020    ALKPHOS 72 01/29/2020    EGFR 86 01/29/2020     Lab Results   Component Value Date    WBC 4 00 (L) 01/29/2020    WBC 4 2 01/29/2020    HGB 14 0 01/29/2020    HGB 14 2 01/29/2020    HCT 42 6 01/29/2020    HCT 42 1 01/29/2020    MCV 96 01/29/2020    MCV 94 01/29/2020     01/29/2020     01/29/2020     Lab Results   Component Value Date    HEPCAB Non-reactive 11/19/2019     Lab Results   Component Value Date    HEPCAB Non-reactive 11/19/2019     No results found for: RPR         Physical Exam   Constitutional: He is oriented to person, place, and time  He appears well-developed and well-nourished  No distress  HENT:   Head: Normocephalic  Right Ear: External ear normal    Left Ear: External ear normal    Nose: Nose normal    Mouth/Throat: Oropharynx is clear and moist  No oropharyngeal exudate  Eyes: Pupils are equal, round, and reactive to light  Conjunctivae are normal  Right eye exhibits no discharge  Left eye exhibits no discharge  Neck: Normal range of motion  No thyromegaly present  Cardiovascular: Normal rate, regular rhythm, normal heart sounds and intact distal pulses  No murmur heard  Pulmonary/Chest: Effort normal and breath sounds normal  He has no wheezes  Abdominal: Soft  Bowel sounds are normal  He exhibits no mass  There is no tenderness  Musculoskeletal: Normal range of motion  He exhibits no edema or tenderness  Lymphadenopathy:     He has no cervical adenopathy  Neurological: He is alert and oriented to person, place, and time  Skin: Skin is warm and dry  No rash noted  Psychiatric: He has a normal mood and affect   His behavior is normal

## 2020-02-06 ENCOUNTER — PATIENT OUTREACH (OUTPATIENT)
Dept: SURGERY | Facility: CLINIC | Age: 50
End: 2020-02-06

## 2020-02-06 NOTE — PROGRESS NOTES
Ct left [facility] for CM to send to [facility]  CM faxed to [facility]

## 2020-02-11 ENCOUNTER — APPOINTMENT (OUTPATIENT)
Dept: LAB | Facility: CLINIC | Age: 50
End: 2020-02-11
Payer: COMMERCIAL

## 2020-02-11 ENCOUNTER — PATIENT OUTREACH (OUTPATIENT)
Dept: SURGERY | Facility: CLINIC | Age: 50
End: 2020-02-11

## 2020-02-11 DIAGNOSIS — B20 HIV INFECTION, UNSPECIFIED SYMPTOM STATUS (HCC): ICD-10-CM

## 2020-02-11 LAB
BASOPHILS # BLD AUTO: 0.04 THOUSANDS/ΜL (ref 0–0.1)
BASOPHILS NFR BLD AUTO: 1 % (ref 0–1)
EOSINOPHIL # BLD AUTO: 0.06 THOUSAND/ΜL (ref 0–0.61)
EOSINOPHIL NFR BLD AUTO: 2 % (ref 0–6)
ERYTHROCYTE [DISTWIDTH] IN BLOOD BY AUTOMATED COUNT: 12.7 % (ref 11.6–15.1)
HCT VFR BLD AUTO: 40 % (ref 36.5–49.3)
HGB BLD-MCNC: 13.1 G/DL (ref 12–17)
IMM GRANULOCYTES # BLD AUTO: 0.01 THOUSAND/UL (ref 0–0.2)
IMM GRANULOCYTES NFR BLD AUTO: 0 % (ref 0–2)
LYMPHOCYTES # BLD AUTO: 1.15 THOUSANDS/ΜL (ref 0.6–4.47)
LYMPHOCYTES NFR BLD AUTO: 33 % (ref 14–44)
MCH RBC QN AUTO: 31.4 PG (ref 26.8–34.3)
MCHC RBC AUTO-ENTMCNC: 32.8 G/DL (ref 31.4–37.4)
MCV RBC AUTO: 96 FL (ref 82–98)
MONOCYTES # BLD AUTO: 0.33 THOUSAND/ΜL (ref 0.17–1.22)
MONOCYTES NFR BLD AUTO: 9 % (ref 4–12)
NEUTROPHILS # BLD AUTO: 1.95 THOUSANDS/ΜL (ref 1.85–7.62)
NEUTS SEG NFR BLD AUTO: 55 % (ref 43–75)
NRBC BLD AUTO-RTO: 0 /100 WBCS
PLATELET # BLD AUTO: 242 THOUSANDS/UL (ref 149–390)
PMV BLD AUTO: 10.6 FL (ref 8.9–12.7)
RBC # BLD AUTO: 4.17 MILLION/UL (ref 3.88–5.62)
WBC # BLD AUTO: 3.54 THOUSAND/UL (ref 4.31–10.16)

## 2020-02-11 PROCEDURE — 85025 COMPLETE CBC W/AUTO DIFF WBC: CPT

## 2020-02-11 PROCEDURE — 36415 COLL VENOUS BLD VENIPUNCTURE: CPT

## 2020-02-11 PROCEDURE — 87536 HIV-1 QUANT&REVRSE TRNSCRPJ: CPT

## 2020-02-11 PROCEDURE — 87900 PHENOTYPE INFECT AGENT DRUG: CPT

## 2020-02-11 PROCEDURE — 87906 NFCT AGT GNTYP ALYS HIV1: CPT

## 2020-02-11 PROCEDURE — 87901 NFCT AGT GNTYP ALYS HIV1 REV: CPT

## 2020-02-13 LAB
HIV1 RNA # SERPL NAA+PROBE: 100 COPIES/ML
HIV1 RNA SERPL NAA+PROBE-LOG#: 2 LOG10COPY/ML

## 2020-02-24 ENCOUNTER — DOCUMENTATION (OUTPATIENT)
Dept: SURGERY | Facility: CLINIC | Age: 50
End: 2020-02-24

## 2020-02-25 ENCOUNTER — PATIENT OUTREACH (OUTPATIENT)
Dept: SURGERY | Facility: CLINIC | Age: 50
End: 2020-02-25

## 2020-02-25 ENCOUNTER — OFFICE VISIT (OUTPATIENT)
Dept: SURGERY | Facility: CLINIC | Age: 50
End: 2020-02-25
Payer: COMMERCIAL

## 2020-02-25 VITALS
HEIGHT: 71 IN | WEIGHT: 194.4 LBS | OXYGEN SATURATION: 96 % | TEMPERATURE: 98.2 F | DIASTOLIC BLOOD PRESSURE: 70 MMHG | BODY MASS INDEX: 27.22 KG/M2 | HEART RATE: 70 BPM | SYSTOLIC BLOOD PRESSURE: 118 MMHG

## 2020-02-25 DIAGNOSIS — A53.9 SYPHILIS: ICD-10-CM

## 2020-02-25 DIAGNOSIS — B20 HIV DISEASE (HCC): Primary | ICD-10-CM

## 2020-02-25 PROCEDURE — 99214 OFFICE O/P EST MOD 30 MIN: CPT | Performed by: INTERNAL MEDICINE

## 2020-02-25 NOTE — PROGRESS NOTES
Progress Note - Infectious Disease   Danay Hart 54 y o  male MRN: [id number]  Unit/Bed#:  Encounter: [id number]      Impression/Plan:  1   HIV-doing well on Biktarvy with a viral load of 100 copies, and a CD4 count remains above 400  Unclear what caused the bump in the viral load to greater than 1000 copies because the patient adamantly denies missing doses, or starting any new supplements, or vitamins  Fortunately now the viral load is come down substantially   Recheck labs in 2 months and follow up in 3 months  Stressed adherence      2   Syphilis-has been serofast  The RPR titer remains quite low without any significant increase   Will continue to monitor the RPR      3   Weight gain-his weight is now stabilized after losing some weight    He will continue his aggressive dietary and exercise regimen  Patient was provided medication, adherence and prevention education    Subjective:  Routine follow-up for HIV  Patient claims 100% adherence with Biktarvy  Patient recently had a bump in his viral load to greater than 1000 copies but on retesting it has dropped down to 100 copies over 2 weeks  Patient denies any notable side effects  Overall the feeling well  The patient denies any fever chills or sweats, denies any nausea vomiting or diarrhea, denies any cough or shortness of breath  ROS:  A complete review of systems is negative other than that noted above in the subjective    Followup portions patient history reviewed and updated as: Allergies, current medications, past medical history, past social history, past surgical history, and the problem list    Objective:  Vitals:  Vitals:    02/25/20 1626   BP: 118/70   Pulse: 70   Temp: 98 2 °F (36 8 °C)   SpO2: 96%   Weight: 88 2 kg (194 lb 6 4 oz)   Height: 5' 11" (1 803 m)       Physical Exam:   General Appearance:  Alert, interactive, appearing well,  nontoxic, no acute distress     Neck:   Supple without lymphadenopathy, no thyromegaly or masses Throat: Oropharynx moist without lesions  Lungs:   Clear to auscultation bilaterally; no wheezes, rhonchi or rales; respirations unlabored   Heart:  RRR; no murmur, rub or gallop   Abdomen:   Soft, non-tender, non-distended, positive bowel sounds  Extremities: No clubbing, cyanosis or edema   Skin: No new rashes or lesions  No draining wounds noted  Labs, Imaging, & Other studies:   All pertinent labs and imaging studies were personally reviewed    Lab Results   Component Value Date    K 4 2 01/29/2020     01/29/2020    CO2 30 01/29/2020    BUN 12 01/29/2020    CREATININE 1 03 01/29/2020    GLUF 83 01/29/2020    CALCIUM 9 6 01/29/2020    AST 16 01/29/2020    ALT 34 01/29/2020    ALKPHOS 72 01/29/2020    EGFR 86 01/29/2020     Lab Results   Component Value Date    WBC 3 54 (L) 02/11/2020    HGB 13 1 02/11/2020    HCT 40 0 02/11/2020    MCV 96 02/11/2020     02/11/2020     Lab Results   Component Value Date    HEPCAB Non-reactive 11/19/2019     Lab Results   Component Value Date    HEPCAB Non-reactive 11/19/2019     No results found for: RPR  CD4 ABS   Date/Time Value Ref Range Status   01/29/2020 09:29  359 - 1519 /uL Final     HIV-1 RNA by PCR, Qn   Date/Time Value Ref Range Status   02/11/2020 11:05  copies/mL Final     Comment:     The reportable range for this assay is 20 to 10,000,000  copies HIV-1 RNA/mL             Current Outpatient Medications:     BIKTARVY -25 MG tablet, TAKE 1 TABLET BY MOUTH DAILY, Disp: 30 tablet, Rfl: 5

## 2020-02-26 ENCOUNTER — PATIENT OUTREACH (OUTPATIENT)
Dept: SURGERY | Facility: CLINIC | Age: 50
End: 2020-02-26

## 2020-02-26 NOTE — PROGRESS NOTES
Ct received a letter stating medical records were needed for MRT process  Cm submitted 6 months of records to Sewell MONAHAN

## 2020-02-28 ENCOUNTER — PATIENT OUTREACH (OUTPATIENT)
Dept: SURGERY | Facility: CLINIC | Age: 50
End: 2020-02-28

## 2020-03-12 ENCOUNTER — PATIENT OUTREACH (OUTPATIENT)
Dept: SURGERY | Facility: CLINIC | Age: 50
End: 2020-03-12

## 2020-03-12 NOTE — PROGRESS NOTES
Letter below was mailed out to ct  Felisha Pearson   Supervisor Case Management  ------------------------------------------------  3/11/20      I am sorry to inform you that this will be my last day at [facility] [place] [address]  I wanted to let you know this for several reasons  Mostly I'd like to tell you that it has been my great privilege to be your  here at [place] [address] you have been a truly delightful patient and I have enjoyed getting to know you for the last 3 years  They are currently interviewing to fill my position,once filled you will be notified  Of your new   For now you can contact:    Anabel Barnes,  [phone] or Felisha Pearson Supervisor of Case Management, [phone] for case management issues  For medical information remember to call the [facility] clinic staff at [phone]      Again, it was pleasure knowing you,      Sincerely,    Jessica Avelar

## 2020-03-16 ENCOUNTER — TELEPHONE (OUTPATIENT)
Dept: SURGERY | Facility: CLINIC | Age: 50
End: 2020-03-16

## 2020-03-26 ENCOUNTER — PATIENT OUTREACH (OUTPATIENT)
Dept: SURGERY | Facility: CLINIC | Age: 50
End: 2020-03-26

## 2020-03-27 ENCOUNTER — PATIENT OUTREACH (OUTPATIENT)
Dept: SURGERY | Facility: CLINIC | Age: 50
End: 2020-03-27

## 2020-03-27 NOTE — PROGRESS NOTES
Melly called Ct to follow up  Ct states he will e-mail his MAWD bill to MELLY Siegel completed March MAWD payment and scanned to [facility] for signature  Due to Covid-19 and for the completion of payments on time and as quick as possible  MELLY scanned what was sent to [place] and MELLY supervisor will have completed Copy

## 2020-04-17 LAB
HIV RT+PR MUT DET ISLT: NORMAL
HIV RT+PR MUT DET ISLT: NORMAL

## 2020-04-28 ENCOUNTER — PATIENT OUTREACH (OUTPATIENT)
Dept: SURGERY | Facility: CLINIC | Age: 50
End: 2020-04-28

## 2020-05-01 DIAGNOSIS — B20 HIV (HUMAN IMMUNODEFICIENCY VIRUS INFECTION) (HCC): ICD-10-CM

## 2020-05-01 RX ORDER — BICTEGRAVIR SODIUM, EMTRICITABINE, AND TENOFOVIR ALAFENAMIDE FUMARATE 50; 200; 25 MG/1; MG/1; MG/1
1 TABLET ORAL DAILY
Qty: 30 TABLET | Refills: 5 | Status: SHIPPED | OUTPATIENT
Start: 2020-05-01 | End: 2020-10-25 | Stop reason: SDUPTHER

## 2020-05-04 ENCOUNTER — PATIENT OUTREACH (OUTPATIENT)
Dept: SURGERY | Facility: CLINIC | Age: 50
End: 2020-05-04

## 2020-05-06 ENCOUNTER — TELEMEDICINE (OUTPATIENT)
Dept: SURGERY | Facility: CLINIC | Age: 50
End: 2020-05-06
Payer: COMMERCIAL

## 2020-05-06 DIAGNOSIS — B20 HIV DISEASE (HCC): Primary | ICD-10-CM

## 2020-05-06 DIAGNOSIS — K12.0 APHTHOUS ULCER: ICD-10-CM

## 2020-05-06 PROCEDURE — 99215 OFFICE O/P EST HI 40 MIN: CPT | Performed by: NURSE PRACTITIONER

## 2020-05-19 ENCOUNTER — PATIENT OUTREACH (OUTPATIENT)
Dept: SURGERY | Facility: CLINIC | Age: 50
End: 2020-05-19

## 2020-05-20 ENCOUNTER — PATIENT OUTREACH (OUTPATIENT)
Dept: SURGERY | Facility: CLINIC | Age: 50
End: 2020-05-20

## 2020-05-26 ENCOUNTER — PATIENT OUTREACH (OUTPATIENT)
Dept: SURGERY | Facility: CLINIC | Age: 50
End: 2020-05-26

## 2020-06-01 ENCOUNTER — TELEPHONE (OUTPATIENT)
Dept: SURGERY | Facility: CLINIC | Age: 50
End: 2020-06-01

## 2020-06-01 DIAGNOSIS — B35.1 ONYCHOMYCOSIS: Primary | ICD-10-CM

## 2020-06-05 ENCOUNTER — APPOINTMENT (OUTPATIENT)
Dept: LAB | Facility: CLINIC | Age: 50
End: 2020-06-05
Payer: COMMERCIAL

## 2020-06-05 ENCOUNTER — TRANSCRIBE ORDERS (OUTPATIENT)
Dept: LAB | Facility: CLINIC | Age: 50
End: 2020-06-05

## 2020-06-05 DIAGNOSIS — B20 HIV DISEASE (HCC): ICD-10-CM

## 2020-06-05 DIAGNOSIS — B35.1 DERMATOPHYTOSIS OF NAIL: ICD-10-CM

## 2020-06-05 DIAGNOSIS — B35.1 DERMATOPHYTOSIS OF NAIL: Primary | ICD-10-CM

## 2020-06-05 LAB
ALBUMIN SERPL BCP-MCNC: 3.7 G/DL (ref 3.5–5)
ALP SERPL-CCNC: 72 U/L (ref 46–116)
ALT SERPL W P-5'-P-CCNC: 35 U/L (ref 12–78)
ANION GAP SERPL CALCULATED.3IONS-SCNC: 6 MMOL/L (ref 4–13)
AST SERPL W P-5'-P-CCNC: 14 U/L (ref 5–45)
BASOPHILS # BLD AUTO: 0.05 THOUSANDS/ΜL (ref 0–0.1)
BASOPHILS NFR BLD AUTO: 1 % (ref 0–1)
BILIRUB DIRECT SERPL-MCNC: 0.12 MG/DL (ref 0–0.2)
BILIRUB SERPL-MCNC: 0.43 MG/DL (ref 0.2–1)
BUN SERPL-MCNC: 16 MG/DL (ref 5–25)
CALCIUM SERPL-MCNC: 9 MG/DL (ref 8.3–10.1)
CHLORIDE SERPL-SCNC: 105 MMOL/L (ref 100–108)
CO2 SERPL-SCNC: 26 MMOL/L (ref 21–32)
CREAT SERPL-MCNC: 1.12 MG/DL (ref 0.6–1.3)
EOSINOPHIL # BLD AUTO: 0.06 THOUSAND/ΜL (ref 0–0.61)
EOSINOPHIL NFR BLD AUTO: 1 % (ref 0–6)
ERYTHROCYTE [DISTWIDTH] IN BLOOD BY AUTOMATED COUNT: 12.4 % (ref 11.6–15.1)
GFR SERPL CREATININE-BSD FRML MDRD: 78 ML/MIN/1.73SQ M
GLUCOSE SERPL-MCNC: 86 MG/DL (ref 65–140)
HCT VFR BLD AUTO: 42.5 % (ref 36.5–49.3)
HGB BLD-MCNC: 14.4 G/DL (ref 12–17)
IMM GRANULOCYTES # BLD AUTO: 0.01 THOUSAND/UL (ref 0–0.2)
IMM GRANULOCYTES NFR BLD AUTO: 0 % (ref 0–2)
LYMPHOCYTES # BLD AUTO: 2.02 THOUSANDS/ΜL (ref 0.6–4.47)
LYMPHOCYTES NFR BLD AUTO: 40 % (ref 14–44)
MCH RBC QN AUTO: 31.5 PG (ref 26.8–34.3)
MCHC RBC AUTO-ENTMCNC: 33.9 G/DL (ref 31.4–37.4)
MCV RBC AUTO: 93 FL (ref 82–98)
MONOCYTES # BLD AUTO: 0.46 THOUSAND/ΜL (ref 0.17–1.22)
MONOCYTES NFR BLD AUTO: 9 % (ref 4–12)
NEUTROPHILS # BLD AUTO: 2.4 THOUSANDS/ΜL (ref 1.85–7.62)
NEUTS SEG NFR BLD AUTO: 49 % (ref 43–75)
NRBC BLD AUTO-RTO: 0 /100 WBCS
PLATELET # BLD AUTO: 239 THOUSANDS/UL (ref 149–390)
PMV BLD AUTO: 9.8 FL (ref 8.9–12.7)
POTASSIUM SERPL-SCNC: 4.2 MMOL/L (ref 3.5–5.3)
PROT SERPL-MCNC: 7.3 G/DL (ref 6.4–8.2)
RBC # BLD AUTO: 4.57 MILLION/UL (ref 3.88–5.62)
SODIUM SERPL-SCNC: 137 MMOL/L (ref 136–145)
WBC # BLD AUTO: 5 THOUSAND/UL (ref 4.31–10.16)

## 2020-06-05 PROCEDURE — 86360 T CELL ABSOLUTE COUNT/RATIO: CPT

## 2020-06-05 PROCEDURE — 36415 COLL VENOUS BLD VENIPUNCTURE: CPT

## 2020-06-05 PROCEDURE — 85025 COMPLETE CBC W/AUTO DIFF WBC: CPT

## 2020-06-05 PROCEDURE — 87536 HIV-1 QUANT&REVRSE TRNSCRPJ: CPT

## 2020-06-05 PROCEDURE — 80053 COMPREHEN METABOLIC PANEL: CPT

## 2020-06-05 PROCEDURE — 82248 BILIRUBIN DIRECT: CPT

## 2020-06-06 LAB
BASOPHILS # BLD AUTO: 0 X10E3/UL (ref 0–0.2)
BASOPHILS NFR BLD AUTO: 1 %
CD3+CD4+ CELLS # BLD: 568 /UL (ref 359–1519)
CD3+CD4+ CELLS NFR BLD: 28.4 % (ref 30.8–58.5)
CD3+CD4+ CELLS/CD3+CD8+ CLL BLD: 0.76 % (ref 0.92–3.72)
CD3+CD8+ CELLS # BLD: 746 /UL (ref 109–897)
CD3+CD8+ CELLS NFR BLD: 37.3 % (ref 12–35.5)
EOSINOPHIL # BLD AUTO: 0.1 X10E3/UL (ref 0–0.4)
EOSINOPHIL NFR BLD AUTO: 1 %
ERYTHROCYTE [DISTWIDTH] IN BLOOD BY AUTOMATED COUNT: 13.1 % (ref 11.6–15.4)
HCT VFR BLD AUTO: 44.9 % (ref 37.5–51)
HGB BLD-MCNC: 14.6 G/DL (ref 13–17.7)
IMM GRANULOCYTES # BLD: 0 X10E3/UL (ref 0–0.1)
IMM GRANULOCYTES NFR BLD: 0 %
LYMPHOCYTES # BLD AUTO: 2 X10E3/UL (ref 0.7–3.1)
LYMPHOCYTES NFR BLD AUTO: 40 %
MCH RBC QN AUTO: 30.9 PG (ref 26.6–33)
MCHC RBC AUTO-ENTMCNC: 32.5 G/DL (ref 31.5–35.7)
MCV RBC AUTO: 95 FL (ref 79–97)
MONOCYTES # BLD AUTO: 0.5 X10E3/UL (ref 0.1–0.9)
MONOCYTES NFR BLD AUTO: 9 %
NEUTROPHILS # BLD AUTO: 2.5 X10E3/UL (ref 1.4–7)
NEUTROPHILS NFR BLD AUTO: 49 %
PLATELET # BLD AUTO: 252 X10E3/UL (ref 150–450)
RBC # BLD AUTO: 4.73 X10E6/UL (ref 4.14–5.8)
WBC # BLD AUTO: 5.1 X10E3/UL (ref 3.4–10.8)

## 2020-06-09 LAB
HIV1 RNA # SERPL NAA+PROBE: <20 COPIES/ML
HIV1 RNA SERPL NAA+PROBE-LOG#: NORMAL LOG10COPY/ML

## 2020-06-12 ENCOUNTER — PATIENT OUTREACH (OUTPATIENT)
Dept: SURGERY | Facility: CLINIC | Age: 50
End: 2020-06-12

## 2020-06-15 ENCOUNTER — PATIENT OUTREACH (OUTPATIENT)
Dept: SURGERY | Facility: CLINIC | Age: 50
End: 2020-06-15

## 2020-06-16 ENCOUNTER — PATIENT OUTREACH (OUTPATIENT)
Dept: SURGERY | Facility: CLINIC | Age: 50
End: 2020-06-16

## 2020-06-22 ENCOUNTER — TELEPHONE (OUTPATIENT)
Dept: SURGERY | Facility: CLINIC | Age: 50
End: 2020-06-22

## 2020-06-23 ENCOUNTER — OFFICE VISIT (OUTPATIENT)
Dept: SURGERY | Facility: CLINIC | Age: 50
End: 2020-06-23
Payer: COMMERCIAL

## 2020-06-23 VITALS
SYSTOLIC BLOOD PRESSURE: 110 MMHG | DIASTOLIC BLOOD PRESSURE: 82 MMHG | HEART RATE: 77 BPM | TEMPERATURE: 97.9 F | HEIGHT: 71 IN | OXYGEN SATURATION: 96 % | WEIGHT: 201.6 LBS | BODY MASS INDEX: 28.22 KG/M2

## 2020-06-23 DIAGNOSIS — K12.0 APHTHOUS ULCER: ICD-10-CM

## 2020-06-23 DIAGNOSIS — R63.5 WEIGHT GAIN: ICD-10-CM

## 2020-06-23 DIAGNOSIS — K12.0 RECURRENT APHTHOUS ULCER: Primary | ICD-10-CM

## 2020-06-23 DIAGNOSIS — A53.9 SYPHILIS: ICD-10-CM

## 2020-06-23 DIAGNOSIS — Z13.220 ENCOUNTER FOR LIPID SCREENING FOR CARDIOVASCULAR DISEASE: ICD-10-CM

## 2020-06-23 DIAGNOSIS — Z13.6 ENCOUNTER FOR LIPID SCREENING FOR CARDIOVASCULAR DISEASE: ICD-10-CM

## 2020-06-23 DIAGNOSIS — B20 HIV DISEASE (HCC): Primary | ICD-10-CM

## 2020-06-23 PROCEDURE — 99215 OFFICE O/P EST HI 40 MIN: CPT | Performed by: INTERNAL MEDICINE

## 2020-06-23 RX ORDER — METHYLPREDNISOLONE 4 MG/1
TABLET ORAL
Qty: 21 TABLET | Refills: 0 | Status: SHIPPED | OUTPATIENT
Start: 2020-06-23 | End: 2020-08-17 | Stop reason: HOSPADM

## 2020-06-25 ENCOUNTER — TRANSCRIBE ORDERS (OUTPATIENT)
Dept: LAB | Facility: CLINIC | Age: 50
End: 2020-06-25

## 2020-06-25 ENCOUNTER — APPOINTMENT (OUTPATIENT)
Dept: LAB | Facility: CLINIC | Age: 50
End: 2020-06-25
Payer: COMMERCIAL

## 2020-06-25 DIAGNOSIS — K12.0 RECURRENT APHTHOUS ULCER: ICD-10-CM

## 2020-06-25 LAB
ALBUMIN SERPL BCP-MCNC: 3.9 G/DL (ref 3.5–5)
ALP SERPL-CCNC: 67 U/L (ref 46–116)
ALT SERPL W P-5'-P-CCNC: 32 U/L (ref 12–78)
ANION GAP SERPL CALCULATED.3IONS-SCNC: 6 MMOL/L (ref 4–13)
AST SERPL W P-5'-P-CCNC: 23 U/L (ref 5–45)
BILIRUB SERPL-MCNC: 0.6 MG/DL (ref 0.2–1)
BUN SERPL-MCNC: 18 MG/DL (ref 5–25)
CALCIUM SERPL-MCNC: 9.4 MG/DL (ref 8.3–10.1)
CHLORIDE SERPL-SCNC: 105 MMOL/L (ref 100–108)
CO2 SERPL-SCNC: 28 MMOL/L (ref 21–32)
CREAT SERPL-MCNC: 1.07 MG/DL (ref 0.6–1.3)
FERRITIN SERPL-MCNC: 21 NG/ML (ref 8–388)
FOLATE SERPL-MCNC: >20 NG/ML (ref 3.1–17.5)
GFR SERPL CREATININE-BSD FRML MDRD: 82 ML/MIN/1.73SQ M
GLUCOSE SERPL-MCNC: 100 MG/DL (ref 65–140)
MAGNESIUM SERPL-MCNC: 1.9 MG/DL (ref 1.6–2.6)
POTASSIUM SERPL-SCNC: 4.6 MMOL/L (ref 3.5–5.3)
PROT SERPL-MCNC: 7.8 G/DL (ref 6.4–8.2)
SODIUM SERPL-SCNC: 139 MMOL/L (ref 136–145)
VIT B12 SERPL-MCNC: 581 PG/ML (ref 100–900)

## 2020-06-25 PROCEDURE — 80053 COMPREHEN METABOLIC PANEL: CPT

## 2020-06-25 PROCEDURE — 82607 VITAMIN B-12: CPT

## 2020-06-25 PROCEDURE — 82728 ASSAY OF FERRITIN: CPT

## 2020-06-25 PROCEDURE — 36415 COLL VENOUS BLD VENIPUNCTURE: CPT

## 2020-06-25 PROCEDURE — 83735 ASSAY OF MAGNESIUM: CPT

## 2020-06-25 PROCEDURE — 82746 ASSAY OF FOLIC ACID SERUM: CPT

## 2020-07-05 NOTE — PROGRESS NOTES
Cm e-mailed [employer] to follow up regarding Trevon Comment request  Vi Chamorro inquired and asked if Cm received the questions aidsnet needs reviewed  Cm called Ct to confirm employment and income and advised Ct to call helpline to update about income due to COVID-19

## 2020-07-09 ENCOUNTER — PATIENT OUTREACH (OUTPATIENT)
Dept: SURGERY | Facility: CLINIC | Age: 50
End: 2020-07-09

## 2020-07-09 NOTE — PROGRESS NOTES
Ct called and stated he no longer has a premium  Ct has MA until work comes back up  Ct checked in  Ct states he is no longer working  Ct still collecting unemployment  Ct states no stimulus check  Cm updated Clinic  Ct asked about information for [facility] Erika Solis

## 2020-07-13 ENCOUNTER — TELEPHONE (OUTPATIENT)
Dept: SURGERY | Facility: CLINIC | Age: 50
End: 2020-07-13

## 2020-08-04 ENCOUNTER — TELEPHONE (OUTPATIENT)
Dept: SURGERY | Facility: CLINIC | Age: 50
End: 2020-08-04

## 2020-08-04 ENCOUNTER — PATIENT OUTREACH (OUTPATIENT)
Dept: SURGERY | Facility: CLINIC | Age: 50
End: 2020-08-04

## 2020-08-04 NOTE — TELEPHONE ENCOUNTER
NAMRATA Curran to c/b, reminded him of his appt tomorrow at 11am, and went over check in process in the message as well

## 2020-08-11 ENCOUNTER — TRANSCRIBE ORDERS (OUTPATIENT)
Dept: LAB | Facility: CLINIC | Age: 50
End: 2020-08-11

## 2020-08-11 ENCOUNTER — APPOINTMENT (OUTPATIENT)
Dept: LAB | Facility: CLINIC | Age: 50
End: 2020-08-11
Payer: COMMERCIAL

## 2020-08-11 DIAGNOSIS — B35.1 TINEA UNGUIUM: ICD-10-CM

## 2020-08-11 DIAGNOSIS — B35.1 TINEA UNGUIUM: Primary | ICD-10-CM

## 2020-08-11 LAB
ALBUMIN SERPL BCP-MCNC: 4 G/DL (ref 3.5–5)
ALP SERPL-CCNC: 71 U/L (ref 46–116)
ALT SERPL W P-5'-P-CCNC: 32 U/L (ref 12–78)
AST SERPL W P-5'-P-CCNC: 17 U/L (ref 5–45)
BILIRUB DIRECT SERPL-MCNC: 0.14 MG/DL (ref 0–0.2)
BILIRUB SERPL-MCNC: 0.53 MG/DL (ref 0.2–1)
PROT SERPL-MCNC: 7.7 G/DL (ref 6.4–8.2)

## 2020-08-11 PROCEDURE — 80076 HEPATIC FUNCTION PANEL: CPT

## 2020-08-11 PROCEDURE — 36415 COLL VENOUS BLD VENIPUNCTURE: CPT

## 2020-08-14 ENCOUNTER — TELEPHONE (OUTPATIENT)
Dept: SURGERY | Facility: CLINIC | Age: 50
End: 2020-08-14

## 2020-08-17 ENCOUNTER — OFFICE VISIT (OUTPATIENT)
Dept: SURGERY | Facility: CLINIC | Age: 50
End: 2020-08-17
Payer: COMMERCIAL

## 2020-08-17 ENCOUNTER — TELEPHONE (OUTPATIENT)
Dept: SURGERY | Facility: CLINIC | Age: 50
End: 2020-08-17

## 2020-08-17 VITALS
WEIGHT: 198.8 LBS | BODY MASS INDEX: 27.83 KG/M2 | HEIGHT: 71 IN | OXYGEN SATURATION: 97 % | SYSTOLIC BLOOD PRESSURE: 102 MMHG | HEART RATE: 71 BPM | TEMPERATURE: 97.2 F | DIASTOLIC BLOOD PRESSURE: 70 MMHG

## 2020-08-17 DIAGNOSIS — U07.1 COVID-19: ICD-10-CM

## 2020-08-17 DIAGNOSIS — K12.0 APHTHOUS ULCER: ICD-10-CM

## 2020-08-17 DIAGNOSIS — Z23 NEED FOR HEPATITIS A IMMUNIZATION: ICD-10-CM

## 2020-08-17 DIAGNOSIS — B20 HIV DISEASE (HCC): Primary | ICD-10-CM

## 2020-08-17 PROCEDURE — 99214 OFFICE O/P EST MOD 30 MIN: CPT | Performed by: NURSE PRACTITIONER

## 2020-08-17 PROCEDURE — 90471 IMMUNIZATION ADMIN: CPT

## 2020-08-17 PROCEDURE — 90632 HEPA VACCINE ADULT IM: CPT

## 2020-08-17 RX ORDER — TERBINAFINE HYDROCHLORIDE 250 MG/1
TABLET ORAL
COMMUNITY
End: 2020-09-22 | Stop reason: HOSPADM

## 2020-08-17 NOTE — ASSESSMENT & PLAN NOTE
No results found for: [id number]  CD4 ABS   Date/Time Value Ref Range Status   2020 10:48  359 - 1519 /uL Final     HIV-1 RNA by PCR, Qn   Date/Time Value Ref Range Status   2020 10:48 AM <20 copies/mL Final     Comment:     HIV-1 RNA detected  The reportable range for this assay is 20 to 10,000,000  copies HIV-1 RNA/mL  HIV-1 RNA Viral Load Log   Date/Time Value Ref Range Status   2020 10:48 AM COMMENT [id number]/mL Final     Comment:     Unable to calculate result since non-numeric result obtained for  component test          ART: Tom  Reports 100% adherence  Denies side effects  Stressed the importance of adherence  Continue follow up with ID clinic  Reviewed most recent labs, including Cd4 and viral load  Discussed the risks and benefits of treatment options, instructions for management, importance of treatment adherence, and reduction of risk factor  Educated on possible  medication side effects  Counseled on routes of HIV transmission, including the risk of  infection  Emphasized that viral suppression is the best method to prevent HIV transmission  At this time pt denies the need for HIV testing of anyone in their life  Total encounter time was 45 minutes  Greater then 20 minutes were spent on counseling and patient education  Pt voices understanding and agreement with treatment plan

## 2020-08-17 NOTE — ASSESSMENT & PLAN NOTE
Significantly improved  Patient reports he has had not had another outbreak since he stopped using NSAIDs

## 2020-08-17 NOTE — PROGRESS NOTES
Assessment/Plan:    Educated on the signs and symptoms of COVID-19  Instructed to call clinic if pt develops cough, fever, or shortness of breath  Educated to maintain CDC recommended social distancing practices, wash hands frequently, wear a mask, avoid touching face, and stay home if you become ill  HIV disease ([facility] )  No results found for: [id number]  CD4 ABS   Date/Time Value Ref Range Status   2020 10:48  359 - 1519 /uL Final     HIV-1 RNA by PCR, Qn   Date/Time Value Ref Range Status   2020 10:48 AM <20 copies/mL Final     Comment:     HIV-1 RNA detected  The reportable range for this assay is 20 to 10,000,000  copies HIV-1 RNA/mL  HIV-1 RNA Viral Load Log   Date/Time Value Ref Range Status   2020 10:48 AM COMMENT [id number]/mL Final     Comment:     Unable to calculate result since non-numeric result obtained for  component test          ART: Tom  Reports 100% adherence  Denies side effects  Stressed the importance of adherence  Continue follow up with ID clinic  Reviewed most recent labs, including Cd4 and viral load  Discussed the risks and benefits of treatment options, instructions for management, importance of treatment adherence, and reduction of risk factor  Educated on possible  medication side effects  Counseled on routes of HIV transmission, including the risk of  infection  Emphasized that viral suppression is the best method to prevent HIV transmission  At this time pt denies the need for HIV testing of anyone in their life  Total encounter time was 45 minutes  Greater then 20 minutes were spent on counseling and patient education  Pt voices understanding and agreement with treatment plan             Diagnoses and all orders for this visit:    HIV disease ([place] [address] )    Need for hepatitis A immunization  -     Hepatitis A Vaccine Adult IM    COVID-19  -     SARS-CoV2 Antibody, Total (IgG, IgA, IgM) [facility]; Future    Other orders  - terbinafine (LamISIL) 250 mg tablet; terbinafine HCl 250 mg tablet          Subjective:      Patient ID: Omer Mendez is a 54 y o  male  Andrea Cordova presents to the clinic today for primary care follow-up of chronic conditions  Past medical history significant for HIV, TB status post treatment, and umbilical hernia  Andrea Cordova is doing well and offers no acute complaints  Pt denies cough, shortness of breath,fever, nausea, diarrhea,anosmia,or ageusia  Denies exposure to sick contacts  The following portions of the patient's history were reviewed and updated as appropriate: allergies, current medications, past family history, past medical history, past social history, past surgical history and problem list     Review of Systems   Constitutional: Negative for activity change, appetite change, chills, diaphoresis, fatigue, fever and unexpected weight change  HENT: Negative for congestion, dental problem, ear pain, hearing loss, mouth sores, rhinorrhea and sore throat  Eyes: Negative for pain, redness and visual disturbance  Respiratory: Negative for shortness of breath and wheezing  Cardiovascular: Negative for chest pain and leg swelling  Gastrointestinal: Negative for abdominal pain, constipation, diarrhea, nausea and vomiting  Endocrine: Negative for polydipsia, polyphagia and polyuria  Genitourinary: Negative for difficulty urinating and dysuria  Musculoskeletal: Negative for back pain, joint swelling and myalgias  Skin: Negative for rash  Neurological: Negative for syncope and headaches  Psychiatric/Behavioral: Negative for behavioral problems and suicidal ideas           Objective:      /70   Pulse 71   Temp (!) 97 2 °F (36 2 °C)   Ht 5' 11" (1 803 m)   Wt 90 2 kg (198 lb 12 8 oz)   SpO2 97%   BMI 27 73 kg/m²       Lab Results   Component Value Date    K 4 6 06/25/2020     06/25/2020    CO2 28 06/25/2020    BUN 18 06/25/2020    CREATININE 1 07 06/25/2020    GLUF 83 01/29/2020    CALCIUM 9 4 06/25/2020    AST 17 08/11/2020    ALT 32 08/11/2020    ALKPHOS 71 08/11/2020    EGFR 82 06/25/2020     Lab Results   Component Value Date    WBC 5 00 06/05/2020    WBC 5 1 06/05/2020    HGB 14 4 06/05/2020    HGB 14 6 06/05/2020    HCT 42 5 06/05/2020    HCT 44 9 06/05/2020    MCV 93 06/05/2020    MCV 95 06/05/2020     06/05/2020     06/05/2020     Lab Results   Component Value Date    HEPCAB Non-reactive 11/19/2019     Lab Results   Component Value Date    HEPCAB Non-reactive 11/19/2019     No results found for: RPR         Physical Exam  Constitutional:       General: He is not in acute distress  Appearance: He is well-developed  HENT:      Head: Normocephalic  Right Ear: External ear normal       Left Ear: External ear normal       Nose: Nose normal       Mouth/Throat:      Pharynx: No oropharyngeal exudate  Eyes:      General:         Right eye: No discharge  Left eye: No discharge  Conjunctiva/sclera: Conjunctivae normal       Pupils: Pupils are equal, round, and reactive to light  Neck:      Musculoskeletal: Normal range of motion  Thyroid: No thyromegaly  Cardiovascular:      Rate and Rhythm: Normal rate and regular rhythm  Heart sounds: Normal heart sounds  No murmur  Pulmonary:      Effort: Pulmonary effort is normal       Breath sounds: Normal breath sounds  No wheezing  Abdominal:      General: Bowel sounds are normal       Palpations: Abdomen is soft  There is no mass  Tenderness: There is no abdominal tenderness  Musculoskeletal: Normal range of motion  General: No tenderness  Lymphadenopathy:      Cervical: No cervical adenopathy  Skin:     General: Skin is warm and dry  Findings: No rash  Neurological:      Mental Status: He is alert and oriented to person, place, and time     Psychiatric:         Behavior: Behavior normal

## 2020-08-21 ENCOUNTER — TELEPHONE (OUTPATIENT)
Dept: SURGERY | Facility: CLINIC | Age: 50
End: 2020-08-21

## 2020-08-24 ENCOUNTER — PATIENT OUTREACH (OUTPATIENT)
Dept: SURGERY | Facility: CLINIC | Age: 50
End: 2020-08-24

## 2020-08-24 ENCOUNTER — DOCUMENTATION (OUTPATIENT)
Dept: SURGERY | Facility: CLINIC | Age: 50
End: 2020-08-24

## 2020-08-27 ENCOUNTER — PATIENT OUTREACH (OUTPATIENT)
Dept: SURGERY | Facility: CLINIC | Age: 50
End: 2020-08-27

## 2020-08-31 ENCOUNTER — PATIENT OUTREACH (OUTPATIENT)
Dept: SURGERY | Facility: CLINIC | Age: 50
End: 2020-08-31

## 2020-08-31 ENCOUNTER — OFFICE VISIT (OUTPATIENT)
Dept: SURGERY | Facility: CLINIC | Age: 50
End: 2020-08-31
Payer: COMMERCIAL

## 2020-08-31 VITALS
DIASTOLIC BLOOD PRESSURE: 68 MMHG | OXYGEN SATURATION: 95 % | BODY MASS INDEX: 27.92 KG/M2 | SYSTOLIC BLOOD PRESSURE: 102 MMHG | TEMPERATURE: 98.1 F | WEIGHT: 199.4 LBS | HEART RATE: 67 BPM | HEIGHT: 71 IN

## 2020-08-31 DIAGNOSIS — L02.91 ABSCESS: Primary | ICD-10-CM

## 2020-08-31 DIAGNOSIS — L02.414 CUTANEOUS ABSCESS OF LEFT UPPER EXTREMITY: ICD-10-CM

## 2020-08-31 DIAGNOSIS — K12.0 APHTHOUS ULCER: ICD-10-CM

## 2020-08-31 DIAGNOSIS — B20 HIV DISEASE (HCC): ICD-10-CM

## 2020-08-31 PROBLEM — L02.419: Status: ACTIVE | Noted: 2020-08-31

## 2020-08-31 PROCEDURE — 99214 OFFICE O/P EST MOD 30 MIN: CPT | Performed by: NURSE PRACTITIONER

## 2020-08-31 RX ORDER — PREDNISONE 5 MG/1
TABLET ORAL
Qty: 15 EACH | Refills: 0 | Status: SHIPPED | OUTPATIENT
Start: 2020-08-31 | End: 2020-09-22 | Stop reason: HOSPADM

## 2020-08-31 RX ORDER — SULFAMETHOXAZOLE AND TRIMETHOPRIM 800; 160 MG/1; MG/1
1 TABLET ORAL EVERY 12 HOURS SCHEDULED
Qty: 14 TABLET | Refills: 0 | Status: SHIPPED | OUTPATIENT
Start: 2020-08-31 | End: 2020-09-07

## 2020-08-31 NOTE — PROGRESS NOTES
Assessment/Plan:    Cutaneous abscess of upper extremity  Subcutaneous abscess in the upper left leg  2cm  Surrounding erthymateous tissue  I&D performed  Small mount of cloudy serosangeous drainage  Wound cleaned and bandage applied  Tolerated well without side effects  Bactrim DS BID X 7 days  Educated to apply warm compress  Instructed on f/u precautions  HIV disease ([facility] )  No results found for: [id number]  CD4 ABS   Date/Time Value Ref Range Status   2020 10:48  359 - 1519 /uL Final     HIV-1 RNA by PCR, Qn   Date/Time Value Ref Range Status   2020 10:48 AM <20 copies/mL Final     Comment:     HIV-1 RNA detected  The reportable range for this assay is 20 to 10,000,000  copies HIV-1 RNA/mL  HIV-1 RNA Viral Load Log   Date/Time Value Ref Range Status   2020 10:48 AM COMMENT [id number]/mL Final     Comment:     Unable to calculate result since non-numeric result obtained for  component test          ART: Tom  Reports 100% adherence  Denies side effects  Stressed the importance of adherence  Continue follow up with ID clinic  Reviewed most recent labs, including Cd4 and viral load  Discussed the risks and benefits of treatment options, instructions for management, importance of treatment adherence, and reduction of risk factor  Educated on possible  medication side effects  Counseled on routes of HIV transmission, including the risk of  infection  Emphasized that viral suppression is the best method to prevent HIV transmission  At this time pt denies the need for HIV testing of anyone in their life  Total encounter time was 45 minutes  Greater then 20 minutes were spent on counseling and patient education  Pt voices understanding and agreement with treatment plan  Aphthous ulcer  Return of multiple, painful ulcers after dental work  Provided with prednisone taper          Diagnoses and all orders for this visit:    Abscess  - sulfamethoxazole-trimethoprim (BACTRIM DS) 800-160 mg per tablet; Take 1 tablet by mouth every 12 (twelve) hours for 7 days    Aphthous ulcer  -     predniSONE 5 MG (21) TBPK; Take 5 each by mouth daily for 11 days, THEN 4 each daily for 11 days, THEN 3 each daily for 11 days, THEN 2 each daily for 11 days, THEN 1 each daily for 11 days  HIV disease ([facility] )    Cutaneous abscess of left upper extremity          Subjective:      Patient ID: Surendra Jim is a 54 y o  male  Abscess  Patient presents for evaluation of a cutaneous abscess  Lesion is located in the L upper thigh  Onset was 3 days ago  Symptoms have gradually worsened  Abscess has associated symptoms of none  Patient does have previous history of cutaneous abscesses  Patient does not have diabetes  The following portions of the patient's history were reviewed and updated as appropriate: allergies, current medications, past family history, past medical history, past social history, past surgical history and problem list     Review of Systems   Constitutional: Negative for activity change, appetite change, chills, diaphoresis, fatigue, fever and unexpected weight change  HENT: Negative for congestion, dental problem, ear pain, hearing loss, mouth sores, rhinorrhea and sore throat  Eyes: Negative for pain, redness and visual disturbance  Respiratory: Negative for shortness of breath and wheezing  Cardiovascular: Negative for chest pain and leg swelling  Gastrointestinal: Negative for abdominal pain, constipation, diarrhea, nausea and vomiting  Endocrine: Negative for polydipsia, polyphagia and polyuria  Genitourinary: Negative for difficulty urinating and dysuria  Musculoskeletal: Negative for back pain, joint swelling and myalgias  Skin: Positive for wound  Negative for rash  Neurological: Negative for syncope and headaches  Psychiatric/Behavioral: Negative for behavioral problems and suicidal ideas  Objective:      /68   Pulse 67   Temp 98 1 °F (36 7 °C)   Ht 5' 11" (1 803 m)   Wt 90 4 kg (199 lb 6 4 oz)   SpO2 95%   BMI 27 81 kg/m²          Physical Exam  Constitutional:       General: He is not in acute distress  Appearance: He is well-developed  HENT:      Head: Normocephalic  Right Ear: External ear normal       Left Ear: External ear normal       Nose: Nose normal       Mouth/Throat:      Pharynx: No oropharyngeal exudate  Eyes:      General:         Right eye: No discharge  Left eye: No discharge  Conjunctiva/sclera: Conjunctivae normal       Pupils: Pupils are equal, round, and reactive to light  Neck:      Musculoskeletal: Normal range of motion  Thyroid: No thyromegaly  Cardiovascular:      Rate and Rhythm: Normal rate and regular rhythm  Heart sounds: Normal heart sounds  No murmur  Pulmonary:      Effort: Pulmonary effort is normal       Breath sounds: Normal breath sounds  No wheezing  Abdominal:      General: Bowel sounds are normal       Palpations: Abdomen is soft  There is no mass  Tenderness: There is no abdominal tenderness  Musculoskeletal: Normal range of motion  General: No tenderness  Lymphadenopathy:      Cervical: No cervical adenopathy  Skin:     General: Skin is warm and dry  Findings: Abscess present  No rash  Neurological:      Mental Status: He is alert and oriented to person, place, and time     Psychiatric:         Behavior: Behavior normal

## 2020-08-31 NOTE — PATIENT INSTRUCTIONS
MRSA (Methicillin-Resistant Staphylococcus Aureus)   AMBULATORY CARE:   MRSA (methicillin-resistant Staphylococcus aureus)  is a strain of staph bacteria that can cause infection  Usually, antibiotics are used to kill bacteria  MRSA bacteria are resistant to the common antibiotics used to treat Staph infections  This makes MRSA hard to treat  MRSA most commonly causes a skin or soft tissue infection  Bacteria may get into your skin or soft tissue through a cut, sore, or incision  MRSA may spread to your blood, lungs, heart, and bone  Signs and symptoms:   · Skin sores     · Bumps on your skin that are red and painful    · A cut or incision that is red, swollen, and filled with pus    · Fluid-filled blisters    · Fever  Seek care immediately if:   · You develop new symptoms such as a cough or fever during or after treatment for MRSA infection  · Your symptoms get worse  Contact your healthcare provider if:   · Your symptoms return after treatment  · You have questions and concerns about your condition or care  Treatment for MRSA  includes any of the following:  · Antibiotics  may help treat the bacterial infection  Take all of your antibiotics until they are finished  · Incision and drainage  of a wound, a sore or an abscess may be needed  A provider will open and drain infected fluid and pus  This helps remove bacteria from your wound so it can heal   Follow up with your healthcare provider within 2 days or as directed: You may be referred to an infectious disease specialist  Aury Platt may need an exam or more tests to make sure your infection is healing  Write down your questions so you remember to ask them during your visits  Prevent the spread of MRSA:  Do the following if you have an active MRSA infection:  · Wash your hands often  Ask others in your home to wash their hands often  This is the most important thing you can do to prevent the spread of infection   Wash your hands several times each day, especially before and after you change your bandage  If someone else changes your bandage, they should wash their hands after  Carry germ-killing gel with you and use it to clean your hands when you have no soap and water  · Do not touch sores  Do not poke or squeeze sores  This can make the infection go deeper into your tissue  · Cover infected sores with a bandage  Make sure the sore is completely covered during activities that may cause skin to skin contact with another person  Examples include sports such as wrestling or football  Put an extra bandage on a sore that is draining fluid  This helps keep infected drainage off surfaces that others can touch  · Do not share personal items with others  This includes washcloths, towels, uniforms, clothes, and razors  · Do not use public pools, hot tubs, or therapy pools until your infection has healed  Your infected sore can spread the infection to another person through the water  · Tell all healthcare providers that you have a MRSA infection  If you are admitted to the hospital, you may be placed in a private room  Healthcare providers will wear gowns and gloves when they come into your room  They will also wash their hands often and clean your room well  Your visitors may also be asked to wear a gown and gloves  All of these practices help prevent the spread of MRSA to healthcare providers and other patients  MRSA and your home:  MRSA can stay on surfaces for weeks  It is important to keep others safe by doing the following:  · Clean surfaces daily with a disinfectant  Follow directions on the label for how to apply the disinfectant  Items that you use often should be cleaned daily  Examples include kitchen or bathroom counters, phones, doorknobs, and remote controls  Clean the shower or bathtub after each use  · Wash dishes and silverware in a  or in hot water    Do not share unwashed dishes or silverware with anyone  · Wash used sheets, towels, and clothes with water and laundry detergent  Put dirty laundry in the washer immediately  Put it in a plastic bag if you are not able to wash it immediately  You do no need to wash this laundry separately from other laundry  Use the warmest water possible for the type of clothing  Wash your hands after you touch dirty laundry and before you handle clean laundry  Dry laundry completely in a warm or hot dryer  © 2017 [facility] Information is for End User's use only and may not be sold, redistributed or otherwise used for commercial purposes  All illustrations and images included in CareNotes® are the copyrighted property of [employer] , [employer]  or Adrian Duran  The above information is an  only  It is not intended as medical advice for individual conditions or treatments  Talk to your doctor, nurse or pharmacist before following any medical regimen to see if it is safe and effective for you

## 2020-09-01 ENCOUNTER — PATIENT OUTREACH (OUTPATIENT)
Dept: SURGERY | Facility: CLINIC | Age: 50
End: 2020-09-01

## 2020-09-02 NOTE — PROGRESS NOTES
Cm received vm from Ct regarding his cm  Cm updated Cm [facility] Ct needs to be reached out too

## 2020-09-09 ENCOUNTER — APPOINTMENT (OUTPATIENT)
Dept: LAB | Facility: CLINIC | Age: 50
End: 2020-09-09
Payer: COMMERCIAL

## 2020-09-09 DIAGNOSIS — Z13.220 ENCOUNTER FOR LIPID SCREENING FOR CARDIOVASCULAR DISEASE: ICD-10-CM

## 2020-09-09 DIAGNOSIS — B20 HIV DISEASE (HCC): ICD-10-CM

## 2020-09-09 DIAGNOSIS — U07.1 COVID-19: ICD-10-CM

## 2020-09-09 DIAGNOSIS — Z13.6 ENCOUNTER FOR LIPID SCREENING FOR CARDIOVASCULAR DISEASE: ICD-10-CM

## 2020-09-09 LAB
ALBUMIN SERPL BCP-MCNC: 3.7 G/DL (ref 3.5–5)
ALP SERPL-CCNC: 75 U/L (ref 46–116)
ALT SERPL W P-5'-P-CCNC: 30 U/L (ref 12–78)
ANION GAP SERPL CALCULATED.3IONS-SCNC: 7 MMOL/L (ref 4–13)
AST SERPL W P-5'-P-CCNC: 18 U/L (ref 5–45)
BASOPHILS # BLD AUTO: 0.04 THOUSANDS/ΜL (ref 0–0.1)
BASOPHILS NFR BLD AUTO: 1 % (ref 0–1)
BILIRUB SERPL-MCNC: 0.48 MG/DL (ref 0.2–1)
BUN SERPL-MCNC: 13 MG/DL (ref 5–25)
CALCIUM SERPL-MCNC: 9.3 MG/DL (ref 8.3–10.1)
CHLORIDE SERPL-SCNC: 105 MMOL/L (ref 100–108)
CHOLEST SERPL-MCNC: 168 MG/DL (ref 50–200)
CO2 SERPL-SCNC: 27 MMOL/L (ref 21–32)
CREAT SERPL-MCNC: 1.15 MG/DL (ref 0.6–1.3)
EOSINOPHIL # BLD AUTO: 0.09 THOUSAND/ΜL (ref 0–0.61)
EOSINOPHIL NFR BLD AUTO: 2 % (ref 0–6)
ERYTHROCYTE [DISTWIDTH] IN BLOOD BY AUTOMATED COUNT: 12.8 % (ref 11.6–15.1)
GFR SERPL CREATININE-BSD FRML MDRD: 75 ML/MIN/1.73SQ M
GLUCOSE P FAST SERPL-MCNC: 96 MG/DL (ref 65–99)
HCT VFR BLD AUTO: 44.3 % (ref 36.5–49.3)
HDLC SERPL-MCNC: 39 MG/DL
HGB BLD-MCNC: 14.6 G/DL (ref 12–17)
IMM GRANULOCYTES # BLD AUTO: 0.01 THOUSAND/UL (ref 0–0.2)
IMM GRANULOCYTES NFR BLD AUTO: 0 % (ref 0–2)
LDLC SERPL CALC-MCNC: 111 MG/DL (ref 0–100)
LYMPHOCYTES # BLD AUTO: 1.62 THOUSANDS/ΜL (ref 0.6–4.47)
LYMPHOCYTES NFR BLD AUTO: 38 % (ref 14–44)
MCH RBC QN AUTO: 31.5 PG (ref 26.8–34.3)
MCHC RBC AUTO-ENTMCNC: 33 G/DL (ref 31.4–37.4)
MCV RBC AUTO: 96 FL (ref 82–98)
MONOCYTES # BLD AUTO: 0.43 THOUSAND/ΜL (ref 0.17–1.22)
MONOCYTES NFR BLD AUTO: 10 % (ref 4–12)
NEUTROPHILS # BLD AUTO: 2.05 THOUSANDS/ΜL (ref 1.85–7.62)
NEUTS SEG NFR BLD AUTO: 49 % (ref 43–75)
NRBC BLD AUTO-RTO: 0 /100 WBCS
PLATELET # BLD AUTO: 248 THOUSANDS/UL (ref 149–390)
PMV BLD AUTO: 10 FL (ref 8.9–12.7)
POTASSIUM SERPL-SCNC: 4.7 MMOL/L (ref 3.5–5.3)
PROT SERPL-MCNC: 7.7 G/DL (ref 6.4–8.2)
RBC # BLD AUTO: 4.64 MILLION/UL (ref 3.88–5.62)
RPR SER QL: REACTIVE
RPR SER-TITR: ABNORMAL {TITER}
SARS-COV-2 IGG+IGM SERPL QL IA: NORMAL
SODIUM SERPL-SCNC: 139 MMOL/L (ref 136–145)
TRIGL SERPL-MCNC: 91 MG/DL
WBC # BLD AUTO: 4.24 THOUSAND/UL (ref 4.31–10.16)

## 2020-09-09 PROCEDURE — 80053 COMPREHEN METABOLIC PANEL: CPT

## 2020-09-09 PROCEDURE — 80061 LIPID PANEL: CPT

## 2020-09-09 PROCEDURE — 85025 COMPLETE CBC W/AUTO DIFF WBC: CPT

## 2020-09-09 PROCEDURE — 86780 TREPONEMA PALLIDUM: CPT

## 2020-09-09 PROCEDURE — 86593 SYPHILIS TEST NON-TREP QUANT: CPT

## 2020-09-09 PROCEDURE — 86360 T CELL ABSOLUTE COUNT/RATIO: CPT

## 2020-09-09 PROCEDURE — 87536 HIV-1 QUANT&REVRSE TRNSCRPJ: CPT

## 2020-09-09 PROCEDURE — 86769 SARS-COV-2 COVID-19 ANTIBODY: CPT

## 2020-09-09 PROCEDURE — 86592 SYPHILIS TEST NON-TREP QUAL: CPT

## 2020-09-09 PROCEDURE — 36415 COLL VENOUS BLD VENIPUNCTURE: CPT

## 2020-09-10 LAB
BASOPHILS # BLD AUTO: 0 X10E3/UL (ref 0–0.2)
BASOPHILS NFR BLD AUTO: 1 %
CD3+CD4+ CELLS # BLD: 434 /UL (ref 359–1519)
CD3+CD4+ CELLS NFR BLD: 24.1 % (ref 30.8–58.5)
CD3+CD4+ CELLS/CD3+CD8+ CLL BLD: 0.67 % (ref 0.92–3.72)
CD3+CD8+ CELLS # BLD: 648 /UL (ref 109–897)
CD3+CD8+ CELLS NFR BLD: 36 % (ref 12–35.5)
EOSINOPHIL # BLD AUTO: 0.1 X10E3/UL (ref 0–0.4)
EOSINOPHIL NFR BLD AUTO: 2 %
ERYTHROCYTE [DISTWIDTH] IN BLOOD BY AUTOMATED COUNT: 12.6 % (ref 11.6–15.4)
HCT VFR BLD AUTO: 42.9 % (ref 37.5–51)
HGB BLD-MCNC: 14.1 G/DL (ref 13–17.7)
IMM GRANULOCYTES # BLD: 0 X10E3/UL (ref 0–0.1)
IMM GRANULOCYTES NFR BLD: 0 %
LYMPHOCYTES # BLD AUTO: 1.8 X10E3/UL (ref 0.7–3.1)
LYMPHOCYTES NFR BLD AUTO: 39 %
MCH RBC QN AUTO: 30.9 PG (ref 26.6–33)
MCHC RBC AUTO-ENTMCNC: 32.9 G/DL (ref 31.5–35.7)
MCV RBC AUTO: 94 FL (ref 79–97)
MONOCYTES # BLD AUTO: 0.5 X10E3/UL (ref 0.1–0.9)
MONOCYTES NFR BLD AUTO: 10 %
NEUTROPHILS # BLD AUTO: 2.2 X10E3/UL (ref 1.4–7)
NEUTROPHILS NFR BLD AUTO: 48 %
PLATELET # BLD AUTO: 258 X10E3/UL (ref 150–450)
RBC # BLD AUTO: 4.57 X10E6/UL (ref 4.14–5.8)
T PALLIDUM AB SER QL IF: REACTIVE
WBC # BLD AUTO: 4.6 X10E3/UL (ref 3.4–10.8)

## 2020-09-11 LAB
HIV1 RNA # SERPL NAA+PROBE: 20 COPIES/ML
HIV1 RNA SERPL NAA+PROBE-LOG#: 1.3 LOG10COPY/ML

## 2020-09-22 ENCOUNTER — OFFICE VISIT (OUTPATIENT)
Dept: SURGERY | Facility: CLINIC | Age: 50
End: 2020-09-22
Payer: COMMERCIAL

## 2020-09-22 VITALS
TEMPERATURE: 97.6 F | WEIGHT: 201 LBS | BODY MASS INDEX: 28.14 KG/M2 | OXYGEN SATURATION: 98 % | HEIGHT: 71 IN | SYSTOLIC BLOOD PRESSURE: 122 MMHG | DIASTOLIC BLOOD PRESSURE: 74 MMHG | HEART RATE: 64 BPM

## 2020-09-22 DIAGNOSIS — Z72.89 OTHER PROBLEMS RELATED TO LIFESTYLE: ICD-10-CM

## 2020-09-22 DIAGNOSIS — Z11.3 ENCOUNTER FOR SCREENING FOR BACTERIAL SEXUALLY TRANSMITTED DISEASE: ICD-10-CM

## 2020-09-22 DIAGNOSIS — Z23 NEED FOR INFLUENZA VACCINATION: ICD-10-CM

## 2020-09-22 DIAGNOSIS — Z20.2 CONTACT WITH AND (SUSPECTED) EXPOSURE TO INFECTIONS WITH A PREDOMINANTLY SEXUAL MODE OF TRANSMISSION: ICD-10-CM

## 2020-09-22 DIAGNOSIS — B20 HIV DISEASE (HCC): Primary | ICD-10-CM

## 2020-09-22 DIAGNOSIS — K12.0 APHTHOUS ULCER: ICD-10-CM

## 2020-09-22 DIAGNOSIS — A53.9 SYPHILIS: ICD-10-CM

## 2020-09-22 DIAGNOSIS — D72.89 OTHER SPECIFIED DISORDERS OF WHITE BLOOD CELLS: ICD-10-CM

## 2020-09-22 PROCEDURE — 90682 RIV4 VACC RECOMBINANT DNA IM: CPT

## 2020-09-22 PROCEDURE — 99214 OFFICE O/P EST MOD 30 MIN: CPT | Performed by: INTERNAL MEDICINE

## 2020-09-22 PROCEDURE — 90471 IMMUNIZATION ADMIN: CPT

## 2020-09-22 NOTE — PROGRESS NOTES
Progress Note - Infectious Disease   Сергей Fermin 54 y o  male MRN: [id number]  Unit/Bed#:  Encounter: [id number]      Impression/Plan:  1   HIV-doing well on Biktarvy with an undetectable viral load and a CD4 count in the 400s  He previously had some blood soon his viral load felt secondary to supplements   Recheck labs in 2 months and follow up in 3 months  Stressed adherence      2   Syphilis-has been serofast  The RPR titer remains quite low without any significant increase  Continue annual follow-up of RPR     3   Weight gain-his weight is now stabilized after losing some weight    He will continue his aggressive dietary and exercise regimen      4  Severe refractory aphthous ulcerations-no recurrence since stopping nonsteroidals  Will monitor for now      Patient was provided medication, adherence and prevention education    Subjective:  Routine follow-up for HIV  Patient claims 100% adherence with Biktarvy    Patient denies any notable side effects  Overall the feeling well  The patient denies any fever chills or sweats, denies any nausea vomiting or diarrhea, denies any cough or shortness of breath  ROS:  A complete review of systems is negative other than that noted above in the subjective    Followup portions patient history reviewed and updated as: Allergies, current medications, past medical history, past social history, past surgical history, and the problem list    Objective:  Vitals:  Vitals:    09/22/20 1613   BP: 122/74   Pulse: 64   Temp: 97 6 °F (36 4 °C)   SpO2: 98%   Weight: 91 2 kg (201 lb)   Height: 5' 11" (1 803 m)       Physical Exam:   General Appearance:  Alert, interactive, appearing well,  nontoxic, no acute distress  Neck:   Supple without lymphadenopathy, no thyromegaly or masses   Throat: Oropharynx moist without lesions      Lungs:   Clear to auscultation bilaterally; no wheezes, rhonchi or rales; respirations unlabored   Heart:  RRR; no murmur, rub or gallop   Abdomen: Soft, non-tender, non-distended, positive bowel sounds  Extremities: No clubbing, cyanosis or edema   Skin: No new rashes or lesions  No draining wounds noted  Labs, Imaging, & Other studies:   All pertinent labs and imaging studies were personally reviewed    Lab Results   Component Value Date    K 4 7 09/09/2020     09/09/2020    CO2 27 09/09/2020    BUN 13 09/09/2020    CREATININE 1 15 09/09/2020    GLUF 96 09/09/2020    CALCIUM 9 3 09/09/2020    AST 18 09/09/2020    ALT 30 09/09/2020    ALKPHOS 75 09/09/2020    EGFR 75 09/09/2020     Lab Results   Component Value Date    WBC 4 24 (L) 09/09/2020    WBC 4 6 09/09/2020    HGB 14 6 09/09/2020    HGB 14 1 09/09/2020    HCT 44 3 09/09/2020    HCT 42 9 09/09/2020    MCV 96 09/09/2020    MCV 94 09/09/2020     09/09/2020     09/09/2020     Lab Results   Component Value Date    HEPCAB Non-reactive 11/19/2019     Lab Results   Component Value Date    HEPCAB Non-reactive 11/19/2019     Lab Results   Component Value Date    RPR Reactive (A) 09/09/2020    RPR Reactive 2 dils (A) 09/09/2020     CD4 ABS   Date/Time Value Ref Range Status   09/09/2020 07:11  359 - 1519 /uL Final     HIV-1 RNA by PCR, Qn   Date/Time Value Ref Range Status   09/09/2020 07:11 AM 20 copies/mL Final     Comment:     The reportable range for this assay is 20 to 10,000,000  copies HIV-1 RNA/mL             Current Outpatient Medications:     BIKTARVY -25 MG tablet, TAKE 1 TABLET BY MOUTH DAILY, Disp: 30 tablet, Rfl: 5

## 2020-10-02 ENCOUNTER — PATIENT OUTREACH (OUTPATIENT)
Dept: SURGERY | Facility: CLINIC | Age: 50
End: 2020-10-02

## 2020-10-23 DIAGNOSIS — B20 HIV (HUMAN IMMUNODEFICIENCY VIRUS INFECTION) (HCC): ICD-10-CM

## 2020-10-25 RX ORDER — BICTEGRAVIR SODIUM, EMTRICITABINE, AND TENOFOVIR ALAFENAMIDE FUMARATE 50; 200; 25 MG/1; MG/1; MG/1
1 TABLET ORAL DAILY
Qty: 30 TABLET | Refills: 5 | Status: SHIPPED | OUTPATIENT
Start: 2020-10-25 | End: 2021-03-15 | Stop reason: SDUPTHER

## 2020-10-26 ENCOUNTER — PATIENT OUTREACH (OUTPATIENT)
Dept: SURGERY | Facility: CLINIC | Age: 50
End: 2020-10-26

## 2020-11-06 ENCOUNTER — PATIENT OUTREACH (OUTPATIENT)
Dept: SURGERY | Facility: CLINIC | Age: 50
End: 2020-11-06

## 2020-11-09 ENCOUNTER — PATIENT OUTREACH (OUTPATIENT)
Dept: SURGERY | Facility: CLINIC | Age: 50
End: 2020-11-09

## 2020-11-12 ENCOUNTER — PATIENT OUTREACH (OUTPATIENT)
Dept: SURGERY | Facility: CLINIC | Age: 50
End: 2020-11-12

## 2020-12-02 ENCOUNTER — PATIENT OUTREACH (OUTPATIENT)
Dept: SURGERY | Facility: CLINIC | Age: 50
End: 2020-12-02

## 2020-12-03 ENCOUNTER — LAB (OUTPATIENT)
Dept: LAB | Facility: CLINIC | Age: 50
End: 2020-12-03
Payer: COMMERCIAL

## 2020-12-03 DIAGNOSIS — Z72.89 OTHER PROBLEMS RELATED TO LIFESTYLE: ICD-10-CM

## 2020-12-03 DIAGNOSIS — B20 HIV DISEASE (HCC): ICD-10-CM

## 2020-12-03 DIAGNOSIS — Z11.3 ENCOUNTER FOR SCREENING FOR BACTERIAL SEXUALLY TRANSMITTED DISEASE: ICD-10-CM

## 2020-12-03 DIAGNOSIS — D72.89 OTHER SPECIFIED DISORDERS OF WHITE BLOOD CELLS: ICD-10-CM

## 2020-12-03 DIAGNOSIS — Z20.2 CONTACT WITH AND (SUSPECTED) EXPOSURE TO INFECTIONS WITH A PREDOMINANTLY SEXUAL MODE OF TRANSMISSION: ICD-10-CM

## 2020-12-03 LAB
ALBUMIN SERPL BCP-MCNC: 3.8 G/DL (ref 3.5–5)
ALP SERPL-CCNC: 65 U/L (ref 46–116)
ALT SERPL W P-5'-P-CCNC: 75 U/L (ref 12–78)
ANION GAP SERPL CALCULATED.3IONS-SCNC: 5 MMOL/L (ref 4–13)
AST SERPL W P-5'-P-CCNC: 35 U/L (ref 5–45)
BASOPHILS # BLD AUTO: 0.04 THOUSANDS/ΜL (ref 0–0.1)
BASOPHILS NFR BLD AUTO: 1 % (ref 0–1)
BILIRUB SERPL-MCNC: 0.57 MG/DL (ref 0.2–1)
BILIRUB UR QL STRIP: NEGATIVE
BUN SERPL-MCNC: 9 MG/DL (ref 5–25)
CALCIUM SERPL-MCNC: 9.7 MG/DL (ref 8.3–10.1)
CHLORIDE SERPL-SCNC: 102 MMOL/L (ref 100–108)
CLARITY UR: CLEAR
CO2 SERPL-SCNC: 30 MMOL/L (ref 21–32)
COLOR UR: YELLOW
CREAT SERPL-MCNC: 1.09 MG/DL (ref 0.6–1.3)
EOSINOPHIL # BLD AUTO: 0.09 THOUSAND/ΜL (ref 0–0.61)
EOSINOPHIL NFR BLD AUTO: 2 % (ref 0–6)
ERYTHROCYTE [DISTWIDTH] IN BLOOD BY AUTOMATED COUNT: 12.5 % (ref 11.6–15.1)
GFR SERPL CREATININE-BSD FRML MDRD: 80 ML/MIN/1.73SQ M
GLUCOSE P FAST SERPL-MCNC: 101 MG/DL (ref 65–99)
GLUCOSE UR STRIP-MCNC: NEGATIVE MG/DL
HCT VFR BLD AUTO: 45.4 % (ref 36.5–49.3)
HCV AB SER QL: NORMAL
HGB BLD-MCNC: 14.7 G/DL (ref 12–17)
HGB UR QL STRIP.AUTO: NEGATIVE
IMM GRANULOCYTES # BLD AUTO: 0.01 THOUSAND/UL (ref 0–0.2)
IMM GRANULOCYTES NFR BLD AUTO: 0 % (ref 0–2)
KETONES UR STRIP-MCNC: NEGATIVE MG/DL
LEUKOCYTE ESTERASE UR QL STRIP: NEGATIVE
LYMPHOCYTES # BLD AUTO: 1.76 THOUSANDS/ΜL (ref 0.6–4.47)
LYMPHOCYTES NFR BLD AUTO: 39 % (ref 14–44)
MCH RBC QN AUTO: 30.8 PG (ref 26.8–34.3)
MCHC RBC AUTO-ENTMCNC: 32.4 G/DL (ref 31.4–37.4)
MCV RBC AUTO: 95 FL (ref 82–98)
MONOCYTES # BLD AUTO: 0.46 THOUSAND/ΜL (ref 0.17–1.22)
MONOCYTES NFR BLD AUTO: 10 % (ref 4–12)
NEUTROPHILS # BLD AUTO: 2.15 THOUSANDS/ΜL (ref 1.85–7.62)
NEUTS SEG NFR BLD AUTO: 48 % (ref 43–75)
NITRITE UR QL STRIP: NEGATIVE
NRBC BLD AUTO-RTO: 0 /100 WBCS
PH UR STRIP.AUTO: 8 [PH]
PLATELET # BLD AUTO: 222 THOUSANDS/UL (ref 149–390)
PMV BLD AUTO: 9.9 FL (ref 8.9–12.7)
POTASSIUM SERPL-SCNC: 4.8 MMOL/L (ref 3.5–5.3)
PROT SERPL-MCNC: 7.6 G/DL (ref 6.4–8.2)
PROT UR STRIP-MCNC: NEGATIVE MG/DL
RBC # BLD AUTO: 4.78 MILLION/UL (ref 3.88–5.62)
SODIUM SERPL-SCNC: 137 MMOL/L (ref 136–145)
SP GR UR STRIP.AUTO: 1.01 (ref 1–1.03)
UROBILINOGEN UR QL STRIP.AUTO: 0.2 E.U./DL
WBC # BLD AUTO: 4.51 THOUSAND/UL (ref 4.31–10.16)

## 2020-12-03 PROCEDURE — 80053 COMPREHEN METABOLIC PANEL: CPT

## 2020-12-03 PROCEDURE — 87491 CHLMYD TRACH DNA AMP PROBE: CPT

## 2020-12-03 PROCEDURE — 86803 HEPATITIS C AB TEST: CPT

## 2020-12-03 PROCEDURE — 87591 N.GONORRHOEAE DNA AMP PROB: CPT

## 2020-12-03 PROCEDURE — 81003 URINALYSIS AUTO W/O SCOPE: CPT

## 2020-12-03 PROCEDURE — 85025 COMPLETE CBC W/AUTO DIFF WBC: CPT

## 2020-12-03 PROCEDURE — 36415 COLL VENOUS BLD VENIPUNCTURE: CPT

## 2020-12-03 PROCEDURE — 87536 HIV-1 QUANT&REVRSE TRNSCRPJ: CPT

## 2020-12-03 PROCEDURE — 86360 T CELL ABSOLUTE COUNT/RATIO: CPT

## 2020-12-04 LAB
BASOPHILS # BLD AUTO: 0 X10E3/UL (ref 0–0.2)
BASOPHILS NFR BLD AUTO: 1 %
C TRACH DNA SPEC QL NAA+PROBE: NEGATIVE
CD3+CD4+ CELLS # BLD: 405 /UL (ref 359–1519)
CD3+CD4+ CELLS NFR BLD: 22.5 % (ref 30.8–58.5)
CD3+CD4+ CELLS/CD3+CD8+ CLL BLD: 0.6 % (ref 0.92–3.72)
CD3+CD8+ CELLS # BLD: 677 /UL (ref 109–897)
CD3+CD8+ CELLS NFR BLD: 37.6 % (ref 12–35.5)
EOSINOPHIL # BLD AUTO: 0.1 X10E3/UL (ref 0–0.4)
EOSINOPHIL NFR BLD AUTO: 2 %
ERYTHROCYTE [DISTWIDTH] IN BLOOD BY AUTOMATED COUNT: 12.6 % (ref 11.6–15.4)
HCT VFR BLD AUTO: 43.6 % (ref 37.5–51)
HGB BLD-MCNC: 14.6 G/DL (ref 13–17.7)
IMM GRANULOCYTES # BLD: 0 X10E3/UL (ref 0–0.1)
IMM GRANULOCYTES NFR BLD: 0 %
LYMPHOCYTES # BLD AUTO: 1.8 X10E3/UL (ref 0.7–3.1)
LYMPHOCYTES NFR BLD AUTO: 39 %
MCH RBC QN AUTO: 31.9 PG (ref 26.6–33)
MCHC RBC AUTO-ENTMCNC: 33.5 G/DL (ref 31.5–35.7)
MCV RBC AUTO: 95 FL (ref 79–97)
MONOCYTES # BLD AUTO: 0.5 X10E3/UL (ref 0.1–0.9)
MONOCYTES NFR BLD AUTO: 10 %
N GONORRHOEA DNA SPEC QL NAA+PROBE: NEGATIVE
NEUTROPHILS # BLD AUTO: 2.2 X10E3/UL (ref 1.4–7)
NEUTROPHILS NFR BLD AUTO: 48 %
PLATELET # BLD AUTO: 242 X10E3/UL (ref 150–450)
RBC # BLD AUTO: 4.58 X10E6/UL (ref 4.14–5.8)
WBC # BLD AUTO: 4.6 X10E3/UL (ref 3.4–10.8)

## 2020-12-05 LAB
HIV1 RNA # SERPL NAA+PROBE: <20 COPIES/ML
HIV1 RNA SERPL NAA+PROBE-LOG#: NORMAL LOG10COPY/ML

## 2020-12-21 ENCOUNTER — TELEPHONE (OUTPATIENT)
Dept: SURGERY | Facility: CLINIC | Age: 50
End: 2020-12-21

## 2020-12-22 ENCOUNTER — OFFICE VISIT (OUTPATIENT)
Dept: SURGERY | Facility: CLINIC | Age: 50
End: 2020-12-22
Payer: COMMERCIAL

## 2020-12-22 VITALS
TEMPERATURE: 97.4 F | OXYGEN SATURATION: 99 % | DIASTOLIC BLOOD PRESSURE: 82 MMHG | BODY MASS INDEX: 28.31 KG/M2 | WEIGHT: 202.2 LBS | HEIGHT: 71 IN | HEART RATE: 65 BPM | SYSTOLIC BLOOD PRESSURE: 110 MMHG

## 2020-12-22 DIAGNOSIS — Z13.1 SCREENING FOR DIABETES MELLITUS: ICD-10-CM

## 2020-12-22 DIAGNOSIS — A53.9 SYPHILIS: ICD-10-CM

## 2020-12-22 DIAGNOSIS — B20 HIV DISEASE (HCC): Primary | ICD-10-CM

## 2020-12-22 DIAGNOSIS — Z79.899 OTHER LONG TERM (CURRENT) DRUG THERAPY: ICD-10-CM

## 2020-12-22 DIAGNOSIS — K12.0 APHTHOUS ULCER: ICD-10-CM

## 2020-12-22 DIAGNOSIS — Z23 NEED FOR TDAP VACCINATION: ICD-10-CM

## 2020-12-22 PROCEDURE — 90471 IMMUNIZATION ADMIN: CPT

## 2020-12-22 PROCEDURE — 90715 TDAP VACCINE 7 YRS/> IM: CPT

## 2020-12-22 PROCEDURE — 99214 OFFICE O/P EST MOD 30 MIN: CPT | Performed by: INTERNAL MEDICINE

## 2020-12-22 RX ORDER — PREDNISONE 1 MG/1
TABLET ORAL
COMMUNITY
End: 2021-03-10

## 2020-12-29 ENCOUNTER — PATIENT OUTREACH (OUTPATIENT)
Dept: SURGERY | Facility: CLINIC | Age: 50
End: 2020-12-29

## 2021-01-14 ENCOUNTER — PATIENT OUTREACH (OUTPATIENT)
Dept: SURGERY | Facility: CLINIC | Age: 51
End: 2021-01-14

## 2021-02-12 ENCOUNTER — PATIENT OUTREACH (OUTPATIENT)
Dept: SURGERY | Facility: CLINIC | Age: 51
End: 2021-02-12

## 2021-02-12 ENCOUNTER — TELEPHONE (OUTPATIENT)
Dept: SURGERY | Facility: CLINIC | Age: 51
End: 2021-02-12

## 2021-02-15 ENCOUNTER — OFFICE VISIT (OUTPATIENT)
Dept: SURGERY | Facility: CLINIC | Age: 51
End: 2021-02-15
Payer: COMMERCIAL

## 2021-02-15 VITALS
HEIGHT: 71 IN | WEIGHT: 207 LBS | BODY MASS INDEX: 28.98 KG/M2 | DIASTOLIC BLOOD PRESSURE: 72 MMHG | OXYGEN SATURATION: 97 % | HEART RATE: 73 BPM | TEMPERATURE: 98 F | SYSTOLIC BLOOD PRESSURE: 104 MMHG

## 2021-02-15 DIAGNOSIS — H83.3X3 NOISE-INDUCED HEARING LOSS OF BOTH EARS: ICD-10-CM

## 2021-02-15 DIAGNOSIS — B20 HIV DISEASE (HCC): Primary | ICD-10-CM

## 2021-02-15 DIAGNOSIS — F50.81 BINGE EATING DISORDER: ICD-10-CM

## 2021-02-15 DIAGNOSIS — Z00.00 ANNUAL PHYSICAL EXAM: ICD-10-CM

## 2021-02-15 DIAGNOSIS — S03.00XA DISLOCATION OF TEMPOROMANDIBULAR JOINT, INITIAL ENCOUNTER: ICD-10-CM

## 2021-02-15 PROCEDURE — 99396 PREV VISIT EST AGE 40-64: CPT | Performed by: NURSE PRACTITIONER

## 2021-02-15 NOTE — PATIENT INSTRUCTIONS
COVID-19 and Chronic Health Conditions   WHAT YOU NEED TO KNOW:   Your chronic condition may increase your risk for COVID-19 or serious problems it can cause, such as pneumonia  Healthcare providers might need to make changes that affect how you usually manage your chronic health condition  Providers may change hours of operation or not have patients come in to be seen  You may not be able to make appointments to get blood drawn or to have tests or procedures  This may continue until the virus that causes COVID-19 is controlled  Until then, you can take steps to manage your condition  The steps will also lower your risk for COVID-19 or the serious problems it causes  DISCHARGE INSTRUCTIONS:   If you think you may be infected with the new coronavirus,  do the following to protect others:  · If emergency care is needed,  tell the  about the possible infection, or call ahead and tell the emergency department  · Call a healthcare provider  for instructions if symptoms are mild  Do not  arrive without calling first  Your provider will need to protect staff members and other patients  · Cover your mouth and nose  while you are getting medical care  This will help lower the risk of infecting others  Call your local emergency number [phone] in the [address] [place] [address],[address]) or emergency department if:   · You have trouble breathing or shortness of breath  · You have chest pain or pressure that lasts longer than 5 minutes  · You become confused or hard to wake  · Your lips or face are blue  · You have a fever of 104°F (40°C) or higher  Call your doctor or healthcare provider if:   · You do not  have symptoms of COVID-19 but had close physical contact within 14 days with someone who tested positive  · You have questions or concerns about your COVID-19 or your chronic condition      The following may increase your risk for serious effects of COVID-19:   · Age 72 years or older    · Obesity, diabetes, cancer, or a liver disease    · Kidney failure, chronic kidney disease, or sickle cell disease    · Lung disease, COPD, moderate-to-severe asthma, cystic fibrosis, or pulmonary fibrosis (scarring in your lungs)    · A severe heart disease, such as coronary artery disease or heart failure    · A brain blood vessel disorder or disease, or a condition such as dementia    · Living in a nursing home or long-term care facility    · Smoking cigarettes    · Hypertension (high blood pressure) or thalassemia    · An immune system problem, HIV or AIDS, or a blood or bone marrow transplant    · Long-term use of certain medicines, such as corticosteroids    · Pregnancy    Manage your chronic health condition during this time:  If you do not have a regular healthcare provider, experts recommend you contact a local [facility] health center or health department  The following can help you manage your condition and prevent COVID-19:  · Get emergency care for your condition if needed  Talk to your healthcare providers about symptoms of your chronic condition that need immediate care  Your providers can help you create a plan or add exacerbation management to your plan  The plan will include when to go to an emergency department and when to call your local emergency number  This will depend on where you live and the services that are available during this time  · Go to dialysis appointments as scheduled  It is important to stay on schedule  You will need to have enough food to be able to follow the emergency diet plan if you must miss a session  The emergency diet needs to be part of the management plan for your condition  · Reschedule any upcoming appointments as needed  Medical facilities may be closed until the new coronavirus is better controlled  This means you may need to reschedule a surgery, procedure, or check-up appointment  If you cannot have a phone or video appointment, you will need to make a new appointment   Some providers may be scheduling appointments several months in advance  Some surgeries and procedures will happen as scheduled  This depends on the medical condition and the reason for the surgery or procedure  You may need to have extra testing for COVID-19 several days before  · Follow any regular management plan you use  Your healthcare provider will tell you if you need to make any changes to your regular management plan  For example, if you have asthma, continue to follow your asthma action plan  If you have diabetes, you may need to check your blood sugar level more often  Stress and illness can make blood sugar levels go up  You may need to adjust medicine such as insulin  If you have a heart condition or high blood pressure, you may need to check your blood pressure more often  Stress and illness can also raise your blood pressure  · Talk to your healthcare providers about your medicines  You may be able to get more than 1 month of medicine at a time  This will lower the number of times you need to go to a pharmacy to get your medicines  Make sure you have enough medicine if you have a condition that can lead to an emergency  Examples include asthma medicines, insulin, or an epinephrine pen  Check the expiration dates on the medicines you currently have  Ask for refills as soon as possible, if needed  If it is not time to refill prescriptions, you may be able to get an emergency supply of some medicines  Medicine plans vary, so ask your healthcare provider or pharmacist for options  · Have supplies available in your home  If possible, get extra supplies you use regularly  Examples include absorbent pads, syringes, and wound cleaning solutions  This will limit the number of trips out of your home to get supplies  · Know the signs and symptoms of COVID-19  Signs and symptoms usually start about 5 days after infection but can take 2 to 14 days  Signs and symptoms range from mild to severe   You may feel like you have the flu or a bad cold  Your chronic health condition may cause some of the same symptoms COVID-19 causes  This can make it hard to know if a symptom is from COVID-19 or your chronic condition  Keep a record of any new or worsening symptom you have  This is especially important if you have a condition that often causes shortness of breath  Your provider can tell you if you should be tested for COVID-19  Information is still being learned  Tell your healthcare provider if you think you were infected but develop signs or symptoms not listed below:    ? A cough    ? Shortness of breath or trouble breathing that may become severe    ? A fever of at least 100 4°F, or 38°C (may be lower in adults 65 or older)    ? Chills that might include shaking    ? Muscle pain, body aches, or a headache    ? A sore throat    ? Suddenly not being able to taste or smell anything    ? Feeling very tired (fatigue)    ? Congestion (stuffy head and nose), or a runny nose    ? Diarrhea, nausea, or vomiting    What you can do to prevent having to go out of your home during this time:   · Ask your healthcare provider for other ways to have appointments  You may be able to have appointments without having to go into the provider's office  Some providers offer phone, video, or other types of appointments  · Have food, medicines, and other supplies delivered  Some pharmacies can send certain medicines to you through the mail  Grocery stores and restaurants may be able to deliver food and other items  If possible, have delivered items placed somewhere  Try not to have someone hand you an item  You will be so close to the person that the virus can spread between you  Wash your hands after you put the delivered items away  · Ask someone to get items you need  The person can get groceries, medicines, or other needed items for you  Choose a person who does not have signs or symptoms of COVID-19 or has tested negative for it   The person should not be waiting for test results  He or she should not have a condition that increases the risk for COVID-19 or serious problems it causes  Lower your risk for COVID-19:  The virus that causes COVID-19 spreads quickly and easily  It mainly spreads through droplets that form when a person talks, coughs, or sneezes  An infected person may also be able to leave the virus on objects and surfaces  Another person can get the virus on his or her hands by touching the object or surface  Infection happens if the person then touches his or her eyes or mouth with unwashed hands  The best way to prevent infection is to avoid anyone who is infected, but this can be hard to do  An infected person can spread the virus before signs or symptoms begin, or even if signs or symptoms never develop  The following are ways to prevent the spread of the virus and lower your risk for COVID-19:      · Wash your hands often throughout the day  Use soap and water  Rub your soapy hands together, lacing your fingers  Wash the front and back of each hand, and in between your fingers  Use the fingers of one hand to scrub under the fingernails of the other hand  Wash for at least 20 seconds  Rinse with warm, running water for several seconds  Then dry your hands with a clean towel or paper towel  Use hand  that contains alcohol if soap and water are not available  Do not touch your eyes, nose, or mouth without washing your hands first  Teach children how to wash their hands  · Cover sneezes and coughs  Turn your face away and cover your mouth and nose with a tissue  Throw the tissue away  Use the bend of your arm if a tissue is not available  Then wash your hands well with soap and water or use hand   Turn your head and cover your face if someone near you is coughing or sneezing  Teach children how to cover a cough or sneeze  Remind them to wash their hands  · Make a habit of not touching your face    If you get the virus on your hands, you can transfer it to your eyes, nose, or mouth and become infected  · Clean and disinfect high-touch surfaces and objects in your home often  Do this regularly, even if you think no one living in or coming to your home is infected with the virus  Surfaces include countertops, cupboard doors, desks, handles, handrails, doorknobs, toilet handles, faucets, chairs, and light switches  Objects include keys, phones, computer keyboards and mice, video games, remote controls, and children's toys  Some surfaces and objects may need to be cleaned several times each day, depending on how often they are used  ? Use disinfecting wipes or a disinfecting solution  You can make a solution by mixing 4 teaspoons of bleach with 1 quart (4 cups) of water  Clean with a sponge or cloth that can be thrown away or washed in hot, soapy water and reused  Clean used dishes and utensils in hot, soapy water or in the   ? Be careful with   Do not use any cleaning or disinfecting products that can trigger an asthma attack or other breathing problems  Open windows or have circulating air as you clean  Do not  mix ammonia with bleach  This will create toxic fumes  Read the labels of all products you use  · Ask about vaccines you may need  No vaccine is available yet for the new coronavirus  It is still a good idea to get other vaccines to help protect your immune system  Get the influenza (flu) vaccine as soon as recommended each year, usually starting in September or October  A flu infection can make COVID-19 worse if you have them at the same time  The flu can also cause signs and symptoms that are similar to COVID-19  Get the pneumonia vaccine if recommended  Your healthcare provider can tell you if you also need other vaccines, and when to get them    Follow social distancing guidelines if you must go out of your home during this time:  Social distancing means people stay far enough apart physically that the virus cannot spread from one person to another  National and local social distancing rules vary  Rules may change over time as restrictions are lifted, but they may return if an outbreak happens where you live  It is important to know and follow all current social distancing rules in your area  The following are general guidelines:  · Limit trips out of your home  Most local guidelines allow leaving the home to buy food or supplies, or to seek medical care if necessary  · Stay at least 6 feet (2 meters) away from anyone who does not live in your home  Do not shake hands with, hug, or kiss a person as a greeting  Stand or walk as far from others as possible, especially around anyone who is sneezing or coughing  If you must use public transportation (such as a bus or subway), try to sit or stand away from others  You can stay safely connected with others through phone calls, e-mail messages, social media websites, and video chats  Check in on anyone who may be having a hard time socially distancing, or who lives alone  Ask administrators at nursing homes or long-term care facilities how you can safely communicate with someone living there  · Wear a cloth face covering (mask) when you must go out  Only do this if your chronic condition does not cause breathing problems  You can make a face covering out of a thick cloth  This will save medical masks for healthcare workers and first responders  Choose fabric that holds its shape after it is washed and dried, such as cotton  Put the top half of a paper coffee filter in the middle of the fabric to create an extra filter for you  Check that you can breathe through the fabric when it is folded into several layers  Fold the fabric into a long band  Put each end through a rubber band or hair tie to create ear loops  Fold the ends of the fabric toward the middle  Hold the covering to your face   Adjust it so it covers your nose and mouth completely  Hook the loops onto your ears to keep the covering in place  The following are important points to remember:     ? Do not use a plastic face shield instead of a cloth covering  A face shield will not protect others from droplets  If a face shield must be used, choose one that wraps around both sides of the face and goes below the chin  Do not use disposable shields more than 1 time  Brain Rasher that can be used again need to be cleaned and disinfected between uses  Do not put a face shield or a cloth covering on a  or infant  These increase the risk for sudden infant death syndrome (SIDS)  ? Continue social distancing while you are out  A face covering does not mean you can have close physical contact with others  You still need to stay at least 6 feet (2 meters) away from others  ? Do not touch your face covering or eyes while you are wearing the covering  Your eyes will not be covered by the cloth  You can be infected if the virus is on your hand and you touch your eyes  Wash your hands with soap and water for 20 seconds or use hand  before you remove your face covering  ? Do not remove your face covering to talk, sneeze, or cough  Your face covering will help protect you and others around you  ? Wash face coverings often  Wash them in a washing machine in the warmest water the fabric allows  Make sure the fabric is completely dry before you use the face covering again  Only wear clean coverings when you go out  Use a new coffee filter each time  ? Certain individuals should not use face coverings  Do not put a face covering on anyone who is younger than 2 years  [facility] children may not be able to breathe through the covering  Do not put a face covering on anyone who cannot remove it by himself or herself  ? You can use a clear face covering if others need to read your lips    A clear covering may be helpful if you communicate with someone who is deaf or hard of hearing  A clear covering is made of cloth but has plastic over the mouth area  This will help the person see your lips more easily  Do not use a plastic face shield instead  ? Do not use face coverings that have breathing valves or vents  Valves or vents on the sides are designed to allow breathed air to go out  This makes breathing easier, but the virus can travel out of the valve or vent and be spread to others  · Do not have anyone over to your home unless it is necessary  It is okay to have medical workers in to provide care  Wear your face covering, and remind medical workers to wear a mask or face covering  Remind them to wash their hands when they arrive and before they leave  Do not  have family members, friends, or neighbors over, even if they are not sick  A person can pass the new virus to others before symptoms of COVID-19 begin  Some people never even develop symptoms  Children commonly have mild symptoms or no symptoms  It is important that you continue social distancing with everyone, including children  It may be hard to tell a child not to hug or kiss you  Explain that this is how he or she can help you stay healthy  · Do not go to someone else's home unless it is necessary  Do not go over to visit, even if the person is lonely  Only go if you need to help him or her  · Avoid large gatherings and crowds  Gatherings or crowds of 10 or more individuals can cause the virus to spread  Examples of gatherings include parties, sporting events, [employer] services, and conferences  Crowds may form at beaches, salazar, and tourist attractions  You can continue to protect yourself by staying away from large gatherings and crowds  · Stay safe if you must go out to work  You may have a job only done outside your home that is considered essential  Keep physical distance between you and other workers as much as possible  Follow your employer's rules so everyone stays safe      Help strengthen your immune system:   · Do not smoke  Nicotine and other chemicals in cigarettes and cigars can cause lung damage and increase your risk for infections such as bronchitis or pneumonia  Ask your healthcare provider for information if you currently smoke and need help to quit  E-cigarettes or smokeless tobacco still contain nicotine  Talk to your healthcare provider before you use these products  · Eat a variety of healthy foods  Examples include vegetables, fruits, whole-grain breads and cereals, lean meats and poultry, fish, low-fat dairy products, and cooked beans  Healthy foods contain nutrients that help keep your immune system strong  · Find ways to manage stress  You may be feeling more stressed than usual because of the COVID-19 outbreak  The situation is very stressful to many people  Talk to your healthcare providers about ways to manage stress during this time  Stress can lead to breathing problems or make the problems worse  Stress can trigger an attack or exacerbation of many health conditions  It is important to do things that help you feel more relaxed, such as the following:     ? Pick 1 or 2 times a day to watch the news  Constant news watching can increase your stress levels  ? Talk to a friend on the phone or through a video chat  ? Take a warm, soothing bath  ? Listen to music  ? Exercise can also help relieve stress  This may be hard if your regular gym or outdoor exercise area is closed  If you do not have exercise equipment at home, try walking inside your home  You can walk quickly or turn on music and dance  Follow up with your doctor or healthcare provider as directed: Your providers will tell you when you can come in for tests, procedures, or check-ups  Bring your symptom record with you to all appointments  Write down your questions so you remember to ask them during your visits    For more information:   · Centers for Disease Control and Prevention  [address]   [place] , [address] [place] [address]  Phone: 0- 631 - 5399030  Phone: 2- 647 - 8179248  Web Address: [employer] com br    © Copyright [address] Information is for End User's use only and may not be sold, redistributed or otherwise used for commercial purposes  All illustrations and images included in CareNotes® are the copyrighted property of A D A M , Inc  or Murray Leal  The above information is an  only  It is not intended as medical advice for individual conditions or treatments  Talk to your doctor, nurse or pharmacist before following any medical regimen to see if it is safe and effective for you  Wellness Visit for Adults   AMBULATORY CARE:   A wellness visit  is when you see your healthcare provider to get screened for health problems  Your healthcare provider will also give you advice on how to stay healthy  Write down your questions so you remember to ask them  Ask your healthcare provider how often you should have a wellness visit  What happens at a wellness visit:  Your healthcare provider will ask about your health, and your family history of health problems  This includes high blood pressure, heart disease, and cancer  He or she will ask if you have symptoms that concern you, if you smoke, and about your mood  You may also be asked about your intake of medicines, supplements, food, and alcohol  Any of the following may be done:  · Your weight  will be checked  Your height may also be checked so your body mass index (BMI) can be calculated  Your BMI shows if you are at a healthy weight  · Your blood pressure  and heart rate will be checked  Your temperature may also be checked  · Blood and urine tests  may be done  Blood tests may be done to check your cholesterol levels  Abnormal cholesterol levels increase your risk for heart disease and stroke  You may also need a blood or urine test to check for diabetes if you are at increased risk   Urine tests may be done to look for signs of an infection or kidney disease  · A physical exam  includes checking your heartbeat and lungs with a stethoscope  Your healthcare provider may also check your skin to look for sun damage  · Screening tests  may be recommended  A screening test is done to check for diseases that may not cause symptoms  The screening tests you may need depend on your age, gender, family history, and lifestyle habits  For example, colorectal screening may be recommended if you are 48years old or older  Screening tests you need if you are a woman:   · A Pap smear  is used to screen for cervical cancer  Pap smears are usually done every 3 to 5 years depending on your age  You may need them more often if you have had abnormal Pap smear test results in the past  Ask your healthcare provider how often you should have a Pap smear  · A mammogram  is an x-ray of your breasts to screen for breast cancer  Experts recommend mammograms every 2 years starting at age 48 years  You may need a mammogram at age 52 years or younger if you have an increased risk for breast cancer  Talk to your healthcare provider about when you should start having mammograms and how often you need them  Vaccines you may need:   · Get an influenza vaccine  every year  The influenza vaccine protects you from the flu  Several types of viruses cause the flu  The viruses change over time, so new vaccines are made each year  · Get a tetanus-diphtheria (Td) booster vaccine  every 10 years  This vaccine protects you against tetanus and diphtheria  Tetanus is a severe infection that may cause painful muscle spasms and lockjaw  Diphtheria is a severe bacterial infection that causes a thick covering in the back of your mouth and throat  · Get a human papillomavirus (HPV) vaccine  if you are female and aged 23 to 32 or male 23 to 24 and never received it  This vaccine protects you from HPV infection   HPV is the most common infection spread by sexual contact  HPV may also cause vaginal, penile, and anal cancers  · Get a pneumococcal vaccine  if you are aged 72 years or older  The pneumococcal vaccine is an injection given to protect you from pneumococcal disease  Pneumococcal disease is an infection caused by pneumococcal bacteria  The infection may cause pneumonia, meningitis, or an ear infection  · Get a shingles vaccine  if you are 60 or older, even if you have had shingles before  The shingles vaccine is an injection to protect you from the varicella-zoster virus  This is the same virus that causes chickenpox  Shingles is a painful rash that develops in people who had chickenpox or have been exposed to the virus  How to eat healthy:  My Plate is a model for planning healthy meals  It shows the types and amounts of foods that should go on your plate  Fruits and vegetables make up about half of your plate, and grains and protein make up the other half  A serving of dairy is included on the side of your plate  The amount of calories and serving sizes you need depends on your age, gender, weight, and height  Examples of healthy foods are listed below:  · Eat a variety of vegetables  such as dark green, red, and orange vegetables  You can also include canned vegetables low in sodium (salt) and frozen vegetables without added butter or sauces  · Eat a variety of fresh fruits , canned fruit in 100% juice, frozen fruit, and dried fruit  · Include whole grains  At least half of the grains you eat should be whole grains  Examples include whole-wheat bread, wheat pasta, brown rice, and whole-grain cereals such as oatmeal     · Eat a variety of protein foods such as seafood (fish and shellfish), lean meat, and poultry without skin (turkey and chicken)  Examples of lean meats include pork leg, shoulder, or tenderloin, and beef round, sirloin, tenderloin, and extra lean ground beef   Other protein foods include eggs and egg substitutes, beans, peas, soy products, nuts, and seeds  · Choose low-fat dairy products such as skim or 1% milk or low-fat yogurt, cheese, and cottage cheese  · Limit unhealthy fats  such as butter, hard margarine, and shortening  Exercise:  Exercise at least 30 minutes per day on most days of the week  Some examples of exercise include walking, biking, dancing, and swimming  You can also fit in more physical activity by taking the stairs instead of the elevator or parking farther away from stores  Include muscle strengthening activities 2 days each week  Regular exercise provides many health benefits  It helps you manage your weight, and decreases your risk for type 2 diabetes, heart disease, stroke, and high blood pressure  Exercise can also help improve your mood  Ask your healthcare provider about the best exercise plan for you  General health and safety guidelines:   · Do not smoke  Nicotine and other chemicals in cigarettes and cigars can cause lung damage  Ask your healthcare provider for information if you currently smoke and need help to quit  E-cigarettes or smokeless tobacco still contain nicotine  Talk to your healthcare provider before you use these products  · Limit alcohol  A drink of alcohol is 12 ounces of beer, 5 ounces of wine, or 1½ ounces of liquor  · Lose weight, if needed  Being overweight increases your risk of certain health conditions  These include heart disease, high blood pressure, type 2 diabetes, and certain types of cancer  · Protect your skin  Do not sunbathe or use tanning beds  Use sunscreen with a SPF 15 or higher  Apply sunscreen at least 15 minutes before you go outside  Reapply sunscreen every 2 hours  Wear protective clothing, hats, and sunglasses when you are outside  · Drive safely  Always wear your seatbelt  Make sure everyone in your car wears a seatbelt  A seatbelt can save your life if you are in an accident  Do not use your cell phone when you are driving   This could distract you and cause an accident  Pull over if you need to make a call or send a text message  · Practice safe sex  Use latex condoms if are sexually active and have more than one partner  Your healthcare provider may recommend screening tests for sexually transmitted infections (STIs)  · Wear helmets, lifejackets, and protective gear  Always wear a helmet when you ride a bike or motorcycle, go skiing, or play sports that could cause a head injury  Wear protective equipment when you play sports  Wear a lifejacket when you are on a boat or doing water sports  © Copyright [address] Information is for End User's use only and may not be sold, redistributed or otherwise used for commercial purposes  All illustrations and images included in CareNotes® are the copyrighted property of [employer]  or [facility] Barbara Nobles   The above information is an  only  It is not intended as medical advice for individual conditions or treatments  Talk to your doctor, nurse or pharmacist before following any medical regimen to see if it is safe and effective for you

## 2021-02-15 NOTE — PROGRESS NOTES
[address] [facility]    NAME: Joan Cali  AGE: 52 y o  SEX: male  : 1970     DATE: 2/15/2021     Assessment and Plan:     Problem List Items Addressed This Visit        Other    HIV disease ([facility] [address] ) - Primary     No results found for: [id number]  CD4 ABS   Date/Time Value Ref Range Status   2020 08:39  359 - 1519 /uL Final     HIV-1 RNA by PCR, Qn   Date/Time Value Ref Range Status   2020 08:39 AM <20 copies/mL Final     Comment:     HIV-1 RNA not detected  The reportable range for this assay is 20 to 10,000,000  copies HIV-1 RNA/mL  HIV-1 RNA Viral Load Log   Date/Time Value Ref Range Status   2020 08:39 AM COMMENT [id number]/mL Final     Comment:     Unable to calculate result since non-numeric result obtained for  component test            Current Outpatient Medications:     Biktarvy -25 MG tablet, TAKE 1 TABLET BY MOUTH DAILY, Disp: 30 tablet, Rfl: 5    predniSONE 5 mg tablet, prednisone 5 mg tablet  TAKE 5 TABS BY MOUTH DAILY X 1 DAY, 4 X 1 DAY, 3 X 1 DAY, 2 X 1 DAY, 1 X 1 DAY, Disp: , Rfl:         Denies side effects  Stressed the importance of adherence  Continue follow up with ID clinic  Reviewed most recent labs, including Cd4 and viral load  Discussed the risks and benefits of treatment options, instructions for management, importance of treatment adherence, and reduction of risk factor  Educated on possible  medication side effects  Counseled on routes of HIV transmission, including the risk of  infection  Emphasized that viral suppression is the best method to prevent HIV transmission  At this time pt denies the need for HIV testing of anyone in their life  Total encounter time was 45 minutes  Greater then 20 minutes were spent on counseling and patient education  Pt voices understanding and agreement with treatment plan                 Other Visit Diagnoses     Annual physical exam        BMI 28 0-28 9,adult              Immunizations and preventive care screenings were discussed with patient today  Appropriate education was printed on patient's after visit summary  Counseling:  Dental Health: discussed importance of regular tooth brushing, flossing, and dental visits  · Injury prevention: discussed safety/seat belts, safety helmets, smoke detectors, carbon dioxide detectors, and smoking near bedding or upholstery  Return in 6 months (on 8/15/2021)  Chief Complaint:     Chief Complaint   Patient presents with    lack of energy     Pt c/o increased lack of energy  History of Present Illness:     Adult Annual Physical   Patient here for a comprehensive physical exam  The patient reports problems - depression, binge eatting, weight gain  Diet and Physical Activity  · Diet/Nutrition: consuming 3-5 servings of fruits/vegetables daily and binge eats at night  · Exercise: 3-4 times a week on average  Depression Screening  PHQ-9 Depression Screening    PHQ-9:   Frequency of the following problems over the past two weeks:           General Health  · Sleep: sleeps well  · Hearing: decreased - bilateral   · Vision: no vision problems  · Dental: regular dental visits and brushes teeth twice daily   Health  · Symptoms include: none     Review of Systems:     Review of Systems   Constitutional: Negative for chills and fever  HENT: Negative for ear pain and sore throat  Eyes: Negative for pain and visual disturbance  Respiratory: Negative for cough and shortness of breath  Cardiovascular: Negative for chest pain and palpitations  Gastrointestinal: Negative for abdominal pain and vomiting  Genitourinary: Negative for dysuria and hematuria  Musculoskeletal: Negative for arthralgias and back pain  Skin: Negative for color change and rash  Neurological: Negative for seizures and syncope     Psychiatric/Behavioral:        C/O increased depression, lack of motivation, and increased concerns with binge eating and addiction  All other systems reviewed and are negative  Past Medical History:     Past Medical History:   Diagnosis Date    Anxiety     last assessed 2015    Chest tightness     Drug abuse ([facility] [address] )     including heroin    Stomatitis     last assessed 05/14/15    Syphilis     ,, treated    TB (tuberculosis)       Past Surgical History:     History reviewed  No pertinent surgical history  Family History:     History reviewed  No pertinent family history  Social History:        Social History     Socioeconomic History    Marital status: Single     Spouse name: None    Number of children: None    Years of education: None    Highest education level: None   Occupational History    None   Social Needs    Financial resource strain: None    Food insecurity     Worry: None     Inability: None    Transportation needs     Medical: None     Non-medical: None   Tobacco Use    Smoking status: Former Smoker     Years: 30      Types: Cigarettes     Quit date: 2018     Years since quittin 9    Smokeless tobacco: Never Used   Substance and Sexual Activity    Alcohol use: No    Drug use: No    Sexual activity: Yes     Partners: Male     Birth control/protection: None   Lifestyle    Physical activity     Days per week: None     Minutes per session: None    Stress: None   Relationships    Social connections     Talks on phone: None     Gets together: None     Attends Congregational service: None     Active member of club or organization: None     Attends meetings of clubs or organizations: None     Relationship status: None    Intimate partner violence     Fear of current or ex partner: None     Emotionally abused: None     Physically abused: None     Forced sexual activity: None   Other Topics Concern    None   Social History Narrative    Housing, Stable housing 1 person in household    Monthly income $1700    Vegan      Current Medications:     Current Outpatient Medications   Medication Sig Dispense Refill    Biktarvy -25 MG tablet TAKE 1 TABLET BY MOUTH DAILY 30 tablet 5    predniSONE 5 mg tablet prednisone 5 mg tablet   TAKE 5 TABS BY MOUTH DAILY X 1 DAY, 4 X 1 DAY, 3 X 1 DAY, 2 X 1 DAY, 1 X 1 DAY       No current facility-administered medications for this visit  Allergies: Allergies   Allergen Reactions    Codeine Confusion      Physical Exam:     /72   Pulse 73   Temp 98 °F (36 7 °C)   Ht 5' 11" (1 803 m)   Wt 93 9 kg (207 lb)   SpO2 97%   BMI 28 87 kg/m²     Physical Exam  Vitals signs and nursing note reviewed  Constitutional:       Appearance: He is well-developed  HENT:      Head: Normocephalic and atraumatic  Eyes:      Conjunctiva/sclera: Conjunctivae normal    Neck:      Musculoskeletal: Neck supple  Cardiovascular:      Rate and Rhythm: Normal rate and regular rhythm  Heart sounds: No murmur  Pulmonary:      Effort: Pulmonary effort is normal  No respiratory distress  Breath sounds: Normal breath sounds  Abdominal:      Palpations: Abdomen is soft  Tenderness: There is no abdominal tenderness  Skin:     General: Skin is warm and dry  Neurological:      Mental Status: He is alert            VysrROMAN  [facility] AT [facility]

## 2021-02-15 NOTE — ASSESSMENT & PLAN NOTE
No results found for: [id number]  CD4 ABS   Date/Time Value Ref Range Status   2020 08:39  359 - 1519 /uL Final     HIV-1 RNA by PCR, Qn   Date/Time Value Ref Range Status   2020 08:39 AM <20 copies/mL Final     Comment:     HIV-1 RNA not detected  The reportable range for this assay is 20 to 10,000,000  copies HIV-1 RNA/mL  HIV-1 RNA Viral Load Log   Date/Time Value Ref Range Status   2020 08:39 AM COMMENT [id number]/mL Final     Comment:     Unable to calculate result since non-numeric result obtained for  component test            Current Outpatient Medications:     Biktarvy -25 MG tablet, TAKE 1 TABLET BY MOUTH DAILY, Disp: 30 tablet, Rfl: 5    predniSONE 5 mg tablet, prednisone 5 mg tablet  TAKE 5 TABS BY MOUTH DAILY X 1 DAY, 4 X 1 DAY, 3 X 1 DAY, 2 X 1 DAY, 1 X 1 DAY, Disp: , Rfl:         Denies side effects  Stressed the importance of adherence  Continue follow up with ID clinic  Reviewed most recent labs, including Cd4 and viral load  Discussed the risks and benefits of treatment options, instructions for management, importance of treatment adherence, and reduction of risk factor  Educated on possible  medication side effects  Counseled on routes of HIV transmission, including the risk of  infection  Emphasized that viral suppression is the best method to prevent HIV transmission  At this time pt denies the need for HIV testing of anyone in their life  Total encounter time was 45 minutes  Greater then 20 minutes were spent on counseling and patient education  Pt voices understanding and agreement with treatment plan

## 2021-02-18 ENCOUNTER — EVALUATION (OUTPATIENT)
Dept: PHYSICAL THERAPY | Facility: CLINIC | Age: 51
End: 2021-02-18
Payer: COMMERCIAL

## 2021-02-18 DIAGNOSIS — S03.00XA DISLOCATION OF TEMPOROMANDIBULAR JOINT, INITIAL ENCOUNTER: ICD-10-CM

## 2021-02-18 DIAGNOSIS — M26.629 TMJPDS (TEMPOROMANDIBULAR JOINT PAIN DYSFUNCTION SYNDROME): Primary | ICD-10-CM

## 2021-02-18 PROCEDURE — 97162 PT EVAL MOD COMPLEX 30 MIN: CPT | Performed by: PHYSICAL THERAPIST

## 2021-02-18 PROCEDURE — 97112 NEUROMUSCULAR REEDUCATION: CPT | Performed by: PHYSICAL THERAPIST

## 2021-02-18 NOTE — PROGRESS NOTES
PT Evaluation     Today's date: 2021  Patient name: Patrick Sexton  : 1970  MRN: [id number]  Referring provider: ROMAN Rodriguez  Dx:   Encounter Diagnosis     ICD-10-CM    1  TMJPDS (temporomandibular joint pain dysfunction syndrome)  M26 629                   Assessment  Assessment details: Pt presents with s/s consistent with B TMJD secondary to abnormal paraoral habits, postural abnormalities, and poor TMJ and C/S arthrokinematics  He will benefit from skilled PT services to address his impairments   Impairments: abnormal muscle firing, abnormal muscle tone, abnormal or restricted ROM, abnormal movement, activity intolerance, impaired physical strength, lacks appropriate home exercise program, pain with function, poor posture  and poor body mechanics        Subjective Evaluation    History of Present Illness  Mechanism of injury: Pt is a 52 y o  male with PMHx significant for HIV, TB, anxiety  He reports chronic Hx of B jaw pain and stiffness  He also reports severe bruxing at night, clenching during the during, and premature tooth wear  He denies Hx of trauma  Pain  Current pain ratin  At best pain ratin  At worst pain ratin  Progression: no change    Life stress: COVID, change of job, PMHx      Diagnostic Tests  No diagnostic tests performed  Patient Goals  Patient goals for therapy: decreased pain and increased motion          Objective     Postural Observations  Seated posture: poor  Standing posture: poor  Correction of posture: makes symptoms better        Palpation   Left   Hypertonic in the masseter, temporalis, lateral pterygoid and medial pterygoid  Tenderness of the masseter, temporalis, lateral pterygoid and medial pterygoid  Trigger point to lateral pterygoid  Right   Hypertonic in the masseter, temporalis and lateral pterygoid  Tenderness of the masseter, temporalis, lateral pterygoid and medial pterygoid  Trigger point to lateral pterygoid  Active Range of Motion   Cervical/Thoracic Spine       Cervical  Subcranial protraction:  WFL   Subcranial retraction:   Restriction level: moderate  Flexion:  WFL  Extension:  WFL  Left lateral flexion:  Restriction level: moderate  Right lateral flexion:  Restriction level minimal  Left rotation:  WFL  Right rotation:  Restriction level: minimal  TMJ   Dental findings: O: dental wear noted primarily on incisors  Joint sounds left: slight crepitus  Joint sounds right: slight crepitus  Opening (mm): 50   Lateral excursion, left (mm): 6 and pain  Lateral excursion, right (mm)t: 8 and pain   Protrusion (mm): 15   ROM comments: O: pt demonstrates upper C/S extension and excessive protraction during opening and B lateral glide             Precautions:  PMHx: HIV, TB, anxiety  Dx: chronic B TMJD    Manuals 2/18            B lateral pterygoid ischemic pressure             B TMJ laser                                       Neuro Re-Ed             Supine chin tucks 3"x10            Seated C/S retraction 2"x10            Seated C/S retraction w/ B rotation             Seated C/S retraction w/ B SB             TMJ opening w/ tongue on palate 1x15            TMJ B lateral glide w/ tongue on palate 1x15            TMJ opening FROM without protrusion             Ther Ex                                                                                                                     Ther Activity                                       Gait Training                                       Modalities

## 2021-02-23 ENCOUNTER — OFFICE VISIT (OUTPATIENT)
Dept: PHYSICAL THERAPY | Facility: CLINIC | Age: 51
End: 2021-02-23
Payer: COMMERCIAL

## 2021-02-23 DIAGNOSIS — M26.629 TMJPDS (TEMPOROMANDIBULAR JOINT PAIN DYSFUNCTION SYNDROME): Primary | ICD-10-CM

## 2021-02-23 PROCEDURE — 97140 MANUAL THERAPY 1/> REGIONS: CPT | Performed by: PHYSICAL THERAPIST

## 2021-02-23 PROCEDURE — 97112 NEUROMUSCULAR REEDUCATION: CPT | Performed by: PHYSICAL THERAPIST

## 2021-02-23 NOTE — PROGRESS NOTES
Daily Note     Today's date: 2021  Patient name: Veto Webster  : 1970  MRN: [id number]  Referring provider: ROMAN Frias  Dx:   Encounter Diagnosis     ICD-10-CM    1  TMJPDS (temporomandibular joint pain dysfunction syndrome)  M26 629                   Subjective: Pt reports fair compliance with HEP  Objective: See treatment diary below    Assessment: Pt demonstrated improved pain-free B lateral glide AROM and moderate difficulty limit condylar anterior translation and protrusion during opening AROM  Plan: Cont  POC  Precautions:  PMHx: HIV, TB, anxiety  Dx: chronic B TMJD    Manuals            B lateral pterygoid ischemic pressure  3 min ea           B TMJ laser  10W  1500J ea                                     Neuro Re-Ed             Supine chin tucks 3"x10 3"x10           Seated C/S retraction 2"x10 2"x15           Seated C/S retraction w/ B rotation  1x15           Seated C/S retraction w/ B SB  1x15           TMJ opening w/ tongue on palate 1x15 2x15           TMJ B lateral glide w/ tongue on palate 1x15 2x15           TMJ opening FROM without protrusion  1x10           Ther Ex                                                                                                                     Ther Activity                                       Gait Training                                       Modalities

## 2021-02-25 ENCOUNTER — OFFICE VISIT (OUTPATIENT)
Dept: PHYSICAL THERAPY | Facility: CLINIC | Age: 51
End: 2021-02-25
Payer: COMMERCIAL

## 2021-02-25 DIAGNOSIS — M26.629 TMJPDS (TEMPOROMANDIBULAR JOINT PAIN DYSFUNCTION SYNDROME): Primary | ICD-10-CM

## 2021-02-25 PROCEDURE — 97140 MANUAL THERAPY 1/> REGIONS: CPT | Performed by: PHYSICAL THERAPIST

## 2021-02-25 PROCEDURE — 97112 NEUROMUSCULAR REEDUCATION: CPT | Performed by: PHYSICAL THERAPIST

## 2021-02-25 NOTE — PROGRESS NOTES
Daily Note     Today's date: 2021  Patient name: Hans Marquez  : 1970  MRN: [id number]  Referring provider: ROMAN Lobo  Dx:   Encounter Diagnosis     ICD-10-CM    1  TMJPDS (temporomandibular joint pain dysfunction syndrome)  M26 629                   Subjective: Pt reports good response to last visit however notes generalized stiffness and achiness all week that he attributes to stress  Objective: See treatment diary below    Assessment: Pt demonstrated improved C/S and TMJ neuromuscular control  Plan: Cont  POC  Precautions:  PMHx: HIV, TB, anxiety  Dx: chronic B TMJD    Manuals           B lateral pterygoid ischemic pressure  3 min ea 3x1 min ea          B TMJ laser  10W  1500J ea 10W  1500J ea                                    Neuro Re-Ed             Supine chin tucks 3"x10 3"x10 3"x10          DNF    3"x5         Seated C/S retraction 2"x10 2"x15 2"x15          Seated C/S retraction w/ B rotation  1x15 1x15          Seated C/S retraction w/ B SB  1x15 1x15          TMJ opening w/ tongue on palate 1x15 2x15 2x15          TMJ B lateral glide w/ tongue on palate 1x15 2x15 2x15          TMJ opening FROM without protrusion  1x10 1x15          Ther Ex                                                                                                                     Ther Activity                                       Gait Training                                       Modalities

## 2021-02-26 ENCOUNTER — OFFICE VISIT (OUTPATIENT)
Dept: AUDIOLOGY | Age: 51
End: 2021-02-26
Payer: COMMERCIAL

## 2021-02-26 DIAGNOSIS — H90.42 SENSORINEURAL HEARING LOSS (SNHL) OF LEFT EAR WITH UNRESTRICTED HEARING OF RIGHT EAR: Primary | ICD-10-CM

## 2021-02-26 PROCEDURE — 92557 COMPREHENSIVE HEARING TEST: CPT | Performed by: AUDIOLOGIST

## 2021-02-26 PROCEDURE — 92567 TYMPANOMETRY: CPT | Performed by: AUDIOLOGIST

## 2021-02-26 PROCEDURE — V5274 ALD UNSPECIFIED: HCPCS | Performed by: AUDIOLOGIST

## 2021-02-26 NOTE — PROGRESS NOTES
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  He is interested in musician earmolds for hearing protection for work  EVALUATION:    Otoscopic Evaluation:   Right Ear: Clear and healthy ear canal and tympanic membrane   Left Ear: Clear and healthy ear canal and tympanic membrane    Tympanometry:   Right: Type A - normal middle ear pressure and compliance   Left: Type A - normal middle ear pressure and compliance    Audiogram Results:  Pure tone thresholds revealed hearing within normal limits in his right ear and hearing within normal limits with a moderate sensorineural notch at 4k Hz in the left ear  Pure tone averages and speech reception thresholds are in agreement indicating good test reliability  Word recognition scores are excellent bilaterally  *see attached audiogram    Earmold impressions were taken bilaterally without incident  Musician earmolds were selected with 15/30 dB attenuation in the color "red neon lights"     Patient will be charged for plugs at   Quoted price of $185 for plugs w/ filters + additional set of filters  RECOMMENDATIONS:  Annual hearing eval, Hearing Protection and Copy to Patient/Caregiver    PATIENT EDUCATION:   Discussed results and recommendations with Magdy  Questions were addressed and the patient was encouraged to contact our department should concerns arise            Lucrecia Burrows ,  CCC-A  Clinical Audiologist

## 2021-03-02 ENCOUNTER — OFFICE VISIT (OUTPATIENT)
Dept: PHYSICAL THERAPY | Facility: CLINIC | Age: 51
End: 2021-03-02
Payer: COMMERCIAL

## 2021-03-02 DIAGNOSIS — M26.629 TMJPDS (TEMPOROMANDIBULAR JOINT PAIN DYSFUNCTION SYNDROME): Primary | ICD-10-CM

## 2021-03-02 PROCEDURE — 97112 NEUROMUSCULAR REEDUCATION: CPT | Performed by: PHYSICAL THERAPIST

## 2021-03-02 PROCEDURE — 97140 MANUAL THERAPY 1/> REGIONS: CPT | Performed by: PHYSICAL THERAPIST

## 2021-03-02 NOTE — PROGRESS NOTES
Daily Note     Today's date: 3/2/2021  Patient name: Kenneth Hernandez  : 1970  MRN: [id number]  Referring provider: ROMAN Martines  Dx:   Encounter Diagnosis     ICD-10-CM    1  TMJPDS (temporomandibular joint pain dysfunction syndrome)  M26 629                   Subjective: Pt reports less jaw pain, stiffness, and tension over the past week  He reports not feeling like he has to open his jaw all the way in order to stretch out after wearing a mask all day  Objective: See treatment diary below    Assessment: Pt demonstrated good neuromuscular control introducing DNF and improved postural and clenching awareness  Plan: Cont  POC  Precautions:  PMHx: HIV, TB, anxiety  Dx: chronic B TMJD    Manuals 2/18 2/23 2/25 3/2         B lateral pterygoid ischemic pressure  3 min ea 3x1 min ea 3x1 min ea         B TMJ laser  10W  1500J ea 10W  1500J ea 10W  1500J ea                                   Neuro Re-Ed             Supine chin tucks 3"x10 3"x10 3"x10 3"x15         DNF    3"x5 3"x10        Seated C/S retraction 2"x10 2"x15 2"x15 2"x15         Seated C/S retraction w/ B rotation  1x15 1x15 1x15         Seated C/S retraction w/ B SB  1x15 1x15 1x15         TMJ opening w/ tongue on palate 1x15 2x15 2x15 2x15 3x15        TMJ B lateral glide w/ tongue on palate 1x15 2x15 2x15 2x15         TMJ opening FROM without protrusion  1x10 1x15 1x15         Ther Ex                                                                                                                     Ther Activity                                       Gait Training                                       Modalities

## 2021-03-04 ENCOUNTER — OFFICE VISIT (OUTPATIENT)
Dept: PHYSICAL THERAPY | Facility: CLINIC | Age: 51
End: 2021-03-04
Payer: COMMERCIAL

## 2021-03-04 DIAGNOSIS — M26.629 TMJPDS (TEMPOROMANDIBULAR JOINT PAIN DYSFUNCTION SYNDROME): Primary | ICD-10-CM

## 2021-03-04 DIAGNOSIS — S03.00XA DISLOCATION OF TEMPOROMANDIBULAR JOINT, INITIAL ENCOUNTER: ICD-10-CM

## 2021-03-04 PROCEDURE — 97140 MANUAL THERAPY 1/> REGIONS: CPT | Performed by: PHYSICAL THERAPIST

## 2021-03-04 PROCEDURE — 97112 NEUROMUSCULAR REEDUCATION: CPT | Performed by: PHYSICAL THERAPIST

## 2021-03-04 NOTE — PROGRESS NOTES
Daily Note     Today's date: 3/4/2021  Patient name: Patrick Sexton  : 1970  MRN: [id number]  Referring provider: ROMAN Rodriguez  Dx:   Encounter Diagnosis     ICD-10-CM    1  TMJPDS (temporomandibular joint pain dysfunction syndrome)  M26 629    2  Dislocation of temporomandibular joint, initial encounter  S03  00XA                   Subjective: Pt reports improved TMJ and jaw pain, less stiffness, and less clenching during the day  Objective: See treatment diary below    Assessment: Pt demonstrated improved R>L lateral pterygoid tone  Plan: Cont  POC  Precautions:  PMHx: HIV, TB, anxiety  Dx: chronic B TMJD    Manuals 2/18 2/23 2/25 3/2 3/4        B lateral pterygoid ischemic pressure  3 min ea 3x1 min ea 3x1 min ea 3x1 min ea        B TMJ laser  10W  1500J ea 10W  1500J ea 10W  1500J ea 10W 1500J ea                                  Neuro Re-Ed             Supine chin tucks 3"x10 3"x10 3"x10 3"x15 3"x10        DNF    3"x5 3"x10 5"x10       Seated C/S retraction 2"x10 2"x15 2"x15 2"x15 1x15        Seated C/S retraction w/ B rotation  1x15 1x15 1x15 1x15        Seated C/S retraction w/ B SB  1x15 1x15 1x15 1x15        TMJ opening w/ tongue on palate 1x15 2x15 2x15 2x15 3x10        TMJ B lateral glide w/ tongue on palate 1x15 2x15 2x15 2x15 3x10        TMJ opening FROM without protrusion  1x10 1x15 1x15 1x15        Ther Ex                                                                                                                     Ther Activity                                       Gait Training                                       Modalities

## 2021-03-05 NOTE — PROGRESS NOTES
Assessment    nutrition    Clinical Data/Client History    : Mandi Cartwright    Socio- Economic Status: Eats Out and Cooks    Living Situation: Apartment    Food Prep/Access: Refrigerator, Stove and Microwave    Functional Status: Ambulatory, Able to [employer] and Prepared Own Meals    Activity Level: low    Appetite: Good    Current Body Weight: 207#    Nutrition Diagnosis    Problem: Excessive energy intake    Related to: Increase in estimated food intake and Decrease in physical activity    As Evidenced By: Weight Gain    Intervention Diet Prescription    Nutritional needs based on: 94kg    · 1900 kcal  · 75g protein  · 1900fluid  · 2 0 Na    Current intake: exceeds nutritional needs    Visit Summary    Annual assessment  Pt with wt gain, # (BMI 28)  Reports increased intake and wt gain  Continue to try to reach out to provide counseling/education/written material   Will continue to work toward interventions    Preethi Welch RD,LDN,CHC

## 2021-03-08 NOTE — PROGRESS NOTES
Progress Note    Name:  Knox Lesch  :  1970  Age:  52 y o  Date of Evaluation: 21     Patient's musician plugs arrived with extra set of 30dB filters  Dru Curry to schedule an HAV with me         Lucrecia Coronel , CCC-A  Clinical Audiologist

## 2021-03-09 ENCOUNTER — OFFICE VISIT (OUTPATIENT)
Dept: PHYSICAL THERAPY | Facility: CLINIC | Age: 51
End: 2021-03-09
Payer: COMMERCIAL

## 2021-03-09 DIAGNOSIS — M26.629 TMJPDS (TEMPOROMANDIBULAR JOINT PAIN DYSFUNCTION SYNDROME): Primary | ICD-10-CM

## 2021-03-09 PROCEDURE — 97140 MANUAL THERAPY 1/> REGIONS: CPT | Performed by: PHYSICAL THERAPIST

## 2021-03-09 PROCEDURE — 97112 NEUROMUSCULAR REEDUCATION: CPT | Performed by: PHYSICAL THERAPIST

## 2021-03-09 NOTE — PROGRESS NOTES
Daily Note     Today's date: 3/9/2021  Patient name: Jimmie Fernandez  : 1970  MRN: [id number]  Referring provider: ROMAN Wright  Dx:   Encounter Diagnosis     ICD-10-CM    1  TMJPDS (temporomandibular joint pain dysfunction syndrome)  M26 629                   Subjective: Pt reports TMJ stiffness, no pain after a restless sleep  Objective: See treatment diary below    Assessment: Pt demonstrated improved postural control  Plan: Cont  POC  Precautions:  PMHx: HIV, TB, anxiety  Dx: chronic B TMJD    Manuals 2/18 2/23 2/25 3/2 3/4 3/9       B lateral pterygoid ischemic pressure  3 min ea 3x1 min ea 3x1 min ea 3x1 min ea 3x1 min       B TMJ laser  10W  1500J ea 10W  1500J ea 10W  1500J ea 10W 1500J ea 10W  1500J ea                                 Neuro Re-Ed             Supine chin tucks 3"x10 3"x10 3"x10 3"x15 3"x10 3"x10       DNF    3"x5 3"x10 5"x10 6"x10      Seated C/S retraction 2"x10 2"x15 2"x15 2"x15 1x15 1x15       Seated C/S retraction w/ B rotation  1x15 1x15 1x15 1x15 1x15       Seated C/S retraction w/ B SB  1x15 1x15 1x15 1x15 1x15       TMJ opening w/ tongue on palate 1x15 2x15 2x15 2x15 3x10 3x10       TMJ B lateral glide w/ tongue on palate 1x15 2x15 2x15 2x15 3x10 3x10       TMJ opening FROM without protrusion  1x10 1x15 1x15 1x15 1x15       B TB horizontal ABD      R/  3x10 R/  3x15                                Ther Ex                                                                                                                     Ther Activity                                       Gait Training                                       Modalities

## 2021-03-09 NOTE — PROGRESS NOTES
Daily Note     Today's date: 3/9/2021  Patient name: Steven Soriano  : 1970  MRN: [id number]  Referring provider: ROMAN Wang  Dx:   Encounter Diagnosis     ICD-10-CM    1  TMJPDS (temporomandibular joint pain dysfunction syndrome)  M26 629                   Subjective: Pt reports TMJ stiffness today after a restless night's sleep last night  Objective: See treatment diary below    Assessment: Pt demonstrated improved postural awareness and control  Plan: Cont  POC  Progress postural strengthening, discuss d/c plans in 2 visits  Precautions:  PMHx: HIV, TB, anxiety  Dx: chronic B TMJD    Manuals 2/18 2/23 2/25 3/2 3/4 3/9       B lateral pterygoid ischemic pressure  3 min ea 3x1 min ea 3x1 min ea 3x1 min ea 3x1 min ea       B TMJ laser  10W  1500J ea 10W  1500J ea 10W  1500J ea 10W 1500J ea 10W  1500J ea                                 Neuro Re-Ed             Supine chin tucks 3"x10 3"x10 3"x10 3"x15 3"x10 HEP       DNF    3"x5 3"x10 5"x10 6"x10      Seated C/S retraction 2"x10 2"x15 2"x15 2"x15 1x15 1x15       Seated C/S retraction w/ B rotation  1x15 1x15 1x15 1x15 1x15       Seated C/S retraction w/ B SB  1x15 1x15 1x15 1x15 1x15       TMJ opening w/ tongue on palate 1x15 2x15 2x15 2x15 3x10 3x10       TMJ B lateral glide w/ tongue on palate 1x15 2x15 2x15 2x15 3x10 3x10       TMJ opening FROM without protrusion  1x10 1x15 1x15 1x15 1x15       iso scap squeeze      5"x10  HEP       B TB horizontal ABD      R/   3x10  R/   3x15                                 Ther Ex                                                                                                        Ther Activity                                       Gait Training                                       Modalities

## 2021-03-10 DIAGNOSIS — Z23 ENCOUNTER FOR IMMUNIZATION: ICD-10-CM

## 2021-03-11 ENCOUNTER — OFFICE VISIT (OUTPATIENT)
Dept: PHYSICAL THERAPY | Facility: CLINIC | Age: 51
End: 2021-03-11
Payer: COMMERCIAL

## 2021-03-11 ENCOUNTER — OFFICE VISIT (OUTPATIENT)
Dept: AUDIOLOGY | Age: 51
End: 2021-03-11

## 2021-03-11 DIAGNOSIS — H90.42 SENSORINEURAL HEARING LOSS (SNHL) OF LEFT EAR WITH UNRESTRICTED HEARING OF RIGHT EAR: Primary | ICD-10-CM

## 2021-03-11 DIAGNOSIS — M26.629 TMJPDS (TEMPOROMANDIBULAR JOINT PAIN DYSFUNCTION SYNDROME): Primary | ICD-10-CM

## 2021-03-11 PROCEDURE — 97140 MANUAL THERAPY 1/> REGIONS: CPT | Performed by: PHYSICAL THERAPIST

## 2021-03-11 PROCEDURE — 97112 NEUROMUSCULAR REEDUCATION: CPT | Performed by: PHYSICAL THERAPIST

## 2021-03-11 NOTE — PROGRESS NOTES
Progress Note    Name:  Abdoulaye Garay  :  1970  Age:  52 y o  Date of Evaluation: 21     Patient fit with custom musician plugs today  Excellent fit  Patient practiced removing and inserting filters  Patient very pleased with plugs  Patient paid in full at last appointment         Lucrecia Jones ,  CCC-A  Clinical Audiologist

## 2021-03-11 NOTE — PROGRESS NOTES
Daily Note     Today's date: 3/11/2021  Patient name: Russella Baumgarten  : 1970  MRN: [id number]  Referring provider: ROMAN Wise  Dx:   Encounter Diagnosis     ICD-10-CM    1  TMJPDS (temporomandibular joint pain dysfunction syndrome)  M26 629                   Subjective: Pt reports mild pain and moderate TMJ stiffness upon waking that he attributes to bruxing  He reports being able to resolve with HEP  Objective: See treatment diary below    Assessment: Pt demonstrated normalized R lateral pterygoid tone and good self-management  Plan: Cont  POC  F/u in 1 wk to assess for d/c readiness  Precautions:  PMHx: HIV, TB, anxiety  Dx: chronic B TMJD    Manuals 2/18 2/23 2/25 3/2 3/4 3/9 3/11      B lateral pterygoid ischemic pressure  3 min ea 3x1 min ea 3x1 min ea 3x1 min ea 3x1 min R/  2x1 min  L/  4x1 min      B TMJ laser  10W  1500J ea 10W  1500J ea 10W  1500J ea 10W 1500J ea 10W  1500J ea 10W  1500J ea                                Neuro Re-Ed             Supine chin tucks 3"x10 3"x10 3"x10 3"x15 3"x10 3"x10 3"x10      DNF    3"x5 3"x10 5"x10 6"x10      Seated C/S retraction 2"x10 2"x15 2"x15 2"x15 1x15 1x15 1x15      Seated C/S retraction w/ B rotation  1x15 1x15 1x15 1x15 1x15 1x15      Seated C/S retraction w/ B SB  1x15 1x15 1x15 1x15 1x15 1x15      TMJ opening w/ tongue on palate 1x15 2x15 2x15 2x15 3x10 3x10 3x10      TMJ B lateral glide w/ tongue on palate 1x15 2x15 2x15 2x15 3x10 3x10 3x10      TMJ opening FROM without protrusion  1x10 1x15 1x15 1x15 1x15 1x15      B TB horizontal ABD      R/  3x10 R/  3x15                                Ther Ex                                                                                                                     Ther Activity                                       Gait Training                                       Modalities

## 2021-03-13 ENCOUNTER — APPOINTMENT (OUTPATIENT)
Dept: LAB | Facility: CLINIC | Age: 51
End: 2021-03-13
Payer: COMMERCIAL

## 2021-03-13 ENCOUNTER — TRANSCRIBE ORDERS (OUTPATIENT)
Dept: LAB | Facility: CLINIC | Age: 51
End: 2021-03-13

## 2021-03-13 ENCOUNTER — IMMUNIZATIONS (OUTPATIENT)
Dept: FAMILY MEDICINE CLINIC | Facility: HOSPITAL | Age: 51
End: 2021-03-13

## 2021-03-13 DIAGNOSIS — Z79.899 OTHER LONG TERM (CURRENT) DRUG THERAPY: ICD-10-CM

## 2021-03-13 DIAGNOSIS — Z23 ENCOUNTER FOR IMMUNIZATION: Primary | ICD-10-CM

## 2021-03-13 DIAGNOSIS — B20 HIV DISEASE (HCC): ICD-10-CM

## 2021-03-13 DIAGNOSIS — Z13.1 SCREENING FOR DIABETES MELLITUS: ICD-10-CM

## 2021-03-13 LAB
ALBUMIN SERPL BCP-MCNC: 4 G/DL (ref 3.5–5)
ALP SERPL-CCNC: 71 U/L (ref 46–116)
ALT SERPL W P-5'-P-CCNC: 30 U/L (ref 12–78)
ANION GAP SERPL CALCULATED.3IONS-SCNC: 7 MMOL/L (ref 4–13)
AST SERPL W P-5'-P-CCNC: 19 U/L (ref 5–45)
BASOPHILS # BLD AUTO: 0.04 THOUSANDS/ΜL (ref 0–0.1)
BASOPHILS NFR BLD AUTO: 1 % (ref 0–1)
BILIRUB SERPL-MCNC: 0.55 MG/DL (ref 0.2–1)
BUN SERPL-MCNC: 13 MG/DL (ref 5–25)
CALCIUM SERPL-MCNC: 9.5 MG/DL (ref 8.3–10.1)
CHLORIDE SERPL-SCNC: 105 MMOL/L (ref 100–108)
CO2 SERPL-SCNC: 28 MMOL/L (ref 21–32)
CREAT SERPL-MCNC: 1.18 MG/DL (ref 0.6–1.3)
EOSINOPHIL # BLD AUTO: 0.08 THOUSAND/ΜL (ref 0–0.61)
EOSINOPHIL NFR BLD AUTO: 2 % (ref 0–6)
ERYTHROCYTE [DISTWIDTH] IN BLOOD BY AUTOMATED COUNT: 13.3 % (ref 11.6–15.1)
EST. AVERAGE GLUCOSE BLD GHB EST-MCNC: 100 MG/DL
GFR SERPL CREATININE-BSD FRML MDRD: 73 ML/MIN/1.73SQ M
GLUCOSE P FAST SERPL-MCNC: 98 MG/DL (ref 65–99)
HBA1C MFR BLD: 5.1 %
HCT VFR BLD AUTO: 44.7 % (ref 36.5–49.3)
HGB BLD-MCNC: 15 G/DL (ref 12–17)
IMM GRANULOCYTES # BLD AUTO: 0.01 THOUSAND/UL (ref 0–0.2)
IMM GRANULOCYTES NFR BLD AUTO: 0 % (ref 0–2)
LYMPHOCYTES # BLD AUTO: 1.44 THOUSANDS/ΜL (ref 0.6–4.47)
LYMPHOCYTES NFR BLD AUTO: 34 % (ref 14–44)
MCH RBC QN AUTO: 31.7 PG (ref 26.8–34.3)
MCHC RBC AUTO-ENTMCNC: 33.6 G/DL (ref 31.4–37.4)
MCV RBC AUTO: 95 FL (ref 82–98)
MONOCYTES # BLD AUTO: 0.33 THOUSAND/ΜL (ref 0.17–1.22)
MONOCYTES NFR BLD AUTO: 8 % (ref 4–12)
NEUTROPHILS # BLD AUTO: 2.29 THOUSANDS/ΜL (ref 1.85–7.62)
NEUTS SEG NFR BLD AUTO: 55 % (ref 43–75)
NRBC BLD AUTO-RTO: 0 /100 WBCS
PLATELET # BLD AUTO: 259 THOUSANDS/UL (ref 149–390)
PMV BLD AUTO: 10.3 FL (ref 8.9–12.7)
POTASSIUM SERPL-SCNC: 4.8 MMOL/L (ref 3.5–5.3)
PROT SERPL-MCNC: 7.7 G/DL (ref 6.4–8.2)
RBC # BLD AUTO: 4.73 MILLION/UL (ref 3.88–5.62)
SODIUM SERPL-SCNC: 140 MMOL/L (ref 136–145)
WBC # BLD AUTO: 4.19 THOUSAND/UL (ref 4.31–10.16)

## 2021-03-13 PROCEDURE — 0001A SARS-COV-2 / COVID-19 MRNA VACCINE (PFIZER-BIONTECH) 30 MCG: CPT

## 2021-03-13 PROCEDURE — 85025 COMPLETE CBC W/AUTO DIFF WBC: CPT

## 2021-03-13 PROCEDURE — 91300 SARS-COV-2 / COVID-19 MRNA VACCINE (PFIZER-BIONTECH) 30 MCG: CPT

## 2021-03-13 PROCEDURE — 87536 HIV-1 QUANT&REVRSE TRNSCRPJ: CPT

## 2021-03-13 PROCEDURE — 83036 HEMOGLOBIN GLYCOSYLATED A1C: CPT

## 2021-03-13 PROCEDURE — 36415 COLL VENOUS BLD VENIPUNCTURE: CPT

## 2021-03-13 PROCEDURE — 86360 T CELL ABSOLUTE COUNT/RATIO: CPT

## 2021-03-13 PROCEDURE — 80053 COMPREHEN METABOLIC PANEL: CPT

## 2021-03-14 LAB
BASOPHILS # BLD AUTO: 0.1 X10E3/UL (ref 0–0.2)
BASOPHILS NFR BLD AUTO: 1 %
CD3+CD4+ CELLS # BLD: 328 /UL (ref 359–1519)
CD3+CD4+ CELLS NFR BLD: 23.4 % (ref 30.8–58.5)
CD3+CD4+ CELLS/CD3+CD8+ CLL BLD: 0.69 % (ref 0.92–3.72)
CD3+CD8+ CELLS # BLD: 477 /UL (ref 109–897)
CD3+CD8+ CELLS NFR BLD: 34.1 % (ref 12–35.5)
EOSINOPHIL # BLD AUTO: 0.1 X10E3/UL (ref 0–0.4)
EOSINOPHIL NFR BLD AUTO: 2 %
ERYTHROCYTE [DISTWIDTH] IN BLOOD BY AUTOMATED COUNT: 13.5 % (ref 11.6–15.4)
HCT VFR BLD AUTO: 43.8 % (ref 37.5–51)
HGB BLD-MCNC: 14.9 G/DL (ref 13–17.7)
IMM GRANULOCYTES # BLD: 0 X10E3/UL (ref 0–0.1)
IMM GRANULOCYTES NFR BLD: 0 %
LYMPHOCYTES # BLD AUTO: 1.4 X10E3/UL (ref 0.7–3.1)
LYMPHOCYTES NFR BLD AUTO: 35 %
MCH RBC QN AUTO: 32.3 PG (ref 26.6–33)
MCHC RBC AUTO-ENTMCNC: 34 G/DL (ref 31.5–35.7)
MCV RBC AUTO: 95 FL (ref 79–97)
MONOCYTES # BLD AUTO: 0.3 X10E3/UL (ref 0.1–0.9)
MONOCYTES NFR BLD AUTO: 8 %
NEUTROPHILS # BLD AUTO: 2.2 X10E3/UL (ref 1.4–7)
NEUTROPHILS NFR BLD AUTO: 54 %
PLATELET # BLD AUTO: 268 X10E3/UL (ref 150–450)
RBC # BLD AUTO: 4.61 X10E6/UL (ref 4.14–5.8)
WBC # BLD AUTO: 4.1 X10E3/UL (ref 3.4–10.8)

## 2021-03-15 ENCOUNTER — OFFICE VISIT (OUTPATIENT)
Dept: SURGERY | Facility: CLINIC | Age: 51
End: 2021-03-15
Payer: COMMERCIAL

## 2021-03-15 ENCOUNTER — PATIENT OUTREACH (OUTPATIENT)
Dept: SURGERY | Facility: CLINIC | Age: 51
End: 2021-03-15

## 2021-03-15 VITALS
OXYGEN SATURATION: 96 % | HEART RATE: 74 BPM | DIASTOLIC BLOOD PRESSURE: 82 MMHG | HEIGHT: 71 IN | BODY MASS INDEX: 28.14 KG/M2 | TEMPERATURE: 98.8 F | SYSTOLIC BLOOD PRESSURE: 122 MMHG | WEIGHT: 201 LBS

## 2021-03-15 DIAGNOSIS — F50.81 BINGE EATING DISORDER: ICD-10-CM

## 2021-03-15 DIAGNOSIS — B20 HIV DISEASE (HCC): Primary | ICD-10-CM

## 2021-03-15 DIAGNOSIS — B20 HIV (HUMAN IMMUNODEFICIENCY VIRUS INFECTION) (HCC): ICD-10-CM

## 2021-03-15 PROBLEM — F50.819 BINGE EATING DISORDER: Status: ACTIVE | Noted: 2021-03-15

## 2021-03-15 PROCEDURE — 99214 OFFICE O/P EST MOD 30 MIN: CPT | Performed by: NURSE PRACTITIONER

## 2021-03-15 RX ORDER — BICTEGRAVIR SODIUM, EMTRICITABINE, AND TENOFOVIR ALAFENAMIDE FUMARATE 50; 200; 25 MG/1; MG/1; MG/1
1 TABLET ORAL DAILY
Qty: 30 TABLET | Refills: 5 | Status: SHIPPED | OUTPATIENT
Start: 2021-03-15 | End: 2021-10-05

## 2021-03-15 NOTE — PROGRESS NOTES
Assessment/Plan:    HIV disease ([place] [address] )  No results found for: [id number]  CD4 ABS   Date/Time Value Ref Range Status   2021 09:46  (L) 359 - 1519 /uL Final     HIV-1 RNA by PCR, Qn   Date/Time Value Ref Range Status   2020 08:39 AM <20 copies/mL Final     Comment:     HIV-1 RNA not detected  The reportable range for this assay is 20 to 10,000,000  copies HIV-1 RNA/mL  HIV-1 RNA Viral Load Log   Date/Time Value Ref Range Status   2020 08:39 AM COMMENT [id number]/mL Final     Comment:     Unable to calculate result since non-numeric result obtained for  component test          ART: Maria C Burger        Denies side effects  Stressed the importance of adherence  Continue follow up with ID clinic  Reviewed most recent labs, including Cd4 and viral load  Discussed the risks and benefits of treatment options, instructions for management, importance of treatment adherence, and reduction of risk factor  Educated on possible  medication side effects  Counseled on routes of HIV transmission, including the risk of  infection  Emphasized that viral suppression is the best method to prevent HIV transmission  At this time pt denies the need for HIV testing of anyone in their life  Total encounter time was 45 minutes  Greater then 20 minutes were spent on counseling and patient education  Pt voices understanding and agreement with treatment plan  Binge eating disorder  Started Contrave one month ago  Tolerating medication well  Denies side effects  Janine Real reports he is no longer binge eating and has reduced feelings of depression and urge to use drugs and alcohol          Diagnoses and all orders for this visit:    HIV disease ([place] [address] )    Binge eating disorder  -     Naltrexone-buPROPion HCl ER 8-90 MG TB12; Take 2 tablets by mouth 2 (two) times a day    HIV (human immunodeficiency virus infection) ([place] [address] )  -     bictegravir-emtricitab-tenofovir alafenamide (Biktarvy) -25 MG tablet; Take 1 tablet by mouth daily    BMI 28 0-28 9,adult          Subjective:      Patient ID: Tito Moore is a 52 y o  male  Lizett Bauitsta presents to the clinic today for one month primary care follow-up of binge eating and new start of bupropion/nalaxterone     Past medical history significant for HIV, TB status post treatment, and umbilical hernia  Lizett Bautista is doing well and offers no acute complaints  He reports less occurences of binge eating and reduced drug and alcohol craving  He is tolerating the medication and denies side effects  The following portions of the patient's history were reviewed and updated as appropriate: allergies, current medications, past family history, past medical history, past social history, past surgical history and problem list     Review of Systems   Constitutional: Negative for activity change, appetite change, chills, diaphoresis, fatigue, fever and unexpected weight change  HENT: Negative for congestion, dental problem, ear pain, hearing loss, mouth sores, rhinorrhea and sore throat  Eyes: Negative for pain, redness and visual disturbance  Respiratory: Negative for cough, shortness of breath and wheezing  Cardiovascular: Negative for chest pain, palpitations and leg swelling  Gastrointestinal: Negative for abdominal pain, constipation, diarrhea, nausea and vomiting  Endocrine: Negative for polydipsia, polyphagia and polyuria  Genitourinary: Negative for difficulty urinating, dysuria and hematuria  Musculoskeletal: Negative for arthralgias, back pain, joint swelling and myalgias  Skin: Negative for color change and rash  Neurological: Negative for seizures, syncope and headaches  Psychiatric/Behavioral: Negative for behavioral problems and suicidal ideas  All other systems reviewed and are negative          Objective:      /82   Pulse 74   Temp 98 8 °F (37 1 °C)   Ht 5' 11" (1 803 m)   Wt 91 2 kg (201 lb)   SpO2 96%   BMI 28 03 kg/m²     No results found for: AO8CBPR  CD4 ABS   Date/Time Value Ref Range Status   2021 09:46  (L) 359 - 1519 /uL Final     HIV-1 RNA by PCR, Qn   Date/Time Value Ref Range Status   2020 08:39 AM <20 copies/mL Final     Comment:     HIV-1 RNA not detected  The reportable range for this assay is 20 to 10,000,000  copies HIV-1 RNA/mL  HIV-1 RNA Viral Load Log   Date/Time Value Ref Range Status   2020 08:39 AM COMMENT [id number]/mL Final     Comment:     Unable to calculate result since non-numeric result obtained for  component test            Current Outpatient Medications:     bictegravir-emtricitab-tenofovir alafenamide (Biktarvy) -25 MG tablet, Take 1 tablet by mouth daily, Disp: 30 tablet, Rfl: 5    Naltrexone-buPROPion HCl ER 8-90 MG TB12, Take 2 tablets by mouth 2 (two) times a day, Disp: 120 tablet, Rfl: 2        Denies side effects  Stressed the importance of adherence  Continue follow up with ID clinic  Reviewed most recent labs, including Cd4 and viral load  Discussed the risks and benefits of treatment options, instructions for management, importance of treatment adherence, and reduction of risk factor  Educated on possible  medication side effects  Counseled on routes of HIV transmission, including the risk of  infection  Emphasized that viral suppression is the best method to prevent HIV transmission  At this time pt denies the need for HIV testing of anyone in their life  Total encounter time was 45 minutes  Greater then 20 minutes were spent on counseling and patient education  Pt voices understanding and agreement with treatment plan  Physical Exam  Constitutional:       General: He is not in acute distress  Appearance: He is well-developed  HENT:      Head: Normocephalic  Right Ear: External ear normal       Left Ear: External ear normal       Nose: Nose normal       Mouth/Throat:      Pharynx: No oropharyngeal exudate     Eyes: General:         Right eye: No discharge  Left eye: No discharge  Conjunctiva/sclera: Conjunctivae normal       Pupils: Pupils are equal, round, and reactive to light  Neck:      Musculoskeletal: Normal range of motion  Thyroid: No thyromegaly  Cardiovascular:      Rate and Rhythm: Normal rate and regular rhythm  Heart sounds: Normal heart sounds  No murmur  Pulmonary:      Effort: Pulmonary effort is normal       Breath sounds: Normal breath sounds  No wheezing  Abdominal:      General: Bowel sounds are normal       Palpations: Abdomen is soft  There is no mass  Tenderness: There is no abdominal tenderness  Musculoskeletal: Normal range of motion  General: No tenderness  Lymphadenopathy:      Cervical: No cervical adenopathy  Skin:     General: Skin is warm and dry  Findings: No rash  Neurological:      Mental Status: He is alert and oriented to person, place, and time  Psychiatric:         Behavior: Behavior normal          BMI Counseling: Body mass index is 28 03 kg/m²  The BMI is above normal  Nutrition recommendations include 3-5 servings of fruits/vegetables daily  Exercise recommendations include exercising 3-5 times per week

## 2021-03-15 NOTE — ASSESSMENT & PLAN NOTE
No results found for: [id number]  CD4 ABS   Date/Time Value Ref Range Status   2021 09:46  (L) 359 - 1519 /uL Final     HIV-1 RNA by PCR, Qn   Date/Time Value Ref Range Status   2020 08:39 AM <20 copies/mL Final     Comment:     HIV-1 RNA not detected  The reportable range for this assay is 20 to 10,000,000  copies HIV-1 RNA/mL  HIV-1 RNA Viral Load Log   Date/Time Value Ref Range Status   2020 08:39 AM COMMENT [id number]/mL Final     Comment:     Unable to calculate result since non-numeric result obtained for  component test          ART: Shantal Maddox        Denies side effects  Stressed the importance of adherence  Continue follow up with ID clinic  Reviewed most recent labs, including Cd4 and viral load  Discussed the risks and benefits of treatment options, instructions for management, importance of treatment adherence, and reduction of risk factor  Educated on possible  medication side effects  Counseled on routes of HIV transmission, including the risk of  infection  Emphasized that viral suppression is the best method to prevent HIV transmission  At this time pt denies the need for HIV testing of anyone in their life  Total encounter time was 45 minutes  Greater then 20 minutes were spent on counseling and patient education  Pt voices understanding and agreement with treatment plan

## 2021-03-15 NOTE — ASSESSMENT & PLAN NOTE
Started Contrave one month ago  Tolerating medication well  Denies side effects  Sanket Lockett reports he is no longer binge eating and has reduced feelings of depression and urge to use drugs and alcohol

## 2021-03-15 NOTE — PATIENT INSTRUCTIONS

## 2021-03-16 ENCOUNTER — PATIENT OUTREACH (OUTPATIENT)
Dept: SURGERY | Facility: CLINIC | Age: 51
End: 2021-03-16

## 2021-03-16 ENCOUNTER — APPOINTMENT (OUTPATIENT)
Dept: PHYSICAL THERAPY | Facility: CLINIC | Age: 51
End: 2021-03-16
Payer: COMMERCIAL

## 2021-03-16 LAB
HIV1 RNA # SERPL NAA+PROBE: 50 COPIES/ML
HIV1 RNA SERPL NAA+PROBE-LOG#: 1.7 LOG10COPY/ML

## 2021-03-18 ENCOUNTER — OFFICE VISIT (OUTPATIENT)
Dept: PHYSICAL THERAPY | Facility: CLINIC | Age: 51
End: 2021-03-18
Payer: COMMERCIAL

## 2021-03-18 DIAGNOSIS — M26.629 TMJPDS (TEMPOROMANDIBULAR JOINT PAIN DYSFUNCTION SYNDROME): Primary | ICD-10-CM

## 2021-03-18 PROCEDURE — 97112 NEUROMUSCULAR REEDUCATION: CPT | Performed by: PHYSICAL THERAPIST

## 2021-03-18 PROCEDURE — 97140 MANUAL THERAPY 1/> REGIONS: CPT | Performed by: PHYSICAL THERAPIST

## 2021-03-18 NOTE — PROGRESS NOTES
Daily Note     Today's date: 3/18/2021  Patient name: Tressa Braswell  : 1970  MRN: [id number]  Referring provider: ROMAN Washburn  Dx:   Encounter Diagnosis     ICD-10-CM    1  TMJPDS (temporomandibular joint pain dysfunction syndrome)  M26 629                   Subjective: Pt reports increased bruxing and morning pain and stiffness of unknown origin over the past week however is able to resolve with HEP  Objective: See treatment diary below    Assessment: Pt demonstrated increased L lateral pterygoid tone but full, pain-free ROM and good self-mgmt  Pt will benefit from f/u with dentistry to discuss  options for bruxing  Plan: Cont  POC  Prepare for d/c in 1 week if able to continue pain resolution with HEP  Precautions:  PMHx: HIV, TB, anxiety  Dx: chronic B TMJD    Manuals 2/18 2/23 2/25 3/2 3/4 3/9 3/11      B lateral pterygoid ischemic pressure  3 min ea 3x1 min ea 3x1 min ea 3x1 min ea 3x1 min R/  2x1 min  L/  4x1 min R/  3x1 min  L/  4x1 min     B TMJ laser  10W  1500J ea 10W  1500J ea 10W  1500J ea 10W 1500J ea 10W  1500J ea 10W  1500J ea 10W   1500J ea                               Neuro Re-Ed             Supine chin tucks 3"x10 3"x10 3"x10 3"x15 3"x10 3"x10 3"x10 3"x10     DNF    3"x5 3"x10 5"x10 6"x10 6"x10     Seated C/S retraction 2"x10 2"x15 2"x15 2"x15 1x15 1x15 1x15 1x15     Seated C/S retraction w/ B rotation  1x15 1x15 1x15 1x15 1x15 1x15 1x15     Seated C/S retraction w/ B SB  1x15 1x15 1x15 1x15 1x15 1x15 1x15     TMJ opening w/ tongue on palate 1x15 2x15 2x15 2x15 3x10 3x10 3x10 3x10     TMJ B lateral glide w/ tongue on palate 1x15 2x15 2x15 2x15 3x10 3x10 3x10 3x10     TMJ opening FROM without protrusion  1x10 1x15 1x15 1x15 1x15 1x15 1x15     B TB horizontal ABD      R/  3x10 R/  3x15 R/  3x20                               Ther Ex                                                                                                                     Ther Activity Gait Training                                       Modalities

## 2021-03-19 ENCOUNTER — PATIENT OUTREACH (OUTPATIENT)
Dept: SURGERY | Facility: CLINIC | Age: 51
End: 2021-03-19

## 2021-03-23 ENCOUNTER — OFFICE VISIT (OUTPATIENT)
Dept: SURGERY | Facility: CLINIC | Age: 51
End: 2021-03-23
Payer: COMMERCIAL

## 2021-03-23 VITALS
BODY MASS INDEX: 27.8 KG/M2 | HEIGHT: 71 IN | TEMPERATURE: 97.4 F | SYSTOLIC BLOOD PRESSURE: 118 MMHG | WEIGHT: 198.6 LBS | HEART RATE: 74 BPM | OXYGEN SATURATION: 98 % | DIASTOLIC BLOOD PRESSURE: 80 MMHG

## 2021-03-23 DIAGNOSIS — R63.4 WEIGHT LOSS: ICD-10-CM

## 2021-03-23 DIAGNOSIS — A53.9 SYPHILIS: ICD-10-CM

## 2021-03-23 DIAGNOSIS — B20 HIV DISEASE (HCC): Primary | ICD-10-CM

## 2021-03-23 DIAGNOSIS — K12.0 APHTHOUS ULCER: ICD-10-CM

## 2021-03-23 PROCEDURE — 1036F TOBACCO NON-USER: CPT | Performed by: INTERNAL MEDICINE

## 2021-03-23 PROCEDURE — 3008F BODY MASS INDEX DOCD: CPT | Performed by: INTERNAL MEDICINE

## 2021-03-23 PROCEDURE — 99214 OFFICE O/P EST MOD 30 MIN: CPT | Performed by: INTERNAL MEDICINE

## 2021-03-23 NOTE — PROGRESS NOTES
Progress Note - Infectious Disease   Danay Hart 52 y o  male MRN: [id number]  Unit/Bed#:  Encounter: [id number]      Impression/Plan:  1   HIV-doing well on Biktarvy with a  near undetectable viral load and a CD4 count in the 300s   He previously had some blood soon his viral load felt secondary to supplements   Recheck labs in 2 months and follow up in 3 months  Stressed adherence      2   Syphilis-has been serofast  The RPR titer remains quite low without any significant increase   Continue annual follow-up of RPR     3    Weight loss-intentional  Will continue to monitor      4  Severe refractory aphthous ulcerations-no recurrence since stopping nonsteroidals   Will monitor for now     5  TMJ syndrome- continues to get physical therapy  Patient was provided medication, adherence and prevention education    Subjective:  Routine follow-up for HIV  Patient claims 100% adherence with  Biktarvy   Patient denies any notable side effects  Overall the feeling well  The patient denies any fever chills or sweats, denies any nausea vomiting or diarrhea, denies any cough or shortness of breath  ROS:  A complete review of systems is negative other than that noted above in the subjective    Followup portions patient history reviewed and updated as: Allergies, current medications, past medical history, past social history, past surgical history, and the problem list    Objective:  Vitals:  Vitals:    03/23/21 1708   BP: 118/80   Pulse: 74   Temp: (!) 97 4 °F (36 3 °C)   SpO2: 98%   Weight: 90 1 kg (198 lb 9 6 oz)   Height: 5' 11" (1 803 m)       Physical Exam:   General Appearance:  Alert, interactive, appearing well,  nontoxic, no acute distress  Neck:   Supple without lymphadenopathy, no thyromegaly or masses   Throat: Oropharynx moist without lesions      Lungs:   Clear to auscultation bilaterally; no wheezes, rhonchi or rales; respirations unlabored   Heart:  RRR; no murmur, rub or gallop   Abdomen: Soft, non-tender, non-distended, positive bowel sounds  Extremities: No clubbing, cyanosis or edema   Skin: No new rashes or lesions  No draining wounds noted  Labs, Imaging, & Other studies:   All pertinent labs and imaging studies were personally reviewed    Lab Results   Component Value Date    K 4 8 03/13/2021     03/13/2021    CO2 28 03/13/2021    BUN 13 03/13/2021    CREATININE 1 18 03/13/2021    GLUF 98 03/13/2021    CALCIUM 9 5 03/13/2021    AST 19 03/13/2021    ALT 30 03/13/2021    ALKPHOS 71 03/13/2021    EGFR 73 03/13/2021     Lab Results   Component Value Date    WBC 4 19 (L) 03/13/2021    WBC 4 1 03/13/2021    HGB 15 0 03/13/2021    HGB 14 9 03/13/2021    HCT 44 7 03/13/2021    HCT 43 8 03/13/2021    MCV 95 03/13/2021    MCV 95 03/13/2021     03/13/2021     03/13/2021     Lab Results   Component Value Date    HEPCAB Non-reactive 12/03/2020     Lab Results   Component Value Date    HEPCAB Non-reactive 12/03/2020     Lab Results   Component Value Date    RPR Reactive (A) 09/09/2020    RPR Reactive 2 dils (A) 09/09/2020     CD4 ABS   Date/Time Value Ref Range Status   03/13/2021 09:46  (L) 359 - 1519 /uL Final     HIV-1 RNA by PCR, Qn   Date/Time Value Ref Range Status   03/13/2021 09:46 AM 50 copies/mL Final     Comment:     The reportable range for this assay is 20 to 10,000,000  copies HIV-1 RNA/mL             Current Outpatient Medications:     bictegravir-emtricitab-tenofovir alafenamide (Biktarvy) -25 MG tablet, Take 1 tablet by mouth daily, Disp: 30 tablet, Rfl: 5    Naltrexone-buPROPion HCl ER 8-90 MG TB12, Take 2 tablets by mouth 2 (two) times a day, Disp: 120 tablet, Rfl: 2

## 2021-03-23 NOTE — PROGRESS NOTES
Assessment:   Follow up    Nutrition Diagnosis    Problem: Unitended weight gain    Related to: Estimated intake inconsistent with measured nutritional needs    As Evidenced By: Weight Gain and Patient Interview    Nutrition Education Intervention: Provided     Person Educated: Patient  Topics Discussed: Recent wt gain/current goals for wt loss and increased PA  Teaching Method: Verbal and Written    Goals    Goal #1 Wt loss  Education started - reinforce in follow-up  Visit Summary    Sanket Lockett is re-committed to wt loss as he is feeling better r/t anxiety 2nd to COVID pandemic/restrictions  Recently feels better about communicating with staff/general public  Had discussed eating late at night, binge eating (mindless eating), remains vegan but consuming too many sweets, etc   Provided detailed information on ordering from mobile market and use of weekly voucher  Will continue to assess and provide interventions as needed    Selina Hernandez RD,LDN,CHC

## 2021-03-26 ENCOUNTER — PATIENT OUTREACH (OUTPATIENT)
Dept: SURGERY | Facility: CLINIC | Age: 51
End: 2021-03-26

## 2021-03-26 ENCOUNTER — OFFICE VISIT (OUTPATIENT)
Dept: PHYSICAL THERAPY | Facility: CLINIC | Age: 51
End: 2021-03-26
Payer: COMMERCIAL

## 2021-03-26 DIAGNOSIS — M26.629 TMJPDS (TEMPOROMANDIBULAR JOINT PAIN DYSFUNCTION SYNDROME): Primary | ICD-10-CM

## 2021-03-26 PROCEDURE — 97112 NEUROMUSCULAR REEDUCATION: CPT | Performed by: PHYSICAL THERAPIST

## 2021-03-26 PROCEDURE — 97140 MANUAL THERAPY 1/> REGIONS: CPT | Performed by: PHYSICAL THERAPIST

## 2021-03-26 NOTE — PROGRESS NOTES
Daily Note     Today's date: 3/26/2021  Patient name: Ana Luna  : 1970  MRN: [id number]  Referring provider: ROMAN Vaughan  Dx:   Encounter Diagnosis     ICD-10-CM    1  TMJPDS (temporomandibular joint pain dysfunction syndrome)  M26 629                   Subjective: Pt reports not waking up with pain the past few days  He notes improved postural awareness with less effort during the day and continues to be able to resolve pain or stiffness with HEP  Objective: See treatment diary below  Updated HEP    Assessment: Pt demonstrated good understanding of updated HEP and self-management  Plan: Discharge from skilled PT services  Precautions:  PMHx: HIV, TB, anxiety  Dx: chronic B TMJD    Manuals 2/18 2/23 2/25 3/2 3/4 3/9 3/11 3/18 3/26    B lateral pterygoid ischemic pressure  3 min ea 3x1 min ea 3x1 min ea 3x1 min ea 3x1 min R/  2x1 min  L/  4x1 min R/  3x1 min  L/  4x1 min R/  3x1 min  L/  4x1 min    B TMJ laser  10W  1500J ea 10W  1500J ea 10W  1500J ea 10W 1500J ea 10W  1500J ea 10W  1500J ea 10W   1500J ea 10W  1500Jea                              Neuro Re-Ed             Supine chin tucks 3"x10 3"x10 3"x10 3"x15 3"x10 3"x10 3"x10 3"x10 3"x10    DNF    3"x5 3"x10 5"x10 6"x10 6"x10 7"x10    Seated C/S retraction 2"x10 2"x15 2"x15 2"x15 1x15 1x15 1x15 1x15 1x15    Seated C/S retraction w/ B rotation  1x15 1x15 1x15 1x15 1x15 1x15 1x15 1x15    Seated C/S retraction w/ B SB  1x15 1x15 1x15 1x15 1x15 1x15 1x15 1x15    TMJ opening w/ tongue on palate 1x15 2x15 2x15 2x15 3x10 3x10 3x10 3x10 3x10    TMJ B lateral glide w/ tongue on palate 1x15 2x15 2x15 2x15 3x10 3x10 3x10 3x10 3x10    TMJ opening FROM without protrusion  1x10 1x15 1x15 1x15 1x15 1x15 1x15 1x15    B TB horizontal ABD      R/  3x10 R/  3x15 R/  3x20 R/  3x20                              Ther Ex                                                                                                                     Ther Activity Gait Training                                       Modalities

## 2021-03-26 NOTE — PROGRESS NOTES
Ct called needing assistance with dental  Per ct hasn't been able to schedule an apt and is facing troubles with possibly needing   Cm called [facility]) and schedule an apt for Pooja 3 @1pm      Cm called ct back and left a vm

## 2021-03-29 ENCOUNTER — PATIENT OUTREACH (OUTPATIENT)
Dept: SURGERY | Facility: CLINIC | Age: 51
End: 2021-03-29

## 2021-04-03 ENCOUNTER — IMMUNIZATIONS (OUTPATIENT)
Dept: FAMILY MEDICINE CLINIC | Facility: HOSPITAL | Age: 51
End: 2021-04-03

## 2021-04-03 DIAGNOSIS — Z23 ENCOUNTER FOR IMMUNIZATION: Primary | ICD-10-CM

## 2021-04-03 PROCEDURE — 91300 SARS-COV-2 / COVID-19 MRNA VACCINE (PFIZER-BIONTECH) 30 MCG: CPT

## 2021-04-03 PROCEDURE — 0002A SARS-COV-2 / COVID-19 MRNA VACCINE (PFIZER-BIONTECH) 30 MCG: CPT

## 2021-04-13 ENCOUNTER — PATIENT OUTREACH (OUTPATIENT)
Dept: SURGERY | Facility: CLINIC | Age: 51
End: 2021-04-13

## 2021-05-05 ENCOUNTER — PATIENT OUTREACH (OUTPATIENT)
Dept: SURGERY | Facility: CLINIC | Age: 51
End: 2021-05-05

## 2021-05-05 NOTE — PROGRESS NOTES
Ct called cm about RW recert for fee scale  Ct sent a copy to cm's email  ( see media) Cm forward it to hospital financial counselors  Cm also reminded ct about recert  Ct agreed to 5/10 @ noon

## 2021-05-10 ENCOUNTER — PATIENT OUTREACH (OUTPATIENT)
Dept: SURGERY | Facility: CLINIC | Age: 51
End: 2021-05-10

## 2021-05-10 NOTE — PROGRESS NOTES
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

## 2021-05-11 ENCOUNTER — PATIENT OUTREACH (OUTPATIENT)
Dept: SURGERY | Facility: CLINIC | Age: 51
End: 2021-05-11

## 2021-05-14 ENCOUNTER — PATIENT OUTREACH (OUTPATIENT)
Dept: SURGERY | Facility: CLINIC | Age: 51
End: 2021-05-14

## 2021-06-03 ENCOUNTER — PATIENT OUTREACH (OUTPATIENT)
Dept: SURGERY | Facility: CLINIC | Age: 51
End: 2021-06-03

## 2021-06-03 ENCOUNTER — OFFICE VISIT (OUTPATIENT)
Dept: DENTISTRY | Facility: CLINIC | Age: 51
End: 2021-06-03

## 2021-06-03 VITALS — HEART RATE: 75 BPM | SYSTOLIC BLOOD PRESSURE: 122 MMHG | DIASTOLIC BLOOD PRESSURE: 95 MMHG | TEMPERATURE: 98 F

## 2021-06-03 DIAGNOSIS — Z01.20 ENCOUNTER FOR DENTAL EXAMINATION: Primary | ICD-10-CM

## 2021-06-03 PROCEDURE — D1110 PROPHYLAXIS - ADULT: HCPCS | Performed by: DENTIST

## 2021-06-03 PROCEDURE — D0120 PERIODIC ORAL EVALUATION - ESTABLISHED PATIENT: HCPCS | Performed by: DENTIST

## 2021-06-03 NOTE — PROGRESS NOTES
Dental procedures in this visit     - PROPHYLAXIS - ADULT (Completed)     Service provider: Carmenza Campos DMD     Billing provider: Gina Servin DMD     - PERIODIC ORAL EVALUATION - ESTABLISHED PATIENT (Completed)     Service provider: Carmenza Campos DMD     Billing provider: Gina Servin DMD     Subjective   Patient ID: Gilford Pupa is a 52 y o  male  Chief Complaint   Patient presents with    Routine Oral Cleaning       Objective   Dental Soft Tissue Exam   - No Swelling, bilateral symmetry extraorally  - No lesions noted intraorally  Saliva production adequate; within normal limits  Dental Exam   - Erosion present on the lingual of #7, 8, 9, 10 with incisal wear  Caries noted on #12 and #30  Hard Tissue Exam:  Caries as charted  Assessment/Plan :   Discussed with patient erosion and incisal wear present on #7, 8, 9, 10  Recommended completion of four crown #7-10 to improve function and esthetics  Patient reports nocturnal grinding; would like a new nightguard  Current nightguard is approximately 1years old and worn  Patient is currently seeing a PT that is improving TMJ function  Due to severe grinding habits, crowns recommended to be PFZ  Patient made aware that #8 and #9 are at risk of needed root canal therapy following reduction as there is minimal crown structure left on the lingual  Patient also made aware of 2 carious lesions  Patient understands and accepts treatment  Following crown preparation, patient will remain in temporaries for a time of monitoring to ensure RCT is not needed  Once verified, patient may continue with final restorations followed by fabrication of new nightguard       NV:  Prep #7, 8, 9, 10     Dr Jose Villarreal

## 2021-06-03 NOTE — PROGRESS NOTES
Ct called robbin about his dental apt with [facility]  Per ct is on his way but wasn't sure if he needed his fee scale application with him in order to proceed with care  Robbin explained that's for the system and is his file so he's fine  If he has any questions or troubles to contact robbin

## 2021-06-08 ENCOUNTER — TELEPHONE (OUTPATIENT)
Dept: SURGERY | Facility: CLINIC | Age: 51
End: 2021-06-08

## 2021-06-09 ENCOUNTER — OFFICE VISIT (OUTPATIENT)
Dept: SURGERY | Facility: CLINIC | Age: 51
End: 2021-06-09
Payer: COMMERCIAL

## 2021-06-09 VITALS
TEMPERATURE: 98.2 F | WEIGHT: 186 LBS | SYSTOLIC BLOOD PRESSURE: 122 MMHG | HEIGHT: 71 IN | BODY MASS INDEX: 26.04 KG/M2 | OXYGEN SATURATION: 96 % | HEART RATE: 75 BPM | DIASTOLIC BLOOD PRESSURE: 78 MMHG

## 2021-06-09 DIAGNOSIS — B20 HIV DISEASE (HCC): Primary | ICD-10-CM

## 2021-06-09 DIAGNOSIS — M54.50 ACUTE LEFT-SIDED LOW BACK PAIN, UNSPECIFIED WHETHER SCIATICA PRESENT: ICD-10-CM

## 2021-06-09 LAB
SL AMB  POCT GLUCOSE, UA: NORMAL
SL AMB LEUKOCYTE ESTERASE,UA: NORMAL
SL AMB POCT BILIRUBIN,UA: NORMAL
SL AMB POCT BLOOD,UA: NORMAL
SL AMB POCT KETONES,UA: NORMAL
SL AMB POCT NITRITE,UA: NORMAL
SL AMB POCT PH,UA: 7
SL AMB POCT SPECIFIC GRAVITY,UA: 1
SL AMB POCT URINE PROTEIN: NORMAL
SL AMB POCT UROBILINOGEN: NORMAL

## 2021-06-09 PROCEDURE — 99213 OFFICE O/P EST LOW 20 MIN: CPT | Performed by: NURSE PRACTITIONER

## 2021-06-09 PROCEDURE — 81002 URINALYSIS NONAUTO W/O SCOPE: CPT | Performed by: NURSE PRACTITIONER

## 2021-06-09 PROCEDURE — 1036F TOBACCO NON-USER: CPT | Performed by: NURSE PRACTITIONER

## 2021-06-09 PROCEDURE — 3008F BODY MASS INDEX DOCD: CPT | Performed by: NURSE PRACTITIONER

## 2021-06-09 NOTE — PATIENT INSTRUCTIONS
Muscle Strain   WHAT YOU NEED TO KNOW:   A muscle strain is a twist, pull, or tear of a muscle or tendon  A tendon is a strong elastic tissue that connects a muscle to a bone  Signs of a strained muscle include bruising and swelling over the area, pain with movement, and loss of strength  DISCHARGE INSTRUCTIONS:   Return to the emergency department if:   · You suddenly cannot feel or move your injured muscle  Contact your healthcare provider if:   · Your pain and swelling worsen or do not go away  · You have questions or concerns about your condition or care  Medicines:   · NSAIDs  help decrease swelling and pain or fever  This medicine is available with or without a doctor's order  NSAIDs can cause stomach bleeding or kidney problems in certain people  If you take blood thinner medicine, always ask your healthcare provider if NSAIDs are safe for you  Always read the medicine label and follow directions  · Muscle relaxers  help decrease pain and muscle spasms  · Take your medicine as directed  Contact your healthcare provider if you think your medicine is not helping or if you have side effects  Tell him of her if you are allergic to any medicine  Keep a list of the medicines, vitamins, and herbs you take  Include the amounts, and when and why you take them  Bring the list or the pill bottles to follow-up visits  Carry your medicine list with you in case of an emergency  Follow up with your healthcare provider as directed: Your healthcare provider may suggest that you have a follow-up visit before you go back to your usual activity  Write down your questions so you remember to ask them during your visits  Self-care:   · 3 to 7 days after the injury:  Use Rest, Ice, Compression, and Elevation (RICE) to help stop bruising and decrease pain and swelling  ? Rest:  Rest your muscle to allow your injury to heal  When the pain decreases, begin normal, slow movements   For mild and moderate muscle strains, you should rest your muscles for about 2 days  However, if you have a severe muscle strain, you should rest for 10 to 14 days  You may need to use crutches to walk if your muscle strain is in your legs or lower body  ? Ice:  Put an ice pack on the injured area  Put a towel between the ice pack and your skin  Do not put the ice pack directly on your skin  You can use a package of frozen peas instead of an ice pack  ? Compression:  You may need to wrap an elastic bandage around the area to decrease swelling  It should be tight enough for you to feel support  Do not wrap it too tightly  ? Elevation:  Keep the injured muscle raised above your heart if possible  For example if you have a strain of your lower leg muscle, lie down and prop your leg up on pillows  This helps decrease pain and swelling  · 3 to 21 days after the injury:  Start to slowly and regularly exercise your muscle  This will help it heal  If you feel pain, decrease how hard you are exercising  · 1 to 6 weeks after the injury:  Stretch the injured muscle  Hold the stretch for about 30 seconds  Do this 4 times a day  You may stretch the muscle until you feel a slight pull  Stop stretching if you feel pain  · 2 weeks to 6 months after the injury:  The goal of this phase is to return to the activity you were doing before the injury happened, without hurting the muscle again  · 3 weeks to 6 months after the injury:  Keep stretching and strengthening your muscles to avoid injury  Slowly increase the time and distance that you exercise  You may have signs and symptoms of muscle strain 6 months after the injury, even if you do things to help it heal  In this case, you may need surgery on the muscle  © Copyright [address] Information is for End User's use only and may not be sold, redistributed or otherwise used for commercial purposes   All illustrations and images included in CareNotes® are the copyrighted property of A D A M , Inc  or [facility] Barbara Nobles   The above information is an  only  It is not intended as medical advice for individual conditions or treatments  Talk to your doctor, nurse or pharmacist before following any medical regimen to see if it is safe and effective for you

## 2021-06-09 NOTE — PROGRESS NOTES
Subjective   Mayte Chavez is a 52 y o  male who presents for evaluation of low back pain  The patient has had no prior back problems  Symptoms have been present for 2 days and are unchanged  Onset was related to / precipitated by a twisting movement and lifting a heavy object  The pain is located in the right sacroiliac area and does not radiate  The pain is described as aching, burning and throbbing and occurs all day  He rates his pain as a 3 on a scale of 0-10  Symptoms are exacerbated by nothing in particular  Symptoms are improved by rest and sleep  He has also tried stretching which provided no symptom relief  He has no other symptoms associated with the back pain  The patient has no "red flag" history indicative of complicated back pain       The following portions of the patient's history were reviewed and updated as appropriate: allergies, current medications, past family history, past medical history, past social history, past surgical history and problem list     Review of Systems  Pertinent items are noted in HPI       Objective   Full range of motion without pain, no tenderness, no spasm, no curvature  Normal reflexes, gait, strength and negative straight-leg raise       Assessment/Plan   Nonspecific acute low back pain  Natural history and expected course discussed  Questions answered  Educational material distributed  Proper lifting, bending technique discussed  Stretching exercises discussed  Short (2-4 day) period of relative rest recommended until acute symptoms improve  Ice to affected area as needed for local pain relief  OTC analgesics as needed  Follow-up in if symptoms worsen or do not contiune to improve  Call [phone] with any additional questions         No results found for: [id number]  CD4 ABS   Date/Time Value Ref Range Status   03/13/2021 09:46  (L) 359 - 1519 /uL Final     HIV-1 RNA by PCR, Qn   Date/Time Value Ref Range Status   03/13/2021 09:46 AM 50 copies/mL Final Comment:     The reportable range for this assay is 20 to 10,000,000  copies HIV-1 RNA/mL  HIV-1 RNA Viral Load Log   Date/Time Value Ref Range Status   2021 09:46 AM 1 699 [id number]/mL Final           Current Outpatient Medications:     bictegravir-emtricitab-tenofovir alafenamide (Biktarvy) -25 MG tablet, Take 1 tablet by mouth daily, Disp: 30 tablet, Rfl: 5    Naltrexone-buPROPion HCl ER 8-90 MG TB12, Take 2 tablets by mouth 2 (two) times a day, Disp: 120 tablet, Rfl: 2        Denies side effects  Stressed the importance of adherence  Continue follow up with ID clinic  Reviewed most recent labs, including Cd4 and viral load  Discussed the risks and benefits of treatment options, instructions for management, importance of treatment adherence, and reduction of risk factor  Educated on possible  medication side effects  Counseled on routes of HIV transmission, including the risk of  infection  Emphasized that viral suppression is the best method to prevent HIV transmission  At this time pt denies the need for HIV testing of anyone in their life  Total encounter time was 45 minutes  Greater then 20 minutes were spent on counseling and patient education  Pt voices understanding and agreement with treatment plan  Usama Perez

## 2021-06-11 ENCOUNTER — APPOINTMENT (OUTPATIENT)
Dept: LAB | Facility: HOSPITAL | Age: 51
End: 2021-06-11
Attending: INTERNAL MEDICINE
Payer: COMMERCIAL

## 2021-06-11 DIAGNOSIS — B20 HIV DISEASE (HCC): ICD-10-CM

## 2021-06-11 LAB
ALBUMIN SERPL BCP-MCNC: 4.2 G/DL (ref 3.4–4.8)
ALP SERPL-CCNC: 66.7 U/L (ref 10–129)
ALT SERPL W P-5'-P-CCNC: 15 U/L (ref 5–63)
ANION GAP SERPL CALCULATED.3IONS-SCNC: 5 MMOL/L (ref 4–13)
AST SERPL W P-5'-P-CCNC: 16 U/L (ref 15–41)
BASOPHILS # BLD AUTO: 0.03 THOUSANDS/ΜL (ref 0–0.1)
BASOPHILS NFR BLD AUTO: 1 % (ref 0–1)
BILIRUB SERPL-MCNC: 0.53 MG/DL (ref 0.3–1.2)
BUN SERPL-MCNC: 16 MG/DL (ref 6–20)
CALCIUM SERPL-MCNC: 9.4 MG/DL (ref 8.4–10.2)
CHLORIDE SERPL-SCNC: 106 MMOL/L (ref 96–108)
CO2 SERPL-SCNC: 28 MMOL/L (ref 22–33)
CREAT SERPL-MCNC: 1.02 MG/DL (ref 0.5–1.2)
EOSINOPHIL # BLD AUTO: 0.09 THOUSAND/ΜL (ref 0–0.61)
EOSINOPHIL NFR BLD AUTO: 2 % (ref 0–6)
ERYTHROCYTE [DISTWIDTH] IN BLOOD BY AUTOMATED COUNT: 12.2 % (ref 11.6–15.1)
GFR SERPL CREATININE-BSD FRML MDRD: 87 ML/MIN/1.73SQ M
GLUCOSE P FAST SERPL-MCNC: 87 MG/DL (ref 70–105)
HCT VFR BLD AUTO: 42.8 % (ref 36.5–49.3)
HGB BLD-MCNC: 14.5 G/DL (ref 12–17)
IMM GRANULOCYTES # BLD AUTO: 0 THOUSAND/UL (ref 0–0.2)
IMM GRANULOCYTES NFR BLD AUTO: 0 % (ref 0–2)
LYMPHOCYTES # BLD AUTO: 1.36 THOUSANDS/ΜL (ref 0.6–4.47)
LYMPHOCYTES NFR BLD AUTO: 33 % (ref 14–44)
MCH RBC QN AUTO: 31.6 PG (ref 26.8–34.3)
MCHC RBC AUTO-ENTMCNC: 33.9 G/DL (ref 31.4–37.4)
MCV RBC AUTO: 93 FL (ref 82–98)
MONOCYTES # BLD AUTO: 0.4 THOUSAND/ΜL (ref 0.17–1.22)
MONOCYTES NFR BLD AUTO: 10 % (ref 4–12)
NEUTROPHILS # BLD AUTO: 2.22 THOUSANDS/ΜL (ref 1.85–7.62)
NEUTS SEG NFR BLD AUTO: 54 % (ref 43–75)
PLATELET # BLD AUTO: 250 THOUSANDS/UL (ref 149–390)
PMV BLD AUTO: 10.5 FL (ref 8.9–12.7)
POTASSIUM SERPL-SCNC: 4.4 MMOL/L (ref 3.5–5)
PROT SERPL-MCNC: 7 G/DL (ref 6.4–8.3)
RBC # BLD AUTO: 4.59 MILLION/UL (ref 3.88–5.62)
SODIUM SERPL-SCNC: 139 MMOL/L (ref 133–145)
WBC # BLD AUTO: 4.1 THOUSAND/UL (ref 4.31–10.16)

## 2021-06-11 PROCEDURE — 36415 COLL VENOUS BLD VENIPUNCTURE: CPT

## 2021-06-11 PROCEDURE — 85025 COMPLETE CBC W/AUTO DIFF WBC: CPT

## 2021-06-11 PROCEDURE — 86360 T CELL ABSOLUTE COUNT/RATIO: CPT

## 2021-06-11 PROCEDURE — 87536 HIV-1 QUANT&REVRSE TRNSCRPJ: CPT

## 2021-06-11 PROCEDURE — 80053 COMPREHEN METABOLIC PANEL: CPT

## 2021-06-12 LAB
BASOPHILS # BLD AUTO: 0 X10E3/UL (ref 0–0.2)
BASOPHILS NFR BLD AUTO: 1 %
CD3+CD4+ CELLS # BLD: 323 /UL (ref 359–1519)
CD3+CD4+ CELLS NFR BLD: 23.1 % (ref 30.8–58.5)
CD3+CD4+ CELLS/CD3+CD8+ CLL BLD: 0.67 % (ref 0.92–3.72)
CD3+CD8+ CELLS # BLD: 482 /UL (ref 109–897)
CD3+CD8+ CELLS NFR BLD: 34.4 % (ref 12–35.5)
EOSINOPHIL # BLD AUTO: 0.1 X10E3/UL (ref 0–0.4)
EOSINOPHIL NFR BLD AUTO: 2 %
ERYTHROCYTE [DISTWIDTH] IN BLOOD BY AUTOMATED COUNT: 12.2 % (ref 11.6–15.4)
HCT VFR BLD AUTO: 43.3 % (ref 37.5–51)
HGB BLD-MCNC: 14.7 G/DL (ref 13–17.7)
IMM GRANULOCYTES # BLD: 0 X10E3/UL (ref 0–0.1)
IMM GRANULOCYTES NFR BLD: 0 %
LYMPHOCYTES # BLD AUTO: 1.4 X10E3/UL (ref 0.7–3.1)
LYMPHOCYTES NFR BLD AUTO: 33 %
MCH RBC QN AUTO: 32.2 PG (ref 26.6–33)
MCHC RBC AUTO-ENTMCNC: 33.9 G/DL (ref 31.5–35.7)
MCV RBC AUTO: 95 FL (ref 79–97)
MONOCYTES # BLD AUTO: 0.4 X10E3/UL (ref 0.1–0.9)
MONOCYTES NFR BLD AUTO: 10 %
NEUTROPHILS # BLD AUTO: 2.3 X10E3/UL (ref 1.4–7)
NEUTROPHILS NFR BLD AUTO: 54 %
NRBC BLD AUTO-RTO: 1 % (ref 0–0)
PLATELET # BLD AUTO: 262 X10E3/UL (ref 150–450)
RBC # BLD AUTO: 4.56 X10E6/UL (ref 4.14–5.8)
WBC # BLD AUTO: 4.2 X10E3/UL (ref 3.4–10.8)

## 2021-06-13 LAB
HIV1 RNA # SERPL NAA+PROBE: 40 COPIES/ML
HIV1 RNA SERPL NAA+PROBE-LOG#: 1.6 LOG10COPY/ML

## 2021-06-22 ENCOUNTER — OFFICE VISIT (OUTPATIENT)
Dept: SURGERY | Facility: CLINIC | Age: 51
End: 2021-06-22
Payer: COMMERCIAL

## 2021-06-22 VITALS
OXYGEN SATURATION: 96 % | HEIGHT: 71 IN | BODY MASS INDEX: 26.38 KG/M2 | HEART RATE: 77 BPM | SYSTOLIC BLOOD PRESSURE: 126 MMHG | WEIGHT: 188.4 LBS | TEMPERATURE: 98.6 F | DIASTOLIC BLOOD PRESSURE: 83 MMHG

## 2021-06-22 DIAGNOSIS — Z13.220 ENCOUNTER FOR LIPID SCREENING FOR CARDIOVASCULAR DISEASE: ICD-10-CM

## 2021-06-22 DIAGNOSIS — B20 HIV DISEASE (HCC): Primary | ICD-10-CM

## 2021-06-22 DIAGNOSIS — D72.89 OTHER SPECIFIED DISORDERS OF WHITE BLOOD CELLS: ICD-10-CM

## 2021-06-22 DIAGNOSIS — M26.629 ARTHRALGIA OF TEMPOROMANDIBULAR JOINT, UNSPECIFIED LATERALITY: ICD-10-CM

## 2021-06-22 DIAGNOSIS — Z72.89 OTHER PROBLEMS RELATED TO LIFESTYLE: ICD-10-CM

## 2021-06-22 DIAGNOSIS — K12.0 APHTHOUS ULCER: ICD-10-CM

## 2021-06-22 DIAGNOSIS — Z20.2 CONTACT WITH AND (SUSPECTED) EXPOSURE TO INFECTIONS WITH A PREDOMINANTLY SEXUAL MODE OF TRANSMISSION: ICD-10-CM

## 2021-06-22 DIAGNOSIS — Z11.3 ENCOUNTER FOR SCREENING FOR BACTERIAL SEXUALLY TRANSMITTED DISEASE: ICD-10-CM

## 2021-06-22 DIAGNOSIS — A53.9 SYPHILIS: ICD-10-CM

## 2021-06-22 DIAGNOSIS — Z13.6 ENCOUNTER FOR LIPID SCREENING FOR CARDIOVASCULAR DISEASE: ICD-10-CM

## 2021-06-22 PROCEDURE — 3008F BODY MASS INDEX DOCD: CPT | Performed by: INTERNAL MEDICINE

## 2021-06-22 PROCEDURE — 99215 OFFICE O/P EST HI 40 MIN: CPT | Performed by: INTERNAL MEDICINE

## 2021-06-22 PROCEDURE — 3725F SCREEN DEPRESSION PERFORMED: CPT | Performed by: INTERNAL MEDICINE

## 2021-06-22 PROCEDURE — 1036F TOBACCO NON-USER: CPT | Performed by: INTERNAL MEDICINE

## 2021-06-22 NOTE — PROGRESS NOTES
Assessment/Plan:      Diagnoses and all orders for this visit:    HIV disease ([facility] [address] )  -     CBC and differential; Future  -     T-helper cells CD4/CD8 %; Future  -     HIV-1 RNA, quantitative, PCR; Future  -     Comprehensive metabolic panel; Future  -     RPR; Future  -     Lipid Panel with Direct LDL reflex; Future    Encounter for lipid screening for cardiovascular disease  -     Lipid Panel with Direct LDL reflex; Future    Contact with and (suspected) exposure to infections with a predominantly sexual mode of transmission  -     RPR; Future    Encounter for screening for bacterial sexually transmitted disease  -     RPR; Future    Other problems related to lifestyle  -     RPR; Future    Other specified disorders of white blood cells  -     RPR; Future          Behavorial Health Recommends continued maintenance of well-being  Discussion: Today patient presents with no mental health concerns  Patient shared that he enjoyed some time of personal reflection and growth during the stay at home orders and that it ended up being a good time for him and in his relationship  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  PHQ-9 screener completed  Score = 0    Per self-report, 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

## 2021-06-22 NOTE — PROGRESS NOTES
Progress Note - Infectious Disease   Anitra Wellington 52 y o  male MRN: [id number]  Unit/Bed#:  Encounter: [id number]      Impression/Plan:  1   HIV-doing well on Biktarvy with a  near undetectable viral load and a CD4 count in the 300s   He previously had some blood soon his viral load felt secondary to supplements   Recheck labs in 2 months and follow up in 3 months  Stressed adherence      2   Syphilis-has been serofast  The RPR titer remains quite low without any significant increase   Continue annual follow-up of RPR     3   Weight loss-intentional  Will continue to monitor  Relatively stable      4  Severe refractory aphthous ulcerations-no recurrence since stopping nonsteroidals   Will monitor for now     5  TMJ syndrome-Wishes to reengage with physical therapy although he is clearly improved      Patient was provided medication, adherence and prevention education    Subjective:  Routine follow-up for HIV  Patient claims 100% adherence with  Biktarvy     Patient denies any notable side effects  Overall the feeling well  The patient denies any fever chills or sweats, denies any nausea vomiting or diarrhea, denies any cough or shortness of breath  ROS:  A complete review of systems is negative other than that noted above in the subjective    Followup portions patient history reviewed and updated as: Allergies, current medications, past medical history, past social history, past surgical history, and the problem list    Objective:  Vitals:  Vitals:    06/22/21 1621   BP: 126/83   Pulse: 77   Temp: 98 6 °F (37 °C)   SpO2: 96%   Weight: 85 5 kg (188 lb 6 4 oz)   Height: 5' 11" (1 803 m)       Physical Exam:   General Appearance:  Alert, interactive, appearing well,  nontoxic, no acute distress  Neck:   Supple without lymphadenopathy, no thyromegaly or masses   Throat: Oropharynx moist without lesions      Lungs:   Clear to auscultation bilaterally; no wheezes, rhonchi or rales; respirations unlabored Heart:  RRR; no murmur, rub or gallop   Abdomen:   Soft, non-tender, non-distended, positive bowel sounds  Extremities: No clubbing, cyanosis or edema   Skin: No new rashes or lesions  No draining wounds noted  Labs, Imaging, & Other studies:   All pertinent labs and imaging studies were personally reviewed    Lab Results   Component Value Date    K 4 4 06/11/2021     06/11/2021    CO2 28 06/11/2021    BUN 16 06/11/2021    CREATININE 1 02 06/11/2021    GLUF 87 06/11/2021    CALCIUM 9 4 06/11/2021    AST 16 06/11/2021    ALT 15 06/11/2021    ALKPHOS 66 7 06/11/2021    EGFR 87 06/11/2021     Lab Results   Component Value Date    WBC 4 10 (L) 06/11/2021    WBC 4 2 06/11/2021    HGB 14 5 06/11/2021    HGB 14 7 06/11/2021    HCT 42 8 06/11/2021    HCT 43 3 06/11/2021    MCV 93 06/11/2021    MCV 95 06/11/2021     06/11/2021     06/11/2021     Lab Results   Component Value Date    HEPCAB Non-reactive 12/03/2020     Lab Results   Component Value Date    HEPCAB Non-reactive 12/03/2020     Lab Results   Component Value Date    RPR Reactive (A) 09/09/2020    RPR Reactive 2 dils (A) 09/09/2020     CD4 ABS   Date/Time Value Ref Range Status   06/11/2021 09:36  (L) 359 - 1519 /uL Final     HIV-1 RNA by PCR, Qn   Date/Time Value Ref Range Status   06/11/2021 09:36 AM 40 copies/mL Final     Comment:     The reportable range for this assay is 20 to 10,000,000  copies HIV-1 RNA/mL             Current Outpatient Medications:     bictegravir-emtricitab-tenofovir alafenamide (Biktarvy) -25 MG tablet, Take 1 tablet by mouth daily, Disp: 30 tablet, Rfl: 5    Naltrexone-buPROPion HCl ER 8-90 MG TB12, Take 2 tablets by mouth 2 (two) times a day, Disp: 120 tablet, Rfl: 2

## 2021-06-23 NOTE — PROGRESS NOTES
Pastoral Care Progress Note    2021  Patient: Kwan Suggs : 1970  Encounter Date & Time: 2021   MRN: [id number]      The  visited Mr Renny Leos for continued pastoral care and support  The visit was brief due to his ID appointment  Mr Renny Leos shared he was doing well  Sharing, " I did well during  "  He advised he was able to get financial support for his [employer] business and his partner had plenty of work  Mr Renny Leos was able to focus on self-care  The visit was interrupted due to his appointment and the  was not able to re-visit him before he left the office  The  will follow-up with patient in future primary visit

## 2021-06-29 ENCOUNTER — PATIENT OUTREACH (OUTPATIENT)
Dept: SURGERY | Facility: CLINIC | Age: 51
End: 2021-06-29

## 2021-06-29 NOTE — PROGRESS NOTES
Ct called cm about mail  Ct is due for a renewal for [facility]  Cm asked if ct needed help filling out application  Per ct he thinks he got it but will drop it off so cm can fax it over

## 2021-07-01 ENCOUNTER — PATIENT OUTREACH (OUTPATIENT)
Dept: SURGERY | Facility: CLINIC | Age: 51
End: 2021-07-01

## 2021-07-09 DIAGNOSIS — F50.81 BINGE EATING DISORDER: ICD-10-CM

## 2021-07-12 RX ORDER — NALTREXONE HYDROCHLORIDE AND BUPROPION HYDROCHLORIDE 8; 90 MG/1; MG/1
TABLET, EXTENDED RELEASE ORAL
Qty: 120 TABLET | Refills: 2 | Status: SHIPPED | OUTPATIENT
Start: 2021-07-12 | End: 2021-10-05

## 2021-08-11 ENCOUNTER — PATIENT OUTREACH (OUTPATIENT)
Dept: SURGERY | Facility: CLINIC | Age: 51
End: 2021-08-11

## 2021-08-20 ENCOUNTER — PATIENT OUTREACH (OUTPATIENT)
Dept: SURGERY | Facility: CLINIC | Age: 51
End: 2021-08-20

## 2021-08-23 ENCOUNTER — OFFICE VISIT (OUTPATIENT)
Dept: SURGERY | Facility: CLINIC | Age: 51
End: 2021-08-23
Payer: COMMERCIAL

## 2021-08-23 ENCOUNTER — PATIENT OUTREACH (OUTPATIENT)
Dept: SURGERY | Facility: CLINIC | Age: 51
End: 2021-08-23

## 2021-08-23 VITALS
HEART RATE: 76 BPM | WEIGHT: 188.2 LBS | HEIGHT: 71 IN | SYSTOLIC BLOOD PRESSURE: 119 MMHG | DIASTOLIC BLOOD PRESSURE: 78 MMHG | BODY MASS INDEX: 26.35 KG/M2 | OXYGEN SATURATION: 96 % | TEMPERATURE: 98.5 F

## 2021-08-23 DIAGNOSIS — F50.81 BINGE EATING DISORDER: ICD-10-CM

## 2021-08-23 DIAGNOSIS — B20 HIV DISEASE (HCC): Primary | ICD-10-CM

## 2021-08-23 DIAGNOSIS — K12.0 APHTHOUS ULCER: ICD-10-CM

## 2021-08-23 PROBLEM — L02.419: Status: RESOLVED | Noted: 2020-08-31 | Resolved: 2021-08-23

## 2021-08-23 PROCEDURE — 99214 OFFICE O/P EST MOD 30 MIN: CPT | Performed by: NURSE PRACTITIONER

## 2021-08-23 NOTE — ASSESSMENT & PLAN NOTE
Reports occasional ulcer, but symptoms are mild  Advised to avoid acidic foods, injury to the oral cavity, and stress  Educated to use a soft bristle toothbrush and follow-up with regular dental visits

## 2021-08-23 NOTE — PROGRESS NOTES
Assessment/Plan:    HIV disease ([facility] )  No results found for: [id number]  CD4 ABS   Date/Time Value Ref Range Status   2021 09:36  (L) 359 - 1519 /uL Final     HIV-1 RNA by PCR, Qn   Date/Time Value Ref Range Status   2021 09:36 AM 40 copies/mL Final     Comment:     The reportable range for this assay is 20 to 10,000,000  copies HIV-1 RNA/mL  HIV-1 RNA Viral Load Log   Date/Time Value Ref Range Status   2021 09:36 AM 1 602 [id number]/mL Final         ART: Sudheer Roa        Denies side effects  Stressed the importance of adherence  Continue follow up with ID clinic  Reviewed most recent labs, including Cd4 and viral load  Discussed the risks and benefits of treatment options, instructions for management, importance of treatment adherence, and reduction of risk factor  Educated on possible  medication side effects  Counseled on routes of HIV transmission, including the risk of  infection  Emphasized that viral suppression is the best method to prevent HIV transmission  At this time pt denies the need for HIV testing of anyone in their life  Total encounter time was 45 minutes  Greater then 20 minutes were spent on counseling and patient education  Pt voices understanding and agreement with treatment plan  Binge eating disorder   Stable  Doing well on Contrave  Aphthous ulcer    Reports occasional ulcer, but symptoms are mild  Advised to avoid acidic foods, injury to the oral cavity, and stress  Educated to use a soft bristle toothbrush and follow-up with regular dental visits  Diagnoses and all orders for this visit:    HIV disease ([place] [address] )    Binge eating disorder    Aphthous ulcer          Subjective:      Patient ID: Santana Ramirez is a 52 y o  male  Tara Retana presents to the clinic today for   Three-month primary care follow-up   Past medical history significant for HIV, TB status post treatment, and umbilical hernia   Tara Retana is doing well and offers no acute complaints  He reports  doing well on Contrave and has less occurences of binge eating and reduced drug and alcohol craving  He is tolerating the medication and denies side effects  The following portions of the patient's history were reviewed and updated as appropriate: allergies, current medications, past family history, past medical history, past social history, past surgical history and problem list     Review of Systems   Constitutional: Negative for chills and fever  HENT: Negative for ear pain and sore throat  Eyes: Negative for pain and visual disturbance  Respiratory: Negative for cough and shortness of breath  Cardiovascular: Negative for chest pain and palpitations  Gastrointestinal: Negative for abdominal pain and vomiting  Genitourinary: Negative for dysuria and hematuria  Musculoskeletal: Negative for arthralgias and back pain  Skin: Negative for color change and rash  Neurological: Negative for seizures and syncope  All other systems reviewed and are negative          Objective:      /78   Pulse 76   Temp 98 5 °F (36 9 °C)   Ht 5' 11" (1 803 m)   Wt 85 4 kg (188 lb 3 2 oz)   SpO2 96%   BMI 26 25 kg/m²       Lab Results   Component Value Date    K 4 4 06/11/2021     06/11/2021    CO2 28 06/11/2021    BUN 16 06/11/2021    CREATININE 1 02 06/11/2021    GLUF 87 06/11/2021    CALCIUM 9 4 06/11/2021    AST 16 06/11/2021    ALT 15 06/11/2021    ALKPHOS 66 7 06/11/2021    EGFR 87 06/11/2021     Lab Results   Component Value Date    WBC 4 10 (L) 06/11/2021    WBC 4 2 06/11/2021    HGB 14 5 06/11/2021    HGB 14 7 06/11/2021    HCT 42 8 06/11/2021    HCT 43 3 06/11/2021    MCV 93 06/11/2021    MCV 95 06/11/2021     06/11/2021     06/11/2021     Lab Results   Component Value Date    HEPCAB Non-reactive 12/03/2020     Lab Results   Component Value Date    HEPCAB Non-reactive 12/03/2020     Lab Results   Component Value Date    RPR Reactive (A) 09/09/2020    RPR Reactive 2 dils (A) 09/09/2020            Physical Exam  Constitutional:       General: He is not in acute distress  Appearance: He is well-developed  HENT:      Head: Normocephalic  Right Ear: External ear normal       Left Ear: External ear normal       Nose: Nose normal       Mouth/Throat:      Pharynx: No oropharyngeal exudate  Eyes:      General:         Right eye: No discharge  Left eye: No discharge  Conjunctiva/sclera: Conjunctivae normal       Pupils: Pupils are equal, round, and reactive to light  Neck:      Thyroid: No thyromegaly  Cardiovascular:      Rate and Rhythm: Normal rate and regular rhythm  Heart sounds: Normal heart sounds  No murmur heard  Pulmonary:      Effort: Pulmonary effort is normal       Breath sounds: Normal breath sounds  No wheezing  Abdominal:      General: Bowel sounds are normal       Palpations: Abdomen is soft  There is no mass  Tenderness: There is no abdominal tenderness  Musculoskeletal:         General: No tenderness  Normal range of motion  Cervical back: Normal range of motion  Lymphadenopathy:      Cervical: No cervical adenopathy  Skin:     General: Skin is warm and dry  Findings: No rash  Neurological:      Mental Status: He is alert and oriented to person, place, and time     Psychiatric:         Behavior: Behavior normal

## 2021-08-23 NOTE — ASSESSMENT & PLAN NOTE
No results found for: EV1JAVE  CD4 ABS   Date/Time Value Ref Range Status   2021 09:36  (L) 359 - 1519 /uL Final     HIV-1 RNA by PCR, Qn   Date/Time Value Ref Range Status   2021 09:36 AM 40 copies/mL Final     Comment:     The reportable range for this assay is 20 to 10,000,000  copies HIV-1 RNA/mL  HIV-1 RNA Viral Load Log   Date/Time Value Ref Range Status   2021 09:36 AM 1 602 [id number]/mL Final         ART: Jennifer Barksdale        Denies side effects  Stressed the importance of adherence  Continue follow up with ID clinic  Reviewed most recent labs, including Cd4 and viral load  Discussed the risks and benefits of treatment options, instructions for management, importance of treatment adherence, and reduction of risk factor  Educated on possible  medication side effects  Counseled on routes of HIV transmission, including the risk of  infection  Emphasized that viral suppression is the best method to prevent HIV transmission  At this time pt denies the need for HIV testing of anyone in their life  Total encounter time was 45 minutes  Greater then 20 minutes were spent on counseling and patient education  Pt voices understanding and agreement with treatment plan

## 2021-08-30 ENCOUNTER — PATIENT OUTREACH (OUTPATIENT)
Dept: SURGERY | Facility: CLINIC | Age: 51
End: 2021-08-30

## 2021-08-30 NOTE — PROGRESS NOTES
Assessment  Annual    Clinical Data/Client History    HIV: YES  AIDS: NO    : Sonal Cartwright    Socio- Economic Status: Eats Out and Cooks    Living Situation: Apartment    Food Prep/Access: Refrigerator, Stove and Microwave    Psycho Social Factors: None reported    Functional Status: Ambulatory, Able to [employer] and Prepared Own Meals    Activity Level: None    Typical Food/Beverage Intake:    · Patient continues with a vegan dietary pattern intake as through previous years  No concerns at this time  No new nutrition related labs to view/    Appetite: Good    Supplements: NO    Food Intolerance: None reported    Usual Body Weight: 199#    Current Body Weight: 188#    Nutrition Diagnosis    Problem: No nutrition diagnosis at this time    Intervention Diet Prescription    Nutritional needs based on: 188# (85kg)    · 9594-6815 kcal  · 68-85 protein  · 1870mL-1955mL fluid  · 2 0g Na    Current intake: adequate    Nutrition Education Intervention: Provided     Person Educated: Patient    Teaching Method: Verbal and Written      Visit Summary    Annual nutrition assessment  Wt loss of ~10#, intentional as he has been more active, exercising regularly weekly and more controlled with nutritional intake  No new nutrition related labs, prior lab results WNL  No identified oral health concerns or chronic N/V/D/C  Provided mobile market voucher as patient regularly uses program   Will continue to monitor and provide interventions as needed    Radha Bonilla, RD,LDN,[facility]

## 2021-09-03 ENCOUNTER — PATIENT OUTREACH (OUTPATIENT)
Dept: SURGERY | Facility: CLINIC | Age: 51
End: 2021-09-03

## 2021-09-03 NOTE — PROGRESS NOTES
Robbin called Alma to find out who needed labs  Alma mentioned ct  Cm reached out to ct to remind him about his labs

## 2021-09-09 ENCOUNTER — APPOINTMENT (OUTPATIENT)
Dept: LAB | Facility: HOSPITAL | Age: 51
End: 2021-09-09
Attending: INTERNAL MEDICINE
Payer: COMMERCIAL

## 2021-09-09 DIAGNOSIS — D72.89 OTHER SPECIFIED DISORDERS OF WHITE BLOOD CELLS: ICD-10-CM

## 2021-09-09 DIAGNOSIS — Z72.89 OTHER PROBLEMS RELATED TO LIFESTYLE: ICD-10-CM

## 2021-09-09 DIAGNOSIS — Z11.3 ENCOUNTER FOR SCREENING FOR BACTERIAL SEXUALLY TRANSMITTED DISEASE: ICD-10-CM

## 2021-09-09 DIAGNOSIS — B20 HIV DISEASE (HCC): ICD-10-CM

## 2021-09-09 DIAGNOSIS — Z13.220 ENCOUNTER FOR LIPID SCREENING FOR CARDIOVASCULAR DISEASE: ICD-10-CM

## 2021-09-09 DIAGNOSIS — Z20.2 CONTACT WITH AND (SUSPECTED) EXPOSURE TO INFECTIONS WITH A PREDOMINANTLY SEXUAL MODE OF TRANSMISSION: ICD-10-CM

## 2021-09-09 DIAGNOSIS — Z13.6 ENCOUNTER FOR LIPID SCREENING FOR CARDIOVASCULAR DISEASE: ICD-10-CM

## 2021-09-09 LAB
ALBUMIN SERPL BCP-MCNC: 4.2 G/DL (ref 3.4–4.8)
ALP SERPL-CCNC: 63.2 U/L (ref 10–129)
ALT SERPL W P-5'-P-CCNC: 14 U/L (ref 5–63)
ANION GAP SERPL CALCULATED.3IONS-SCNC: 4 MMOL/L (ref 4–13)
AST SERPL W P-5'-P-CCNC: 13 U/L (ref 15–41)
BASOPHILS # BLD AUTO: 0.04 THOUSANDS/ΜL (ref 0–0.1)
BASOPHILS NFR BLD AUTO: 1 % (ref 0–1)
BILIRUB SERPL-MCNC: 0.46 MG/DL (ref 0.3–1.2)
BUN SERPL-MCNC: 23 MG/DL (ref 6–20)
CALCIUM SERPL-MCNC: 9.5 MG/DL (ref 8.4–10.2)
CHLORIDE SERPL-SCNC: 104 MMOL/L (ref 96–108)
CHOLEST SERPL-MCNC: 164 MG/DL
CO2 SERPL-SCNC: 30 MMOL/L (ref 22–33)
CREAT SERPL-MCNC: 1.08 MG/DL (ref 0.5–1.2)
EOSINOPHIL # BLD AUTO: 0.12 THOUSAND/ΜL (ref 0–0.61)
EOSINOPHIL NFR BLD AUTO: 3 % (ref 0–6)
ERYTHROCYTE [DISTWIDTH] IN BLOOD BY AUTOMATED COUNT: 12.1 % (ref 11.6–15.1)
GFR SERPL CREATININE-BSD FRML MDRD: 81 ML/MIN/1.73SQ M
GLUCOSE P FAST SERPL-MCNC: 94 MG/DL (ref 70–105)
HCT VFR BLD AUTO: 42.8 % (ref 36.5–49.3)
HDLC SERPL-MCNC: 35 MG/DL
HGB BLD-MCNC: 14.3 G/DL (ref 12–17)
IMM GRANULOCYTES # BLD AUTO: 0 THOUSAND/UL (ref 0–0.2)
IMM GRANULOCYTES NFR BLD AUTO: 0 % (ref 0–2)
LDLC SERPL CALC-MCNC: 104 MG/DL (ref 0–100)
LYMPHOCYTES # BLD AUTO: 1.85 THOUSANDS/ΜL (ref 0.6–4.47)
LYMPHOCYTES NFR BLD AUTO: 42 % (ref 14–44)
MCH RBC QN AUTO: 31.5 PG (ref 26.8–34.3)
MCHC RBC AUTO-ENTMCNC: 33.4 G/DL (ref 31.4–37.4)
MCV RBC AUTO: 94 FL (ref 82–98)
MONOCYTES # BLD AUTO: 0.51 THOUSAND/ΜL (ref 0.17–1.22)
MONOCYTES NFR BLD AUTO: 12 % (ref 4–12)
NEUTROPHILS # BLD AUTO: 1.84 THOUSANDS/ΜL (ref 1.85–7.62)
NEUTS SEG NFR BLD AUTO: 42 % (ref 43–75)
PLATELET # BLD AUTO: 226 THOUSANDS/UL (ref 149–390)
PMV BLD AUTO: 10.3 FL (ref 8.9–12.7)
POTASSIUM SERPL-SCNC: 4.2 MMOL/L (ref 3.5–5)
PROT SERPL-MCNC: 6.9 G/DL (ref 6.4–8.3)
RBC # BLD AUTO: 4.54 MILLION/UL (ref 3.88–5.62)
SODIUM SERPL-SCNC: 138 MMOL/L (ref 133–145)
TRIGL SERPL-MCNC: 125.8 MG/DL
WBC # BLD AUTO: 4.36 THOUSAND/UL (ref 4.31–10.16)

## 2021-09-09 PROCEDURE — 36415 COLL VENOUS BLD VENIPUNCTURE: CPT

## 2021-09-09 PROCEDURE — 87536 HIV-1 QUANT&REVRSE TRNSCRPJ: CPT

## 2021-09-09 PROCEDURE — 86592 SYPHILIS TEST NON-TREP QUAL: CPT

## 2021-09-09 PROCEDURE — 85025 COMPLETE CBC W/AUTO DIFF WBC: CPT

## 2021-09-09 PROCEDURE — 80061 LIPID PANEL: CPT

## 2021-09-09 PROCEDURE — 86360 T CELL ABSOLUTE COUNT/RATIO: CPT

## 2021-09-09 PROCEDURE — 80053 COMPREHEN METABOLIC PANEL: CPT

## 2021-09-10 LAB
BASOPHILS # BLD AUTO: 0 X10E3/UL (ref 0–0.2)
BASOPHILS NFR BLD AUTO: 1 %
CD3+CD4+ CELLS # BLD: 542 /UL (ref 359–1519)
CD3+CD4+ CELLS NFR BLD: 27.1 % (ref 30.8–58.5)
CD3+CD4+ CELLS/CD3+CD8+ CLL BLD: 0.72 % (ref 0.92–3.72)
CD3+CD8+ CELLS # BLD: 758 /UL (ref 109–897)
CD3+CD8+ CELLS NFR BLD: 37.9 % (ref 12–35.5)
EOSINOPHIL # BLD AUTO: 0.1 X10E3/UL (ref 0–0.4)
EOSINOPHIL NFR BLD AUTO: 3 %
ERYTHROCYTE [DISTWIDTH] IN BLOOD BY AUTOMATED COUNT: 12.4 % (ref 11.6–15.4)
HCT VFR BLD AUTO: 37.3 % (ref 37.5–51)
HGB BLD-MCNC: 12.8 G/DL (ref 13–17.7)
HIV1 RNA # SERPL NAA+PROBE: 50 COPIES/ML
HIV1 RNA SERPL NAA+PROBE-LOG#: 1.7 LOG10COPY/ML
IMM GRANULOCYTES # BLD: 0 X10E3/UL (ref 0–0.1)
IMM GRANULOCYTES NFR BLD: 0 %
LYMPHOCYTES # BLD AUTO: 2 X10E3/UL (ref 0.7–3.1)
LYMPHOCYTES NFR BLD AUTO: 44 %
MCH RBC QN AUTO: 32.2 PG (ref 26.6–33)
MCHC RBC AUTO-ENTMCNC: 34.3 G/DL (ref 31.5–35.7)
MCV RBC AUTO: 94 FL (ref 79–97)
MONOCYTES # BLD AUTO: 0.4 X10E3/UL (ref 0.1–0.9)
MONOCYTES NFR BLD AUTO: 10 %
NEUTROPHILS # BLD AUTO: 1.8 X10E3/UL (ref 1.4–7)
NEUTROPHILS NFR BLD AUTO: 42 %
NRBC BLD AUTO-RTO: 1 % (ref 0–0)
PLATELET # BLD AUTO: 258 X10E3/UL (ref 150–450)
RBC # BLD AUTO: 3.98 X10E6/UL (ref 4.14–5.8)
RPR SER QL: NORMAL
WBC # BLD AUTO: 4.3 X10E3/UL (ref 3.4–10.8)

## 2021-09-24 ENCOUNTER — PATIENT OUTREACH (OUTPATIENT)
Dept: SURGERY | Facility: CLINIC | Age: 51
End: 2021-09-24

## 2021-09-28 ENCOUNTER — OFFICE VISIT (OUTPATIENT)
Dept: SURGERY | Facility: CLINIC | Age: 51
End: 2021-09-28
Payer: COMMERCIAL

## 2021-09-28 VITALS
BODY MASS INDEX: 26.21 KG/M2 | OXYGEN SATURATION: 98 % | DIASTOLIC BLOOD PRESSURE: 78 MMHG | SYSTOLIC BLOOD PRESSURE: 120 MMHG | WEIGHT: 187.2 LBS | TEMPERATURE: 97.6 F | HEART RATE: 74 BPM | HEIGHT: 71 IN

## 2021-09-28 DIAGNOSIS — R63.4 WEIGHT LOSS: ICD-10-CM

## 2021-09-28 DIAGNOSIS — Z20.2 CONTACT WITH AND (SUSPECTED) EXPOSURE TO INFECTIONS WITH A PREDOMINANTLY SEXUAL MODE OF TRANSMISSION: ICD-10-CM

## 2021-09-28 DIAGNOSIS — Z11.3 ENCOUNTER FOR SCREENING FOR BACTERIAL SEXUALLY TRANSMITTED DISEASE: ICD-10-CM

## 2021-09-28 DIAGNOSIS — B20 HIV DISEASE (HCC): Primary | ICD-10-CM

## 2021-09-28 DIAGNOSIS — Z72.89 OTHER PROBLEMS RELATED TO LIFESTYLE: ICD-10-CM

## 2021-09-28 DIAGNOSIS — K12.0 APHTHOUS ULCER: ICD-10-CM

## 2021-09-28 DIAGNOSIS — Z23 NEED FOR INFLUENZA VACCINATION: ICD-10-CM

## 2021-09-28 DIAGNOSIS — D72.89 OTHER SPECIFIED DISORDERS OF WHITE BLOOD CELLS: ICD-10-CM

## 2021-09-28 DIAGNOSIS — A53.9 SYPHILIS: ICD-10-CM

## 2021-09-28 PROCEDURE — 90471 IMMUNIZATION ADMIN: CPT

## 2021-09-28 PROCEDURE — 90682 RIV4 VACC RECOMBINANT DNA IM: CPT

## 2021-09-28 PROCEDURE — 99214 OFFICE O/P EST MOD 30 MIN: CPT | Performed by: INTERNAL MEDICINE

## 2021-09-28 NOTE — PROGRESS NOTES
Progress Note - Infectious Disease   Rosangela Best 52 y o  male MRN: [id number]  Unit/Bed#:  Encounter: [id number]      Impression/Plan:  1   HIV-doing well on Biktarvy with a  near undetectable viral load and a CD4 count in the 500s  Marilynn Price previously had some blood soon his viral load felt secondary to supplements   Recheck labs in 2 months and follow up in 3 months  Stressed adherence      2   Syphilis-has been serofast  The RPR titer remains quite low without any significant increase   Continue annual follow-up of RPR     3   Weight loss-intentional  Will continue to monitor  Relatively stable      4  Severe refractory aphthous ulcerations-no recurrence since stopping nonsteroidals   Will monitor for now      Patient was provided medication, adherence and prevention education    Subjective:  Routine follow-up for HIV  Patient claims 100% adherence with Biktarvy    Patient denies any notable side effects  Overall the feeling well  The patient denies any fever chills or sweats, denies any nausea vomiting or diarrhea, denies any cough or shortness of breath  ROS:  A complete review of systems is negative other than that noted above in the subjective    Followup portions patient history reviewed and updated as: Allergies, current medications, past medical history, past social history, past surgical history, and the problem list    Objective:  Vitals:  Vitals:    09/28/21 1614   BP: 120/78   Pulse: 74   Temp: 97 6 °F (36 4 °C)   SpO2: 98%   Weight: 84 9 kg (187 lb 3 2 oz)   Height: 5' 11" (1 803 m)       Physical Exam:   General Appearance:  Alert, interactive, appearing well,  nontoxic, no acute distress  Neck:   Supple without lymphadenopathy, no thyromegaly or masses   Throat: Oropharynx moist without lesions      Lungs:   Clear to auscultation bilaterally; no wheezes, rhonchi or rales; respirations unlabored   Heart:  RRR; no murmur, rub or gallop   Abdomen:   Soft, non-tender, non-distended, positive bowel sounds  Extremities: No clubbing, cyanosis or edema   Skin: No new rashes or lesions  No draining wounds noted  Labs, Imaging, & Other studies:   All pertinent labs and imaging studies were personally reviewed    Lab Results   Component Value Date    K 4 2 09/09/2021     09/09/2021    CO2 30 09/09/2021    BUN 23 (H) 09/09/2021    CREATININE 1 08 09/09/2021    GLUF 94 09/09/2021    CALCIUM 9 5 09/09/2021    AST 13 (L) 09/09/2021    ALT 14 09/09/2021    ALKPHOS 63 2 09/09/2021    EGFR 81 09/09/2021     Lab Results   Component Value Date    WBC 4 36 09/09/2021    WBC 4 3 09/09/2021    HGB 14 3 09/09/2021    HGB 12 8 (L) 09/09/2021    HCT 42 8 09/09/2021    HCT 37 3 (L) 09/09/2021    MCV 94 09/09/2021    MCV 94 09/09/2021     09/09/2021     09/09/2021     Lab Results   Component Value Date    HEPCAB Non-reactive 12/03/2020     Lab Results   Component Value Date    HEPCAB Non-reactive 12/03/2020     Lab Results   Component Value Date    RPR Non-Reactive 09/09/2021     CD4 ABS   Date/Time Value Ref Range Status   09/09/2021 07:16  359 - 1519 /uL Final     HIV-1 RNA by PCR, Qn   Date/Time Value Ref Range Status   09/09/2021 07:16 AM 50 copies/mL Final     Comment:     The reportable range for this assay is 20 to 10,000,000  copies HIV-1 RNA/mL             Current Outpatient Medications:     bictegravir-emtricitab-tenofovir alafenamide (Biktarvy) -25 MG tablet, Take 1 tablet by mouth daily, Disp: 30 tablet, Rfl: 5    Contrave 8-90 MG TB12, TAKE TWO TABLETS BY MOUTH TWICE A DAY, Disp: 120 tablet, Rfl: 2

## 2021-10-01 ENCOUNTER — TELEPHONE (OUTPATIENT)
Dept: SURGERY | Facility: CLINIC | Age: 51
End: 2021-10-01

## 2021-10-01 DIAGNOSIS — F41.9 ANXIETY: Primary | ICD-10-CM

## 2021-10-01 RX ORDER — ALPRAZOLAM 1 MG/1
1 TABLET ORAL ONCE
Qty: 2 TABLET | Refills: 0 | Status: SHIPPED | OUTPATIENT
Start: 2021-10-01 | End: 2021-11-22 | Stop reason: HOSPADM

## 2021-10-04 ENCOUNTER — OFFICE VISIT (OUTPATIENT)
Dept: DENTISTRY | Facility: CLINIC | Age: 51
End: 2021-10-04

## 2021-10-04 VITALS — SYSTOLIC BLOOD PRESSURE: 115 MMHG | DIASTOLIC BLOOD PRESSURE: 73 MMHG | HEART RATE: 76 BPM | TEMPERATURE: 98.6 F

## 2021-10-04 DIAGNOSIS — F50.81 BINGE EATING DISORDER: ICD-10-CM

## 2021-10-04 DIAGNOSIS — K03.0 DENTAL ATTRITION, EXCESSIVE: Primary | ICD-10-CM

## 2021-10-04 DIAGNOSIS — B20 HIV (HUMAN IMMUNODEFICIENCY VIRUS INFECTION) (HCC): ICD-10-CM

## 2021-10-04 PROCEDURE — D0470 DIAGNOSTIC CASTS: HCPCS | Performed by: DENTIST

## 2021-10-05 RX ORDER — NALTREXONE HYDROCHLORIDE AND BUPROPION HYDROCHLORIDE 8; 90 MG/1; MG/1
TABLET, EXTENDED RELEASE ORAL
Qty: 120 TABLET | Refills: 2 | Status: SHIPPED | OUTPATIENT
Start: 2021-10-05 | End: 2021-12-27 | Stop reason: SDUPTHER

## 2021-10-05 RX ORDER — BICTEGRAVIR SODIUM, EMTRICITABINE, AND TENOFOVIR ALAFENAMIDE FUMARATE 50; 200; 25 MG/1; MG/1; MG/1
1 TABLET ORAL DAILY
Qty: 30 TABLET | Refills: 5 | Status: SHIPPED | OUTPATIENT
Start: 2021-10-05 | End: 2022-03-21

## 2021-10-25 ENCOUNTER — OFFICE VISIT (OUTPATIENT)
Dept: DENTISTRY | Facility: CLINIC | Age: 51
End: 2021-10-25

## 2021-10-25 VITALS — DIASTOLIC BLOOD PRESSURE: 77 MMHG | SYSTOLIC BLOOD PRESSURE: 117 MMHG | TEMPERATURE: 98.2 F | HEART RATE: 73 BPM

## 2021-10-25 DIAGNOSIS — K02.9 CARIES: Primary | ICD-10-CM

## 2021-10-25 PROCEDURE — WIS2001 FINAL IMPRESSION - CROWN OR IMPLANT: Performed by: DENTIST

## 2021-10-29 ENCOUNTER — PATIENT OUTREACH (OUTPATIENT)
Dept: SURGERY | Facility: CLINIC | Age: 51
End: 2021-10-29

## 2021-11-05 ENCOUNTER — PATIENT OUTREACH (OUTPATIENT)
Dept: SURGERY | Facility: CLINIC | Age: 51
End: 2021-11-05

## 2021-11-08 ENCOUNTER — PATIENT OUTREACH (OUTPATIENT)
Dept: SURGERY | Facility: CLINIC | Age: 51
End: 2021-11-08

## 2021-11-09 ENCOUNTER — PATIENT OUTREACH (OUTPATIENT)
Dept: SURGERY | Facility: CLINIC | Age: 51
End: 2021-11-09

## 2021-11-19 ENCOUNTER — PATIENT OUTREACH (OUTPATIENT)
Dept: SURGERY | Facility: CLINIC | Age: 51
End: 2021-11-19

## 2021-11-22 ENCOUNTER — OFFICE VISIT (OUTPATIENT)
Dept: SURGERY | Facility: CLINIC | Age: 51
End: 2021-11-22
Payer: COMMERCIAL

## 2021-11-22 VITALS
WEIGHT: 183.4 LBS | SYSTOLIC BLOOD PRESSURE: 111 MMHG | HEIGHT: 71 IN | OXYGEN SATURATION: 98 % | TEMPERATURE: 97.7 F | DIASTOLIC BLOOD PRESSURE: 75 MMHG | HEART RATE: 96 BPM | BODY MASS INDEX: 25.68 KG/M2

## 2021-11-22 DIAGNOSIS — F50.81 BINGE EATING DISORDER: ICD-10-CM

## 2021-11-22 DIAGNOSIS — B20 HIV DISEASE (HCC): Primary | ICD-10-CM

## 2021-11-22 PROCEDURE — 99214 OFFICE O/P EST MOD 30 MIN: CPT | Performed by: NURSE PRACTITIONER

## 2021-12-07 ENCOUNTER — TELEPHONE (OUTPATIENT)
Dept: SURGERY | Facility: CLINIC | Age: 51
End: 2021-12-07

## 2021-12-22 ENCOUNTER — OFFICE VISIT (OUTPATIENT)
Dept: DENTISTRY | Facility: CLINIC | Age: 51
End: 2021-12-22

## 2021-12-22 VITALS — SYSTOLIC BLOOD PRESSURE: 161 MMHG | HEART RATE: 86 BPM | TEMPERATURE: 98.4 F | DIASTOLIC BLOOD PRESSURE: 68 MMHG

## 2021-12-22 DIAGNOSIS — Z01.20 ENCOUNTER FOR DENTAL EXAMINATION: Primary | ICD-10-CM

## 2021-12-22 PROCEDURE — D2740 CROWN - PORCELAIN/CERAMIC: HCPCS | Performed by: DENTIST

## 2021-12-24 DIAGNOSIS — F50.81 BINGE EATING DISORDER: ICD-10-CM

## 2021-12-27 RX ORDER — NALTREXONE HYDROCHLORIDE AND BUPROPION HYDROCHLORIDE 8; 90 MG/1; MG/1
TABLET, EXTENDED RELEASE ORAL
Qty: 120 TABLET | Refills: 2 | Status: SHIPPED | OUTPATIENT
Start: 2021-12-27 | End: 2022-02-14 | Stop reason: SDUPTHER

## 2022-01-05 ENCOUNTER — OFFICE VISIT (OUTPATIENT)
Dept: DENTISTRY | Facility: CLINIC | Age: 52
End: 2022-01-05

## 2022-01-05 DIAGNOSIS — K05.6 PERIODONTAL DISEASE: Primary | ICD-10-CM

## 2022-01-05 PROCEDURE — D0191 ASSESSMENT OF A PATIENT: HCPCS | Performed by: DENTIST

## 2022-01-05 NOTE — PROGRESS NOTES
Pt comes in for limited emergency appointment  Pt is feeling something behind his crown and sensitivity to cold and hot when swishing around UR  Pt is pointing to #3  PA and BW taken of tooth and reviewed  I/O exam showed a small space between gingiva and tooth on buccal  Perio probing around tooth completed 3-4 mm but noticed 5-6 mm pocket on MB  Pt states he has not had a cleaning over 1 5  BOP on probing noticed  Dr Jordan Room present for evaluation and discussion  Due to not having previous probing readings and patient is not percussion + or feel pain when eating  Recommended completing prophy and re-evaluating after prophy  Pt should be put on a 4 month recall period due to medical conditions  Also, recommended patient to wait until prophylaxis is completed for mouthguard impressions  All questions answered and patient understands      NV: Kendy Acevedo

## 2022-01-10 RX ORDER — CHLORHEXIDINE GLUCONATE 0.12 MG/ML
15 RINSE ORAL 2 TIMES DAILY
Qty: 120 ML | Refills: 0 | Status: SHIPPED | OUTPATIENT
Start: 2022-01-10

## 2022-01-17 ENCOUNTER — OFFICE VISIT (OUTPATIENT)
Dept: DENTISTRY | Facility: CLINIC | Age: 52
End: 2022-01-17

## 2022-01-17 VITALS — HEART RATE: 90 BPM | DIASTOLIC BLOOD PRESSURE: 81 MMHG | TEMPERATURE: 97.9 F | SYSTOLIC BLOOD PRESSURE: 131 MMHG

## 2022-01-17 DIAGNOSIS — Z01.20 ENCOUNTER FOR DENTAL EXAM AND CLEANING W/O ABNORMAL FINDINGS: Primary | ICD-10-CM

## 2022-01-17 DIAGNOSIS — K05.4 PERIODONTOSIS: ICD-10-CM

## 2022-01-17 PROCEDURE — D0274 BITEWINGS - 4 RADIOGRAPHIC IMAGES: HCPCS

## 2022-01-17 PROCEDURE — D0220 INTRAORAL - PERIAPICAL FIRST RADIOGRAPHIC IMAGE: HCPCS

## 2022-01-17 PROCEDURE — D0120 PERIODIC ORAL EVALUATION - ESTABLISHED PATIENT: HCPCS

## 2022-01-17 PROCEDURE — D1110 PROPHYLAXIS - ADULT: HCPCS

## 2022-01-17 NOTE — PROGRESS NOTES
RECALL EXAM, ADULT PROPHY,  4 BWX, 1 PA #19 ( for pre-auth ins for crown)    CHIEF CONCERN: none   PAIN SCALE: 0  ASA CLASS: I  PLAQUE: mild   CALCULUS: light calculus -mod calculus  BLEEDING:  light -moderate  STAIN :none   ORAL HYGIENE:  good  PERIO: full probe exam completed  Some isolated 4 mm pockets with very little bleeding    Hand scaled, polished and flossed  Oral Hygiene Instruction :  recommended brushing 2 x daily for 2 minutes MIN, recommended flossing daily, reviewed dietary precautions  Soft tissue exam:  soft tissue exam was normal  ExtraOral exam:   Extraoral exam was normal    Dr Lian Bhandari exam=   Reviewed with patient clinical and radiographic findings and patient verbalized understanding  All questions and concerns addressed   recommended crown for #19    REFERRALS: no referrals needed    CARIES FINDINGS: no caries noted    Next Visit: crown #19 after pre-auth   PA given to [facility] for pre-auth    Next Recall: 4 month recall- Dr Erin Rivers recommended 4 month recall per last visit

## 2022-02-11 ENCOUNTER — TELEPHONE (OUTPATIENT)
Dept: SURGERY | Facility: CLINIC | Age: 52
End: 2022-02-11

## 2022-02-11 NOTE — TELEPHONE ENCOUNTER
Pt called this morning, he's experiencing a lot of tooth pain  He's been taking tylenol but it's not really helping, he asked if he could get something for the pain  He has a dental appt in March and is on a wait list to be seen sooner

## 2022-02-14 ENCOUNTER — OFFICE VISIT (OUTPATIENT)
Dept: DENTISTRY | Facility: CLINIC | Age: 52
End: 2022-02-14

## 2022-02-14 VITALS — HEART RATE: 69 BPM | DIASTOLIC BLOOD PRESSURE: 73 MMHG | TEMPERATURE: 97.8 F | SYSTOLIC BLOOD PRESSURE: 113 MMHG

## 2022-02-14 DIAGNOSIS — Z01.21 ENCOUNTER FOR DENTAL EXAMINATION AND CLEANING WITH ABNORMAL FINDINGS: Primary | ICD-10-CM

## 2022-02-14 DIAGNOSIS — K04.90 ENDODONTIC DISEASE: ICD-10-CM

## 2022-02-14 PROCEDURE — D0270 BITEWING - SINGLE RADIOGRAPHIC IMAGE: HCPCS | Performed by: DENTIST

## 2022-02-14 PROCEDURE — D0140 LIMITED ORAL EVALUATION - PROBLEM FOCUSED: HCPCS | Performed by: DENTIST

## 2022-02-14 PROCEDURE — D0220 INTRAORAL - PERIAPICAL FIRST RADIOGRAPHIC IMAGE: HCPCS | Performed by: DENTIST

## 2022-02-14 RX ORDER — TRAMADOL HYDROCHLORIDE 50 MG/1
50 TABLET ORAL EVERY 6 HOURS PRN
Qty: 20 TABLET | Refills: 0 | Status: CANCELLED | OUTPATIENT
Start: 2022-02-14 | End: 2022-02-19

## 2022-02-14 RX ORDER — TRAMADOL HYDROCHLORIDE 50 MG/1
50 TABLET ORAL EVERY 6 HOURS PRN
Qty: 12 TABLET | Refills: 0 | Status: SHIPPED | OUTPATIENT
Start: 2022-02-14 | End: 2022-02-17

## 2022-02-14 RX ORDER — AMOXICILLIN 500 MG/1
500 CAPSULE ORAL EVERY 8 HOURS SCHEDULED
Qty: 21 CAPSULE | Refills: 0 | Status: SHIPPED | OUTPATIENT
Start: 2022-02-14 | End: 2022-02-21

## 2022-02-14 NOTE — PROGRESS NOTES
Pt presents to [facility] for limited exam w/" My tooth hurts on the upper right side "    Pt reported that his tooth has been hurting since Thursday  Pt said that it hurts the most when he is sleeping  Endo ice    Tooth #3: percussion (+++), (-) endo ice   Tooth #4: percussion (+), (-) endo ice  Teeth #7-10 and #24, and #30 normal response  ASSESSMENT  Tooth #4 PAP present; irreversible pulpitis with symptomatic apical periodontitis  PLAN  Endo referral given to pt for tooth #4 due to existing crown on tooth  Pt said that he would like to save the tooth  Pt informed to call dental office if he has any immediate swelling during business hours, however, after the clinic is closed report to the emergency room immediately  Pt understood and agreed  Pt left in stable conditon      DONE TODAY  PA of tooth #4, limited exam, prescribed Tramadol and Amox    NEXT VISIT  6mrc and erika Flores/Ralph

## 2022-02-22 ENCOUNTER — PATIENT OUTREACH (OUTPATIENT)
Dept: SURGERY | Facility: CLINIC | Age: 52
End: 2022-02-22

## 2022-03-04 DIAGNOSIS — F50.81 BINGE EATING DISORDER: ICD-10-CM

## 2022-03-04 RX ORDER — NALTREXONE HYDROCHLORIDE AND BUPROPION HYDROCHLORIDE 8; 90 MG/1; MG/1
TABLET, EXTENDED RELEASE ORAL
Qty: 120 TABLET | Refills: 2 | Status: SHIPPED | OUTPATIENT
Start: 2022-03-04 | End: 2022-05-31

## 2022-03-07 ENCOUNTER — OFFICE VISIT (OUTPATIENT)
Dept: DENTISTRY | Facility: CLINIC | Age: 52
End: 2022-03-07

## 2022-03-07 VITALS — TEMPERATURE: 97.3 F | DIASTOLIC BLOOD PRESSURE: 72 MMHG | SYSTOLIC BLOOD PRESSURE: 102 MMHG | HEART RATE: 85 BPM

## 2022-03-07 DIAGNOSIS — F45.8 BRUXISM (TEETH GRINDING): Primary | ICD-10-CM

## 2022-03-07 PROCEDURE — WIS5001 FINAL IMPRESSIONS DENTURE: Performed by: DENTIST

## 2022-03-07 NOTE — PROGRESS NOTES
51yoM presents for final impression for maxillary occlusal guard  PMH reviewed, no changes  Maxillary final impression taken with prefabricated tray with heavy body PVS first  Impression taken again with a light body PVS wash to ensure all anatomy captured  Confirmed no voids or distortions  Opposing mandibular arch impression taken w/ alginate and poured up  Bite registration taken with Blue[employer]ite  Sent to [facility] labs for fabrication       NV: delivery of maxillary occlusal guard once delivered

## 2022-03-18 DIAGNOSIS — B20 HIV (HUMAN IMMUNODEFICIENCY VIRUS INFECTION) (HCC): ICD-10-CM

## 2022-03-21 RX ORDER — BICTEGRAVIR SODIUM, EMTRICITABINE, AND TENOFOVIR ALAFENAMIDE FUMARATE 50; 200; 25 MG/1; MG/1; MG/1
1 TABLET ORAL DAILY
Qty: 30 TABLET | Refills: 5 | Status: SHIPPED | OUTPATIENT
Start: 2022-03-21

## 2022-03-23 ENCOUNTER — APPOINTMENT (OUTPATIENT)
Dept: LAB | Facility: CLINIC | Age: 52
End: 2022-03-23
Payer: COMMERCIAL

## 2022-03-23 DIAGNOSIS — Z11.3 ENCOUNTER FOR SCREENING FOR BACTERIAL SEXUALLY TRANSMITTED DISEASE: ICD-10-CM

## 2022-03-23 DIAGNOSIS — D72.89 OTHER SPECIFIED DISORDERS OF WHITE BLOOD CELLS: ICD-10-CM

## 2022-03-23 DIAGNOSIS — Z72.89 OTHER PROBLEMS RELATED TO LIFESTYLE: ICD-10-CM

## 2022-03-23 DIAGNOSIS — B20 HIV DISEASE (HCC): ICD-10-CM

## 2022-03-23 DIAGNOSIS — Z20.2 CONTACT WITH AND (SUSPECTED) EXPOSURE TO INFECTIONS WITH A PREDOMINANTLY SEXUAL MODE OF TRANSMISSION: ICD-10-CM

## 2022-03-23 LAB
ALBUMIN SERPL BCP-MCNC: 3.8 G/DL (ref 3.5–5)
ALP SERPL-CCNC: 71 U/L (ref 46–116)
ALT SERPL W P-5'-P-CCNC: 49 U/L (ref 12–78)
ANION GAP SERPL CALCULATED.3IONS-SCNC: 4 MMOL/L (ref 4–13)
AST SERPL W P-5'-P-CCNC: 55 U/L (ref 5–45)
BASOPHILS # BLD AUTO: 0.04 THOUSANDS/ΜL (ref 0–0.1)
BASOPHILS NFR BLD AUTO: 1 % (ref 0–1)
BILIRUB SERPL-MCNC: 0.58 MG/DL (ref 0.2–1)
BILIRUB UR QL STRIP: NEGATIVE
BUN SERPL-MCNC: 17 MG/DL (ref 5–25)
CALCIUM SERPL-MCNC: 9.5 MG/DL (ref 8.3–10.1)
CHLORIDE SERPL-SCNC: 107 MMOL/L (ref 100–108)
CLARITY UR: CLEAR
CO2 SERPL-SCNC: 28 MMOL/L (ref 21–32)
COLOR UR: YELLOW
CREAT SERPL-MCNC: 1.12 MG/DL (ref 0.6–1.3)
EOSINOPHIL # BLD AUTO: 0.14 THOUSAND/ΜL (ref 0–0.61)
EOSINOPHIL NFR BLD AUTO: 3 % (ref 0–6)
ERYTHROCYTE [DISTWIDTH] IN BLOOD BY AUTOMATED COUNT: 13 % (ref 11.6–15.1)
GFR SERPL CREATININE-BSD FRML MDRD: 76 ML/MIN/1.73SQ M
GLUCOSE P FAST SERPL-MCNC: 90 MG/DL (ref 65–99)
GLUCOSE UR STRIP-MCNC: NEGATIVE MG/DL
HCT VFR BLD AUTO: 44.7 % (ref 36.5–49.3)
HCV AB SER QL: NORMAL
HGB BLD-MCNC: 15.1 G/DL (ref 12–17)
HGB UR QL STRIP.AUTO: NEGATIVE
IMM GRANULOCYTES # BLD AUTO: 0 THOUSAND/UL (ref 0–0.2)
IMM GRANULOCYTES NFR BLD AUTO: 0 % (ref 0–2)
KETONES UR STRIP-MCNC: NEGATIVE MG/DL
LEUKOCYTE ESTERASE UR QL STRIP: NEGATIVE
LYMPHOCYTES # BLD AUTO: 1.85 THOUSANDS/ΜL (ref 0.6–4.47)
LYMPHOCYTES NFR BLD AUTO: 43 % (ref 14–44)
MCH RBC QN AUTO: 31.2 PG (ref 26.8–34.3)
MCHC RBC AUTO-ENTMCNC: 33.8 G/DL (ref 31.4–37.4)
MCV RBC AUTO: 92 FL (ref 82–98)
MONOCYTES # BLD AUTO: 0.44 THOUSAND/ΜL (ref 0.17–1.22)
MONOCYTES NFR BLD AUTO: 10 % (ref 4–12)
NEUTROPHILS # BLD AUTO: 1.88 THOUSANDS/ΜL (ref 1.85–7.62)
NEUTS SEG NFR BLD AUTO: 43 % (ref 43–75)
NITRITE UR QL STRIP: NEGATIVE
NRBC BLD AUTO-RTO: 0 /100 WBCS
PH UR STRIP.AUTO: 8 [PH]
PLATELET # BLD AUTO: 237 THOUSANDS/UL (ref 149–390)
PMV BLD AUTO: 10.7 FL (ref 8.9–12.7)
POTASSIUM SERPL-SCNC: 4.2 MMOL/L (ref 3.5–5.3)
PROT SERPL-MCNC: 7.2 G/DL (ref 6.4–8.2)
PROT UR STRIP-MCNC: NEGATIVE MG/DL
RBC # BLD AUTO: 4.84 MILLION/UL (ref 3.88–5.62)
SODIUM SERPL-SCNC: 139 MMOL/L (ref 136–145)
SP GR UR STRIP.AUTO: 1.01 (ref 1–1.03)
UROBILINOGEN UR STRIP-ACNC: <2 MG/DL
WBC # BLD AUTO: 4.35 THOUSAND/UL (ref 4.31–10.16)

## 2022-03-23 PROCEDURE — 86360 T CELL ABSOLUTE COUNT/RATIO: CPT

## 2022-03-23 PROCEDURE — 80053 COMPREHEN METABOLIC PANEL: CPT

## 2022-03-23 PROCEDURE — 87491 CHLMYD TRACH DNA AMP PROBE: CPT

## 2022-03-23 PROCEDURE — 87591 N.GONORRHOEAE DNA AMP PROB: CPT

## 2022-03-23 PROCEDURE — 87536 HIV-1 QUANT&REVRSE TRNSCRPJ: CPT

## 2022-03-23 PROCEDURE — 36415 COLL VENOUS BLD VENIPUNCTURE: CPT

## 2022-03-23 PROCEDURE — 81003 URINALYSIS AUTO W/O SCOPE: CPT

## 2022-03-23 PROCEDURE — 85025 COMPLETE CBC W/AUTO DIFF WBC: CPT

## 2022-03-23 PROCEDURE — 86803 HEPATITIS C AB TEST: CPT

## 2022-03-24 ENCOUNTER — PATIENT OUTREACH (OUTPATIENT)
Dept: SURGERY | Facility: CLINIC | Age: 52
End: 2022-03-24

## 2022-03-24 LAB
BASOPHILS # BLD AUTO: 0.1 X10E3/UL (ref 0–0.2)
BASOPHILS NFR BLD AUTO: 1 %
C TRACH DNA SPEC QL NAA+PROBE: NEGATIVE
CD3+CD4+ CELLS # BLD: 532 /UL (ref 359–1519)
CD3+CD4+ CELLS NFR BLD: 28 % (ref 30.8–58.5)
CD3+CD4+ CELLS/CD3+CD8+ CLL BLD: 0.8 % (ref 0.92–3.72)
CD3+CD8+ CELLS # BLD: 661 /UL (ref 109–897)
CD3+CD8+ CELLS NFR BLD: 34.8 % (ref 12–35.5)
EOSINOPHIL # BLD AUTO: 0.1 X10E3/UL (ref 0–0.4)
EOSINOPHIL NFR BLD AUTO: 3 %
ERYTHROCYTE [DISTWIDTH] IN BLOOD BY AUTOMATED COUNT: 12.5 % (ref 11.6–15.4)
HCT VFR BLD AUTO: 45.6 % (ref 37.5–51)
HGB BLD-MCNC: 14.9 G/DL (ref 13–17.7)
IMM GRANULOCYTES # BLD: 0 X10E3/UL (ref 0–0.1)
IMM GRANULOCYTES NFR BLD: 0 %
LYMPHOCYTES # BLD AUTO: 1.9 X10E3/UL (ref 0.7–3.1)
LYMPHOCYTES NFR BLD AUTO: 45 %
MCH RBC QN AUTO: 31.6 PG (ref 26.6–33)
MCHC RBC AUTO-ENTMCNC: 32.7 G/DL (ref 31.5–35.7)
MCV RBC AUTO: 97 FL (ref 79–97)
MONOCYTES # BLD AUTO: 0.4 X10E3/UL (ref 0.1–0.9)
MONOCYTES NFR BLD AUTO: 10 %
N GONORRHOEA DNA SPEC QL NAA+PROBE: NEGATIVE
NEUTROPHILS # BLD AUTO: 1.7 X10E3/UL (ref 1.4–7)
NEUTROPHILS NFR BLD AUTO: 41 %
PLATELET # BLD AUTO: 222 X10E3/UL (ref 150–450)
RBC # BLD AUTO: 4.72 X10E6/UL (ref 4.14–5.8)
WBC # BLD AUTO: 4.1 X10E3/UL (ref 3.4–10.8)

## 2022-03-25 LAB
HIV1 RNA # SERPL NAA+PROBE: 110 COPIES/ML
HIV1 RNA SERPL NAA+PROBE-LOG#: 2.04 LOG10COPY/ML

## 2022-03-28 ENCOUNTER — OFFICE VISIT (OUTPATIENT)
Dept: DENTISTRY | Facility: CLINIC | Age: 52
End: 2022-03-28

## 2022-03-28 DIAGNOSIS — K03.0 EXCESSIVE ATTRITION OF TEETH LIMITED TO ENAMEL: Primary | ICD-10-CM

## 2022-03-28 PROCEDURE — WIS20999 ORTHOPEDIC SPLINT (ORTHOTIC): Performed by: DENTIST

## 2022-03-28 PROCEDURE — WIS97700 ADJUST ORTHOTIC/SPLINT: Performed by: DENTIST

## 2022-03-28 NOTE — PROGRESS NOTES
51yoM presents for maxillary occlusal guard delivery  Maxillary occlusal guard unable to fit upon insertion intraorally  Nightguard snaps onto cast with great pressure, however, fit is tight and difficult to remove  Guard was warmed for several minutes with hot water prior to insertion and was still unable to seat comfortably  Confirmed fit on cast is too tight  Intaglio surface of guard was gently relieved with acrylic burs in the molar regions  Confirmed with attending that new final impression to be taken today guard and sent to lab  New guard to be fabricated by lab - made of more pliable flexible material would be necessary  Confirmed with billing that ordering new type of  would not affect cost to pt  New final impression with heavy body PVS and bite registration taken today  Case to be resent to [facility] dental lab  Previous case materials and previous  to be sent to [facility] lab      NV: try in/delivery of nightguard once returned

## 2022-03-29 ENCOUNTER — OFFICE VISIT (OUTPATIENT)
Dept: SURGERY | Facility: CLINIC | Age: 52
End: 2022-03-29
Payer: COMMERCIAL

## 2022-03-29 VITALS
DIASTOLIC BLOOD PRESSURE: 72 MMHG | BODY MASS INDEX: 25.38 KG/M2 | WEIGHT: 187.4 LBS | SYSTOLIC BLOOD PRESSURE: 112 MMHG | TEMPERATURE: 98 F | HEIGHT: 72 IN | HEART RATE: 80 BPM | OXYGEN SATURATION: 99 %

## 2022-03-29 DIAGNOSIS — A53.9 SYPHILIS: ICD-10-CM

## 2022-03-29 DIAGNOSIS — B20 HIV DISEASE (HCC): Primary | ICD-10-CM

## 2022-03-29 DIAGNOSIS — Z79.899 OTHER LONG TERM (CURRENT) DRUG THERAPY: ICD-10-CM

## 2022-03-29 DIAGNOSIS — K12.0 APHTHOUS ULCER: ICD-10-CM

## 2022-03-29 DIAGNOSIS — Z13.1 SCREENING FOR DIABETES MELLITUS: ICD-10-CM

## 2022-03-29 PROCEDURE — 99214 OFFICE O/P EST MOD 30 MIN: CPT | Performed by: INTERNAL MEDICINE

## 2022-03-29 NOTE — PROGRESS NOTES
Progress Note - Infectious Disease   Erica Hugo 54 y o  male MRN: [id number]  Unit/Bed#:  Encounter: [id number]      Impression/Plan:  1   HIV-doing well on Biktarvy with a viral load of 110 and a CD4 count in the 500s  Deo Oropeza previously had some blood soon his viral load felt secondary to supplements  Still doing supplements but  from medications by proximally 12 hours   Recheck labs in 4 weeks and follow up in 6 weeks  Stressed adherence      2   Syphilis-has been serofast   Now nonreactive  Continue annual monitoring     3   Weight loss-intentional  Will continue to monitor  Relatively stable      4  Severe refractory aphthous ulcerations-no recurrence since stopping nonsteroidals   Will monitor for now      Patient was provided medication, adherence and prevention education    Subjective:  Routine follow-up for HIV  Patient claims 100% adherence with Biktarvy    Patient denies any notable side effects  Overall the feeling well  The patient denies any fever chills or sweats, denies any nausea vomiting or diarrhea, denies any cough or shortness of breath  ROS:  A complete review of systems is negative other than that noted above in the subjective    Followup portions patient history reviewed and updated as: Allergies, current medications, past medical history, past social history, past surgical history, and the problem list    Objective:  Vitals:  Vitals:    03/29/22 1700   BP: 112/72   Pulse: 80   Temp: 98 °F (36 7 °C)   SpO2: 99%   Weight: 85 kg (187 lb 6 4 oz)   Height: 6' (1 829 m)       Physical Exam:   General Appearance:  Alert, interactive, appearing well,  nontoxic, no acute distress  Neck:   Supple without lymphadenopathy, no thyromegaly or masses   Throat: Oropharynx moist without lesions      Lungs:   Clear to auscultation bilaterally; no wheezes, rhonchi or rales; respirations unlabored   Heart:  RRR; no murmur, rub or gallop   Abdomen:   Soft, non-tender, non-distended, positive bowel sounds  Extremities: No clubbing, cyanosis or edema   Skin: No new rashes or lesions  No draining wounds noted  Labs, Imaging, & Other studies:   All pertinent labs and imaging studies were personally reviewed    Lab Results   Component Value Date    K 4 2 03/23/2022     03/23/2022    CO2 28 03/23/2022    BUN 17 03/23/2022    CREATININE 1 12 03/23/2022    GLUF 90 03/23/2022    CALCIUM 9 5 03/23/2022    AST 55 (H) 03/23/2022    ALT 49 03/23/2022    ALKPHOS 71 03/23/2022    EGFR 76 03/23/2022     Lab Results   Component Value Date    WBC 4 35 03/23/2022    WBC 4 1 03/23/2022    HGB 15 1 03/23/2022    HGB 14 9 03/23/2022    HCT 44 7 03/23/2022    HCT 45 6 03/23/2022    MCV 92 03/23/2022    MCV 97 03/23/2022     03/23/2022     03/23/2022     Lab Results   Component Value Date    HEPCAB Non-reactive 03/23/2022     Lab Results   Component Value Date    HEPCAB Non-reactive 03/23/2022     Lab Results   Component Value Date    RPR Non-Reactive 09/09/2021     CD4 ABS   Date/Time Value Ref Range Status   03/23/2022 07:12  359 - 1519 /uL Final     HIV-1 RNA by PCR, Qn   Date/Time Value Ref Range Status   03/23/2022 07:12  copies/mL Final     Comment:     The reportable range for this assay is 20 to 10,000,000  copies HIV-1 RNA/mL             Current Outpatient Medications:     Biktarvy -25 MG tablet, TAKE 1 TABLET BY MOUTH DAILY, Disp: 30 tablet, Rfl: 5    Contrave 8-90 MG TB12, TAKE TWO TABLETS BY MOUTH TWICE A DAY, Disp: 120 tablet, Rfl: 2    chlorhexidine (PERIDEX) 0 12 % solution, Apply 15 mL to the mouth or throat 2 (two) times a day (Patient not taking: Reported on 3/29/2022 ), Disp: 120 mL, Rfl: 0

## 2022-03-30 NOTE — PROGRESS NOTES
Assessment    Annual nutritional assessment    Clinical Data/Client History    HIV: yes  AIDS: no    Socio- Economic Status: Eats Out, Cooks and has Mobile [employer] voucher & has signed up for local CSA  Living Situation: Apartment    Food Prep/Access: Refrigerator, Stove and Microwave    Functional Status: Ambulatory, Able to [employer] and he and his partner both cook  Activity Level: patient states he is very active, has  , exercises regularly    Oral Problems: Chewing Difficulty denies, Pain on occasion, but not significant, Inability to Chew no     Last Dental Exam: 3/28/22    Procedures Performed: repair of crowns, getting mouth guard    Typical Food/Beverage Intake:    · Breakfast cooked grains, or whole grain toast with soy milk or tofu  · Lunch similar to breakfast, also with lots of cooked vegetables  · Dinner similar to breakfast & lunch  · Snacks rice cakes and peanut butter, nuts, fresh fruit  · Fluids water, seltzer, coffee, at least 2 Protein drinks/ day    Appetite: Excellent    Supplements: Yes  Homemade Smoothies and Protein Powders    Food Intolerance: patient admits he cannot tolerate many raw vegetables, especially cruciferous veggies such as broccoli  Weight Change Percent: minimal    Usual Body Weight: 186 lbs in June 2021    Current Body Weight: 187 5 lbs( 85 kg)    Nutrition Diagnosis    Problem: No nutrition diagnosis at this time    Intervention Diet Prescription    Nutritional needs based on: 85 kg current body weight    · 2400 to 2600 kcal  · 70 to 85 gm protein  · 2400 ml fluid  · 2 gm Na    Current intake: adequate     Nutrition Recommendations: reinforced patient's Vegan food choices      Goals: continue with current eating habits    Nutrition Education Intervention: Provided     Person Educated: patient    Topics Discussed: healthy eating guidelines    Teaching Method: Verbal    Readiness to Change: Maintenance: (Maintaining the behavior change)    Visit Summary    Patient doing well with his current Vegan food choices  His weight is stable, & A1c, Albumin WNL  Will continue to monitor patient's nutritional status    Jese Hutton RDN, LDN, [facility]

## 2022-04-15 ENCOUNTER — OFFICE VISIT (OUTPATIENT)
Dept: DENTISTRY | Facility: CLINIC | Age: 52
End: 2022-04-15

## 2022-04-15 VITALS — SYSTOLIC BLOOD PRESSURE: 112 MMHG | HEART RATE: 67 BPM | DIASTOLIC BLOOD PRESSURE: 75 MMHG | TEMPERATURE: 98.2 F

## 2022-04-15 DIAGNOSIS — K05.4 PERIODONTOSIS: Primary | ICD-10-CM

## 2022-04-15 PROCEDURE — D4910 PERIODONTAL MAINTENANCE: HCPCS

## 2022-04-15 NOTE — PROGRESS NOTES
3 MONTH PERIO MAINTENANCE  No periodic exam due today  REVIEWED MED HX: meds, allergies, health changes reviewed in EPIC  CHIEF CONCERN:  UR tooth recommended for endodontist , pt unable to do at this time due to finances until few months  some swelling around gum but no tooth pain   PAIN SCALE:  0  ASA CLASS:  I  PLAQUE:  mild   CALCULUS:   light calculus   BLEEDING:    light   STAIN :   moderate  ORAL HYGIENE:  good  PERIO: stable    Hand scaled, polished and flossed  Used cavitron    Visual and Tactile Intraoral/ Extraoral evaluation: Oral and Oropharyngeal cancer evaluation  No findings     Dr Olivia Aragon exam=  Evaluated reported swelling above #4, referred to endo due to crown present   recommended endo asap  Informed pt to call asap if any pain or swelling       REFERRALS: no referrals needed    CARIES FINDINGS:  no caries noted    Next Recall: 3 month perio maintenance    Last BWX: 2/14/22

## 2022-05-12 ENCOUNTER — PATIENT OUTREACH (OUTPATIENT)
Dept: SURGERY | Facility: CLINIC | Age: 52
End: 2022-05-12

## 2022-05-13 ENCOUNTER — APPOINTMENT (OUTPATIENT)
Dept: LAB | Facility: CLINIC | Age: 52
End: 2022-05-13
Payer: COMMERCIAL

## 2022-05-13 LAB
ALBUMIN SERPL BCP-MCNC: 3.5 G/DL (ref 3.5–5)
ALP SERPL-CCNC: 71 U/L (ref 46–116)
ALT SERPL W P-5'-P-CCNC: 38 U/L (ref 12–78)
ANION GAP SERPL CALCULATED.3IONS-SCNC: -1 MMOL/L (ref 4–13)
AST SERPL W P-5'-P-CCNC: 22 U/L (ref 5–45)
BASOPHILS # BLD MANUAL: 0 THOUSAND/UL (ref 0–0.1)
BASOPHILS NFR MAR MANUAL: 0 % (ref 0–1)
BILIRUB SERPL-MCNC: 0.47 MG/DL (ref 0.2–1)
BUN SERPL-MCNC: 15 MG/DL (ref 5–25)
CALCIUM SERPL-MCNC: 9.8 MG/DL (ref 8.3–10.1)
CHLORIDE SERPL-SCNC: 105 MMOL/L (ref 100–108)
CO2 SERPL-SCNC: 32 MMOL/L (ref 21–32)
CREAT SERPL-MCNC: 1.07 MG/DL (ref 0.6–1.3)
EOSINOPHIL # BLD MANUAL: 0.15 THOUSAND/UL (ref 0–0.4)
EOSINOPHIL NFR BLD MANUAL: 4 % (ref 0–6)
ERYTHROCYTE [DISTWIDTH] IN BLOOD BY AUTOMATED COUNT: 12.4 % (ref 11.6–15.1)
EST. AVERAGE GLUCOSE BLD GHB EST-MCNC: 105 MG/DL
GFR SERPL CREATININE-BSD FRML MDRD: 81 ML/MIN/1.73SQ M
GLUCOSE P FAST SERPL-MCNC: 88 MG/DL (ref 65–99)
HBA1C MFR BLD: 5.3 %
HCT VFR BLD AUTO: 42.1 % (ref 36.5–49.3)
HGB BLD-MCNC: 14.4 G/DL (ref 12–17)
LYMPHOCYTES # BLD AUTO: 2.17 THOUSAND/UL (ref 0.6–4.47)
LYMPHOCYTES # BLD AUTO: 59 % (ref 14–44)
MCH RBC QN AUTO: 30.9 PG (ref 26.8–34.3)
MCHC RBC AUTO-ENTMCNC: 34.2 G/DL (ref 31.4–37.4)
MCV RBC AUTO: 90 FL (ref 82–98)
MONOCYTES # BLD AUTO: 0.4 THOUSAND/UL (ref 0–1.22)
MONOCYTES NFR BLD: 11 % (ref 4–12)
NEUTROPHILS # BLD MANUAL: 0.92 THOUSAND/UL (ref 1.85–7.62)
NEUTS SEG NFR BLD AUTO: 25 % (ref 43–75)
PLATELET # BLD AUTO: 243 THOUSANDS/UL (ref 149–390)
PLATELET BLD QL SMEAR: ADEQUATE
PMV BLD AUTO: 10.5 FL (ref 8.9–12.7)
POTASSIUM SERPL-SCNC: 4.5 MMOL/L (ref 3.5–5.3)
PROT SERPL-MCNC: 7.7 G/DL (ref 6.4–8.2)
RBC # BLD AUTO: 4.66 MILLION/UL (ref 3.88–5.62)
SODIUM SERPL-SCNC: 136 MMOL/L (ref 136–145)
VARIANT LYMPHS # BLD AUTO: 1 %
WBC # BLD AUTO: 3.68 THOUSAND/UL (ref 4.31–10.16)

## 2022-05-13 PROCEDURE — 85007 BL SMEAR W/DIFF WBC COUNT: CPT | Performed by: INTERNAL MEDICINE

## 2022-05-13 PROCEDURE — 80053 COMPREHEN METABOLIC PANEL: CPT | Performed by: INTERNAL MEDICINE

## 2022-05-13 PROCEDURE — 86360 T CELL ABSOLUTE COUNT/RATIO: CPT | Performed by: INTERNAL MEDICINE

## 2022-05-13 PROCEDURE — 85027 COMPLETE CBC AUTOMATED: CPT | Performed by: INTERNAL MEDICINE

## 2022-05-13 PROCEDURE — 36415 COLL VENOUS BLD VENIPUNCTURE: CPT | Performed by: INTERNAL MEDICINE

## 2022-05-13 PROCEDURE — 87536 HIV-1 QUANT&REVRSE TRNSCRPJ: CPT | Performed by: INTERNAL MEDICINE

## 2022-05-13 PROCEDURE — 83036 HEMOGLOBIN GLYCOSYLATED A1C: CPT | Performed by: INTERNAL MEDICINE

## 2022-05-14 LAB
BASOPHILS # BLD AUTO: 0 X10E3/UL (ref 0–0.2)
BASOPHILS NFR BLD AUTO: 1 %
CD3+CD4+ CELLS # BLD: 557 /UL (ref 359–1519)
CD3+CD4+ CELLS NFR BLD: 29.3 % (ref 30.8–58.5)
CD3+CD4+ CELLS/CD3+CD8+ CLL BLD: 0.86 % (ref 0.92–3.72)
CD3+CD8+ CELLS # BLD: 644 /UL (ref 109–897)
CD3+CD8+ CELLS NFR BLD: 33.9 % (ref 12–35.5)
EOSINOPHIL # BLD AUTO: 0.1 X10E3/UL (ref 0–0.4)
EOSINOPHIL NFR BLD AUTO: 3 %
ERYTHROCYTE [DISTWIDTH] IN BLOOD BY AUTOMATED COUNT: 12 % (ref 11.6–15.4)
HCT VFR BLD AUTO: 44.6 % (ref 37.5–51)
HGB BLD-MCNC: 14.2 G/DL (ref 13–17.7)
IMM GRANULOCYTES # BLD: 0 X10E3/UL (ref 0–0.1)
IMM GRANULOCYTES NFR BLD: 0 %
LYMPHOCYTES # BLD AUTO: 1.9 X10E3/UL (ref 0.7–3.1)
LYMPHOCYTES NFR BLD AUTO: 51 %
MCH RBC QN AUTO: 30 PG (ref 26.6–33)
MCHC RBC AUTO-ENTMCNC: 31.8 G/DL (ref 31.5–35.7)
MCV RBC AUTO: 94 FL (ref 79–97)
MONOCYTES # BLD AUTO: 0.3 X10E3/UL (ref 0.1–0.9)
MONOCYTES NFR BLD AUTO: 9 %
NEUTROPHILS # BLD AUTO: 1.3 X10E3/UL (ref 1.4–7)
NEUTROPHILS NFR BLD AUTO: 36 %
PLATELET # BLD AUTO: 243 X10E3/UL (ref 150–450)
RBC # BLD AUTO: 4.73 X10E6/UL (ref 4.14–5.8)
WBC # BLD AUTO: 3.7 X10E3/UL (ref 3.4–10.8)

## 2022-05-16 LAB
HIV1 RNA # SERPL NAA+PROBE: 60 COPIES/ML
HIV1 RNA SERPL NAA+PROBE-LOG#: 1.78 LOG10COPY/ML

## 2022-05-23 ENCOUNTER — OFFICE VISIT (OUTPATIENT)
Dept: SURGERY | Facility: CLINIC | Age: 52
End: 2022-05-23
Payer: COMMERCIAL

## 2022-05-23 VITALS
TEMPERATURE: 98.1 F | HEIGHT: 72 IN | BODY MASS INDEX: 25.6 KG/M2 | WEIGHT: 189 LBS | SYSTOLIC BLOOD PRESSURE: 111 MMHG | HEART RATE: 85 BPM | OXYGEN SATURATION: 96 % | DIASTOLIC BLOOD PRESSURE: 71 MMHG

## 2022-05-23 DIAGNOSIS — F51.01 PRIMARY INSOMNIA: ICD-10-CM

## 2022-05-23 DIAGNOSIS — Z00.00 ANNUAL PHYSICAL EXAM: ICD-10-CM

## 2022-05-23 DIAGNOSIS — B20 HIV DISEASE (HCC): Primary | ICD-10-CM

## 2022-05-23 DIAGNOSIS — Z12.11 ENCOUNTER FOR SCREENING COLONOSCOPY: ICD-10-CM

## 2022-05-23 DIAGNOSIS — F50.81 BINGE EATING DISORDER: ICD-10-CM

## 2022-05-23 DIAGNOSIS — D22.9 ATYPICAL MOLE: ICD-10-CM

## 2022-05-23 PROCEDURE — 99396 PREV VISIT EST AGE 40-64: CPT | Performed by: NURSE PRACTITIONER

## 2022-05-23 NOTE — PROGRESS NOTES
[address] [facility]    NAME: Patrick Sexton  AGE: 54 y o  SEX: male  : 1970     DATE: 2022     Assessment and Plan:     Problem List Items Addressed This Visit        Other    HIV disease ([facility] [address] ) - Primary     No results found for: [id number]  CD4 ABS   Date/Time Value Ref Range Status   2022 09:32  359 - 1519 /uL Final     HIV-1 RNA by PCR, Qn   Date/Time Value Ref Range Status   2022 09:32 AM 60 copies/mL Final     Comment:     The reportable range for this assay is 20 to 10,000,000  copies HIV-1 RNA/mL  HIV-1 RNA Viral Load Log   Date/Time Value Ref Range Status   2022 09:32 AM 1 778 [id number]/mL Final         ART: Margy Hatchet        Denies side effects  Stressed the importance of adherence  Continue follow up with ID clinic  Reviewed most recent labs, including Cd4 and viral load  Discussed the risks and benefits of treatment options, instructions for management, importance of treatment adherence, and reduction of risk factor  Educated on possible  medication side effects  Counseled on routes of HIV transmission, including the risk of  infection  Emphasized that viral suppression is the best method to prevent HIV transmission  At this time pt denies the need for HIV testing of anyone in their life  Total encounter time was 45 minutes  Greater then 20 minutes were spent on counseling and patient education  Pt voices understanding and agreement with treatment plan  Binge eating disorder     Well controlled with Contrave              Primary insomnia     Discussed good sleep hygiene and relaxation technique to ease anxiety   Will attempt Melatonin to assist with restful sleep             Other Visit Diagnoses     Annual physical exam        Atypical mole        Relevant Orders    Ambulatory referral to Dermatology    Encounter for screening colonoscopy        Relevant Orders Ambulatory referral to Gastroenterology          Immunizations and preventive care screenings were discussed with patient today  Appropriate education was printed on patient's after visit summary  Counseling:  Dental Health: discussed importance of regular tooth brushing, flossing, and dental visits  · Exercise: the importance of regular exercise/physical activity was discussed  Recommend exercise 3-5 times per week for at least 30 minutes  BMI Counseling: Body mass index is 25 63 kg/m²  The BMI is above normal  Nutrition recommendations include encouraging healthy choices of fruits and vegetables  Exercise recommendations include vigorous physical activity 75 minutes/week  Rationale for BMI follow-up plan is due to patient being overweight or obese  Return in 6 months (on 11/23/2022)  Chief Complaint:     Chief Complaint   Patient presents with    Follow-up     Pt has a spot on his face he had on his face for a few months but it looks like its changing  History of Present Illness:     Adult Annual Physical   Patient here for a comprehensive physical exam  The patient reports problems - atypical mole on the right buccal area       Diet and Physical Activity  · Diet/Nutrition: well balanced diet and consuming 3-5 servings of fruits/vegetables daily  · Exercise: moderate cardiovascular exercise and 3-4 times a week on average  Depression Screening  PHQ-2/9 Depression Screening         General Health  · Sleep: sleeps well  Does report occasional difficulty falling asleep  · Hearing: normal - bilateral   · Vision: no vision problems  · Dental: regular dental visits   Health  · Symptoms include: none     Review of Systems:     Review of Systems   Constitutional: Negative for chills and fever  HENT: Negative for ear pain and sore throat  Eyes: Negative for pain and visual disturbance  Respiratory: Negative for cough and shortness of breath      Cardiovascular: Negative for chest pain and palpitations  Gastrointestinal: Negative for abdominal pain and vomiting  Genitourinary: Negative for dysuria and hematuria  Musculoskeletal: Negative for arthralgias and back pain  Skin: Negative for color change and rash  Neurological: Negative for seizures and syncope  Psychiatric/Behavioral: Positive for sleep disturbance  All other systems reviewed and are negative  Past Medical History:     Past Medical History:   Diagnosis Date    Anxiety     last assessed 2015    Chest tightness     Drug abuse ([facility] [address] )     including heroin    Stomatitis     last assessed 05/14/15    Syphilis     ,, treated    TB (tuberculosis)       Past Surgical History:     No past surgical history on file  Family History:     No family history on file  Social History:     Social History     Socioeconomic History    Marital status: Single     Spouse name: None    Number of children: None    Years of education: None    Highest education level: None   Occupational History    None   Tobacco Use    Smoking status: Former Smoker     Years: 30 00     Types: Cigarettes     Quit date: 2018     Years since quittin 2    Smokeless tobacco: Never Used   Vaping Use    Vaping Use: Never used   Substance and Sexual Activity    Alcohol use: No    Drug use: No    Sexual activity: Yes     Partners: Male     Birth control/protection: None   Other Topics Concern    None   Social History Narrative    Housing, Stable housing 1 person in household    Monthly income $80    Vegan     Social Determinants of Health     Financial Resource Strain: Not on file   Food Insecurity: Not on file   Transportation Needs: Not on file   Physical Activity: Not on file   Stress: Not on file   Social Connections: Not on file   Intimate Partner Violence: Not on file   Housing Stability: Not on file      Current Medications:     Current Outpatient Medications   Medication Sig Dispense Refill    Biktarvy -25 MG tablet TAKE 1 TABLET BY MOUTH DAILY 30 tablet 5    Contrave 8-90 MG TB12 TAKE TWO TABLETS BY MOUTH TWICE A  tablet 2    chlorhexidine (PERIDEX) 0 12 % solution Apply 15 mL to the mouth or throat 2 (two) times a day (Patient not taking: No sig reported) 120 mL 0     No current facility-administered medications for this visit  Allergies: Allergies   Allergen Reactions    Codeine Confusion      Physical Exam:     /71   Pulse 85   Temp 98 1 °F (36 7 °C)   Ht 6' (1 829 m)   Wt 85 7 kg (189 lb)   SpO2 96%   BMI 25 63 kg/m²     Physical Exam  Vitals and nursing note reviewed  Constitutional:       Appearance: He is well-developed  HENT:      Head: Normocephalic and atraumatic  Eyes:      Conjunctiva/sclera: Conjunctivae normal    Cardiovascular:      Rate and Rhythm: Normal rate and regular rhythm  Heart sounds: No murmur heard  Pulmonary:      Effort: Pulmonary effort is normal  No respiratory distress  Breath sounds: Normal breath sounds  Abdominal:      Palpations: Abdomen is soft  Tenderness: There is no abdominal tenderness  Musculoskeletal:      Cervical back: Neck supple  Skin:     General: Skin is warm and dry  Neurological:      Mental Status: He is alert            [facility]  [facility]

## 2022-05-23 NOTE — PATIENT INSTRUCTIONS

## 2022-05-23 NOTE — ASSESSMENT & PLAN NOTE
No results found for: [id number]  CD4 ABS   Date/Time Value Ref Range Status   2022 09:32  359 - 1519 /uL Final     HIV-1 RNA by PCR, Qn   Date/Time Value Ref Range Status   2022 09:32 AM 60 copies/mL Final     Comment:     The reportable range for this assay is 20 to 10,000,000  copies HIV-1 RNA/mL  HIV-1 RNA Viral Load Log   Date/Time Value Ref Range Status   2022 09:32 AM 1 778 [id number]/mL Final         ART: Rylee Stover        Denies side effects  Stressed the importance of adherence  Continue follow up with ID clinic  Reviewed most recent labs, including Cd4 and viral load  Discussed the risks and benefits of treatment options, instructions for management, importance of treatment adherence, and reduction of risk factor  Educated on possible  medication side effects  Counseled on routes of HIV transmission, including the risk of  infection  Emphasized that viral suppression is the best method to prevent HIV transmission  At this time pt denies the need for HIV testing of anyone in their life  Total encounter time was 45 minutes  Greater then 20 minutes were spent on counseling and patient education  Pt voices understanding and agreement with treatment plan

## 2022-05-23 NOTE — ASSESSMENT & PLAN NOTE
Discussed good sleep hygiene and relaxation technique to ease anxiety   Will attempt Melatonin to assist with restful sleep

## 2022-05-24 ENCOUNTER — OFFICE VISIT (OUTPATIENT)
Dept: SURGERY | Facility: CLINIC | Age: 52
End: 2022-05-24
Payer: COMMERCIAL

## 2022-05-24 VITALS
SYSTOLIC BLOOD PRESSURE: 119 MMHG | HEIGHT: 72 IN | BODY MASS INDEX: 25.9 KG/M2 | TEMPERATURE: 97.8 F | HEART RATE: 76 BPM | WEIGHT: 191.2 LBS | DIASTOLIC BLOOD PRESSURE: 86 MMHG | OXYGEN SATURATION: 95 %

## 2022-05-24 DIAGNOSIS — B20 HIV DISEASE (HCC): Primary | ICD-10-CM

## 2022-05-24 DIAGNOSIS — A53.9 SYPHILIS: ICD-10-CM

## 2022-05-24 DIAGNOSIS — K12.0 APHTHOUS ULCER: ICD-10-CM

## 2022-05-24 DIAGNOSIS — R63.4 WEIGHT LOSS: ICD-10-CM

## 2022-05-24 PROCEDURE — 99214 OFFICE O/P EST MOD 30 MIN: CPT | Performed by: INTERNAL MEDICINE

## 2022-05-25 DIAGNOSIS — F50.81 BINGE EATING DISORDER: ICD-10-CM

## 2022-05-31 RX ORDER — NALTREXONE HYDROCHLORIDE AND BUPROPION HYDROCHLORIDE 8; 90 MG/1; MG/1
TABLET, EXTENDED RELEASE ORAL
Qty: 120 TABLET | Refills: 2 | Status: SHIPPED | OUTPATIENT
Start: 2022-05-31

## 2022-06-06 ENCOUNTER — PATIENT OUTREACH (OUTPATIENT)
Dept: SURGERY | Facility: CLINIC | Age: 52
End: 2022-06-06

## 2022-06-06 NOTE — PROGRESS NOTES
Ct did recert over the phone  There are no e-signatures due to covid 19       Per ct is sexually active  Per ct isn't using protection with partner  Cm and ct went over the importance of protection  Per ct has 1 partner in the last 3 months  Per ct goes to [employer]  Ct has active Trg [address]  Per ct denies any oral issues  Per ct sees PCP every 6 months, Id every 3  Per ct CD4 count is 557  Per ct is undetectable  Per ct takes 5 pills a day  Per ct on Biktarvy  Cm completed RW fee scale application   (see media)

## 2022-06-07 ENCOUNTER — TELEPHONE (OUTPATIENT)
Dept: SURGERY | Facility: CLINIC | Age: 52
End: 2022-06-07

## 2022-06-07 NOTE — TELEPHONE ENCOUNTER
Pt called and wanted to discuss his recent dermatology appt, and also asked about getting his testosterone checked

## 2022-06-10 DIAGNOSIS — R53.83 OTHER FATIGUE: ICD-10-CM

## 2022-06-10 DIAGNOSIS — B20 HIV INFECTION, UNSPECIFIED SYMPTOM STATUS (HCC): Primary | ICD-10-CM

## 2022-06-10 DIAGNOSIS — R68.82 LOW LIBIDO: ICD-10-CM

## 2022-06-10 DIAGNOSIS — L65.9 HAIR LOSS: ICD-10-CM

## 2022-06-29 ENCOUNTER — PATIENT OUTREACH (OUTPATIENT)
Dept: SURGERY | Facility: OTHER | Age: 52
End: 2022-06-29

## 2022-07-25 ENCOUNTER — PATIENT OUTREACH (OUTPATIENT)
Dept: SURGERY | Facility: CLINIC | Age: 52
End: 2022-07-25

## 2022-07-25 NOTE — PROGRESS NOTES
Writer messaged client welcome letter introducing herself as his new   Writer also [employer] client Satisfaction Survey  Client agreed to complete the survey and was notified to contact writer, when they completed the survey

## 2022-08-03 ENCOUNTER — OFFICE VISIT (OUTPATIENT)
Dept: DENTISTRY | Facility: CLINIC | Age: 52
End: 2022-08-03

## 2022-08-03 VITALS — SYSTOLIC BLOOD PRESSURE: 127 MMHG | DIASTOLIC BLOOD PRESSURE: 77 MMHG | TEMPERATURE: 97.7 F

## 2022-08-03 DIAGNOSIS — Z00.00 ENCOUNTER FOR SCREENING AND PREVENTATIVE CARE: Primary | ICD-10-CM

## 2022-08-03 PROCEDURE — D0120 PERIODIC ORAL EVALUATION - ESTABLISHED PATIENT: HCPCS | Performed by: DENTIST

## 2022-08-03 PROCEDURE — D4910 PERIODONTAL MAINTENANCE: HCPCS

## 2022-08-03 NOTE — PROGRESS NOTES
Reviewed Medical History with pt-no changes  ASA:III  Chief Complaint:none-knows he needs endo #4    Periodic Exam, Perio maint , OHI  Intraoral exam:soft palate slightly inflammed-pt quit smoking in 2018, quit other vices within last 4 mos  Fistula buccal #3,4-pt  Reminded needs RCT #4  Pt states he saw Endo  who told him cost for Rct #4 and he has not yet scheduled it  Oral Hygiene:fair: lt-moder  plaque, lt  calculus, moder  Gen  bleeding, local  heavy around anters  Spot probed  Perio :gen  3mm, slt  Local  recession  Hand scaled, Flossed, polished  Patient tolerated well  Dr Selin Chiu examined:No decay  Still needs crown #19 and ideally should have #14,30 also crowned  NV:RCT #4-Refer outside Endodontist  Needs:crowns #19,14,30  3mos perio main, s      Casie Mendieta [facility] , PHDHP

## 2022-08-17 ENCOUNTER — PATIENT OUTREACH (OUTPATIENT)
Dept: SURGERY | Facility: CLINIC | Age: 52
End: 2022-08-17

## 2022-08-17 NOTE — PROGRESS NOTES
Writer called ct stated he had forgotten to do labs, he stated he will do them tomorrow 8/18      Erika Christine  Manager Case Management

## 2022-08-18 ENCOUNTER — APPOINTMENT (OUTPATIENT)
Dept: LAB | Facility: CLINIC | Age: 52
End: 2022-08-18
Payer: COMMERCIAL

## 2022-08-18 DIAGNOSIS — K04.7 TOOTH ABSCESS: Primary | ICD-10-CM

## 2022-08-18 DIAGNOSIS — R68.82 LOW LIBIDO: ICD-10-CM

## 2022-08-18 DIAGNOSIS — L65.9 HAIR LOSS: ICD-10-CM

## 2022-08-18 DIAGNOSIS — R53.83 OTHER FATIGUE: ICD-10-CM

## 2022-08-18 DIAGNOSIS — B20 HIV INFECTION, UNSPECIFIED SYMPTOM STATUS (HCC): ICD-10-CM

## 2022-08-18 LAB
ALBUMIN SERPL BCP-MCNC: 3.6 G/DL (ref 3.5–5)
ALP SERPL-CCNC: 68 U/L (ref 46–116)
ALT SERPL W P-5'-P-CCNC: 33 U/L (ref 12–78)
ANION GAP SERPL CALCULATED.3IONS-SCNC: 3 MMOL/L (ref 4–13)
AST SERPL W P-5'-P-CCNC: 19 U/L (ref 5–45)
BASOPHILS # BLD AUTO: 0.06 THOUSANDS/ΜL (ref 0–0.1)
BASOPHILS NFR BLD AUTO: 1 % (ref 0–1)
BILIRUB SERPL-MCNC: 0.63 MG/DL (ref 0.2–1)
BUN SERPL-MCNC: 17 MG/DL (ref 5–25)
CALCIUM SERPL-MCNC: 9.5 MG/DL (ref 8.3–10.1)
CHLORIDE SERPL-SCNC: 105 MMOL/L (ref 96–108)
CO2 SERPL-SCNC: 28 MMOL/L (ref 21–32)
CREAT SERPL-MCNC: 0.95 MG/DL (ref 0.6–1.3)
EOSINOPHIL # BLD AUTO: 0.15 THOUSAND/ΜL (ref 0–0.61)
EOSINOPHIL NFR BLD AUTO: 2 % (ref 0–6)
ERYTHROCYTE [DISTWIDTH] IN BLOOD BY AUTOMATED COUNT: 13.3 % (ref 11.6–15.1)
FERRITIN SERPL-MCNC: 71 NG/ML (ref 8–388)
GFR SERPL CREATININE-BSD FRML MDRD: 93 ML/MIN/1.73SQ M
GLUCOSE P FAST SERPL-MCNC: 98 MG/DL (ref 65–99)
HCT VFR BLD AUTO: 44.6 % (ref 36.5–49.3)
HGB BLD-MCNC: 14.4 G/DL (ref 12–17)
IMM GRANULOCYTES # BLD AUTO: 0.03 THOUSAND/UL (ref 0–0.2)
IMM GRANULOCYTES NFR BLD AUTO: 0 % (ref 0–2)
LYMPHOCYTES # BLD AUTO: 1.89 THOUSANDS/ΜL (ref 0.6–4.47)
LYMPHOCYTES NFR BLD AUTO: 25 % (ref 14–44)
MCH RBC QN AUTO: 30.7 PG (ref 26.8–34.3)
MCHC RBC AUTO-ENTMCNC: 32.3 G/DL (ref 31.4–37.4)
MCV RBC AUTO: 95 FL (ref 82–98)
MONOCYTES # BLD AUTO: 0.61 THOUSAND/ΜL (ref 0.17–1.22)
MONOCYTES NFR BLD AUTO: 8 % (ref 4–12)
NEUTROPHILS # BLD AUTO: 4.75 THOUSANDS/ΜL (ref 1.85–7.62)
NEUTS SEG NFR BLD AUTO: 64 % (ref 43–75)
NRBC BLD AUTO-RTO: 0 /100 WBCS
PLATELET # BLD AUTO: 258 THOUSANDS/UL (ref 149–390)
PMV BLD AUTO: 10.9 FL (ref 8.9–12.7)
POTASSIUM SERPL-SCNC: 4.7 MMOL/L (ref 3.5–5.3)
PROLACTIN SERPL-MCNC: 3.6 NG/ML (ref 2.5–17.4)
PROT SERPL-MCNC: 7.6 G/DL (ref 6.4–8.4)
RBC # BLD AUTO: 4.69 MILLION/UL (ref 3.88–5.62)
SODIUM SERPL-SCNC: 136 MMOL/L (ref 135–147)
WBC # BLD AUTO: 7.49 THOUSAND/UL (ref 4.31–10.16)

## 2022-08-18 PROCEDURE — 82728 ASSAY OF FERRITIN: CPT

## 2022-08-18 PROCEDURE — 84153 ASSAY OF PSA TOTAL: CPT

## 2022-08-18 PROCEDURE — 84146 ASSAY OF PROLACTIN: CPT

## 2022-08-18 PROCEDURE — 84402 ASSAY OF FREE TESTOSTERONE: CPT

## 2022-08-18 PROCEDURE — 84154 ASSAY OF PSA FREE: CPT

## 2022-08-18 PROCEDURE — 84403 ASSAY OF TOTAL TESTOSTERONE: CPT

## 2022-08-18 RX ORDER — AMOXICILLIN 500 MG/1
500 CAPSULE ORAL EVERY 8 HOURS SCHEDULED
Qty: 21 CAPSULE | Refills: 0 | Status: SHIPPED | OUTPATIENT
Start: 2022-08-18 | End: 2022-08-30 | Stop reason: HOSPADM

## 2022-08-18 NOTE — PROGRESS NOTES
Pt called in saying his upper right tooth that needed the root canal is hurting him a lot  Pt still needs to save the tooth and will schedule appointment with the endodontist  Sent prescription of Amoxicillin after reviewing no drug allergies  Explained we cannot keep sending prescriptions and solution is do have the root canal done      Gordo Rodriguez

## 2022-08-19 LAB
BASOPHILS # BLD AUTO: 0.1 X10E3/UL (ref 0–0.2)
BASOPHILS NFR BLD AUTO: 1 %
CD3+CD4+ CELLS # BLD: 513 /UL (ref 359–1519)
CD3+CD4+ CELLS NFR BLD: 27 % (ref 30.8–58.5)
CD3+CD4+ CELLS/CD3+CD8+ CLL BLD: 0.77 % (ref 0.92–3.72)
CD3+CD8+ CELLS # BLD: 667 /UL (ref 109–897)
CD3+CD8+ CELLS NFR BLD: 35.1 % (ref 12–35.5)
EOSINOPHIL # BLD AUTO: 0.2 X10E3/UL (ref 0–0.4)
EOSINOPHIL NFR BLD AUTO: 3 %
ERYTHROCYTE [DISTWIDTH] IN BLOOD BY AUTOMATED COUNT: 13.1 % (ref 11.6–15.4)
HCT VFR BLD AUTO: 43.8 % (ref 37.5–51)
HGB BLD-MCNC: 14.6 G/DL (ref 13–17.7)
IMM GRANULOCYTES # BLD: 0 X10E3/UL (ref 0–0.1)
IMM GRANULOCYTES NFR BLD: 0 %
LYMPHOCYTES # BLD AUTO: 1.9 X10E3/UL (ref 0.7–3.1)
LYMPHOCYTES NFR BLD AUTO: 26 %
MCH RBC QN AUTO: 31.1 PG (ref 26.6–33)
MCHC RBC AUTO-ENTMCNC: 33.3 G/DL (ref 31.5–35.7)
MCV RBC AUTO: 93 FL (ref 79–97)
MONOCYTES # BLD AUTO: 0.5 X10E3/UL (ref 0.1–0.9)
MONOCYTES NFR BLD AUTO: 7 %
NEUTROPHILS # BLD AUTO: 4.6 X10E3/UL (ref 1.4–7)
NEUTROPHILS NFR BLD AUTO: 63 %
PLATELET # BLD AUTO: 254 X10E3/UL (ref 150–450)
RBC # BLD AUTO: 4.69 X10E6/UL (ref 4.14–5.8)
TESTOST FREE SERPL-MCNC: 7.1 PG/ML (ref 6.8–21.5)
TESTOST SERPL-MCNC: 534 NG/DL (ref 264–916)
WBC # BLD AUTO: 7.3 X10E3/UL (ref 3.4–10.8)

## 2022-08-20 LAB
PSA FREE MFR SERPL: 24.3 %
PSA FREE SERPL-MCNC: 0.17 NG/ML
PSA SERPL-MCNC: 0.7 NG/ML (ref 0–4)

## 2022-08-22 LAB
HIV1 RNA # SERPL NAA+PROBE: 40 COPIES/ML
HIV1 RNA SERPL NAA+PROBE-LOG#: 1.6 LOG10COPY/ML

## 2022-08-30 ENCOUNTER — OFFICE VISIT (OUTPATIENT)
Dept: SURGERY | Facility: CLINIC | Age: 52
End: 2022-08-30
Payer: COMMERCIAL

## 2022-08-30 VITALS
WEIGHT: 191.6 LBS | DIASTOLIC BLOOD PRESSURE: 89 MMHG | HEART RATE: 82 BPM | SYSTOLIC BLOOD PRESSURE: 116 MMHG | HEIGHT: 72 IN | BODY MASS INDEX: 25.95 KG/M2 | TEMPERATURE: 98.2 F | OXYGEN SATURATION: 96 %

## 2022-08-30 DIAGNOSIS — K12.0 APHTHOUS ULCER: ICD-10-CM

## 2022-08-30 DIAGNOSIS — B20 HIV DISEASE (HCC): Primary | ICD-10-CM

## 2022-08-30 DIAGNOSIS — A53.9 SYPHILIS: ICD-10-CM

## 2022-08-30 DIAGNOSIS — R63.4 WEIGHT LOSS: ICD-10-CM

## 2022-08-30 PROCEDURE — 99214 OFFICE O/P EST MOD 30 MIN: CPT | Performed by: INTERNAL MEDICINE

## 2022-08-30 NOTE — PROGRESS NOTES
Progress Note - Infectious Disease   Etelvina Jerome 54 y o  male MRN: [id number]  Unit/Bed#:  Encounter: [id number]      Impression/Plan:  1   HIV-doing reasonably well well on Biktarvy with a viral load that is decreased to 40 and a CD4 count in the 500s  He commonly has low level detection despite good adherence  Continue ART, recheck labs in 2 months and follow up in 3 months  Stressed adherence      2   Syphilis-has been serofast   Now nonreactive   Continue annual monitoring     3   Weight loss-intentional  Will continue to monitor  Relatively stable      4  Severe refractory aphthous ulcerations-no recurrence since stopping nonsteroidals   Will monitor for now      Patient was provided medication, adherence and prevention education    Subjective:  Routine follow-up for HIV  Patient claims 100% adherence with Biktarvy     Patient denies any notable side effects  Overall the feeling well  The patient denies any fever chills or sweats, denies any nausea vomiting or diarrhea, denies any cough or shortness of breath  ROS:  A complete review of systems is negative other than that noted above in the subjective    Followup portions patient history reviewed and updated as: Allergies, current medications, past medical history, past social history, past surgical history, and the problem list    Objective:  Vitals:  Vitals:    08/30/22 1657   BP: 116/89   Pulse: 82   Temp: 98 2 °F (36 8 °C)   SpO2: 96%   Weight: 86 9 kg (191 lb 9 6 oz)   Height: 6' (1 829 m)       Physical Exam:   General Appearance:  Alert, interactive, appearing well,  nontoxic, no acute distress  Neck:   Supple without lymphadenopathy, no thyromegaly or masses   Throat: Oropharynx moist without lesions  Lungs:   Clear to auscultation bilaterally; no wheezes, rhonchi or rales; respirations unlabored   Heart:  RRR; no murmur, rub or gallop   Abdomen:   Soft, non-tender, non-distended, positive bowel sounds       Extremities: No clubbing, cyanosis or edema   Skin: No new rashes or lesions  No draining wounds noted  Labs, Imaging, & Other studies:   All pertinent labs and imaging studies were personally reviewed    Lab Results   Component Value Date    K 4 7 08/18/2022     08/18/2022    CO2 28 08/18/2022    BUN 17 08/18/2022    CREATININE 0 95 08/18/2022    GLUF 98 08/18/2022    CALCIUM 9 5 08/18/2022    AST 19 08/18/2022    ALT 33 08/18/2022    ALKPHOS 68 08/18/2022    EGFR 93 08/18/2022     Lab Results   Component Value Date    WBC 7 49 08/18/2022    WBC 7 3 08/18/2022    HGB 14 4 08/18/2022    HGB 14 6 08/18/2022    HCT 44 6 08/18/2022    HCT 43 8 08/18/2022    MCV 95 08/18/2022    MCV 93 08/18/2022     08/18/2022     08/18/2022     Lab Results   Component Value Date    HEPCAB Non-reactive 03/23/2022     Lab Results   Component Value Date    HEPCAB Non-reactive 03/23/2022     Lab Results   Component Value Date    RPR Non-Reactive 09/09/2021     CD4 ABS   Date/Time Value Ref Range Status   08/18/2022 11:10  359 - 1519 /uL Final     HIV-1 RNA by PCR, Qn   Date/Time Value Ref Range Status   08/18/2022 11:10 AM 40 copies/mL Final     Comment:     The reportable range for this assay is 20 to 10,000,000  copies HIV-1 RNA/mL             Current Outpatient Medications:     Biktarvy -25 MG tablet, TAKE 1 TABLET BY MOUTH DAILY, Disp: 30 tablet, Rfl: 5    Contrave 8-90 MG TB12, TAKE TWO TABLETS BY MOUTH TWICE A DAY, Disp: 120 tablet, Rfl: 2

## 2022-08-31 ENCOUNTER — PATIENT OUTREACH (OUTPATIENT)
Dept: SURGERY | Facility: CLINIC | Age: 52
End: 2022-08-31

## 2022-08-31 NOTE — PROGRESS NOTES
Assessment/Plan:   [facility] met with pt to complete PHQ-9, pt scored 0, no signs or symptoms of depression  Pt stated he is doing well no mental health concerns at this time  Pt shared he is tired but contributes this to his employment and new responsibilities  Pt is a former smoker, quit 4 years ago  [facility]     Today patient present with   Chief Complaint   Patient presents with    Follow-up     Pt offers no c/o at this time  Patient would likely benefit from annual PHQ-9  -9Consider/focus/continue annual PHQ  Stage of change:   Plan/ Behavioral Recommendations: Follow up as needed         Diagnoses and all orders for this visit:    HIV disease ([facility] [address] )  -     CBC and differential  -     T-helper cells CD4/CD8 %  -     Comprehensive metabolic panel  -     HIV-1 RNA, quantitative, PCR    Syphilis  -     RPR; Future    Aphthous ulcer    Weight loss          Subjective:     Patient ID: Kevin Coley is a 54 y o  male  HPI    History of Present Illness:      Patient is being seen for annual behavioral health assessment  Patient denies current behavioral health concerns      [unfilled]       Review of Systems      Objective:     Physical Exam      [facility]    Orientation     Person: yes    Place: yes    Time: yes    Appearance    Well Developed: yes healthy    Uncomfortable: no    Normal Body Odor: yes    Smells of Feces: no    Smells of Urine: no    Disheveled: no    Well Nourished: yes weight WNL of ideal    Grooming Unkempt: no    Poor Eye Contact: no    Hirsute: no      Looks Tired: no    Acutely Exhausted: noappearance reflects stated age    Mood and Affect:     Appropriate: yes    Euthymicyes    Irritable: no    Angry: no    Anxious: no    Depressed:no    Blunted:no    Labile: no    Restricted: no    Harm to Self or Others: Pt denies SI/HI    Substance Abuse: pt denies

## 2022-09-02 DIAGNOSIS — F50.81 BINGE EATING DISORDER: ICD-10-CM

## 2022-09-06 RX ORDER — NALTREXONE HYDROCHLORIDE AND BUPROPION HYDROCHLORIDE 8; 90 MG/1; MG/1
TABLET, EXTENDED RELEASE ORAL
Qty: 120 TABLET | Refills: 2 | Status: SHIPPED | OUTPATIENT
Start: 2022-09-06

## 2022-09-13 ENCOUNTER — TELEPHONE (OUTPATIENT)
Dept: SURGERY | Facility: CLINIC | Age: 52
End: 2022-09-13

## 2022-09-13 NOTE — TELEPHONE ENCOUNTER
Pt called asking about the testosterone treatment you spoke about  He would like to start on it as soon as possible

## 2022-09-14 ENCOUNTER — OFFICE VISIT (OUTPATIENT)
Dept: SURGERY | Facility: CLINIC | Age: 52
End: 2022-09-14
Payer: COMMERCIAL

## 2022-09-14 VITALS
BODY MASS INDEX: 26.01 KG/M2 | WEIGHT: 192 LBS | DIASTOLIC BLOOD PRESSURE: 83 MMHG | SYSTOLIC BLOOD PRESSURE: 112 MMHG | HEIGHT: 72 IN | HEART RATE: 76 BPM | OXYGEN SATURATION: 94 % | TEMPERATURE: 98.7 F

## 2022-09-14 DIAGNOSIS — R79.89 LOW TESTOSTERONE IN MALE: Primary | ICD-10-CM

## 2022-09-14 DIAGNOSIS — Z79.890 ENCOUNTER FOR MONITORING TESTOSTERONE REPLACEMENT THERAPY: ICD-10-CM

## 2022-09-14 DIAGNOSIS — Z51.81 ENCOUNTER FOR MONITORING TESTOSTERONE REPLACEMENT THERAPY: ICD-10-CM

## 2022-09-14 DIAGNOSIS — B20 HIV DISEASE (HCC): ICD-10-CM

## 2022-09-14 PROCEDURE — 99214 OFFICE O/P EST MOD 30 MIN: CPT | Performed by: NURSE PRACTITIONER

## 2022-09-14 RX ORDER — TESTOSTERONE 20.25 MG/1.25G
1 GEL TOPICAL DAILY
Qty: 88 G | Refills: 2 | Status: SHIPPED | OUTPATIENT
Start: 2022-09-14

## 2022-09-14 NOTE — ASSESSMENT & PLAN NOTE
No results found for: [id number]  CD4 ABS   Date/Time Value Ref Range Status   2022 11:10  359 - 1519 /uL Final     HIV-1 RNA by PCR, Qn   Date/Time Value Ref Range Status   2022 11:10 AM 40 copies/mL Final     Comment:     The reportable range for this assay is 20 to 10,000,000  copies HIV-1 RNA/mL  HIV-1 RNA Viral Load Log   Date/Time Value Ref Range Status   2022 11:10 AM 1 602 [id number]/mL Final         ART: Robert Lopez        Denies side effects  Stressed the importance of adherence  Continue follow up with ID clinic  Reviewed most recent labs, including Cd4 and viral load  Discussed the risks and benefits of treatment options, instructions for management, importance of treatment adherence, and reduction of risk factor  Educated on possible  medication side effects  Counseled on routes of HIV transmission, including the risk of  infection  Emphasized that viral suppression is the best method to prevent HIV transmission  At this time pt denies the need for HIV testing of anyone in their life  Total encounter time was 45 minutes  Greater then 20 minutes were spent on counseling and patient education  Pt voices understanding and agreement with treatment plan

## 2022-09-14 NOTE — PATIENT INSTRUCTIONS
Testosterone (Absorbed through the skin)   Testosterone (kristyn-TOS-ter-one)  Treats low testosterone levels  Brand Name(s): Androderm   There may be other brand names for this medicine  When This Medicine Should Not Be Used: This medicine is not right for everyone  Do not use it if you had an allergic reaction to testosterone, or if you have breast cancer or prostate cancer  How to Use This Medicine:   Patch  Your doctor will tell you how many patches to use, where to apply them, and how often to apply them  Do not use more patches or apply them more often than your doctor tells you to  Read and follow the patient instructions that come with this medicine  Talk to your doctor or pharmacist if you have any questions  Wash your hands with soap and water before and after applying a patch  Leave the patch in its sealed wrapper until you are ready to put it on  Tear the wrapper open carefully  NEVER CUT the wrapper or the patch with scissors  Do not use any patch that has been cut by accident  The patient instructions will show the body areas where you can wear the patch  When putting on each new patch, choose a different place within these areas  Do not put the new patch on the same place you wore the last one  Be sure to remove the old patch before applying a new one  Apply the patch to clean, dry skin with very little hair, on your back, abdomen, upper arms, or thighs  Apply the patch at about the same time every night  Do not put the patch over burns, cuts, or irritated skin  Do not put the patch on oily or sweaty skin or on a spot that might put extra pressure on it (such as over a joint)  Bathing or swimming should not affect the patch  However, wait at least 3 hours after you apply the patch before you wash the skin area or shower or swim  Heavy exercise and sweating may cause the patch to fall off  If the patch becomes loose, smooth it down and press it back onto your skin   If the patch comes off before 12 o'clock noon, put on a new patch, and then replace the new patch at your regular time  If the patch comes off after noon, just wait and put on a new patch at your next regular time  Do not tape the patch to your skin  Missed dose: If you forget to wear or change a patch, put one on as soon as you can  If it is almost time to put on your next patch, wait until then to apply a new patch and skip the one you missed  Do not apply extra patches to make up for a missed dose  Store the patches at room temperature in a closed container, away from heat, moisture, and direct light  Fold the used patch in half with the sticky sides together  Throw any used patch away so that children or pets cannot get to it  You will also need to throw away old patches after the expiration date has passed  Keep the medicine in a safe place  Do not give it to anyone else, even if you have the same symptoms  Drugs and Foods to Avoid:   Ask your doctor or pharmacist before using any other medicine, including over-the-counter medicines, vitamins, and herbal products  Some medicines can affect how testosterone works  Tell your doctor if you are using any of the following:   Insulin  Blood thinner (including warfarin)  Corticosteroid (including dexamethasone, hydrocortisone, methylprednisolone, prednisolone, prednisone)  Warnings While Using This Medicine:   Tell your doctor if you have kidney disease, liver disease, cancer, diabetes, an enlarged prostate, heart disease, lung disease, sleep apnea, or a history of heart attack or stroke  This medicine may cause the following problems:  Increased numbers of red blood cells  Increased risk of prostate cancer  Blood clot in your leg or lung  Increase risk of heart attack or stroke  Lower sperm count (with large doses)  The skin patch contains aluminum, which may cause skin burns if you have an MRI (magnetic resonance imaging) scan  You must remove the patch before an MRI    This medicine is not indicated for use in women and should never be used by a pregnant woman  This medicine can be habit-forming  Do not use more than your prescribed dose  Call your doctor if you think your medicine is not working  Your doctor will do lab tests at regular visits to check on the effects of this medicine  Keep all appointments  Keep all medicine out of the reach of children  Never share your medicine with anyone  Possible Side Effects While Using This Medicine:   Call your doctor right away if you notice any of these side effects: Allergic reaction: Itching or hives, swelling in your face or hands, swelling or tingling in your mouth or throat, chest tightness, trouble breathing  Change in how much or how often you urinate, trouble urinating  Chest pain that may spread to your arms, jaw, back, or neck, trouble breathing, coughing up blood, unusual sweating, faintness  Chest pain, trouble breathing, or coughing up blood  Numbness or weakness on one side of your body, sudden or severe headache, problems with vision, speech, or walking  Pain, redness, or swelling in your arm or leg  Severe skin blisters, redness, swelling, or burning where the patch is applied  Swelling in your hands, ankles, or feet  Unusual bleeding or bruising  If you notice these less serious side effects, talk with your doctor:   Mild skin soreness, redness, itching, or irritation where the patch was applied  Swollen breasts  If you notice other side effects that you think are caused by this medicine, tell your doctor  Call your doctor for medical advice about side effects  You may report side effects to FDA at 0-646-FDA-7863    © Copyright [employer] 2022 Information is for End User's use only and may not be sold, redistributed or otherwise used for commercial purposes  The above information is an  only  It is not intended as medical advice for individual conditions or treatments   Talk to your doctor, nurse or pharmacist before following any medical regimen to see if it is safe and effective for you

## 2022-09-14 NOTE — PROGRESS NOTES
Assessment/Plan:    HIV disease ([facility] 75 )  No results found for: [id number]  CD4 ABS   Date/Time Value Ref Range Status   2022 11:10  359 - 1519 /uL Final     HIV-1 RNA by PCR, Qn   Date/Time Value Ref Range Status   2022 11:10 AM 40 copies/mL Final     Comment:     The reportable range for this assay is 20 to 10,000,000  copies HIV-1 RNA/mL  HIV-1 RNA Viral Load Log   Date/Time Value Ref Range Status   2022 11:10 AM 1 602 [id number]/mL Final         ART: Darlina Cleaves        Denies side effects  Stressed the importance of adherence  Continue follow up with ID clinic  Reviewed most recent labs, including Cd4 and viral load  Discussed the risks and benefits of treatment options, instructions for management, importance of treatment adherence, and reduction of risk factor  Educated on possible  medication side effects  Counseled on routes of HIV transmission, including the risk of  infection  Emphasized that viral suppression is the best method to prevent HIV transmission  At this time pt denies the need for HIV testing of anyone in their life  Total encounter time was 45 minutes  Greater then 20 minutes were spent on counseling and patient education  Pt voices understanding and agreement with treatment plan  Encounter for monitoring testosterone replacement therapy  Start topical testosterone therapy  Educated on potential side effects including increased risk of heart disease, stroke  gynoclamastia, and weight gain  Provided with educational handout  Repeat testosterone in 6 months  Diagnoses and all orders for this visit:    Low testosterone in male  -     Testosterone 1 62 % GEL; Place 1 Pump on the skin in the morning    HIV disease ([place] [address] )    Encounter for monitoring testosterone replacement therapy          Subjective:      Patient ID: Erica Hugo is a 54 y o  male      Tobin Dupont presents to the clinic today for acute visit to discuss hormone replacement therapy    Past medical history significant for HIV, TB status post treatment, and umbilical hernia  Magdy reports episodes of fatigue, loss of muscle depression, hair loss and occasional irritability  He denies decreased sex drive or erectile dysfunction  He is requesting testosterone replacement  The following portions of the patient's history were reviewed and updated as appropriate: allergies, current medications, past family history, past medical history, past social history, past surgical history and problem list     Review of Systems   Constitutional: Negative for chills and fever  HENT: Negative for ear pain and sore throat  Eyes: Negative for pain and visual disturbance  Respiratory: Negative for cough and shortness of breath  Cardiovascular: Negative for chest pain and palpitations  Gastrointestinal: Negative for abdominal pain and vomiting  Genitourinary: Negative for dysuria and hematuria  Musculoskeletal: Negative for arthralgias and back pain  Skin: Negative for color change and rash  Neurological: Negative for seizures and syncope  All other systems reviewed and are negative          Objective:      /83   Pulse 76   Temp 98 7 °F (37 1 °C)   Ht 6' (1 829 m)   Wt 87 1 kg (192 lb)   SpO2 94%   BMI 26 04 kg/m²     PSA 0 7 free testosterone 7 1 total testosterone 534 ferritin 71 prolactin 36      Lab Results   Component Value Date    K 4 7 08/18/2022     08/18/2022    CO2 28 08/18/2022    BUN 17 08/18/2022    CREATININE 0 95 08/18/2022    GLUF 98 08/18/2022    CALCIUM 9 5 08/18/2022    AST 19 08/18/2022    ALT 33 08/18/2022    ALKPHOS 68 08/18/2022    EGFR 93 08/18/2022     Lab Results   Component Value Date    WBC 7 49 08/18/2022    WBC 7 3 08/18/2022    HGB 14 4 08/18/2022    HGB 14 6 08/18/2022    HCT 44 6 08/18/2022    HCT 43 8 08/18/2022    MCV 95 08/18/2022    MCV 93 08/18/2022     08/18/2022     08/18/2022     Lab Results   Component Value Date HEPCAB Non-reactive 03/23/2022     Lab Results   Component Value Date    HEPCAB Non-reactive 03/23/2022     Lab Results   Component Value Date    RPR Non-Reactive 09/09/2021            Physical Exam  Constitutional:       General: He is not in acute distress  Appearance: He is well-developed  HENT:      Head: Normocephalic  Right Ear: External ear normal       Left Ear: External ear normal       Nose: Nose normal       Mouth/Throat:      Pharynx: No oropharyngeal exudate  Eyes:      General:         Right eye: No discharge  Left eye: No discharge  Conjunctiva/sclera: Conjunctivae normal       Pupils: Pupils are equal, round, and reactive to light  Neck:      Thyroid: No thyromegaly  Cardiovascular:      Rate and Rhythm: Normal rate and regular rhythm  Heart sounds: Normal heart sounds  No murmur heard  Pulmonary:      Effort: Pulmonary effort is normal       Breath sounds: Normal breath sounds  No wheezing  Abdominal:      General: Bowel sounds are normal       Palpations: Abdomen is soft  There is no mass  Tenderness: There is no abdominal tenderness  Musculoskeletal:         General: No tenderness  Normal range of motion  Cervical back: Normal range of motion  Lymphadenopathy:      Cervical: No cervical adenopathy  Skin:     General: Skin is warm and dry  Findings: No rash  Neurological:      Mental Status: He is alert and oriented to person, place, and time     Psychiatric:         Behavior: Behavior normal

## 2022-09-14 NOTE — ASSESSMENT & PLAN NOTE
Start topical testosterone therapy  Educated on potential side effects including increased risk of heart disease, stroke  gynoclamastia, and weight gain  Provided with educational handout  Repeat testosterone in 6 months

## 2022-10-03 DIAGNOSIS — B20 HIV (HUMAN IMMUNODEFICIENCY VIRUS INFECTION) (HCC): ICD-10-CM

## 2022-10-04 RX ORDER — BICTEGRAVIR SODIUM, EMTRICITABINE, AND TENOFOVIR ALAFENAMIDE FUMARATE 50; 200; 25 MG/1; MG/1; MG/1
1 TABLET ORAL DAILY
Qty: 30 TABLET | Refills: 5 | Status: SHIPPED | OUTPATIENT
Start: 2022-10-04

## 2022-10-26 NOTE — PROGRESS NOTES
Progress Note - Infectious Disease   Jarrod Hines 54 y o  male MRN: [id number]  Unit/Bed#:  Encounter: [id number]      Impression/Plan:  1   HIV-doing reasonably well well on Biktarvy with a viral load that is decreased to 60  and a CD4 count in the 500s  He commonly has low level detection despite good adherence  Continue ART, recheck labs in 2 months and follow up in 3 months  Stressed adherence      2   Syphilis-has been serofast   Now nonreactive  Continue annual monitoring     3   Weight loss-intentional  Will continue to monitor  Relatively stable      4  Severe refractory aphthous ulcerations-no recurrence since stopping nonsteroidals   Will monitor for now      Patient was provided medication, adherence and prevention education    Subjective:  Routine follow-up for HIV  Patient claims 100% adherence with Biktarvy     Patient denies any notable side effects  Overall the feeling well  The patient denies any fever chills or sweats, denies any nausea vomiting or diarrhea, denies any cough or shortness of breath  ROS:  A complete review of systems is negative other than that noted above in the subjective    Followup portions patient history reviewed and updated as: Allergies, current medications, past medical history, past social history, past surgical history, and the problem list    Objective:  Vitals:  Vitals:    05/24/22 1707   BP: 119/86   Pulse: 76   Temp: 97 8 °F (36 6 °C)   SpO2: 95%   Weight: 86 7 kg (191 lb 3 2 oz)   Height: 6' (1 829 m)       Physical Exam:   General Appearance:  Alert, interactive, appearing well,  nontoxic, no acute distress  Neck:   Supple without lymphadenopathy, no thyromegaly or masses   Throat: Oropharynx moist without lesions  Lungs:   Clear to auscultation bilaterally; no wheezes, rhonchi or rales; respirations unlabored   Heart:  RRR; no murmur, rub or gallop   Abdomen:   Soft, non-tender, non-distended, positive bowel sounds       Extremities: No clubbing, cyanosis or edema   Skin: No new rashes or lesions  No draining wounds noted  Labs, Imaging, & Other studies:   All pertinent labs and imaging studies were personally reviewed    Lab Results   Component Value Date    K 4 5 05/13/2022     05/13/2022    CO2 32 05/13/2022    BUN 15 05/13/2022    CREATININE 1 07 05/13/2022    GLUF 88 05/13/2022    CALCIUM 9 8 05/13/2022    AST 22 05/13/2022    ALT 38 05/13/2022    ALKPHOS 71 05/13/2022    EGFR 81 05/13/2022     Lab Results   Component Value Date    WBC 3 68 (L) 05/13/2022    WBC 3 7 05/13/2022    HGB 14 4 05/13/2022    HGB 14 2 05/13/2022    HCT 42 1 05/13/2022    HCT 44 6 05/13/2022    MCV 90 05/13/2022    MCV 94 05/13/2022     05/13/2022     05/13/2022     Lab Results   Component Value Date    HEPCAB Non-reactive 03/23/2022     Lab Results   Component Value Date    HEPCAB Non-reactive 03/23/2022     Lab Results   Component Value Date    RPR Non-Reactive 09/09/2021     CD4 ABS   Date/Time Value Ref Range Status   05/13/2022 09:32  359 - 1519 /uL Final     HIV-1 RNA by PCR, Qn   Date/Time Value Ref Range Status   05/13/2022 09:32 AM 60 copies/mL Final     Comment:     The reportable range for this assay is 20 to 10,000,000  copies HIV-1 RNA/mL             Current Outpatient Medications:     Biktarvy -25 MG tablet, TAKE 1 TABLET BY MOUTH DAILY, Disp: 30 tablet, Rfl: 5    Contrave 8-90 MG TB12, TAKE TWO TABLETS BY MOUTH TWICE A DAY, Disp: 120 tablet, Rfl: 2    chlorhexidine (PERIDEX) 0 12 % solution, Apply 15 mL to the mouth or throat 2 (two) times a day (Patient not taking: No sig reported), Disp: 120 mL, Rfl: 0 Breath sounds clear and equal bilaterally.

## 2022-11-08 NOTE — PROGRESS NOTES
PERIODIC EXAM, 3 mth perio maintenance, Fmx    REVIEWED MED HX: meds, allergies, health changes reviewed in EPIC  CHIEF CONCERN:  none   PAIN SCALE:  0  ASA CLASS:  I  PLAQUE:  mild   CALCULUS:   light   BLEEDING:  light   STAIN :   none   ORAL HYGIENE:  good  PERIO: full probe completed in April-stage 1 / grade A    Hand scaled, polished and flossed  Used Cavitron  Visual and Tactile Intraoral/ Extraoral evaluation: Oral and Oropharyngeal cancer evaluation  No findings     Dr Gama Herndon exam=   Reviewed with patient clinical findings and patient verbalized understanding  All questions and concerns addressed  Discussed niteguard/pt using OTC niteguard  Fistula #4/ pt was previously referred for RCT       REFERRALS: no referrals needed    CARIES FINDINGS: 15 MO, 18-MO, 20 MO    Next Visit: fillings    Next Recall: 4 month perio maintenance    Last FMX : 11/9/22

## 2022-11-09 ENCOUNTER — OFFICE VISIT (OUTPATIENT)
Dept: DENTISTRY | Facility: CLINIC | Age: 52
End: 2022-11-09

## 2022-11-09 VITALS — DIASTOLIC BLOOD PRESSURE: 80 MMHG | SYSTOLIC BLOOD PRESSURE: 129 MMHG

## 2022-11-09 DIAGNOSIS — K05.4 PERIODONTOSIS: Primary | ICD-10-CM

## 2022-11-09 PROBLEM — K05.30 PERIODONTITIS: Status: ACTIVE | Noted: 2022-11-09

## 2022-11-16 ENCOUNTER — TELEPHONE (OUTPATIENT)
Dept: SURGERY | Facility: CLINIC | Age: 52
End: 2022-11-16

## 2022-11-16 DIAGNOSIS — R79.89 LOW TESTOSTERONE IN MALE: ICD-10-CM

## 2022-11-16 RX ORDER — TESTOSTERONE 20.25 MG/1.25G
1 GEL TOPICAL DAILY
Qty: 88 G | Refills: 2 | Status: CANCELLED | OUTPATIENT
Start: 2022-11-16

## 2022-11-16 RX ORDER — TESTOSTERONE 20.25 MG/1.25G
1 GEL TOPICAL DAILY
Qty: 88 G | Refills: 2 | Status: SHIPPED | OUTPATIENT
Start: 2022-11-16

## 2022-11-16 RX ORDER — TESTOSTERONE 20.25 MG/1.25G
1 GEL TOPICAL DAILY
Qty: 88 G | Refills: 2 | Status: SHIPPED | OUTPATIENT
Start: 2022-11-16 | End: 2022-11-16 | Stop reason: HOSPADM

## 2022-11-16 NOTE — TELEPHONE ENCOUNTER
Pt called and requested that a refill be put in for his testosterone gel , at the [facility] on [address]

## 2022-11-23 ENCOUNTER — PATIENT OUTREACH (OUTPATIENT)
Dept: SURGERY | Facility: CLINIC | Age: 52
End: 2022-11-23

## 2022-11-25 ENCOUNTER — PATIENT OUTREACH (OUTPATIENT)
Dept: SURGERY | Facility: CLINIC | Age: 52
End: 2022-11-25

## 2022-11-28 ENCOUNTER — APPOINTMENT (OUTPATIENT)
Dept: LAB | Facility: CLINIC | Age: 52
End: 2022-11-28

## 2022-11-28 DIAGNOSIS — F50.81 BINGE EATING DISORDER: ICD-10-CM

## 2022-11-28 DIAGNOSIS — A53.9 SYPHILIS: ICD-10-CM

## 2022-11-28 LAB
ALBUMIN SERPL BCP-MCNC: 3.4 G/DL (ref 3.5–5)
ALP SERPL-CCNC: 72 U/L (ref 46–116)
ALT SERPL W P-5'-P-CCNC: 66 U/L (ref 12–78)
ANION GAP SERPL CALCULATED.3IONS-SCNC: 2 MMOL/L (ref 4–13)
AST SERPL W P-5'-P-CCNC: 25 U/L (ref 5–45)
BASOPHILS # BLD AUTO: 0.05 THOUSANDS/ÂΜL (ref 0–0.1)
BASOPHILS NFR BLD AUTO: 1 % (ref 0–1)
BILIRUB SERPL-MCNC: 0.41 MG/DL (ref 0.2–1)
BUN SERPL-MCNC: 8 MG/DL (ref 5–25)
CALCIUM ALBUM COR SERPL-MCNC: 9.7 MG/DL (ref 8.3–10.1)
CALCIUM SERPL-MCNC: 9.2 MG/DL (ref 8.3–10.1)
CHLORIDE SERPL-SCNC: 110 MMOL/L (ref 96–108)
CO2 SERPL-SCNC: 28 MMOL/L (ref 21–32)
CREAT SERPL-MCNC: 1.14 MG/DL (ref 0.6–1.3)
EOSINOPHIL # BLD AUTO: 0.12 THOUSAND/ÂΜL (ref 0–0.61)
EOSINOPHIL NFR BLD AUTO: 3 % (ref 0–6)
ERYTHROCYTE [DISTWIDTH] IN BLOOD BY AUTOMATED COUNT: 12.8 % (ref 11.6–15.1)
GFR SERPL CREATININE-BSD FRML MDRD: 74 ML/MIN/1.73SQ M
GLUCOSE SERPL-MCNC: 79 MG/DL (ref 65–140)
HCT VFR BLD AUTO: 42.7 % (ref 36.5–49.3)
HGB BLD-MCNC: 14.2 G/DL (ref 12–17)
IMM GRANULOCYTES # BLD AUTO: 0.02 THOUSAND/UL (ref 0–0.2)
IMM GRANULOCYTES NFR BLD AUTO: 0 % (ref 0–2)
LYMPHOCYTES # BLD AUTO: 1.67 THOUSANDS/ÂΜL (ref 0.6–4.47)
LYMPHOCYTES NFR BLD AUTO: 36 % (ref 14–44)
MCH RBC QN AUTO: 31.1 PG (ref 26.8–34.3)
MCHC RBC AUTO-ENTMCNC: 33.3 G/DL (ref 31.4–37.4)
MCV RBC AUTO: 93 FL (ref 82–98)
MONOCYTES # BLD AUTO: 0.36 THOUSAND/ÂΜL (ref 0.17–1.22)
MONOCYTES NFR BLD AUTO: 8 % (ref 4–12)
NEUTROPHILS # BLD AUTO: 2.44 THOUSANDS/ÂΜL (ref 1.85–7.62)
NEUTS SEG NFR BLD AUTO: 52 % (ref 43–75)
NRBC BLD AUTO-RTO: 0 /100 WBCS
PLATELET # BLD AUTO: 283 THOUSANDS/UL (ref 149–390)
PMV BLD AUTO: 10.2 FL (ref 8.9–12.7)
POTASSIUM SERPL-SCNC: 4.7 MMOL/L (ref 3.5–5.3)
PROT SERPL-MCNC: 7 G/DL (ref 6.4–8.4)
RBC # BLD AUTO: 4.57 MILLION/UL (ref 3.88–5.62)
RPR SER QL: REACTIVE
RPR SER-TITR: ABNORMAL {TITER}
SODIUM SERPL-SCNC: 140 MMOL/L (ref 135–147)
WBC # BLD AUTO: 4.66 THOUSAND/UL (ref 4.31–10.16)

## 2022-11-29 ENCOUNTER — OFFICE VISIT (OUTPATIENT)
Dept: SURGERY | Facility: CLINIC | Age: 52
End: 2022-11-29

## 2022-11-29 VITALS
DIASTOLIC BLOOD PRESSURE: 66 MMHG | OXYGEN SATURATION: 96 % | HEART RATE: 80 BPM | HEIGHT: 72 IN | SYSTOLIC BLOOD PRESSURE: 103 MMHG | TEMPERATURE: 98.6 F | BODY MASS INDEX: 26.28 KG/M2 | WEIGHT: 194 LBS

## 2022-11-29 DIAGNOSIS — Z23 NEED FOR INFLUENZA VACCINATION: ICD-10-CM

## 2022-11-29 DIAGNOSIS — E34.9 TESTOSTERONE INSUFFICIENCY: Primary | ICD-10-CM

## 2022-11-29 DIAGNOSIS — Z51.81 ENCOUNTER FOR MONITORING TESTOSTERONE REPLACEMENT THERAPY: ICD-10-CM

## 2022-11-29 DIAGNOSIS — B20 HIV (HUMAN IMMUNODEFICIENCY VIRUS INFECTION) (HCC): ICD-10-CM

## 2022-11-29 DIAGNOSIS — Z79.890 ENCOUNTER FOR MONITORING TESTOSTERONE REPLACEMENT THERAPY: ICD-10-CM

## 2022-11-29 DIAGNOSIS — Z23 NEED FOR MENACTRA VACCINATION: ICD-10-CM

## 2022-11-29 DIAGNOSIS — B20 HIV DISEASE (HCC): ICD-10-CM

## 2022-11-29 DIAGNOSIS — F50.81 BINGE EATING DISORDER: ICD-10-CM

## 2022-11-29 LAB
BASOPHILS # BLD AUTO: 0.1 X10E3/UL (ref 0–0.2)
BASOPHILS NFR BLD AUTO: 1 %
CD3+CD4+ CELLS # BLD: 512 /UL (ref 359–1519)
CD3+CD4+ CELLS NFR BLD: 30.1 % (ref 30.8–58.5)
CD3+CD4+ CELLS/CD3+CD8+ CLL BLD: 0.86 % (ref 0.92–3.72)
CD3+CD8+ CELLS # BLD: 595 /UL (ref 109–897)
CD3+CD8+ CELLS NFR BLD: 35 % (ref 12–35.5)
EOSINOPHIL # BLD AUTO: 0.2 X10E3/UL (ref 0–0.4)
EOSINOPHIL NFR BLD AUTO: 3 %
ERYTHROCYTE [DISTWIDTH] IN BLOOD BY AUTOMATED COUNT: 12.4 % (ref 11.6–15.4)
HCT VFR BLD AUTO: 41.4 % (ref 37.5–51)
HGB BLD-MCNC: 13.8 G/DL (ref 13–17.7)
IMM GRANULOCYTES # BLD: 0 X10E3/UL (ref 0–0.1)
IMM GRANULOCYTES NFR BLD: 0 %
LYMPHOCYTES # BLD AUTO: 1.7 X10E3/UL (ref 0.7–3.1)
LYMPHOCYTES NFR BLD AUTO: 37 %
MCH RBC QN AUTO: 31 PG (ref 26.6–33)
MCHC RBC AUTO-ENTMCNC: 33.3 G/DL (ref 31.5–35.7)
MCV RBC AUTO: 93 FL (ref 79–97)
MONOCYTES # BLD AUTO: 0.4 X10E3/UL (ref 0.1–0.9)
MONOCYTES NFR BLD AUTO: 7 %
NEUTROPHILS # BLD AUTO: 2.4 X10E3/UL (ref 1.4–7)
NEUTROPHILS NFR BLD AUTO: 52 %
PLATELET # BLD AUTO: 273 X10E3/UL (ref 150–450)
RBC # BLD AUTO: 4.45 X10E6/UL (ref 4.14–5.8)
WBC # BLD AUTO: 4.7 X10E3/UL (ref 3.4–10.8)

## 2022-11-29 RX ORDER — NALTREXONE HYDROCHLORIDE AND BUPROPION HYDROCHLORIDE 8; 90 MG/1; MG/1
2 TABLET, EXTENDED RELEASE ORAL 2 TIMES DAILY
Qty: 120 TABLET | Refills: 2 | Status: SHIPPED | OUTPATIENT
Start: 2022-11-29

## 2022-11-29 RX ORDER — BICTEGRAVIR SODIUM, EMTRICITABINE, AND TENOFOVIR ALAFENAMIDE FUMARATE 50; 200; 25 MG/1; MG/1; MG/1
1 TABLET ORAL DAILY
Qty: 30 TABLET | Refills: 5 | Status: SHIPPED | OUTPATIENT
Start: 2022-11-29

## 2022-11-29 RX ORDER — NALTREXONE HYDROCHLORIDE AND BUPROPION HYDROCHLORIDE 8; 90 MG/1; MG/1
TABLET, EXTENDED RELEASE ORAL
Qty: 120 TABLET | Refills: 2 | Status: SHIPPED | OUTPATIENT
Start: 2022-11-29

## 2022-11-29 RX ORDER — POLYETHYLENE GLYCOL-3350 AND ELECTROLYTES 236; 6.74; 5.86; 2.97; 22.74 G/274.31G; G/274.31G; G/274.31G; G/274.31G; G/274.31G
POWDER, FOR SOLUTION ORAL
COMMUNITY
Start: 2022-11-01

## 2022-11-29 NOTE — PROGRESS NOTES
Assessment/Plan:    HIV disease ([facility] )  No results found for: [id number]  CD4 ABS   Date/Time Value Ref Range Status   2022 10:55  359 - 1519 /uL Final     HIV-1 RNA by PCR, Qn   Date/Time Value Ref Range Status   2022 11:10 AM 40 copies/mL Final     Comment:     The reportable range for this assay is 20 to 10,000,000  copies HIV-1 RNA/mL  HIV-1 RNA Viral Load Log   Date/Time Value Ref Range Status   2022 11:10 AM 1 602 [id number]/mL Final         ART: Maria C Burger        Denies side effects  Stressed the importance of adherence  Continue follow up with ID clinic  Reviewed most recent labs, including Cd4 and viral load  Discussed the risks and benefits of treatment options, instructions for management, importance of treatment adherence, and reduction of risk factor  Educated on possible  medication side effects  Counseled on routes of HIV transmission, including the risk of  infection  Emphasized that viral suppression is the best method to prevent HIV transmission  At this time pt denies the need for HIV testing of anyone in their life  Total encounter time was 45 minutes  Greater then 20 minutes were spent on counseling and patient education  Pt voices understanding and agreement with treatment plan  Encounter for monitoring testosterone replacement therapy  Tolerating topical testosterone  Reports reduced depression, fatigue, and decreased sex drive  Educated again on potential side effects including heart disease, cancer risk, gynecomastia, and application site reaction  Check testosterone levels with next labs  Monitor PSA annually         Diagnoses and all orders for this visit:    Testosterone insufficiency  -     Testosterone, free, total; Future    Need for influenza vaccination  -     influenza vaccine, quadrivalent, recombinant, PF, 0 5 mL, for patients 18 yr+ (FLUBLOK)    Need for Menactra vaccination  -     MENINGOCOCCAL ACYW-135 TT CONJUGATE  - influenza vaccine, quadrivalent, recombinant, PF, 0 5 mL, for patients 18 yr+ (FLUBLOK)    HIV (human immunodeficiency virus infection) ([facility] 75 )  -     bictegravir-emtricitab-tenofovir alafenamide (Biktarvy) -25 MG tablet; Take 1 tablet by mouth daily    Binge eating disorder  -     Naltrexone-buPROPion HCl ER (Contrave) 8-90 MG TB12; Take 2 tablets by mouth 2 (two) times a day    HIV disease ([facility] 75 )    Encounter for monitoring testosterone replacement therapy    Other orders  -     GaviLyte-G 236 g solution;  (Patient not taking: Reported on 11/29/2022)          Subjective:      Patient ID: Alejandro Mccullough is a 50 y o  male  Cristian Conte presents to the clinic today for primary care follow-up of chronic conditions     Past medical history significant for HIV, TB status post treatment, and umbilical hernia  Cristian Conte is doing well and denies acute complaint  He is scheduled for  Colonoscopy for colon cancer screening  The following portions of the patient's history were reviewed and updated as appropriate: allergies, current medications, past family history, past medical history, past social history, past surgical history and problem list     Review of Systems   Constitutional: Negative for chills and fever  HENT: Negative for ear pain and sore throat  Eyes: Negative for pain and visual disturbance  Respiratory: Negative for cough and shortness of breath  Cardiovascular: Negative for chest pain and palpitations  Gastrointestinal: Negative for abdominal pain and vomiting  Genitourinary: Negative for dysuria and hematuria  Musculoskeletal: Negative for arthralgias and back pain  Skin: Negative for color change and rash  Neurological: Negative for seizures and syncope  All other systems reviewed and are negative          Objective:      /66   Pulse 80   Temp 98 6 °F (37 °C)   Ht 6' (1 829 m)   Wt 88 kg (194 lb)   SpO2 96%   BMI 26 31 kg/m²       Lab Results   Component Value Date    K 4 7 11/28/2022     (H) 11/28/2022    CO2 28 11/28/2022    BUN 8 11/28/2022    CREATININE 1 14 11/28/2022    GLUF 98 08/18/2022    CALCIUM 9 2 11/28/2022    CORRECTEDCA 9 7 11/28/2022    AST 25 11/28/2022    ALT 66 11/28/2022    ALKPHOS 72 11/28/2022    EGFR 74 11/28/2022     Lab Results   Component Value Date    WBC 4 66 11/28/2022    WBC 4 7 11/28/2022    HGB 14 2 11/28/2022    HGB 13 8 11/28/2022    HCT 42 7 11/28/2022    HCT 41 4 11/28/2022    MCV 93 11/28/2022    MCV 93 11/28/2022     11/28/2022     11/28/2022     Lab Results   Component Value Date    HEPCAB Non-reactive 03/23/2022     Lab Results   Component Value Date    HEPCAB Non-reactive 03/23/2022     Lab Results   Component Value Date    RPR Reactive (A) 11/28/2022    RPR Reactive 2 dils (A) 11/28/2022            Physical Exam  Constitutional:       General: He is not in acute distress  Appearance: He is well-developed and well-nourished  HENT:      Head: Normocephalic  Right Ear: External ear normal       Left Ear: External ear normal       Nose: Nose normal       Mouth/Throat:      Mouth: Oropharynx is clear and moist       Pharynx: No oropharyngeal exudate  Eyes:      General:         Right eye: No discharge  Left eye: No discharge  Conjunctiva/sclera: Conjunctivae normal       Pupils: Pupils are equal, round, and reactive to light  Neck:      Thyroid: No thyromegaly  Cardiovascular:      Rate and Rhythm: Normal rate and regular rhythm  Pulses: Intact distal pulses  Heart sounds: Normal heart sounds  No murmur heard  Pulmonary:      Effort: Pulmonary effort is normal       Breath sounds: Normal breath sounds  No wheezing  Abdominal:      General: Bowel sounds are normal       Palpations: Abdomen is soft  There is no mass  Tenderness: There is no abdominal tenderness  Musculoskeletal:         General: No tenderness or edema  Normal range of motion        Cervical back: Normal range of motion  Lymphadenopathy:      Cervical: No cervical adenopathy  Skin:     General: Skin is warm and dry  Findings: No rash  Neurological:      Mental Status: He is alert and oriented to person, place, and time     Psychiatric:         Mood and Affect: Mood and affect normal          Behavior: Behavior normal

## 2022-11-29 NOTE — ASSESSMENT & PLAN NOTE
Tolerating topical testosterone  Reports reduced depression, fatigue, and decreased sex drive  Educated again on potential side effects including heart disease, cancer risk, gynecomastia, and application site reaction  Check testosterone levels with next labs  Monitor PSA annually

## 2022-11-29 NOTE — ASSESSMENT & PLAN NOTE
No results found for: [id number]  CD4 ABS   Date/Time Value Ref Range Status   2022 10:55  359 - 1519 /uL Final     HIV-1 RNA by PCR, Qn   Date/Time Value Ref Range Status   2022 11:10 AM 40 copies/mL Final     Comment:     The reportable range for this assay is 20 to 10,000,000  copies HIV-1 RNA/mL  HIV-1 RNA Viral Load Log   Date/Time Value Ref Range Status   2022 11:10 AM 1 602 [id number]/mL Final         ART: Barrington Koehler        Denies side effects  Stressed the importance of adherence  Continue follow up with ID clinic  Reviewed most recent labs, including Cd4 and viral load  Discussed the risks and benefits of treatment options, instructions for management, importance of treatment adherence, and reduction of risk factor  Educated on possible  medication side effects  Counseled on routes of HIV transmission, including the risk of  infection  Emphasized that viral suppression is the best method to prevent HIV transmission  At this time pt denies the need for HIV testing of anyone in their life  Total encounter time was 45 minutes  Greater then 20 minutes were spent on counseling and patient education  Pt voices understanding and agreement with treatment plan

## 2022-11-30 LAB
HIV1 RNA # SERPL NAA+PROBE: 70 COPIES/ML
HIV1 RNA SERPL NAA+PROBE-LOG#: 1.84 LOG10COPY/ML
T PALLIDUM AB SER QL IF: REACTIVE

## 2022-12-06 ENCOUNTER — DOCUMENTATION (OUTPATIENT)
Dept: SURGERY | Facility: CLINIC | Age: 52
End: 2022-12-06

## 2022-12-06 ENCOUNTER — OFFICE VISIT (OUTPATIENT)
Dept: SURGERY | Facility: CLINIC | Age: 52
End: 2022-12-06

## 2022-12-06 VITALS
HEIGHT: 72 IN | DIASTOLIC BLOOD PRESSURE: 75 MMHG | HEART RATE: 69 BPM | WEIGHT: 197.8 LBS | SYSTOLIC BLOOD PRESSURE: 114 MMHG | BODY MASS INDEX: 26.79 KG/M2 | TEMPERATURE: 97.8 F | OXYGEN SATURATION: 96 %

## 2022-12-06 DIAGNOSIS — Z13.220 ENCOUNTER FOR LIPID SCREENING FOR CARDIOVASCULAR DISEASE: ICD-10-CM

## 2022-12-06 DIAGNOSIS — Z13.6 ENCOUNTER FOR LIPID SCREENING FOR CARDIOVASCULAR DISEASE: ICD-10-CM

## 2022-12-06 DIAGNOSIS — A53.9 SYPHILIS: ICD-10-CM

## 2022-12-06 DIAGNOSIS — B20 HIV DISEASE (HCC): Primary | ICD-10-CM

## 2022-12-06 DIAGNOSIS — Z72.89 OTHER PROBLEMS RELATED TO LIFESTYLE: ICD-10-CM

## 2022-12-06 DIAGNOSIS — Z11.3 ENCOUNTER FOR SCREENING FOR BACTERIAL SEXUALLY TRANSMITTED DISEASE: ICD-10-CM

## 2022-12-06 DIAGNOSIS — D72.89 OTHER SPECIFIED DISORDERS OF WHITE BLOOD CELLS: ICD-10-CM

## 2022-12-06 DIAGNOSIS — K52.9 GASTROENTERITIS: ICD-10-CM

## 2022-12-06 DIAGNOSIS — Z20.2 CONTACT WITH AND (SUSPECTED) EXPOSURE TO INFECTIONS WITH A PREDOMINANTLY SEXUAL MODE OF TRANSMISSION: ICD-10-CM

## 2022-12-06 NOTE — PROGRESS NOTES
Progress Note - Infectious Disease   Erica Hugo 50 y o  male MRN: [id number]  Unit/Bed#:  Encounter: [id number]      Impression/Plan:  1   HIV-doing reasonably well well on Biktarvy with a viral load that is decreased to 70 and a CD4 count in the 500s    He commonly has low level detection despite good adherence   Continue ART, recheck labs in 2 months and follow up in 3 months  Stressed adherence      2   Syphilis-has been serofast   Now nonreactive   Continue annual monitoring     3  Weight loss-intentional  Will continue to monitor  Relatively stable  4   Gastroenteritis type patient missed 1 dose of ART when he was vomiting all day but he is now completely recovered  Patient was provided medication, adherence and prevention education    Subjective:  Routine follow-up for HIV  Patient claims 100% adherence with Biktarvy  Patient denies any notable side effects  Overall the feeling well  The patient denies any fever chills or sweats, denies any nausea vomiting or diarrhea, denies any cough or shortness of breath  ROS:  A complete review of systems is negative other than that noted above in the subjective    Followup portions patient history reviewed and updated as: Allergies, current medications, past medical history, past social history, past surgical history, and the problem list    Objective:  Vitals:  Vitals:    12/06/22 1715   BP: 114/75   Pulse: 69   Temp: 97 8 °F (36 6 °C)   SpO2: 96%   Weight: 89 7 kg (197 lb 12 8 oz)   Height: 6' (1 829 m)       Physical Exam:   General Appearance:  Alert, interactive, appearing well,  nontoxic, no acute distress  Neck:   Supple without lymphadenopathy, no thyromegaly or masses   Throat: Oropharynx moist without lesions  Lungs:   Clear to auscultation bilaterally; no wheezes, rhonchi or rales; respirations unlabored   Heart:  RRR; no murmur, rub or gallop   Abdomen:   Soft, non-tender, non-distended, positive bowel sounds       Extremities: No clubbing, cyanosis or edema   Skin: No new rashes or lesions  No draining wounds noted  Labs, Imaging, & Other studies:   All pertinent labs and imaging studies were personally reviewed    Lab Results   Component Value Date    K 4 7 11/28/2022     (H) 11/28/2022    CO2 28 11/28/2022    BUN 8 11/28/2022    CREATININE 1 14 11/28/2022    GLUF 98 08/18/2022    CALCIUM 9 2 11/28/2022    CORRECTEDCA 9 7 11/28/2022    AST 25 11/28/2022    ALT 66 11/28/2022    ALKPHOS 72 11/28/2022    EGFR 74 11/28/2022     Lab Results   Component Value Date    WBC 4 66 11/28/2022    WBC 4 7 11/28/2022    HGB 14 2 11/28/2022    HGB 13 8 11/28/2022    HCT 42 7 11/28/2022    HCT 41 4 11/28/2022    MCV 93 11/28/2022    MCV 93 11/28/2022     11/28/2022     11/28/2022     Lab Results   Component Value Date    HEPCAB Non-reactive 03/23/2022     Lab Results   Component Value Date    HEPCAB Non-reactive 03/23/2022     Lab Results   Component Value Date    RPR Reactive (A) 11/28/2022    RPR Reactive 2 dils (A) 11/28/2022     CD4 ABS   Date/Time Value Ref Range Status   11/28/2022 10:55  359 - 1519 /uL Final     HIV-1 RNA by PCR, Qn   Date/Time Value Ref Range Status   11/28/2022 10:55 AM 70 copies/mL Final     Comment:     The reportable range for this assay is 20 to 10,000,000  copies HIV-1 RNA/mL             Current Outpatient Medications:   •  bictegravir-emtricitab-tenofovir alafenamide (Biktarvy) -25 MG tablet, Take 1 tablet by mouth daily, Disp: 30 tablet, Rfl: 5  •  Contrave 8-90 MG TB12, TAKE TWO TABLETS BY MOUTH TWICE A DAY, Disp: 120 tablet, Rfl: 2  •  GaviLyte-G 236 g solution, , Disp: , Rfl:   •  Testosterone 1 62 % GEL, Place 1 Pump on the skin in the morning, Disp: 88 g, Rfl: 2  •  Naltrexone-buPROPion HCl ER (Contrave) 8-90 MG TB12, Take 2 tablets by mouth 2 (two) times a day (Patient not taking: Reported on 12/6/2022), Disp: 120 tablet, Rfl: 2
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
## 2022-12-29 ENCOUNTER — PATIENT OUTREACH (OUTPATIENT)
Dept: SURGERY | Facility: CLINIC | Age: 52
End: 2022-12-29
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
## 2023-01-31 ENCOUNTER — TELEPHONE (OUTPATIENT)
Dept: SURGERY | Facility: CLINIC | Age: 53
End: 2023-01-31

## 2023-01-31 DIAGNOSIS — R79.89 LOW TESTOSTERONE IN MALE: ICD-10-CM

## 2023-01-31 RX ORDER — TESTOSTERONE 20.25 MG/1.25G
1 GEL TOPICAL DAILY
Qty: 88 G | Refills: 2 | Status: SHIPPED | OUTPATIENT
Start: 2023-01-31

## 2023-01-31 NOTE — TELEPHONE ENCOUNTER
Pt called and requested a refill of his testosterone gel   He would like to use the [facility] on [address]

## 2023-02-23 ENCOUNTER — VBI (OUTPATIENT)
Dept: ADMINISTRATIVE | Facility: OTHER | Age: 53
End: 2023-02-23

## 2023-03-01 ENCOUNTER — PATIENT OUTREACH (OUTPATIENT)
Dept: SURGERY | Facility: CLINIC | Age: 53
End: 2023-03-01

## 2023-03-03 ENCOUNTER — APPOINTMENT (OUTPATIENT)
Dept: LAB | Facility: CLINIC | Age: 53
End: 2023-03-03

## 2023-03-03 LAB
BASOPHILS # BLD AUTO: 0.06 THOUSANDS/ÂΜL (ref 0–0.1)
BASOPHILS NFR BLD AUTO: 1 % (ref 0–1)
BILIRUB UR QL STRIP: NEGATIVE
CLARITY UR: CLEAR
COLOR UR: COLORLESS
EOSINOPHIL # BLD AUTO: 0.14 THOUSAND/ÂΜL (ref 0–0.61)
EOSINOPHIL NFR BLD AUTO: 3 % (ref 0–6)
ERYTHROCYTE [DISTWIDTH] IN BLOOD BY AUTOMATED COUNT: 12.8 % (ref 11.6–15.1)
GLUCOSE UR STRIP-MCNC: NEGATIVE MG/DL
HCT VFR BLD AUTO: 44.8 % (ref 36.5–49.3)
HCV AB SER QL: NORMAL
HGB BLD-MCNC: 14.9 G/DL (ref 12–17)
HGB UR QL STRIP.AUTO: NEGATIVE
IMM GRANULOCYTES # BLD AUTO: 0.01 THOUSAND/UL (ref 0–0.2)
IMM GRANULOCYTES NFR BLD AUTO: 0 % (ref 0–2)
KETONES UR STRIP-MCNC: NEGATIVE MG/DL
LEUKOCYTE ESTERASE UR QL STRIP: NEGATIVE
LYMPHOCYTES # BLD AUTO: 1.81 THOUSANDS/ÂΜL (ref 0.6–4.47)
LYMPHOCYTES NFR BLD AUTO: 38 % (ref 14–44)
MCH RBC QN AUTO: 31 PG (ref 26.8–34.3)
MCHC RBC AUTO-ENTMCNC: 33.3 G/DL (ref 31.4–37.4)
MCV RBC AUTO: 93 FL (ref 82–98)
MONOCYTES # BLD AUTO: 0.41 THOUSAND/ÂΜL (ref 0.17–1.22)
MONOCYTES NFR BLD AUTO: 9 % (ref 4–12)
NEUTROPHILS # BLD AUTO: 2.32 THOUSANDS/ÂΜL (ref 1.85–7.62)
NEUTS SEG NFR BLD AUTO: 49 % (ref 43–75)
NITRITE UR QL STRIP: NEGATIVE
NRBC BLD AUTO-RTO: 0 /100 WBCS
PH UR STRIP.AUTO: 7.5 [PH]
PLATELET # BLD AUTO: 263 THOUSANDS/UL (ref 149–390)
PMV BLD AUTO: 10.2 FL (ref 8.9–12.7)
PROT UR STRIP-MCNC: NEGATIVE MG/DL
RBC # BLD AUTO: 4.8 MILLION/UL (ref 3.88–5.62)
SP GR UR STRIP.AUTO: 1.01 (ref 1–1.03)
UROBILINOGEN UR STRIP-ACNC: <2 MG/DL
WBC # BLD AUTO: 4.75 THOUSAND/UL (ref 4.31–10.16)

## 2023-03-04 LAB
BASOPHILS # BLD AUTO: 0.1 X10E3/UL (ref 0–0.2)
BASOPHILS NFR BLD AUTO: 1 %
C TRACH DNA SPEC QL NAA+PROBE: NEGATIVE
CD3+CD4+ CELLS # BLD: 534 /UL (ref 359–1519)
CD3+CD4+ CELLS NFR BLD: 31.4 % (ref 30.8–58.5)
CD3+CD4+ CELLS/CD3+CD8+ CLL BLD: 0.87 % (ref 0.92–3.72)
CD3+CD8+ CELLS # BLD: 615 /UL (ref 109–897)
CD3+CD8+ CELLS NFR BLD: 36.2 % (ref 12–35.5)
EOSINOPHIL # BLD AUTO: 0.2 X10E3/UL (ref 0–0.4)
EOSINOPHIL NFR BLD AUTO: 4 %
ERYTHROCYTE [DISTWIDTH] IN BLOOD BY AUTOMATED COUNT: 13.2 % (ref 11.6–15.4)
HCT VFR BLD AUTO: 44.7 % (ref 37.5–51)
HGB BLD-MCNC: 14.5 G/DL (ref 13–17.7)
IMM GRANULOCYTES # BLD: 0 X10E3/UL (ref 0–0.1)
IMM GRANULOCYTES NFR BLD: 0 %
LYMPHOCYTES # BLD AUTO: 1.7 X10E3/UL (ref 0.7–3.1)
LYMPHOCYTES NFR BLD AUTO: 38 %
MCH RBC QN AUTO: 30 PG (ref 26.6–33)
MCHC RBC AUTO-ENTMCNC: 32.4 G/DL (ref 31.5–35.7)
MCV RBC AUTO: 93 FL (ref 79–97)
MONOCYTES # BLD AUTO: 0.4 X10E3/UL (ref 0.1–0.9)
MONOCYTES NFR BLD AUTO: 9 %
N GONORRHOEA DNA SPEC QL NAA+PROBE: NEGATIVE
NEUTROPHILS # BLD AUTO: 2.2 X10E3/UL (ref 1.4–7)
NEUTROPHILS NFR BLD AUTO: 48 %
PLATELET # BLD AUTO: 271 X10E3/UL (ref 150–450)
RBC # BLD AUTO: 4.83 X10E6/UL (ref 4.14–5.8)
WBC # BLD AUTO: 4.6 X10E3/UL (ref 3.4–10.8)

## 2023-03-06 LAB
HIV1 RNA # SERPL NAA+PROBE: 100 COPIES/ML
HIV1 RNA SERPL NAA+PROBE-LOG#: 2 LOG10COPY/ML

## 2023-03-10 NOTE — PROGRESS NOTES
perio maintenance   Last exam, perio maintenance, fmx was 11/9/22  REVIEWED MED HX: meds, allergies, health changes reviewed in EPIC  CHIEF CONCERN:  none   PAIN SCALE:  0  ASA CLASS:  II  PLAQUE:  moderate    CALCULUS:     light     BLEEDING:  moderate  STAIN :   none    ORAL HYGIENE:    fair    PERIO: Stage 1/ Grade A    Hand scaled, polished and flossed  Used Cavitron  Oral Hygiene Instruction:  recommended brushing 2 x daily for 2 minutes MIN, recommended flossing daily, reviewed dietary precautions  Visual and Tactile Intraoral/ Extraoral evaluation: Oral and Oropharyngeal cancer evaluation   No findings     REFERRALS: no referrals needed    CARIES FINDINGS: see treatment plan previously       TREATMENT  PLAN :   1) fillings    Next Recall: 3 mth perio maintenance, periodic exam  Last  FMX : 11/9/23

## 2023-03-13 ENCOUNTER — OFFICE VISIT (OUTPATIENT)
Dept: DENTISTRY | Facility: CLINIC | Age: 53
End: 2023-03-13

## 2023-03-13 VITALS — HEART RATE: 71 BPM | DIASTOLIC BLOOD PRESSURE: 66 MMHG | SYSTOLIC BLOOD PRESSURE: 106 MMHG

## 2023-03-13 DIAGNOSIS — K05.4 PERIODONTOSIS: Primary | ICD-10-CM

## 2023-03-14 ENCOUNTER — OFFICE VISIT (OUTPATIENT)
Dept: SURGERY | Facility: CLINIC | Age: 53
End: 2023-03-14

## 2023-03-14 VITALS
SYSTOLIC BLOOD PRESSURE: 138 MMHG | DIASTOLIC BLOOD PRESSURE: 66 MMHG | HEIGHT: 72 IN | TEMPERATURE: 98.1 F | HEART RATE: 81 BPM | BODY MASS INDEX: 27.77 KG/M2 | OXYGEN SATURATION: 98 % | WEIGHT: 205 LBS

## 2023-03-14 DIAGNOSIS — Z20.2 CONTACT WITH AND (SUSPECTED) EXPOSURE TO INFECTIONS WITH A PREDOMINANTLY SEXUAL MODE OF TRANSMISSION: ICD-10-CM

## 2023-03-14 DIAGNOSIS — D72.89 OTHER SPECIFIED DISORDERS OF WHITE BLOOD CELLS: ICD-10-CM

## 2023-03-14 DIAGNOSIS — F50.81 BINGE EATING DISORDER: ICD-10-CM

## 2023-03-14 DIAGNOSIS — Z72.89 OTHER PROBLEMS RELATED TO LIFESTYLE: ICD-10-CM

## 2023-03-14 DIAGNOSIS — Z13.1 SCREENING FOR DIABETES MELLITUS: ICD-10-CM

## 2023-03-14 DIAGNOSIS — Z79.899 OTHER LONG TERM (CURRENT) DRUG THERAPY: ICD-10-CM

## 2023-03-14 DIAGNOSIS — B20 HIV DISEASE (HCC): Primary | ICD-10-CM

## 2023-03-14 DIAGNOSIS — Z13.220 ENCOUNTER FOR LIPID SCREENING FOR CARDIOVASCULAR DISEASE: ICD-10-CM

## 2023-03-14 DIAGNOSIS — Z13.6 ENCOUNTER FOR LIPID SCREENING FOR CARDIOVASCULAR DISEASE: ICD-10-CM

## 2023-03-14 DIAGNOSIS — Z11.3 ENCOUNTER FOR SCREENING FOR BACTERIAL SEXUALLY TRANSMITTED DISEASE: ICD-10-CM

## 2023-03-14 NOTE — PROGRESS NOTES
Progress Note - Infectious Disease   Kristan Payne 50 y o  male MRN: [id number]  Unit/Bed#:  Encounter: [id number]      Impression/Plan:  1   HIV-doing reasonably well well on Biktarvy with a viral load continues replicated a low-level detection, at this time 100 copies And a CD4 count in the 500s    He commonly has low level detection despite good adherence   Continue ART, recheck labs in 2 months and follow up in 3 months  Stressed adherence  Asked the patient to stop his supplement he is using to help him continue to work out in the setting of being a vegan  If no improvement with a viral load then he can restart it by taking it 12 hours after the Biktarvy      2   Syphilis-has been serofast   Now nonreactive   Continue annual monitoring    3  Binge eating disorder  Doing well on Contrave    Patient was provided medication, adherence and prevention education    Subjective:  Routine follow-up for HIV  Patient claims 100% adherence with Biktarvy     Patient denies any notable side effects  Overall the feeling well  The patient denies any fever chills or sweats, denies any nausea vomiting or diarrhea, denies any cough or shortness of breath  ROS:  A complete review of systems is negative other than that noted above in the subjective    Followup portions patient history reviewed and updated as: Allergies, current medications, past medical history, past social history, past surgical history, and the problem list    Objective:  Vitals:  Vitals:    03/14/23 1559   BP: 138/66   Pulse: 81   Temp: 98 1 °F (36 7 °C)   SpO2: 98%   Weight: 93 kg (205 lb)   Height: 6' (1 829 m)       Physical Exam:   General Appearance:  Alert, interactive, appearing well,  nontoxic, no acute distress  Neck:   Supple without lymphadenopathy, no thyromegaly or masses   Throat: Oropharynx moist without lesions      Lungs:   Clear to auscultation bilaterally; no wheezes, rhonchi or rales; respirations unlabored   Heart:  RRR; no murmur, rub or gallop   Abdomen:   Soft, non-tender, non-distended, positive bowel sounds  Extremities: No clubbing, cyanosis or edema   Skin: No new rashes or lesions  No draining wounds noted  Labs, Imaging, & Other studies:   All pertinent labs and imaging studies were personally reviewed    Lab Results   Component Value Date    K 4 7 11/28/2022     (H) 11/28/2022    CO2 28 11/28/2022    BUN 8 11/28/2022    CREATININE 1 14 11/28/2022    GLUF 98 08/18/2022    CALCIUM 9 2 11/28/2022    CORRECTEDCA 9 7 11/28/2022    AST 25 11/28/2022    ALT 66 11/28/2022    ALKPHOS 72 11/28/2022    EGFR 74 11/28/2022     Lab Results   Component Value Date    WBC 4 75 03/03/2023    WBC 4 6 03/03/2023    HGB 14 9 03/03/2023    HGB 14 5 03/03/2023    HCT 44 8 03/03/2023    HCT 44 7 03/03/2023    MCV 93 03/03/2023    MCV 93 03/03/2023     03/03/2023     03/03/2023     Lab Results   Component Value Date    HEPCAB Non-reactive 03/03/2023     Lab Results   Component Value Date    HEPCAB Non-reactive 03/03/2023     Lab Results   Component Value Date    RPR Reactive (A) 11/28/2022    RPR Reactive 2 dils (A) 11/28/2022     CD4 ABS   Date/Time Value Ref Range Status   03/03/2023 12:03  359 - 1519 /uL Final     HIV-1 RNA by PCR, Qn   Date/Time Value Ref Range Status   03/03/2023 12:03  copies/mL Final     Comment:     The reportable range for this assay is 20 to 10,000,000  copies HIV-1 RNA/mL             Current Outpatient Medications:   •  bictegravir-emtricitab-tenofovir alafenamide (Biktarvy) -25 MG tablet, Take 1 tablet by mouth daily, Disp: 30 tablet, Rfl: 5  •  Contrave 8-90 MG TB12, TAKE TWO TABLETS BY MOUTH TWICE A DAY, Disp: 120 tablet, Rfl: 2  •  Testosterone 1 62 % GEL, Place 1 Pump on the skin in the morning, Disp: 88 g, Rfl: 2  •  GaviLyte-G 236 g solution, , Disp: , Rfl:   •  Naltrexone-buPROPion HCl ER (Contrave) 8-90 MG TB12, Take 2 tablets by mouth 2 (two) times a day (Patient not taking: Reported on 12/6/2022), Disp: 120 tablet, Rfl: 2

## 2023-03-15 ENCOUNTER — DOCUMENTATION (OUTPATIENT)
Dept: SURGERY | Facility: CLINIC | Age: 53
End: 2023-03-15

## 2023-03-15 NOTE — PROGRESS NOTES
Assessment    Mary Ann Larsen was seen by RD in conjunction with ID f/u  pt is established patient (last annual was 03/29/2022)    PMHx:  HIV, syphilis, hernia,laci eating, insomnia      Clinical Data/Client History    CD4 count:  534  Viral load:  100  ART:   Biktarvy      , Patient Navigator: [id number] assistance: participates in CSA program     Living situation: House or apartment     Psychosocial factors: Not discussed     Mobility:  self ambulates    Physical activity: Works out 4-5 days per week  Oral problems: denied difficulties chewing and swallowing       Typical food/beverage intake:    · Breakfast Scrambled tofu wrap   · Lunch seitan vegetable wrap   · Dinner salad with beyond burger  · Snacks protein bar & shakes   · Beverages water & coffee     Appetite: Excellent    Vitamin, mineral, herbal supplements: B-12    Oral/enteral nutrition supplements:  Protein supplements     GI problems: denied n/v/d/c    Food allergies/intolerances: Follows a vegan diet     Weight history: Wt Readings from Last 3 Encounters:   03/14/23 93 kg (205 lb)   12/06/22 89 7 kg (197 lb 12 8 oz)   11/29/22 88 kg (194 lb)       Current body weight:  205(3/14)   Height:  72"  BMI:  27 8  IBW:  178  %IBW  115%     Weight change: +8#(4%) x 90 days- not significant weight gain     Clinically significant/severe: No     Nutrition-related labs:    Lab Results   Component Value Date    HGBA1C 5 3 05/13/2022     Lab Results   Component Value Date    SODIUM 140 11/28/2022    K 4 7 11/28/2022     (H) 11/28/2022    CO2 28 11/28/2022    BUN 8 11/28/2022    CREATININE 1 14 11/28/2022    GLUC 79 11/28/2022    CALCIUM 9 2 11/28/2022         Nutrition-related medications:  Contrave, testosterone      Nutrition Diagnosis    Problem: Weight gain     Related to:  Increased weight training     As Evidenced By: patient interview       Estimated Nutritional Needs    Kitty Watt REE: CBW 93kg    · ~2140 kcal (based on: [place])  · ~93g/pro protein (based on: 1g/kg-weight training )  · ~2300mlfluid (based on: 25ml/kg/day)      Current intake estimation: meeting needs       Nutrition Intervention/Recommendations    Nutrition education intervention: not appropriate -Pt following a balanced plant based diet       Goals:  No significant weight changes  No altered nutrition related labs  Visit Summary  Pt is doing well with vegan diet/exercise  He stated weight gain is from increased weight training  He has a mobile market card but uses a CSA program  RD invited pt to events  MD recommended taking protein supplement 12 hours after Biktarvy  Will continue to provide nutrition support as needed       Renuka Orosco RDN,LDN

## 2023-03-30 DIAGNOSIS — R79.89 LOW TESTOSTERONE IN MALE: ICD-10-CM

## 2023-03-30 NOTE — TELEPHONE ENCOUNTER
Pt requesting testosterone gel refill be sent to [facility] as they have the best price on [facility]

## 2023-04-04 ENCOUNTER — TELEPHONE (OUTPATIENT)
Dept: SURGERY | Facility: CLINIC | Age: 53
End: 2023-04-04

## 2023-04-04 DIAGNOSIS — R79.89 LOW TESTOSTERONE IN MALE: ICD-10-CM

## 2023-04-04 RX ORDER — TESTOSTERONE 20.25 MG/1.25G
GEL TOPICAL DAILY
OUTPATIENT
Start: 2023-04-04

## 2023-04-04 RX ORDER — TESTOSTERONE 20.25 MG/1.25G
2 GEL TOPICAL DAILY
Qty: 75 G | Refills: 2 | Status: SHIPPED | OUTPATIENT
Start: 2023-04-04 | End: 2023-05-04

## 2023-04-04 NOTE — TELEPHONE ENCOUNTER
Pt called and requested that  a refill for his testosterone gel be sent to the [facility] on [address]

## 2023-05-01 ENCOUNTER — OFFICE VISIT (OUTPATIENT)
Dept: DENTISTRY | Facility: CLINIC | Age: 53
End: 2023-05-01

## 2023-05-01 VITALS — SYSTOLIC BLOOD PRESSURE: 117 MMHG | HEART RATE: 72 BPM | DIASTOLIC BLOOD PRESSURE: 82 MMHG

## 2023-05-01 DIAGNOSIS — K02.9 DENTAL DECAY: Primary | ICD-10-CM

## 2023-05-01 NOTE — PROGRESS NOTES
Composite Fillings    Jose Rodriguez presents for composite fillings #18 MO and #20 MO  PMH reviewed, no changes  ASA: II  Pain: 0/10    Discussed with patient need for RCT if pulp exposure occurs or in future if pulp is inflamed  Pt understands and consents  Applied topical benzocaine, administered 1 carpule 2% lidocaine 1:100k epi via IANB and long buccal nerve block  Prepped tooth #20 MO and #18 MO with 245 carbide on high speed  Caries removed with round carbide on slow speed  Placed Kruger matrix  Isolation with medium sized Isovac and cotton rolls  Etch with 37% H2PO4, rinse, dry  Applied Adhese with 20 second scrub once, gentle air dry and light cured for 10s  Restored with flowable composite and Tetric bulk trinity shade A2  Light cured  Refined with finishing burs, polished  Verified occlusion and contacts  Pt left ambulatory and satisfied

## 2023-05-19 DIAGNOSIS — B20 HIV (HUMAN IMMUNODEFICIENCY VIRUS INFECTION) (HCC): ICD-10-CM

## 2023-05-22 RX ORDER — BICTEGRAVIR SODIUM, EMTRICITABINE, AND TENOFOVIR ALAFENAMIDE FUMARATE 50; 200; 25 MG/1; MG/1; MG/1
1 TABLET ORAL DAILY
Qty: 30 TABLET | Refills: 5 | Status: SHIPPED | OUTPATIENT
Start: 2023-05-22

## 2023-05-30 ENCOUNTER — OFFICE VISIT (OUTPATIENT)
Dept: SURGERY | Facility: CLINIC | Age: 53
End: 2023-05-30

## 2023-05-30 ENCOUNTER — PATIENT OUTREACH (OUTPATIENT)
Dept: SURGERY | Facility: CLINIC | Age: 53
End: 2023-05-30

## 2023-05-30 VITALS
DIASTOLIC BLOOD PRESSURE: 76 MMHG | SYSTOLIC BLOOD PRESSURE: 120 MMHG | OXYGEN SATURATION: 96 % | WEIGHT: 205.4 LBS | HEIGHT: 72 IN | BODY MASS INDEX: 27.82 KG/M2 | TEMPERATURE: 98 F | HEART RATE: 69 BPM

## 2023-05-30 DIAGNOSIS — R79.89 LOW TESTOSTERONE IN MALE: ICD-10-CM

## 2023-05-30 DIAGNOSIS — K42.9 UMBILICAL HERNIA WITHOUT OBSTRUCTION AND WITHOUT GANGRENE: ICD-10-CM

## 2023-05-30 DIAGNOSIS — R68.82 LOW LIBIDO: ICD-10-CM

## 2023-05-30 DIAGNOSIS — Z00.00 ANNUAL PHYSICAL EXAM: Primary | ICD-10-CM

## 2023-05-30 DIAGNOSIS — B20 HIV DISEASE (HCC): ICD-10-CM

## 2023-05-30 RX ORDER — TESTOSTERONE 20.25 MG/1.25G
1 GEL TOPICAL DAILY
Qty: 75 G | Refills: 1 | Status: SHIPPED | OUTPATIENT
Start: 2023-05-30

## 2023-05-30 NOTE — PROGRESS NOTES
Cm met with ct  Ct discussed an upcoming event with cm in hopes of the [facility] being potentially involved  Cm acknowledged and discussed this matter with the rest of staff  Cm was advised to discuss this event with Sexual Health & HIV/STD Prevention Manager  Cm has gotten in contact with Prevention Manager and has sent ct's phone number to Prevention Manager for ct to be able to elaborate more on the upcoming event  Cm has also given ct the Prevention Manager's name to expect a potential phone call  Ct has also signed grievance policy and client/ agreement  Cm has informed that ct is due for a recert 95/39   Ct acknowledged and has agreed to schedule recert for 31/19 at 82:25 am

## 2023-05-30 NOTE — PROGRESS NOTES
"  [address] [facility]    NAME: Xochilt Hillman  AGE: 50 y o  SEX: male  : 1970     DATE: 2023     Assessment and Plan:     Problem List Items Addressed This Visit        Other    HIV disease ([address] )     No results found for: \"QB2KCQO\"  CD4 ABS   Date/Time Value Ref Range Status   2023 12:03  359 - 1519 /uL Final     HIV-1 RNA by PCR, Qn   Date/Time Value Ref Range Status   2023 12:03  copies/mL Final     Comment:     The reportable range for this assay is 20 to 10,000,000  copies HIV-1 RNA/mL  HIV-1 RNA Viral Load Log   Date/Time Value Ref Range Status   2023 12:03 PM 2 000 [id number]/mL Final         ART: Mingo Alicia        Denies side effects  Stressed the importance of adherence  Continue follow up with ID clinic  Reviewed most recent labs, including Cd4 and viral load  Discussed the risks and benefits of treatment options, instructions for management, importance of treatment adherence, and reduction of risk factor  Educated on possible  medication side effects  Counseled on routes of HIV transmission, including the risk of  infection  Emphasized that viral suppression is the best method to prevent HIV transmission  At this time pt denies the need for HIV testing of anyone in their life  Total encounter time was 45 minutes  Greater then 20 minutes were spent on counseling and patient education  Pt voices understanding and agreement with treatment plan  Umbilical hernia without obstruction and without gangrene     Size has increased with weight gain  Declines surgical referral due to being unable to not lift things while healing  Educated on S&S of strangulation and recommend abdominal support belt           Other Visit Diagnoses     Annual physical exam    -  Primary    Low libido        Relevant Medications    Testosterone 1 62 % GEL    Low testosterone in male        " Relevant Medications    Testosterone 1 62 % GEL          Immunizations and preventive care screenings were discussed with patient today  Appropriate education was printed on patient's after visit summary  Discussed risks and benefits of prostate cancer screening  We discussed the controversial history of PSA screening for prostate cancer in the [place] as well as the risk of over detection and over treatment of prostate cancer by way of PSA screening  The patient understands that PSA blood testing is an imperfect way to screen for prostate cancer and that elevated PSA levels in the blood may also be caused by infection, inflammation, prostatic trauma or manipulation, urological procedures, or by benign prostatic enlargement  The role of the digital rectal examination in prostate cancer screening was also discussed and I discussed with him that there is large interobserver variability in the findings of digital rectal examination  Counseling:  Dental Health: discussed importance of regular tooth brushing, flossing, and dental visits  · Exercise: the importance of regular exercise/physical activity was discussed  Recommend exercise 3-5 times per week for at least 30 minutes  No follow-ups on file  Chief Complaint:     Chief Complaint   Patient presents with   • Follow-up     Pt offers no c/o at time  History of Present Illness:     Adult Annual Physical   Patient here for a comprehensive physical exam  The patient reports no problems  Diet and Physical Activity  · Diet/Nutrition: well balanced diet and consuming 3-5 servings of fruits/vegetables daily  · Exercise: 3-4 times a week on average  Depression Screening  PHQ-2/9 Depression Screening         General Health  · Sleep: sleeps well  · Hearing: normal - bilateral   · Vision: no vision problems  · Dental: regular dental visits and brushes teeth twice daily          Health  · Symptoms include: none     Review of Systems:     Review of Systems   Constitutional: Negative for activity change, appetite change, chills, diaphoresis, fatigue, fever and unexpected weight change  HENT: Negative for congestion, dental problem, ear pain, hearing loss, mouth sores, rhinorrhea and sore throat  Eyes: Negative for pain, redness and visual disturbance  Respiratory: Negative for shortness of breath and wheezing  Cardiovascular: Negative for chest pain and leg swelling  Gastrointestinal: Negative for abdominal pain, constipation, diarrhea, nausea and vomiting  Endocrine: Negative for polydipsia, polyphagia and polyuria  Genitourinary: Negative for difficulty urinating and dysuria  Musculoskeletal: Negative for back pain, joint swelling and myalgias  Skin: Negative for rash  Neurological: Negative for syncope and headaches  Psychiatric/Behavioral: Negative for behavioral problems and suicidal ideas  Past Medical History:     Past Medical History:   Diagnosis Date   • Anxiety     last assessed 2015   • Chest tightness    • Drug abuse [facility])     including heroin   • HIV disease ([address] )    • Stomatitis     last assessed 05/14/15   • Syphilis     ,, treated   • TB (tuberculosis)       Past Surgical History:     History reviewed  No pertinent surgical history  Family History:     History reviewed  No pertinent family history     Social History:     Social History     Socioeconomic History   • Marital status: Single     Spouse name: None   • Number of children: None   • Years of education: None   • Highest education level: None   Occupational History   • None   Tobacco Use   • Smoking status: Former     Years: 30      Types: Cigarettes     Quit date: 2018     Years since quittin 2   • Smokeless tobacco: Never   Vaping Use   • Vaping Use: Never used   Substance and Sexual Activity   • Alcohol use: No   • Drug use: Not Currently   • Sexual activity: Yes     Partners: Male     Birth control/protection: None   Other Topics Concern   • None   Social History Narrative    Housing, Stable housing 1 person in household  Monthly income $80    Vegan     Social Determinants of Health     Financial Resource Strain: Low Risk  (11/9/2022)    Overall Financial Resource Strain (CARDIA)    • Difficulty of Paying Living Expenses: Not hard at all   Food Insecurity: No Food Insecurity (11/9/2022)    Hunger Vital Sign    • Worried About Running Out of Food in the Last Year: Never true    • Ran Out of Food in the Last Year: Never true   Transportation Needs: No Transportation Needs (11/9/2022)    PRAPARE - Transportation    • Lack of Transportation (Medical): No    • Lack of Transportation (Non-Medical): No   Physical Activity: Not on file   Stress: Not on file   Social Connections: Not on file   Intimate Partner Violence: Not on file   Housing Stability: Not on file      Current Medications:     Current Outpatient Medications   Medication Sig Dispense Refill   • Biktarvy -25 MG tablet TAKE 1 TABLET BY MOUTH DAILY 30 tablet 5   • Contrave 8-90 MG TB12 TAKE TWO TABLETS BY MOUTH TWICE A  tablet 2   • Testosterone 1 62 % GEL Place 1 Bottle on the skin in the morning 75 g 1     No current facility-administered medications for this visit  Allergies: Allergies   Allergen Reactions   • Codeine Confusion      Physical Exam:     /76   Pulse 69   Temp 98 °F (36 7 °C)   Ht 6' (1 829 m)   Wt 93 2 kg (205 lb 6 4 oz)   SpO2 96%   BMI 27 86 kg/m²     Physical Exam  Vitals and nursing note reviewed  Constitutional:       General: He is not in acute distress  Appearance: He is well-developed  HENT:      Head: Normocephalic and atraumatic  Eyes:      Conjunctiva/sclera: Conjunctivae normal    Cardiovascular:      Rate and Rhythm: Normal rate and regular rhythm  Heart sounds: No murmur heard  Pulmonary:      Effort: Pulmonary effort is normal  No respiratory distress        Breath sounds: Normal breath sounds  Abdominal:      Palpations: Abdomen is soft  Tenderness: There is no abdominal tenderness  Musculoskeletal:         General: No swelling  Cervical back: Neck supple  Skin:     General: Skin is warm and dry  Capillary Refill: Capillary refill takes less than 2 seconds  Neurological:      Mental Status: He is alert     Psychiatric:         Mood and Affect: Mood normal           ROMAN Wing  [facility] AT [facility]

## 2023-05-31 NOTE — ASSESSMENT & PLAN NOTE
Size has increased with weight gain  Declines surgical referral due to being unable to not lift things while healing  Educated on S&S of strangulation and recommend abdominal support belt

## 2023-05-31 NOTE — ASSESSMENT & PLAN NOTE
"No results found for: \"[id number]\"  CD4 ABS   Date/Time Value Ref Range Status   2023 12:03  359 - 1519 /uL Final     HIV-1 RNA by PCR, Qn   Date/Time Value Ref Range Status   2023 12:03  copies/mL Final     Comment:     The reportable range for this assay is 20 to 10,000,000  copies HIV-1 RNA/mL  HIV-1 RNA Viral Load Log   Date/Time Value Ref Range Status   2023 12:03 PM 2 000 [id number]/mL Final         ART: Brenda Seen        Denies side effects  Stressed the importance of adherence  Continue follow up with ID clinic  Reviewed most recent labs, including Cd4 and viral load  Discussed the risks and benefits of treatment options, instructions for management, importance of treatment adherence, and reduction of risk factor  Educated on possible  medication side effects  Counseled on routes of HIV transmission, including the risk of  infection  Emphasized that viral suppression is the best method to prevent HIV transmission  At this time pt denies the need for HIV testing of anyone in their life  Total encounter time was 45 minutes  Greater then 20 minutes were spent on counseling and patient education  Pt voices understanding and agreement with treatment plan        "

## 2023-06-02 DIAGNOSIS — F50.81 BINGE EATING DISORDER: ICD-10-CM

## 2023-06-05 ENCOUNTER — PATIENT OUTREACH (OUTPATIENT)
Dept: SURGERY | Facility: CLINIC | Age: 53
End: 2023-06-05

## 2023-06-05 RX ORDER — NALTREXONE HYDROCHLORIDE AND BUPROPION HYDROCHLORIDE 8; 90 MG/1; MG/1
TABLET, EXTENDED RELEASE ORAL
Qty: 120 TABLET | Refills: 2 | Status: SHIPPED | OUTPATIENT
Start: 2023-06-05

## 2023-06-05 NOTE — PROGRESS NOTES
Ct got in contact with cm, inquiring for cm to remind him of what he would need to complete recert  Cm reminded ct of what documents would be needed to get recertification completed  Ct has then came into the office to complete recert with cm  Ct identifies as a homosexual with one partner in the last three months  Ct reports to have a complete understanding of HIV disease process, transmission, and medications  Ct reports to be able to meet own basic needs and is able to access community assistance as needed  Ct reports to have consistent and reliable access to transportation with no need for agency support  Ct reports to be able to access medical care  Ct reports to have X[employer]box  Ct reports to be independent and able to follow up on his own referrals and access care and services  Ct reports to have stable housing without a need for assistance  Ct reports to understand risks and practices risk reduction  Ct reports to have disclosed status to his partner  Ct denies any difficulties with substance use  Ct reports to have been clean for 5 5 years  Ct reports to have [employer] Man[employer] Fee scale alongside his Xmybox  Ct denies any history of mental health  Ct reports to have a steady source of income  Ct denies any language or cultural barriers  Cm has completed acuity scale with ct and ct has scored 12 which leaves ct at a Level 1  Cm has completed [employer] Fee Scale application with ct and has forwarded to the hospital financial counselors

## 2023-06-16 ENCOUNTER — APPOINTMENT (OUTPATIENT)
Dept: LAB | Facility: CLINIC | Age: 53
End: 2023-06-16
Payer: COMMERCIAL

## 2023-06-16 DIAGNOSIS — E34.9 TESTOSTERONE INSUFFICIENCY: ICD-10-CM

## 2023-06-16 LAB
ALBUMIN SERPL BCP-MCNC: 3.8 G/DL (ref 3.5–5)
ALP SERPL-CCNC: 63 U/L (ref 46–116)
ALT SERPL W P-5'-P-CCNC: 37 U/L (ref 12–78)
ANION GAP SERPL CALCULATED.3IONS-SCNC: 2 MMOL/L (ref 4–13)
AST SERPL W P-5'-P-CCNC: 23 U/L (ref 5–45)
BASOPHILS # BLD AUTO: 0.05 THOUSANDS/ÂΜL (ref 0–0.1)
BASOPHILS NFR BLD AUTO: 1 % (ref 0–1)
BILIRUB SERPL-MCNC: 0.59 MG/DL (ref 0.2–1)
BUN SERPL-MCNC: 13 MG/DL (ref 5–25)
CALCIUM SERPL-MCNC: 9.4 MG/DL (ref 8.3–10.1)
CHLORIDE SERPL-SCNC: 108 MMOL/L (ref 96–108)
CHOLEST SERPL-MCNC: 182 MG/DL
CO2 SERPL-SCNC: 26 MMOL/L (ref 21–32)
CREAT SERPL-MCNC: 1.2 MG/DL (ref 0.6–1.3)
EOSINOPHIL # BLD AUTO: 0.11 THOUSAND/ÂΜL (ref 0–0.61)
EOSINOPHIL NFR BLD AUTO: 3 % (ref 0–6)
ERYTHROCYTE [DISTWIDTH] IN BLOOD BY AUTOMATED COUNT: 13 % (ref 11.6–15.1)
GFR SERPL CREATININE-BSD FRML MDRD: 70 ML/MIN/1.73SQ M
GLUCOSE P FAST SERPL-MCNC: 83 MG/DL (ref 65–99)
HCT VFR BLD AUTO: 44 % (ref 36.5–49.3)
HDLC SERPL-MCNC: 42 MG/DL
HGB BLD-MCNC: 14.4 G/DL (ref 12–17)
IMM GRANULOCYTES # BLD AUTO: 0 THOUSAND/UL (ref 0–0.2)
IMM GRANULOCYTES NFR BLD AUTO: 0 % (ref 0–2)
LDLC SERPL CALC-MCNC: 121 MG/DL (ref 0–100)
LYMPHOCYTES # BLD AUTO: 1.62 THOUSANDS/ÂΜL (ref 0.6–4.47)
LYMPHOCYTES NFR BLD AUTO: 41 % (ref 14–44)
MCH RBC QN AUTO: 31.2 PG (ref 26.8–34.3)
MCHC RBC AUTO-ENTMCNC: 32.7 G/DL (ref 31.4–37.4)
MCV RBC AUTO: 95 FL (ref 82–98)
MONOCYTES # BLD AUTO: 0.46 THOUSAND/ÂΜL (ref 0.17–1.22)
MONOCYTES NFR BLD AUTO: 12 % (ref 4–12)
NEUTROPHILS # BLD AUTO: 1.68 THOUSANDS/ÂΜL (ref 1.85–7.62)
NEUTS SEG NFR BLD AUTO: 43 % (ref 43–75)
NRBC BLD AUTO-RTO: 0 /100 WBCS
PLATELET # BLD AUTO: 254 THOUSANDS/UL (ref 149–390)
PMV BLD AUTO: 10.1 FL (ref 8.9–12.7)
POTASSIUM SERPL-SCNC: 4.1 MMOL/L (ref 3.5–5.3)
PROT SERPL-MCNC: 7.5 G/DL (ref 6.4–8.4)
RBC # BLD AUTO: 4.62 MILLION/UL (ref 3.88–5.62)
SODIUM SERPL-SCNC: 136 MMOL/L (ref 135–147)
TREPONEMA PALLIDUM IGG+IGM AB [PRESENCE] IN SERUM OR PLASMA BY IMMUNOASSAY: REACTIVE
TRIGL SERPL-MCNC: 96 MG/DL
WBC # BLD AUTO: 3.92 THOUSAND/UL (ref 4.31–10.16)

## 2023-06-16 PROCEDURE — 84402 ASSAY OF FREE TESTOSTERONE: CPT

## 2023-06-16 PROCEDURE — 84403 ASSAY OF TOTAL TESTOSTERONE: CPT

## 2023-06-17 LAB
BASOPHILS # BLD AUTO: 0 X10E3/UL (ref 0–0.2)
BASOPHILS NFR BLD AUTO: 1 %
CD3+CD4+ CELLS # BLD: 407 /UL (ref 359–1519)
CD3+CD4+ CELLS NFR BLD: 27.1 % (ref 30.8–58.5)
CD3+CD4+ CELLS/CD3+CD8+ CLL BLD: 0.75 % (ref 0.92–3.72)
CD3+CD8+ CELLS # BLD: 543 /UL (ref 109–897)
CD3+CD8+ CELLS NFR BLD: 36.2 % (ref 12–35.5)
EOSINOPHIL # BLD AUTO: 0.1 X10E3/UL (ref 0–0.4)
EOSINOPHIL NFR BLD AUTO: 3 %
ERYTHROCYTE [DISTWIDTH] IN BLOOD BY AUTOMATED COUNT: 13.3 % (ref 11.6–15.4)
EST. AVERAGE GLUCOSE BLD GHB EST-MCNC: 105 MG/DL
HBA1C MFR BLD: 5.3 %
HCT VFR BLD AUTO: 44.1 % (ref 37.5–51)
HGB BLD-MCNC: 14.5 G/DL (ref 13–17.7)
HIV1 RNA # SERPL NAA+PROBE: <20 COPIES/ML
HIV1 RNA SERPL NAA+PROBE-LOG#: NORMAL LOG10COPY/ML
IMM GRANULOCYTES # BLD: 0 X10E3/UL (ref 0–0.1)
IMM GRANULOCYTES NFR BLD: 0 %
LYMPHOCYTES # BLD AUTO: 1.5 X10E3/UL (ref 0.7–3.1)
LYMPHOCYTES NFR BLD AUTO: 40 %
MCH RBC QN AUTO: 30.9 PG (ref 26.6–33)
MCHC RBC AUTO-ENTMCNC: 32.9 G/DL (ref 31.5–35.7)
MCV RBC AUTO: 94 FL (ref 79–97)
MONOCYTES # BLD AUTO: 0.5 X10E3/UL (ref 0.1–0.9)
MONOCYTES NFR BLD AUTO: 12 %
NEUTROPHILS # BLD AUTO: 1.7 X10E3/UL (ref 1.4–7)
NEUTROPHILS NFR BLD AUTO: 44 %
PLATELET # BLD AUTO: 265 X10E3/UL (ref 150–450)
RBC # BLD AUTO: 4.7 X10E6/UL (ref 4.14–5.8)
WBC # BLD AUTO: 3.8 X10E3/UL (ref 3.4–10.8)

## 2023-06-18 LAB
TESTOST FREE SERPL-MCNC: 5.6 PG/ML (ref 7.2–24)
TESTOST SERPL-MCNC: 474 NG/DL (ref 264–916)

## 2023-06-19 ENCOUNTER — PATIENT OUTREACH (OUTPATIENT)
Dept: SURGERY | Facility: CLINIC | Age: 53
End: 2023-06-19

## 2023-06-19 LAB
RPR SER QL: REACTIVE
RPR SER-TITR: ABNORMAL {TITER}
T PALLIDUM AB SER QL IF: REACTIVE

## 2023-06-20 ENCOUNTER — OFFICE VISIT (OUTPATIENT)
Dept: DENTISTRY | Facility: CLINIC | Age: 53
End: 2023-06-20

## 2023-06-20 VITALS — HEART RATE: 77 BPM | SYSTOLIC BLOOD PRESSURE: 144 MMHG | DIASTOLIC BLOOD PRESSURE: 93 MMHG

## 2023-06-20 DIAGNOSIS — S02.5XXB OPEN FRACTURE OF TOOTH, INITIAL ENCOUNTER: Primary | ICD-10-CM

## 2023-06-20 DIAGNOSIS — Z01.20 ENCOUNTER FOR DENTAL EXAMINATION: ICD-10-CM

## 2023-06-20 PROCEDURE — D0220 INTRAORAL - PERIAPICAL FIRST RADIOGRAPHIC IMAGE: HCPCS

## 2023-06-20 PROCEDURE — D4910 PERIODONTAL MAINTENANCE: HCPCS | Performed by: DENTIST

## 2023-06-20 PROCEDURE — D0120 PERIODIC ORAL EVALUATION - ESTABLISHED PATIENT: HCPCS | Performed by: DENTIST

## 2023-06-20 NOTE — DENTAL PROCEDURE DETAILS
Jessica Valdez presents for a Periodic exam  Verbal consent for treatment given in addition to the forms  Reviewed health history - Patient is ASA III  Consents signed: Yes     Perio: Stage I  Pain Scale: 0  Radiographs: Periaplical tooth #3     Oral Hygiene instruction reviewed and given  Recommended Hygiene recall visits with the [facility]  Reason for visit:Periodic Exam   Rooming Includes:  Dental Vitals recorded  Allergies Reviewed  Medication Reviewed  Dental Health Compliance: Twice daily brushing, never flossing, use of fluoride toothpaste  Medical History Reviewed  ASA 3 - Patient with moderate systemic disease with functional limitations    Patient has no complaints/no pain  Patient presents for hygiene appointment  Treatment provided includes periodic exam performed by Dr Ady Nazario, Patricia Paiz/ adult prophy, handscale, polish(mint paste), floss,  no routine xrays needed at this visit, oral hygiene instructions  Intraoral exam/Oral Cancer Screening presents with no significant findings  Tooth #3 is cracked mesial to distal beyond the osseous level, tooth is unrestorable  Plaque buildup is generalized Light  Calculus buildup is Generalized  Light  Gingival evaluation is slight red with very slight inflammation and bleeding  Pockets generalized 2-3mm  Stain evaluation is no stain present  Oral hygiene instructions include brushing 2x daily and flossing daily  Discussed using a waterpik or power   Also talked about switching to alcohol free mouthwash  Reviewed brushing along gumline  [address] due November 2023  Next visit: referral to oral surgeon and kuldipo perio mant

## 2023-06-20 NOTE — PATIENT INSTRUCTIONS
Mouth Care   WHAT YOU NEED TO KNOW:   Why is mouth care important? Mouth care prevents infection, plaque, bleeding gums, mouth sores, and cavities  It also freshens breath and improves appetite  Do mouth care in the morning, after each meal, and before bed each night  You may need more frequent mouth care if your mouth is in poor condition  What items will I need to do mouth care? An electric or manual toothbrush    Toothpaste, dental sticks, and floss    A cup of water for rinsing    Mouthwash     Water-based lip balm or moisturizer    How do I brush my teeth? Wet the toothbrush and place a small amount of toothpaste on it  Gently place the brush on each tooth, and move it in a [place]  Do not press too hard  This may injure your gums  Clean the inner, outer, and top surfaces of your teeth  Brush your gums and the top of your tongue  Swish the water in your mouth and spit it out  Repeat this step with mouthwash  Dry around your mouth  Apply water-based lip balm or moisturizer to your lips to prevent cracking and dryness  How do I floss my teeth? Thread the floss between each tooth, but do not push down on the gums too hard  This can cause gum damage  Make sure to floss on each side of every tooth  You may need to use waxed floss for easier movement between your teeth  What if I wear dentures? Remove the dentures from your mouth before you clean them  Moist dentures come out more easily  Drink a sip of water before you remove your dentures  Gently rock the dentures from side to side to loosen them  Then pull them out  Brush the dentures with clean water and denture  or toothpaste  Brush the dentures on all surfaces with cool water  Do not use hot water  Hot water could damage the dentures  Be careful not to bend any clasps on the dentures as you brush them  Rinse them  Place a thin layer of denture adhesive on the dentures and put them back in your mouth   Ask your healthcare provider which adhesive to use  Soak the dentures in a denture solution each night after you brush them  Rinse them in cold water before you place them back in your mouth  When should I contact my healthcare provider? You develop mouth sores  Your dentures do not fit well  You have pain with brushing  You have questions or concerns about your condition or care  CARE AGREEMENT:   You have the right to help plan your care  Learn about your health condition and how it may be treated  Discuss treatment options with your healthcare providers to decide what care you want to receive  You always have the right to refuse treatment  The above information is an  only  It is not intended as medical advice for individual conditions or treatments  Talk to your doctor, nurse or pharmacist before following any medical regimen to see if it is safe and effective for you  © Copyright Natalie Peon 2022 Information is for End User's use only and may not be sold, redistributed or otherwise used for commercial purposes

## 2023-06-22 ENCOUNTER — TELEPHONE (OUTPATIENT)
Dept: SURGERY | Facility: CLINIC | Age: 53
End: 2023-06-22

## 2023-06-22 NOTE — TELEPHONE ENCOUNTER
Data: Pt was contacted by [facility] at 1:35 p m  to extend requested support per pt's assigned CM  A voice message was provided to pt offering contact number for his convenience since pt wasn't available at the time of contact

## 2023-06-27 ENCOUNTER — OFFICE VISIT (OUTPATIENT)
Dept: SURGERY | Facility: CLINIC | Age: 53
End: 2023-06-27
Payer: COMMERCIAL

## 2023-06-27 VITALS
OXYGEN SATURATION: 97 % | DIASTOLIC BLOOD PRESSURE: 79 MMHG | SYSTOLIC BLOOD PRESSURE: 113 MMHG | TEMPERATURE: 98.9 F | HEIGHT: 72 IN | HEART RATE: 78 BPM | BODY MASS INDEX: 27.28 KG/M2 | WEIGHT: 201.4 LBS

## 2023-06-27 DIAGNOSIS — B20 HIV DISEASE (HCC): Primary | ICD-10-CM

## 2023-06-27 DIAGNOSIS — F50.81 BINGE EATING DISORDER: ICD-10-CM

## 2023-06-27 DIAGNOSIS — Z11.3 ENCOUNTER FOR SCREENING FOR BACTERIAL SEXUALLY TRANSMITTED DISEASE: ICD-10-CM

## 2023-06-27 DIAGNOSIS — Z20.2 CONTACT WITH AND (SUSPECTED) EXPOSURE TO INFECTIONS WITH A PREDOMINANTLY SEXUAL MODE OF TRANSMISSION: ICD-10-CM

## 2023-06-27 DIAGNOSIS — A53.9 SYPHILIS: ICD-10-CM

## 2023-06-27 DIAGNOSIS — D72.89 OTHER SPECIFIED DISORDERS OF WHITE BLOOD CELLS: ICD-10-CM

## 2023-06-27 DIAGNOSIS — Z72.89 OTHER PROBLEMS RELATED TO LIFESTYLE: ICD-10-CM

## 2023-06-27 PROCEDURE — 99214 OFFICE O/P EST MOD 30 MIN: CPT | Performed by: INTERNAL MEDICINE

## 2023-06-27 NOTE — PROGRESS NOTES
Progress Note - Infectious Disease   Daron Getting 50 y o  male MRN: [id number]  Unit/Bed#:  Encounter: [id number]      Impression/Plan:  1   HIV-doing well on Biktarvy with an undetectable viral load and a CD4 count in the 500s   He commonly has low level detection despite good adherence but this time he seems to have become undetectable since stopping his supplements  I asked the patient to continue to hold off on any additional supplements   Continue ART, recheck labs in 5 months and follow up in 6 months  Stressed adherence       2   Syphilis-status post treatment in the past   Continues to be serofast at 1: 2  We will continue to monitor  3   Binge eating disorder  Meeting with our behaviorist to assist with this issue      Patient was provided medication, adherence and prevention education    Subjective:  Routine follow-up for HIV  Patient claims 100% adherence with Biktarvy  Patient denies any notable side effects  Overall the feeling well  The patient denies any fever chills or sweats, denies any nausea vomiting or diarrhea, denies any cough or shortness of breath  ROS:  A complete review of systems is negative other than that noted above in the subjective    Followup portions patient history reviewed and updated as: Allergies, current medications, past medical history, past social history, past surgical history, and the problem list    Objective:  Vitals:  Vitals:    06/27/23 1613   BP: 113/79   Pulse: 78   Temp: 98 9 °F (37 2 °C)   SpO2: 97%   Weight: 91 4 kg (201 lb 6 4 oz)   Height: 6' (1 829 m)       Physical Exam:   General Appearance:  Alert, interactive, appearing well,  nontoxic, no acute distress  Neck:   Supple without lymphadenopathy, no thyromegaly or masses   Throat: Oropharynx moist without lesions      Lungs:   Clear to auscultation bilaterally; no wheezes, rhonchi or rales; respirations unlabored   Heart:  RRR; no murmur, rub or gallop   Abdomen:   Soft, non-tender, non-distended, positive bowel sounds  Extremities: No clubbing, cyanosis or edema   Skin: No new rashes or lesions  No draining wounds noted  Labs, Imaging, & Other studies:   All pertinent labs and imaging studies were personally reviewed    Lab Results   Component Value Date    K 4 1 06/16/2023     06/16/2023    CO2 26 06/16/2023    BUN 13 06/16/2023    CREATININE 1 20 06/16/2023    GLUF 83 06/16/2023    CALCIUM 9 4 06/16/2023    CORRECTEDCA 9 7 11/28/2022    AST 23 06/16/2023    ALT 37 06/16/2023    ALKPHOS 63 06/16/2023    EGFR 70 06/16/2023     Lab Results   Component Value Date    WBC 3 92 (L) 06/16/2023    WBC 3 8 06/16/2023    HGB 14 4 06/16/2023    HGB 14 5 06/16/2023    HCT 44 0 06/16/2023    HCT 44 1 06/16/2023    MCV 95 06/16/2023    MCV 94 06/16/2023     06/16/2023     06/16/2023     Lab Results   Component Value Date    HEPCAB Non-reactive 03/03/2023     Lab Results   Component Value Date    HEPCAB Non-reactive 03/03/2023     Lab Results   Component Value Date    RPR Reactive (A) 06/16/2023    RPR Reactive 2 dils (A) 06/16/2023     CD4 ABS   Date/Time Value Ref Range Status   06/16/2023 07:27  359 - 1519 /uL Final     HIV-1 RNA by PCR, Qn   Date/Time Value Ref Range Status   06/16/2023 07:27 AM <20 copies/mL Final     Comment:     HIV-1 RNA detected  The reportable range for this assay is 20 to 10,000,000  copies HIV-1 RNA/mL             Current Outpatient Medications:   •  Biktarvy -25 MG tablet, TAKE 1 TABLET BY MOUTH DAILY, Disp: 30 tablet, Rfl: 5  •  Contrave 8-90 MG TB12, TAKE TWO TABLETS BY MOUTH TWICE A DAY, Disp: 120 tablet, Rfl: 2  •  Testosterone 1 62 % GEL, Place 1 Bottle on the skin in the morning, Disp: 75 g, Rfl: 1

## 2023-06-27 NOTE — PROGRESS NOTES
Assessment/Plan:   [facility] met with pt during ID clinic  Pt stated he is struggling with racing thoughts and motivation  Pt requested to meet with this [facility] for individual counseling  Pt is scheduled for session next week  Pt is a former smoker quit 5 years ago  Pt has a history of alcohol and drug abuse, quit 5 years ago  No relapse or current usage  Pt stated he has trouble concentrating and is often tired  Pt contributes his low energy level to his employment, [employer]     Today patient present with No chief complaint on file  Patient would likely benefit from counseling session to address trouble concentrating and past drug use  Consider/focus/continue  Monitoring pt's emotional, cognitive and behavioral stability  Pt will meet with [facility]  Stage of change: Contemplation  Plan/ Behavioral Recommendations:  ongoig  interventions as per pts request        Diagnoses and all orders for this visit:    HIV disease ([facility] [address] )  -     CBC and differential  -     T-helper cells CD4/CD8 %  -     Comprehensive metabolic panel  -     HIV-1 RNA, Quantitative PCR, [facility]  -     Hepatitis C antibody  -     Chlamydia/GC amplified DNA by PCR    Contact with and (suspected) exposure to infections with a predominantly sexual mode of transmission  -     Chlamydia/GC amplified DNA by PCR    Encounter for screening for bacterial sexually transmitted disease  -     Chlamydia/GC amplified DNA by PCR    Other problems related to lifestyle  -     Chlamydia/GC amplified DNA by PCR    Other specified disorders of white blood cells  -     Chlamydia/GC amplified DNA by PCR    Syphilis    Binge eating disorder          Subjective:     Patient ID: Vincenzo Cortez is a 50 y o  male  HPI    History of Present Illness:      Patient is being seen for annual behavioral health assessment  Patient reports new behavioral health concerns      [unfilled]       Review of Systems      Objective:     Physical Exam      [facility] [employer] [facility]    Orientation     Person: yes    Place: yes    Time: yes    Appearance    Well Developed: yes healthy    Uncomfortable: no    Normal Body Odor: yes    Smells of Feces: no    Smells of Urine: no    Disheveled: no    Well Nourished: yes weight WNL of ideal    Grooming Unkempt: no    Poor Eye Contact: no    Hirsute: no    Looks Tired: no    Acutely Exhausted: noappearance reflects stated age    Mood and Affect:     Appropriate: yes    Euthymicyes    Irritable: no    Angry: no    Anxious: yes    Depressed:no    Blunted:no    Labile: no    Restricted: no    Harm to Self or Others: pt denies SI/HI    Substance Abuse: past history of alcohol and drug abuse  In recovery for 5 years

## 2023-07-03 ENCOUNTER — CLINICAL SUPPORT (OUTPATIENT)
Dept: SURGERY | Facility: CLINIC | Age: 53
End: 2023-07-03

## 2023-07-03 DIAGNOSIS — F41.9 ANXIETY: Primary | ICD-10-CM
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
## 2023-07-10 ENCOUNTER — CLINICAL SUPPORT (OUTPATIENT)
Dept: SURGERY | Facility: CLINIC | Age: 53
End: 2023-07-10

## 2023-07-10 DIAGNOSIS — F41.9 ANXIETY: Primary | ICD-10-CM

## 2023-07-11 ENCOUNTER — PATIENT OUTREACH (OUTPATIENT)
Dept: SURGERY | Facility: CLINIC | Age: 53
End: 2023-07-11

## 2023-07-11 NOTE — PROGRESS NOTES
SUBJECTIVE: anxiety, focus depression      OBJECTIVE: pt arrived to session at appointed time to address concerns with inability to focus and complete tasks. Pt also stated he is struggling with starting tasks he is aware must be completed for his employment. Pt and [address] [place] [address] addressed the issue with focus with utilizing interventions to assist pt in finding a starting point. Pt agreed to attempt at making a list and placing it in a prominent place in the home. Pt was advised to break tasks into small sections to be able to see progress quickly. Pt stated he feels his appearance is very important in his career but failed to expand on his expertise in his field and what he can bring to the individuals he works for. Pt was then able to identify certain aspects of his expertise that others may not have. Pt then spoke of struggling with confrontation and stated he avoids confrontation. Pt and [address] [place] [address] Marco A will begin next session with healthy confrontation skills. ASSESSMENT: Pt was fully engaged in session, receptive and open to speaking with this [address] [place] [address]. Pt was able express his thoughts and ideas appropriately. No SI/HI were disclosed nor noted during this session. PLAN:  Pt will return next week to continue working on healthy confrontation and becoming task orientated.

## 2023-07-11 NOTE — PROGRESS NOTES
Ct has come into office to complete Medicaid renewal with cm. Cm assisted ct with completing Medicaid renewal form.

## 2023-07-17 ENCOUNTER — CLINICAL SUPPORT (OUTPATIENT)
Dept: SURGERY | Facility: CLINIC | Age: 53
End: 2023-07-17

## 2023-07-17 DIAGNOSIS — F41.9 ANXIETY: Primary | ICD-10-CM

## 2023-07-17 NOTE — PROGRESS NOTES
SUBJECTIVE: depression, anxiety PTSD    OBJECTIVE: pt arrived to session at agreed time to address depression and anxiety. Pt stated he has been able to complete tasks easier this past week by breaking them down into small portions. Pt stated he still at times feels stuck but mainly in his career. Pt is anxious to move forward in his career and seeing younger people advance makes him feel he is missing out. Pt was able to put into perspective the time he has spent in his career is considered short and he was able to verbalize what he needs to do to progress. Pt stated he did have a "bad" day feeling depressed and anxious regarding the future but he was able to experience the feelings and move on to a better place mentally. ASSESSMENT: pt was receptive and able to verbalize his thoughts and feelings appropriately during this session. Pt is making strides towards improving his ability to accept himself and see the progress he has made. No SI/HI was disclosed nor noted during session. PLAN: Pt will continue to utilize somatic interventions and will meet next week with this [facility].

## 2023-07-26 ENCOUNTER — PATIENT OUTREACH (OUTPATIENT)
Dept: SURGERY | Facility: CLINIC | Age: 53
End: 2023-07-26

## 2023-07-26 NOTE — PROGRESS NOTES
Ct came into the office to meet with cm. Ct reported to receive a letter from [facility] indicating that he is in fact eligible for Medicaid. Ct previously received a letter reporting that he would not be eligible for medicaid due to him making too much money. Ct and cm previously discussed this with [facility] in which the [facility]  has recommended ct applying for MAWD. Ct and cm agreed to complete the MAWD application together once ct has received the MAWD application in the mail. Ct reported to have opened the letter to find the letter indicating that he was eligible for Medicaid instead. Cm and ct were both mind boggled and have agreed to get in contact with the [facility] for confirmation. Ct and cm were able to to get in contact with [facility]. Teddy Ramirez confirmed ct is in fact eligible for MA. Ct expressed gratitude. Teddy Ramirez has also reported ct needed to have Health-Sustaining Medication Assessment Form filled out by ct's doctor or PCP and have it sent back to [facility]. Ct acknowledged. Cm informed cm would take the form to PCP to have PCP fill out the form. Cm also informed it would be faxed once PCP fills the form out. Ct acknowledged.

## 2023-07-27 ENCOUNTER — PATIENT OUTREACH (OUTPATIENT)
Dept: SURGERY | Facility: CLINIC | Age: 53
End: 2023-07-27

## 2023-07-27 NOTE — PROGRESS NOTES
Robbin has discussed ct's Health-Sustaining Medication Assessment Form with MYCHAL Bernal. NP Nighat Thornton has been receptive to fill out ct's form. Cm expressed gratitude. Robbin has faxed form to [facility]. Robbin has also mailed August calendar.

## 2023-07-31 ENCOUNTER — TELEPHONE (OUTPATIENT)
Dept: SURGERY | Facility: CLINIC | Age: 53
End: 2023-07-31

## 2023-07-31 ENCOUNTER — CLINICAL SUPPORT (OUTPATIENT)
Dept: SURGERY | Facility: CLINIC | Age: 53
End: 2023-07-31

## 2023-07-31 DIAGNOSIS — R68.82 LOW LIBIDO: ICD-10-CM

## 2023-07-31 DIAGNOSIS — R79.89 LOW TESTOSTERONE IN MALE: ICD-10-CM

## 2023-07-31 DIAGNOSIS — F41.9 ANXIETY: Primary | ICD-10-CM

## 2023-07-31 NOTE — TELEPHONE ENCOUNTER
Pt left a message asking for a refill of his testosterone gel to be sent to [facility] on [address] .

## 2023-07-31 NOTE — PROGRESS NOTES
SUBJECTIVE: depression and anxiety    OBJECTIVE: Pt arrived for session at agreed upon time to address ongoing depression and anxiety. ASSESSMENT: Pt was fully engaged during session and was open and receptive. Pt stated he is conflicted stating he loves his life but wishes he could move to a big city. 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 [facility] and [facility] explored reasons for the lack of motivation and pt was able to see his self sabotage tendency. Pt was encouraged to continue working on making a schedule to get work done at home. 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 Pt also stated his partner would not be able to move due to his business. No SI/HI was disclosed nor noted during session. PLAN: explore thoughts on self sabotage, utilize somatic interventions for anxiety and depression.

## 2023-08-01 ENCOUNTER — TELEPHONE (OUTPATIENT)
Dept: SURGERY | Facility: CLINIC | Age: 53
End: 2023-08-01

## 2023-08-01 DIAGNOSIS — R68.82 LOW LIBIDO: ICD-10-CM

## 2023-08-01 DIAGNOSIS — R79.89 LOW TESTOSTERONE IN MALE: ICD-10-CM

## 2023-08-01 RX ORDER — TESTOSTERONE 20.25 MG/1.25G
2 GEL TOPICAL DAILY
Qty: 75 G | Refills: 1 | Status: SHIPPED | OUTPATIENT
Start: 2023-08-01

## 2023-08-01 RX ORDER — TESTOSTERONE 20.25 MG/1.25G
1 GEL TOPICAL DAILY
Qty: 75 G | Refills: 1 | Status: SHIPPED | OUTPATIENT
Start: 2023-08-01 | End: 2023-08-01 | Stop reason: HOSPADM

## 2023-08-07 ENCOUNTER — CLINICAL SUPPORT (OUTPATIENT)
Dept: SURGERY | Facility: CLINIC | Age: 53
End: 2023-08-07

## 2023-08-07 DIAGNOSIS — F41.9 ANXIETY: Primary | ICD-10-CM

## 2023-08-07 NOTE — PROGRESS NOTES
SUBJECTIVE: depression, anxiety, negative body image    OBJECTIVE: Pt arrived to session at agreed time to address depression, anxiety and negative body image. Pt was encouraged to continue with somatic interventions and to add reframing. ASSESSMENT: Pt was fully engaged throughout session, adding his thoughts and ideas to the conversation. Pt is very open and receptive to therapy. Pt and [address] [place] [address] discussed adding reframing to his interventions and pt stated he feels this will help with the negative thoughts he has regarding his body and his ability to do his job. Pt was able to recognize the barriers he sets for himself and the negative self talk. No SI/HI was noted nor disclosed during this session. PLAN: continue to explore the way reframing can be used in pt's life.

## 2023-08-14 ENCOUNTER — CLINICAL SUPPORT (OUTPATIENT)
Dept: SURGERY | Facility: CLINIC | Age: 53
End: 2023-08-14

## 2023-08-14 DIAGNOSIS — F41.9 ANXIETY: Primary | ICD-10-CM

## 2023-08-14 NOTE — PROGRESS NOTES
SUBJECTIVE: depression, anxiety and poor body image    OBJECTIVE: Pt arrived for session at agreed time to address progress on depression, anxiety and poor body image. Pt was encouraged to continue with reframing situations. ASSESSMENT: Pt was fully engaged through out session, able to add his thoughts and concerns. Pt has made great progress in managing his emotional response to triggers. Pt stated he has been utilizing the reframing method and has found it to be a very useful tool. Pt stated he is very happy with the progress he has made and looking forward to making strides in his career and personal life. No SI/HI noted nor disclosed through out session. PLAN:  [employer] [facility] [place] will contact pt for upcoming sessions. Pt will continue to utilize coping interventions.

## 2023-08-30 ENCOUNTER — OFFICE VISIT (OUTPATIENT)
Dept: SURGERY | Facility: CLINIC | Age: 53
End: 2023-08-30
Payer: COMMERCIAL

## 2023-08-30 VITALS
DIASTOLIC BLOOD PRESSURE: 66 MMHG | BODY MASS INDEX: 27.25 KG/M2 | SYSTOLIC BLOOD PRESSURE: 128 MMHG | TEMPERATURE: 98.1 F | OXYGEN SATURATION: 99 % | HEIGHT: 72 IN | HEART RATE: 72 BPM | WEIGHT: 201.2 LBS

## 2023-08-30 DIAGNOSIS — B20 HIV DISEASE (HCC): Primary | ICD-10-CM

## 2023-08-30 DIAGNOSIS — B20 HIV INFECTION, UNSPECIFIED SYMPTOM STATUS (HCC): ICD-10-CM

## 2023-08-30 DIAGNOSIS — B20 HIV (HUMAN IMMUNODEFICIENCY VIRUS INFECTION) (HCC): ICD-10-CM

## 2023-08-30 DIAGNOSIS — F50.81 BINGE EATING DISORDER: ICD-10-CM

## 2023-08-30 PROCEDURE — 99214 OFFICE O/P EST MOD 30 MIN: CPT | Performed by: NURSE PRACTITIONER

## 2023-08-30 RX ORDER — NALTREXONE HYDROCHLORIDE AND BUPROPION HYDROCHLORIDE 8; 90 MG/1; MG/1
2 TABLET, EXTENDED RELEASE ORAL 2 TIMES DAILY
Qty: 120 TABLET | Refills: 2 | Status: SHIPPED | OUTPATIENT
Start: 2023-08-30

## 2023-08-30 RX ORDER — BICTEGRAVIR SODIUM, EMTRICITABINE, AND TENOFOVIR ALAFENAMIDE FUMARATE 50; 200; 25 MG/1; MG/1; MG/1
1 TABLET ORAL DAILY
Qty: 30 TABLET | Refills: 5 | Status: SHIPPED | OUTPATIENT
Start: 2023-08-30

## 2023-08-30 NOTE — ASSESSMENT & PLAN NOTE
No results found for: "[id number]"  CD4 ABS   Date/Time Value Ref Range Status   2023 07:27  359 - 1519 /uL Final     HIV-1 RNA by PCR, Qn   Date/Time Value Ref Range Status   2023 07:27 AM <20 copies/mL Final     Comment:     HIV-1 RNA detected  The reportable range for this assay is 20 to 10,000,000  copies HIV-1 RNA/mL. HIV-1 RNA Viral Load Log   Date/Time Value Ref Range Status   2023 07:27 AM COMMENT [id number]/mL Final     Comment:     Unable to calculate result since non-numeric result obtained for  component test.         ART: Marge Solano  Educated to keep supplements and ART at least 12 hours apart. Denies side effects. Stressed the importance of adherence. Continue follow up with ID clinic. Reviewed most recent labs, including Cd4 and viral load. Discussed the risks and benefits of treatment options, instructions for management, importance of treatment adherence, and reduction of risk factor. Educated on possible  medication side effects. Counseled on routes of HIV transmission, including the risk of  infection. Emphasized that viral suppression is the best method to prevent HIV transmission. At this time pt.denies the need for HIV testing of anyone in their life. Total encounter time was 45 minutes. Greater then 20 minutes were spent on counseling and patient education. Pt voices understanding and agreement with treatment plan.

## 2023-08-30 NOTE — PROGRESS NOTES
Assessment/Plan:    HIV disease ([address] [place] [address])  No results found for: "[id number]"  CD4 ABS   Date/Time Value Ref Range Status   2023 07:27  359 - 1519 /uL Final     HIV-1 RNA by PCR, Qn   Date/Time Value Ref Range Status   2023 07:27 AM <20 copies/mL Final     Comment:     HIV-1 RNA detected  The reportable range for this assay is 20 to 10,000,000  copies HIV-1 RNA/mL. HIV-1 RNA Viral Load Log   Date/Time Value Ref Range Status   2023 07:27 AM COMMENT [id number]/mL Final     Comment:     Unable to calculate result since non-numeric result obtained for  component test.         ART: Norm Maria Del Carmen  Educated to keep supplements and ART at least 12 hours apart. Denies side effects. Stressed the importance of adherence. Continue follow up with ID clinic. Reviewed most recent labs, including Cd4 and viral load. Discussed the risks and benefits of treatment options, instructions for management, importance of treatment adherence, and reduction of risk factor. Educated on possible  medication side effects. Counseled on routes of HIV transmission, including the risk of  infection. Emphasized that viral suppression is the best method to prevent HIV transmission. At this time pt.denies the need for HIV testing of anyone in their life. Total encounter time was 45 minutes. Greater then 20 minutes were spent on counseling and patient education. Pt voices understanding and agreement with treatment plan. Diagnoses and all orders for this visit:    HIV disease ([address] [place] [address])    HIV (human immunodeficiency virus infection) ([address] [place] [address])  -     bictegravir-emtricitab-tenofovir alafenamide (Biktarvy) -25 MG tablet; Take 1 tablet by mouth daily    Binge eating disorder  -     Naltrexone-buPROPion HCl ER (Contrave) 8-90 MG TB12; Take 2 tablets by mouth 2 (two) times a day    HIV infection, unspecified symptom status (HCC)  -     Ferritin;  Future  -     Vitamin B12; Future  -     Vitamin D 25 hydroxy; Future          Subjective:      Patient ID: Sarah Pierson is a 50 y.o. male. Dane Hartman presents to the clinic today for primary care follow-up of chronic conditions. .  Past medical history significant for HIV, TB status post treatment, and umbilical hernia. Dane Hartman is doing well and denies acute complaint. The following portions of the patient's history were reviewed and updated as appropriate: allergies, current medications, past family history, past medical history, past social history, past surgical history and problem list.    Review of Systems   Constitutional: Negative for chills and fever. HENT: Negative for ear pain and sore throat. Eyes: Negative for pain and visual disturbance. Respiratory: Negative for cough and shortness of breath. Cardiovascular: Negative for chest pain and palpitations. Gastrointestinal: Negative for abdominal pain and vomiting. Genitourinary: Negative for dysuria and hematuria. Musculoskeletal: Negative for arthralgias and back pain. Skin: Negative for color change and rash. Neurological: Negative for seizures and syncope. All other systems reviewed and are negative.         Objective:      /66   Pulse 72   Temp 98.1 °F (36.7 °C)   Ht 6' (1.829 m)   Wt 91.3 kg (201 lb 3.2 oz)   SpO2 99%   BMI 27.29 kg/m²       Lab Results   Component Value Date    K 4.1 06/16/2023     06/16/2023    CO2 26 06/16/2023    BUN 13 06/16/2023    CREATININE 1.20 06/16/2023    GLUF 83 06/16/2023    CALCIUM 9.4 06/16/2023    CORRECTEDCA 9.7 11/28/2022    AST 23 06/16/2023    ALT 37 06/16/2023    ALKPHOS 63 06/16/2023    EGFR 70 06/16/2023     Lab Results   Component Value Date    WBC 3.92 (L) 06/16/2023    WBC 3.8 06/16/2023    HGB 14.4 06/16/2023    HGB 14.5 06/16/2023    HCT 44.0 06/16/2023    HCT 44.1 06/16/2023    MCV 95 06/16/2023    MCV 94 06/16/2023     06/16/2023     06/16/2023     Lab Results   Component Value Date    HEPCAB Non-reactive 03/03/2023     Lab Results   Component Value Date    HEPCAB Non-reactive 03/03/2023     Lab Results   Component Value Date    RPR Reactive (A) 06/16/2023    RPR Reactive 2 dils (A) 06/16/2023            Physical Exam  Constitutional:       General: He is not in acute distress. Appearance: He is well-developed. HENT:      Head: Normocephalic. Right Ear: External ear normal.      Left Ear: External ear normal.      Nose: Nose normal.      Mouth/Throat:      Pharynx: No oropharyngeal exudate. Eyes:      General:         Right eye: No discharge. Left eye: No discharge. Conjunctiva/sclera: Conjunctivae normal.      Pupils: Pupils are equal, round, and reactive to light. Neck:      Thyroid: No thyromegaly. Cardiovascular:      Rate and Rhythm: Normal rate and regular rhythm. Heart sounds: Normal heart sounds. No murmur heard. Pulmonary:      Effort: Pulmonary effort is normal.      Breath sounds: Normal breath sounds. No wheezing. Abdominal:      General: Bowel sounds are normal.      Palpations: Abdomen is soft. There is no mass. Tenderness: There is no abdominal tenderness. Musculoskeletal:         General: No tenderness. Normal range of motion. Cervical back: Normal range of motion. Lymphadenopathy:      Cervical: No cervical adenopathy. Skin:     General: Skin is warm and dry. Findings: No rash. Neurological:      Mental Status: He is alert and oriented to person, place, and time.    Psychiatric:         Behavior: Behavior normal.

## 2023-09-12 ENCOUNTER — DOCUMENTATION (OUTPATIENT)
Dept: SURGERY | Facility: CLINIC | Age: 53
End: 2023-09-12

## 2023-09-12 NOTE — PROGRESS NOTES
[facility] attempted to contact pt via telephone , no answer.   [address] [place] [address] will attempt another outreach

## 2023-09-14 ENCOUNTER — DOCUMENTATION (OUTPATIENT)
Dept: SURGERY | Facility: CLINIC | Age: 53
End: 2023-09-14

## 2023-09-14 NOTE — PROGRESS NOTES
[employer] [facility] contacted pt and scheduled session.

## 2023-09-18 ENCOUNTER — CLINICAL SUPPORT (OUTPATIENT)
Dept: SURGERY | Facility: CLINIC | Age: 53
End: 2023-09-18

## 2023-09-18 DIAGNOSIS — F41.9 ANXIETY: Primary | ICD-10-CM

## 2023-09-25 ENCOUNTER — CLINICAL SUPPORT (OUTPATIENT)
Dept: SURGERY | Facility: CLINIC | Age: 53
End: 2023-09-25

## 2023-09-25 DIAGNOSIS — F41.9 ANXIETY: Primary | ICD-10-CM

## 2023-09-25 NOTE — PROGRESS NOTES
SUBJECTIVE: pt's anxiety and depression    OBJECTIVE: Pt arrived to session at scheduled time to address anxiety and depression. 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 Pt stated he is very pleased with the way his life is at this time with work and his relationship. Pt is grateful for his partners support and encouragement. Pt stated he continues to utilize interventions learned in sessions especially "reframing". ASSESSMENT: Pt was fully engaged in session, receptive and pleasant. Pt has improved his overall outlook of his life. No SI/HI noted nor disclosed during session.      PLAN: pt is scheduled for sessions to continue working on interventions for anxiety and depression,

## 2023-10-09 ENCOUNTER — CLINICAL SUPPORT (OUTPATIENT)
Dept: SURGERY | Facility: CLINIC | Age: 53
End: 2023-10-09

## 2023-10-09 DIAGNOSIS — F41.9 ANXIETY: Primary | ICD-10-CM

## 2023-10-09 NOTE — PROGRESS NOTES
SUBJECTIVE: depression, anxiety and self image    OBJECTIVE: Pt arrived at session at coordinated time to address depression, anxiety and self image. Pt stated he has been very busy with his [employer] business and has been utilizing interventions to manage his anxiety and depression. Pt stated he has been focusing on the here and now while working towards his goals. Pt shared that he is struggling with the decline in his dogs health and wonders who he will be without the dog in his life. Pt spoke of how his dog has been a big part of his road to recovery. Pt shared his relationship is doing well and they are communicating better. Pt shared that he is using positive affirmations in regards to his physical appearance. Pt stated he does not allow himself to speak negatively about himself. ASSESSMENT: Pt was fully engaged throughout this session, he was able and willing to share his thoughts and opinions. No SI/HI was noted nor disclosed throughout session. PLAN: continue to utilize interventions for depression and anxiety.

## 2023-10-25 ENCOUNTER — TELEPHONE (OUTPATIENT)
Dept: SURGERY | Facility: CLINIC | Age: 53
End: 2023-10-25

## 2023-10-25 NOTE — TELEPHONE ENCOUNTER
Pt called and requested a refill of testosterone gel be sent to the [facility] on [facility].

## 2023-10-26 DIAGNOSIS — R68.82 LOW LIBIDO: ICD-10-CM

## 2023-10-26 DIAGNOSIS — R79.89 LOW TESTOSTERONE IN MALE: ICD-10-CM

## 2023-10-27 RX ORDER — TESTOSTERONE 20.25 MG/1.25G
2 GEL TOPICAL DAILY
Qty: 75 G | Refills: 1 | Status: SHIPPED | OUTPATIENT
Start: 2023-10-27

## 2023-11-07 ENCOUNTER — OFFICE VISIT (OUTPATIENT)
Dept: DENTISTRY | Facility: CLINIC | Age: 53
End: 2023-11-07

## 2023-11-07 DIAGNOSIS — K05.6 PERIODONTAL DISEASE: Primary | ICD-10-CM

## 2023-11-07 PROCEDURE — D4910 PERIODONTAL MAINTENANCE: HCPCS

## 2023-11-07 NOTE — PROGRESS NOTES
3 MTH PERIO MAINTENANCE     REVIEWED MED HX: meds, allergies, health changes reviewed in EPIC  CHIEF CONCERN:  none   PAIN SCALE:  0  ASA CLASS:  I  PLAQUE:  mild     CALCULUS:  light     BLEEDING:    moderate  STAIN :   none    ORAL HYGIENE:     fair    PERIO: full mouth probe completed. Deeper readings present UR + UL    Hand scaled, polished and flossed. Used Cavitron. Disp: toothbrush, toothpaste, floss    Visual and Tactile Intraoral/ Extraoral evaluation: Oral and Oropharyngeal cancer evaluation.  No findings     REFERRALS: no referrals provided    Next Recall: 3 month perio maintenance/periodic exam

## 2023-11-13 ENCOUNTER — CLINICAL SUPPORT (OUTPATIENT)
Dept: SURGERY | Facility: CLINIC | Age: 53
End: 2023-11-13
Payer: COMMERCIAL

## 2023-11-13 ENCOUNTER — OFFICE VISIT (OUTPATIENT)
Dept: SURGERY | Facility: CLINIC | Age: 53
End: 2023-11-13
Payer: COMMERCIAL

## 2023-11-13 DIAGNOSIS — Z23 NEED FOR INFLUENZA VACCINATION: Primary | ICD-10-CM

## 2023-11-13 DIAGNOSIS — F41.9 ANXIETY: Primary | ICD-10-CM

## 2023-11-13 PROCEDURE — 90686 IIV4 VACC NO PRSV 0.5 ML IM: CPT

## 2023-11-13 PROCEDURE — 90471 IMMUNIZATION ADMIN: CPT

## 2023-11-13 NOTE — PROGRESS NOTES
SUBJECTIVE: Anxiety    OBJECTIVE: pt arrived to session at coordinated time to address anxiety. Pt stated his dog passed away recently and he is managing his emotional response well. Pt stated he feels more in control of his anxiety and believes he is moving forward in his career and relationship since managing his anxiety. ASSESSMENT: Pt was very pleasant and agreeable to speaking to this [facility]. Pt appeared calm and relaxed with occasional periods of tearfulness. No SI/HI noted nor expressed during this session. PLAN: continue to monitor pts emotional stability and ability to manage stress.

## 2023-11-13 NOTE — PROGRESS NOTES
Patient presented to clinic for flu vaccine. Administered into left deltoid per standard protocol. No adverse reactions observed.

## 2023-11-21 ENCOUNTER — OFFICE VISIT (OUTPATIENT)
Dept: SURGERY | Facility: CLINIC | Age: 53
End: 2023-11-21
Payer: COMMERCIAL

## 2023-11-21 VITALS
SYSTOLIC BLOOD PRESSURE: 137 MMHG | OXYGEN SATURATION: 96 % | HEIGHT: 72 IN | HEART RATE: 83 BPM | BODY MASS INDEX: 27.87 KG/M2 | TEMPERATURE: 99 F | WEIGHT: 205.8 LBS | DIASTOLIC BLOOD PRESSURE: 86 MMHG

## 2023-11-21 DIAGNOSIS — S61.211A LACERATION OF LEFT INDEX FINGER WITHOUT FOREIGN BODY WITHOUT DAMAGE TO NAIL, INITIAL ENCOUNTER: Primary | ICD-10-CM

## 2023-11-21 DIAGNOSIS — B20 HIV DISEASE (HCC): ICD-10-CM

## 2023-11-21 PROCEDURE — 99213 OFFICE O/P EST LOW 20 MIN: CPT | Performed by: NURSE PRACTITIONER

## 2023-11-21 NOTE — ASSESSMENT & PLAN NOTE
No results found for: "TH0VKAV"  CD4 ABS   Date/Time Value Ref Range Status   2023 07:27  359 - 1519 /uL Final     HIV-1 RNA by PCR, Qn   Date/Time Value Ref Range Status   2023 07:27 AM <20 copies/mL Final     Comment:     HIV-1 RNA detected  The reportable range for this assay is 20 to 10,000,000  copies HIV-1 RNA/mL. HIV-1 RNA Viral Load Log   Date/Time Value Ref Range Status   2023 07:27 AM COMMENT [id number]/mL Final     Comment:     Unable to calculate result since non-numeric result obtained for  component test.         ART: Jose Juanlo Fabricio        Denies side effects. Stressed the importance of adherence. Continue follow up with ID clinic. Reviewed most recent labs, including Cd4 and viral load. Discussed the risks and benefits of treatment options, instructions for management, importance of treatment adherence, and reduction of risk factor. Educated on possible  medication side effects. Counseled on routes of HIV transmission, including the risk of  infection. Emphasized that viral suppression is the best method to prevent HIV transmission. At this time pt.denies the need for HIV testing of anyone in their life. Total encounter time was 45 minutes. Greater then 20 minutes were spent on counseling and patient education. Pt voices understanding and agreement with treatment plan.

## 2023-11-21 NOTE — PROGRESS NOTES
Assessment/Plan:  Finger laceration  Wound was cleaned and dried. Histacol applied. Clean bandage applied. Educated on signs of infection. Instructed to call office if finger becomes painful or red or warm. Educated to keep clean and dry. Apply clean bandage as needed for comfort. Use acetaminophen for pain. HIV disease ([address])  No results found for: "[id number]"  CD4 ABS   Date/Time Value Ref Range Status   2023 07:27  359 - 1519 /uL Final     HIV-1 RNA by PCR, Qn   Date/Time Value Ref Range Status   2023 07:27 AM <20 copies/mL Final     Comment:     HIV-1 RNA detected  The reportable range for this assay is 20 to 10,000,000  copies HIV-1 RNA/mL. HIV-1 RNA Viral Load Log   Date/Time Value Ref Range Status   2023 07:27 AM COMMENT [id number]/mL Final     Comment:     Unable to calculate result since non-numeric result obtained for  component test.         ART: Laura Antonio        Denies side effects. Stressed the importance of adherence. Continue follow up with ID clinic. Reviewed most recent labs, including Cd4 and viral load. Discussed the risks and benefits of treatment options, instructions for management, importance of treatment adherence, and reduction of risk factor. Educated on possible  medication side effects. Counseled on routes of HIV transmission, including the risk of  infection. Emphasized that viral suppression is the best method to prevent HIV transmission. At this time pt.denies the need for HIV testing of anyone in their life. Total encounter time was 45 minutes. Greater then 20 minutes were spent on counseling and patient education. Pt voices understanding and agreement with treatment plan. Diagnoses and all orders for this visit:    Laceration of left index finger without foreign body without damage to nail, initial encounter    HIV disease ([address])          Subjective:      Patient ID: Matthew Bragg is a 50 y.o. male.     Tonia Pat presents to the clinic today for acute visit due to L index finger laceration. Past medical history significant for HIV, TB status post treatment, and umbilical hernia. Laceration occurred due to patient slicing open a bagel. Laceration does not extend past the dermis. Measures approximately 1 cm. Clean laceration with no jagged edges. The following portions of the patient's history were reviewed and updated as appropriate: allergies, current medications, past family history, past medical history, past social history, past surgical history, and problem list.    Review of Systems   Constitutional:  Negative for activity change, appetite change, chills, diaphoresis, fatigue, fever and unexpected weight change. HENT:  Negative for congestion, dental problem, ear pain, hearing loss, mouth sores, rhinorrhea and sore throat. Eyes:  Negative for pain, redness and visual disturbance. Respiratory:  Negative for shortness of breath and wheezing. Cardiovascular:  Negative for chest pain and leg swelling. Gastrointestinal:  Negative for abdominal pain, constipation, diarrhea, nausea and vomiting. Endocrine: Negative for polydipsia, polyphagia and polyuria. Genitourinary:  Negative for difficulty urinating and dysuria. Musculoskeletal:  Negative for back pain, joint swelling and myalgias. Skin:  Negative for rash. Neurological:  Negative for syncope and headaches. Psychiatric/Behavioral:  Negative for behavioral problems and suicidal ideas. Objective:      /86   Pulse 83   Temp 99 °F (37.2 °C)   Ht 6' (1.829 m)   Wt 93.4 kg (205 lb 12.8 oz)   SpO2 96%   BMI 27.91 kg/m²          Physical Exam  Constitutional:       Appearance: Normal appearance. Cardiovascular:      Rate and Rhythm: Normal rate and regular rhythm. Pulmonary:      Effort: Pulmonary effort is normal. No respiratory distress. Breath sounds: Normal breath sounds. Skin:     General: Skin is warm.       Capillary Refill: Capillary refill takes less than 2 seconds. Comments: Laceration to the left ring finger. Edges are clean. Laceration is 1 cm long. Neurological:      Mental Status: He is alert and oriented to person, place, and time.

## 2023-12-06 ENCOUNTER — TELEPHONE (OUTPATIENT)
Dept: SURGERY | Facility: CLINIC | Age: 53
End: 2023-12-06

## 2023-12-06 NOTE — TELEPHONE ENCOUNTER
Pt called and left a message saying that he was unable to get his labs done today because the computers were down, he will try again tomorrow.

## 2023-12-07 ENCOUNTER — APPOINTMENT (OUTPATIENT)
Dept: LAB | Facility: CLINIC | Age: 53
End: 2023-12-07
Payer: COMMERCIAL

## 2023-12-07 DIAGNOSIS — F50.81 BINGE EATING DISORDER: ICD-10-CM

## 2023-12-07 DIAGNOSIS — B20 HIV INFECTION, UNSPECIFIED SYMPTOM STATUS (HCC): ICD-10-CM

## 2023-12-07 LAB
25(OH)D3 SERPL-MCNC: 25.9 NG/ML (ref 30–100)
ALBUMIN SERPL BCP-MCNC: 4.4 G/DL (ref 3.5–5)
ALP SERPL-CCNC: 59 U/L (ref 34–104)
ALT SERPL W P-5'-P-CCNC: 29 U/L (ref 7–52)
ANION GAP SERPL CALCULATED.3IONS-SCNC: 7 MMOL/L
AST SERPL W P-5'-P-CCNC: 25 U/L (ref 13–39)
BASOPHILS # BLD AUTO: 0.06 THOUSANDS/ÂΜL (ref 0–0.1)
BASOPHILS NFR BLD AUTO: 1 % (ref 0–1)
BILIRUB SERPL-MCNC: 0.77 MG/DL (ref 0.2–1)
BUN SERPL-MCNC: 16 MG/DL (ref 5–25)
CALCIUM SERPL-MCNC: 9.6 MG/DL (ref 8.4–10.2)
CHLORIDE SERPL-SCNC: 103 MMOL/L (ref 96–108)
CO2 SERPL-SCNC: 27 MMOL/L (ref 21–32)
CREAT SERPL-MCNC: 1.04 MG/DL (ref 0.6–1.3)
EOSINOPHIL # BLD AUTO: 0.13 THOUSAND/ÂΜL (ref 0–0.61)
EOSINOPHIL NFR BLD AUTO: 3 % (ref 0–6)
ERYTHROCYTE [DISTWIDTH] IN BLOOD BY AUTOMATED COUNT: 12.3 % (ref 11.6–15.1)
FERRITIN SERPL-MCNC: 36 NG/ML (ref 24–336)
GFR SERPL CREATININE-BSD FRML MDRD: 82 ML/MIN/1.73SQ M
GLUCOSE P FAST SERPL-MCNC: 79 MG/DL (ref 65–99)
HCT VFR BLD AUTO: 44.8 % (ref 36.5–49.3)
HCV AB SER QL: NORMAL
HGB BLD-MCNC: 15.3 G/DL (ref 12–17)
IMM GRANULOCYTES # BLD AUTO: 0.01 THOUSAND/UL (ref 0–0.2)
IMM GRANULOCYTES NFR BLD AUTO: 0 % (ref 0–2)
LYMPHOCYTES # BLD AUTO: 2.16 THOUSANDS/ÂΜL (ref 0.6–4.47)
LYMPHOCYTES NFR BLD AUTO: 42 % (ref 14–44)
MCH RBC QN AUTO: 31.5 PG (ref 26.8–34.3)
MCHC RBC AUTO-ENTMCNC: 34.2 G/DL (ref 31.4–37.4)
MCV RBC AUTO: 92 FL (ref 82–98)
MONOCYTES # BLD AUTO: 0.51 THOUSAND/ÂΜL (ref 0.17–1.22)
MONOCYTES NFR BLD AUTO: 10 % (ref 4–12)
NEUTROPHILS # BLD AUTO: 2.31 THOUSANDS/ÂΜL (ref 1.85–7.62)
NEUTS SEG NFR BLD AUTO: 44 % (ref 43–75)
NRBC BLD AUTO-RTO: 0 /100 WBCS
PLATELET # BLD AUTO: 273 THOUSANDS/UL (ref 149–390)
PMV BLD AUTO: 10 FL (ref 8.9–12.7)
POTASSIUM SERPL-SCNC: 4.1 MMOL/L (ref 3.5–5.3)
PROT SERPL-MCNC: 7.2 G/DL (ref 6.4–8.4)
RBC # BLD AUTO: 4.85 MILLION/UL (ref 3.88–5.62)
SODIUM SERPL-SCNC: 137 MMOL/L (ref 135–147)
VIT B12 SERPL-MCNC: 586 PG/ML (ref 180–914)
WBC # BLD AUTO: 5.18 THOUSAND/UL (ref 4.31–10.16)

## 2023-12-07 PROCEDURE — 82607 VITAMIN B-12: CPT

## 2023-12-07 PROCEDURE — 82306 VITAMIN D 25 HYDROXY: CPT

## 2023-12-07 PROCEDURE — 82728 ASSAY OF FERRITIN: CPT

## 2023-12-08 LAB
C TRACH DNA SPEC QL NAA+PROBE: NEGATIVE
HIV1 RNA # PLAS NAA DL=20: ABNORMAL {COPIES}/ML
N GONORRHOEA DNA SPEC QL NAA+PROBE: NEGATIVE

## 2023-12-11 ENCOUNTER — CLINICAL SUPPORT (OUTPATIENT)
Dept: SURGERY | Facility: CLINIC | Age: 53
End: 2023-12-11

## 2023-12-11 DIAGNOSIS — F41.9 ANXIETY: Primary | ICD-10-CM

## 2023-12-11 RX ORDER — NALTREXONE HYDROCHLORIDE AND BUPROPION HYDROCHLORIDE 8; 90 MG/1; MG/1
2 TABLET, EXTENDED RELEASE ORAL 2 TIMES DAILY
Qty: 120 TABLET | Refills: 2 | Status: SHIPPED | OUTPATIENT
Start: 2023-12-11

## 2023-12-11 NOTE — PROGRESS NOTES
SUBJECTIVE: anxiety and depression      OBJECTIVE: Pt arrived for session at coordinated time to address progress on managing his anxiety and depression. Pt stated he is doing much better managing his emotional responses and can see a difference in his approach to his emotions. ASSESSMENT: pt was fully engaged through out session and pleasant. Pt did state he was feeling ill stating he feels he ate differently over the weekend than he normally does. Pt appears upbeat and happy. No SI/HI noted nor disclosed. Plan:  pt will call for next appt. Ater the holidays.

## 2023-12-12 ENCOUNTER — OFFICE VISIT (OUTPATIENT)
Dept: SURGERY | Facility: CLINIC | Age: 53
End: 2023-12-12
Payer: COMMERCIAL

## 2023-12-12 VITALS
OXYGEN SATURATION: 97 % | SYSTOLIC BLOOD PRESSURE: 124 MMHG | WEIGHT: 206.2 LBS | BODY MASS INDEX: 27.93 KG/M2 | HEART RATE: 77 BPM | DIASTOLIC BLOOD PRESSURE: 86 MMHG | TEMPERATURE: 98 F | HEIGHT: 72 IN

## 2023-12-12 DIAGNOSIS — Z13.1 SCREENING FOR DIABETES MELLITUS: ICD-10-CM

## 2023-12-12 DIAGNOSIS — F50.81 BINGE EATING DISORDER: ICD-10-CM

## 2023-12-12 DIAGNOSIS — D72.89 OTHER SPECIFIED DISORDERS OF WHITE BLOOD CELLS: ICD-10-CM

## 2023-12-12 DIAGNOSIS — Z13.6 ENCOUNTER FOR LIPID SCREENING FOR CARDIOVASCULAR DISEASE: ICD-10-CM

## 2023-12-12 DIAGNOSIS — Z20.2 CONTACT WITH AND (SUSPECTED) EXPOSURE TO INFECTIONS WITH A PREDOMINANTLY SEXUAL MODE OF TRANSMISSION: ICD-10-CM

## 2023-12-12 DIAGNOSIS — Z13.220 ENCOUNTER FOR LIPID SCREENING FOR CARDIOVASCULAR DISEASE: ICD-10-CM

## 2023-12-12 DIAGNOSIS — Z79.899 OTHER LONG TERM (CURRENT) DRUG THERAPY: ICD-10-CM

## 2023-12-12 DIAGNOSIS — B20 HIV DISEASE (HCC): Primary | ICD-10-CM

## 2023-12-12 DIAGNOSIS — A53.9 SYPHILIS: ICD-10-CM

## 2023-12-12 DIAGNOSIS — Z72.89 OTHER PROBLEMS RELATED TO LIFESTYLE: ICD-10-CM

## 2023-12-12 DIAGNOSIS — Z11.3 ENCOUNTER FOR SCREENING FOR BACTERIAL SEXUALLY TRANSMITTED DISEASE: ICD-10-CM

## 2023-12-12 PROCEDURE — 99214 OFFICE O/P EST MOD 30 MIN: CPT | Performed by: INTERNAL MEDICINE

## 2023-12-12 NOTE — PROGRESS NOTES
Progress Note - Infectious Disease   Portillo Lainez 48 y.o. male MRN: [id number]  Unit/Bed#:  Encounter: [id number]      Impression/Plan:  1. HIV-doing well on Biktarvy with an undetectable viral load and a CD4 count that is pending. Continue ART, recheck labs in 5 months and follow up in 6 months. Stressed adherence. 2.  Syphilis-status post treatment in the past.  Continues to be serofast at 1: 2. We will continue to monitor. Recheck RPR next labs. 3.  Binge eating disorder. Meeting with our behaviorist to assist with this issue. Continue to encourage the patient moving forward      Patient was provided medication, adherence and prevention education    Subjective:  Routine follow-up for HIV. Patient claims 100% adherence with Biktarvy. Patient denies any notable side effects. Overall the feeling well. The patient denies any fever chills or sweats, denies any nausea vomiting or diarrhea, denies any cough or shortness of breath. ROS:  A complete review of systems is negative other than that noted above in the subjective    Followup portions patient history reviewed and updated as: Allergies, current medications, past medical history, past social history, past surgical history, and the problem list    Objective:  Vitals:  Vitals:    12/12/23 1602   BP: 124/86   Pulse: 77   Temp: 98 °F (36.7 °C)   SpO2: 97%   Weight: 93.5 kg (206 lb 3.2 oz)   Height: 6' (1.829 m)       Physical Exam:   General Appearance:  Alert, interactive, appearing well,  nontoxic, no acute distress. Neck:   Supple without lymphadenopathy, no thyromegaly or masses   Throat: Oropharynx moist without lesions. Lungs:   Clear to auscultation bilaterally; no wheezes, rhonchi or rales; respirations unlabored   Heart:  RRR; no murmur, rub or gallop   Abdomen:   Soft, non-tender, non-distended, positive bowel sounds. Extremities: No clubbing, cyanosis or edema   Skin: No new rashes or lesions. No draining wounds noted. Labs, Imaging, & Other studies:   All pertinent labs and imaging studies were personally reviewed    Lab Results   Component Value Date    K 4.1 12/07/2023     12/07/2023    CO2 27 12/07/2023    BUN 16 12/07/2023    CREATININE 1.04 12/07/2023    GLUF 79 12/07/2023    CALCIUM 9.6 12/07/2023    CORRECTEDCA 9.7 11/28/2022    AST 25 12/07/2023    ALT 29 12/07/2023    ALKPHOS 59 12/07/2023    EGFR 82 12/07/2023     Lab Results   Component Value Date    WBC 5.18 12/07/2023    HGB 15.3 12/07/2023    HCT 44.8 12/07/2023    MCV 92 12/07/2023     12/07/2023     Lab Results   Component Value Date    HEPCAB Non-reactive 12/07/2023     Lab Results   Component Value Date    HEPCAB Non-reactive 12/07/2023     Lab Results   Component Value Date    RPR Reactive (A) 06/16/2023    RPR Reactive 2 dils (A) 06/16/2023     CD4 407    HIV-1 TARGET   Date/Time Value Ref Range Status   12/07/2023 09:27 AM Detected <20 cp/mL (A) Not Detected Final           Current Outpatient Medications:     bictegravir-emtricitab-tenofovir alafenamide (Biktarvy) -25 MG tablet, Take 1 tablet by mouth daily, Disp: 30 tablet, Rfl: 5    Contrave 8-90 MG TB12, TAKE TWO TABLETS BY MOUTH TWICE A DAY, Disp: 120 tablet, Rfl: 2    Testosterone 1.62 % GEL, Place 2 g on the skin in the morning, Disp: 75 g, Rfl: 1

## 2023-12-15 LAB
BASOPHILS # BLD AUTO: ABNORMAL X10E3/UL
BASOPHILS NFR BLD AUTO: ABNORMAL %
CD3+CD4+ CELLS # BLD: ABNORMAL /UL
CD3+CD4+ CELLS NFR BLD: 24.3 % (ref 30.8–58.5)
CD3+CD4+ CELLS/CD3+CD8+ CLL BLD: 0.69 % (ref 0.92–3.72)
CD3+CD8+ CELLS # BLD: ABNORMAL /UL
CD3+CD8+ CELLS NFR BLD: 35.2 % (ref 12–35.5)
EOSINOPHIL # BLD AUTO: ABNORMAL X10E3/UL
EOSINOPHIL NFR BLD AUTO: ABNORMAL %
ERYTHROCYTE [DISTWIDTH] IN BLOOD BY AUTOMATED COUNT: ABNORMAL %
HCT VFR BLD AUTO: ABNORMAL %
HGB BLD-MCNC: ABNORMAL G/DL
IMM GRANULOCYTES # BLD: ABNORMAL X10E3/UL
IMM GRANULOCYTES NFR BLD: ABNORMAL %
LYMPHOCYTES # BLD AUTO: ABNORMAL X10E3/UL
LYMPHOCYTES NFR BLD AUTO: ABNORMAL %
MCH RBC QN AUTO: ABNORMAL PG
MCHC RBC AUTO-ENTMCNC: ABNORMAL G/DL
MCV RBC AUTO: ABNORMAL FL
MONOCYTES # BLD AUTO: ABNORMAL X10E3/UL
MONOCYTES NFR BLD AUTO: ABNORMAL %
MORPHOLOGY BLD-IMP: ABNORMAL
NEUTROPHILS # BLD AUTO: ABNORMAL X10E3/UL
NEUTROPHILS NFR BLD AUTO: ABNORMAL %
NRBC BLD AUTO-RTO: ABNORMAL %
PLATELET # BLD AUTO: ABNORMAL X10E3/UL
RBC # BLD AUTO: ABNORMAL X10E6/UL
SL AMB IMMATURE CELLS: ABNORMAL
WBC # BLD AUTO: ABNORMAL X10E3/UL

## 2024-01-03 ENCOUNTER — OFFICE VISIT (OUTPATIENT)
Dept: SURGERY | Facility: CLINIC | Age: 54
End: 2024-01-03
Payer: COMMERCIAL

## 2024-01-03 VITALS
HEART RATE: 76 BPM | SYSTOLIC BLOOD PRESSURE: 136 MMHG | HEIGHT: 72 IN | BODY MASS INDEX: 27.9 KG/M2 | TEMPERATURE: 98.1 F | OXYGEN SATURATION: 98 % | DIASTOLIC BLOOD PRESSURE: 82 MMHG | WEIGHT: 206 LBS

## 2024-01-03 DIAGNOSIS — Z79.890 ENCOUNTER FOR MONITORING TESTOSTERONE REPLACEMENT THERAPY: ICD-10-CM

## 2024-01-03 DIAGNOSIS — R68.82 LOW LIBIDO: ICD-10-CM

## 2024-01-03 DIAGNOSIS — Z51.81 ENCOUNTER FOR MONITORING TESTOSTERONE REPLACEMENT THERAPY: ICD-10-CM

## 2024-01-03 DIAGNOSIS — B20 HIV DISEASE (HCC): Primary | ICD-10-CM

## 2024-01-03 DIAGNOSIS — B20 HIV (HUMAN IMMUNODEFICIENCY VIRUS INFECTION) (HCC): ICD-10-CM

## 2024-01-03 DIAGNOSIS — F50.81 BINGE EATING DISORDER: ICD-10-CM

## 2024-01-03 DIAGNOSIS — R79.89 LOW TESTOSTERONE IN MALE: ICD-10-CM

## 2024-01-03 PROCEDURE — 99214 OFFICE O/P EST MOD 30 MIN: CPT | Performed by: NURSE PRACTITIONER

## 2024-01-03 RX ORDER — BICTEGRAVIR SODIUM, EMTRICITABINE, AND TENOFOVIR ALAFENAMIDE FUMARATE 50; 200; 25 MG/1; MG/1; MG/1
1 TABLET ORAL DAILY
Qty: 30 TABLET | Refills: 5 | Status: SHIPPED | OUTPATIENT
Start: 2024-01-03

## 2024-01-03 RX ORDER — TESTOSTERONE 20.25 MG/1.25G
2 GEL TOPICAL DAILY
Qty: 75 G | Refills: 1 | Status: SHIPPED | OUTPATIENT
Start: 2024-01-03

## 2024-01-03 RX ORDER — NALTREXONE HYDROCHLORIDE AND BUPROPION HYDROCHLORIDE 8; 90 MG/1; MG/1
2 TABLET, EXTENDED RELEASE ORAL 2 TIMES DAILY
Qty: 120 TABLET | Refills: 2 | Status: SHIPPED | OUTPATIENT
Start: 2024-01-03

## 2024-01-03 NOTE — ASSESSMENT & PLAN NOTE
"No results found for: \"[id number]\"  CD4 ABS   Date/Time Value Ref Range Status   2023 09:27 AM COMMENT /uL Final     Comment:     Unable to calculate result since non-numeric result obtained for  component test.     HIV-1 RNA by PCR, Qn   Date/Time Value Ref Range Status   2023 07:27 AM <20 copies/mL Final     Comment:     HIV-1 RNA detected  The reportable range for this assay is 20 to 10,000,000  copies HIV-1 RNA/mL.     HIV-1 RNA Viral Load Log   Date/Time Value Ref Range Status   2023 07:27 AM COMMENT [id number]/mL Final     Comment:     Unable to calculate result since non-numeric result obtained for  component test.         ART: Biktarvy        Denies side effects. Stressed the importance of adherence.  Continue follow up with ID clinic.       Reviewed most recent labs, including Cd4 and viral load. Discussed the risks and benefits of treatment options, instructions for management, importance of treatment adherence, and reduction of risk factor.Educated on possible  medication side effects.     Counseled on routes of HIV transmission, including the risk of  infection. Emphasized that viral suppression is the best method to prevent HIV transmission.  At this time pt.denies the need for HIV testing of anyone in their life.     Total encounter time was 45 minutes. Greater then 20 minutes were spent on counseling and patient education. Pt voices understanding and agreement with treatment plan.      "

## 2024-01-03 NOTE — PROGRESS NOTES
"Assessment/Plan:    HIV disease (HCC)  No results found for: \"[id number]\"  CD4 ABS   Date/Time Value Ref Range Status   2023 09:27 AM COMMENT /uL Final     Comment:     Unable to calculate result since non-numeric result obtained for  component test.     HIV-1 RNA by PCR, Qn   Date/Time Value Ref Range Status   2023 07:27 AM <20 copies/mL Final     Comment:     HIV-1 RNA detected  The reportable range for this assay is 20 to 10,000,000  copies HIV-1 RNA/mL.     HIV-1 RNA Viral Load Log   Date/Time Value Ref Range Status   2023 07:27 AM COMMENT sec14qpeq/mL Final     Comment:     Unable to calculate result since non-numeric result obtained for  component test.         ART: Biktarvy        Denies side effects. Stressed the importance of adherence.  Continue follow up with ID clinic.       Reviewed most recent labs, including Cd4 and viral load. Discussed the risks and benefits of treatment options, instructions for management, importance of treatment adherence, and reduction of risk factor.Educated on possible  medication side effects.     Counseled on routes of HIV transmission, including the risk of  infection. Emphasized that viral suppression is the best method to prevent HIV transmission.  At this time pt.denies the need for HIV testing of anyone in their life.     Total encounter time was 45 minutes. Greater then 20 minutes were spent on counseling and patient education. Pt voices understanding and agreement with treatment plan.         Diagnoses and all orders for this visit:    HIV disease ([facility])    Low libido  -     Testosterone 1.62 % GEL; Place 2 g on the skin in the morning    Low testosterone in male  -     Testosterone 1.62 % GEL; Place 2 g on the skin in the morning    Binge eating disorder  -     Naltrexone-buPROPion HCl ER (Contrave) 8-90 MG TB12; Take 2 tablets by mouth 2 (two) times a day    HIV (human immunodeficiency virus infection) ([facility])  -     " bictegravir-emtricitab-tenofovir alafenamide (Biktarvy) -25 MG tablet; Take 1 tablet by mouth daily    Encounter for monitoring testosterone replacement therapy          Subjective:      Patient ID: Cresencio Reyes is a 53 y.o. male.    Magdy presents to the clinic today for primary care follow-up of chronic conditions. .  Past medical history significant for HIV, TB status post treatment, and umbilical hernia. Magdy is doing well and denies acute complaint.          The following portions of the patient's history were reviewed and updated as appropriate: allergies, current medications, past family history, past medical history, past social history, past surgical history, and problem list.    Review of Systems   Constitutional:  Negative for chills and fever.   HENT:  Negative for ear pain and sore throat.    Eyes:  Negative for pain and visual disturbance.   Respiratory:  Negative for cough and shortness of breath.    Cardiovascular:  Negative for chest pain and palpitations.   Gastrointestinal:  Negative for abdominal pain and vomiting.   Genitourinary:  Negative for dysuria and hematuria.   Musculoskeletal:  Negative for arthralgias and back pain.   Skin:  Negative for color change and rash.   Neurological:  Negative for seizures and syncope.   All other systems reviewed and are negative.        Objective:      /82   Pulse 76   Temp 98.1 °F (36.7 °C)   Ht 6' (1.829 m)   Wt 93.4 kg (206 lb)   SpO2 98%   BMI 27.94 kg/m²       Lab Results   Component Value Date    K 4.1 12/07/2023     12/07/2023    CO2 27 12/07/2023    BUN 16 12/07/2023    CREATININE 1.04 12/07/2023    GLUF 79 12/07/2023    CALCIUM 9.6 12/07/2023    CORRECTEDCA 9.7 11/28/2022    AST 25 12/07/2023    ALT 29 12/07/2023    ALKPHOS 59 12/07/2023    EGFR 82 12/07/2023     Lab Results   Component Value Date    WBC 5.18 12/07/2023    WBC COMMENT 12/07/2023    HGB 15.3 12/07/2023    HGB COMMENT 12/07/2023    HCT 44.8 12/07/2023    HCT  COMMENT 12/07/2023    MCV 92 12/07/2023    MCV COMMENT 12/07/2023     12/07/2023    PLT COMMENT 12/07/2023     Lab Results   Component Value Date    HEPCAB Non-reactive 12/07/2023     Lab Results   Component Value Date    HEPCAB Non-reactive 12/07/2023     Lab Results   Component Value Date    RPR Reactive (A) 06/16/2023    RPR Reactive 2 dils (A) 06/16/2023            Physical Exam  Vitals and nursing note reviewed.   Constitutional:       Appearance: Normal appearance.   HENT:      Head: Normocephalic.      Nose: Nose normal. No congestion or rhinorrhea.      Mouth/Throat:      Mouth: Mucous membranes are moist.      Pharynx: Oropharynx is clear.   Eyes:      Pupils: Pupils are equal, round, and reactive to light.   Cardiovascular:      Rate and Rhythm: Normal rate and regular rhythm.      Pulses: Normal pulses.      Heart sounds: Normal heart sounds.   Pulmonary:      Effort: Pulmonary effort is normal.      Breath sounds: Normal breath sounds.   Abdominal:      General: Abdomen is flat. Bowel sounds are normal.      Palpations: Abdomen is soft.   Musculoskeletal:         General: No tenderness or signs of injury. Normal range of motion.   Skin:     General: Skin is warm and dry.   Neurological:      Mental Status: He is alert and oriented to person, place, and time.

## 2024-02-12 DIAGNOSIS — R79.89 LOW TESTOSTERONE IN MALE: ICD-10-CM

## 2024-02-12 DIAGNOSIS — R68.82 LOW LIBIDO: ICD-10-CM

## 2024-02-14 RX ORDER — TESTOSTERONE 20.25 MG/1.25G
GEL TOPICAL
Qty: 75 G | Refills: 2 | Status: SHIPPED | OUTPATIENT
Start: 2024-02-14

## 2024-02-28 ENCOUNTER — PATIENT OUTREACH (OUTPATIENT)
Dept: SURGERY | Facility: CLINIC | Age: 54
End: 2024-02-28

## 2024-02-28 NOTE — PROGRESS NOTES
Ct has sent cm a text inquiring about cm assisting him with [employer] application. Cm was receptive with assisting ct. Cm has then scheduled for ct to meet with cm 2:00 pm. Ct has then came into the office to meet with cm. Cm has given ct the March calendar alongside a letter informing ct of cm no longer being assigned cm starting 3/2. Ct acknowledged. Cm has then assisted ct with completing [employer] Scale application. Ct expressed gratitude.

## 2024-03-04 ENCOUNTER — OFFICE VISIT (OUTPATIENT)
Dept: DENTISTRY | Facility: CLINIC | Age: 54
End: 2024-03-04

## 2024-03-04 VITALS — SYSTOLIC BLOOD PRESSURE: 151 MMHG | HEART RATE: 65 BPM | DIASTOLIC BLOOD PRESSURE: 78 MMHG

## 2024-03-04 DIAGNOSIS — Z01.20 ENCOUNTER FOR DENTAL EXAM AND CLEANING W/O ABNORMAL FINDINGS: Primary | ICD-10-CM

## 2024-03-04 PROCEDURE — D4910 PERIODONTAL MAINTENANCE: HCPCS

## 2024-03-04 PROCEDURE — D0274 BITEWINGS - 4 RADIOGRAPHIC IMAGES: HCPCS

## 2024-03-04 PROCEDURE — D0120 PERIODIC ORAL EVALUATION - ESTABLISHED PATIENT: HCPCS

## 2024-03-04 NOTE — PROGRESS NOTES
3 MTH PERIO MAINTENANCE, periodic exam, 4 bwx   REVIEWED MED HX: meds, allergies, health changes reviewed in EPIC  CHIEF CONCERN:  no dental pain. Pt reports getting repeated ulcers in mouth  PAIN SCALE:  0  ASA CLASS:  I  PLAQUE:    moderate    CALCULUS:   light    BLEEDING:      light     STAIN :   none     ORAL HYGIENE:  good    PERIO: gingiva appear healthy    Hand scaled, polished and flossed.     Visual and Tactile Intraoral/ Extraoral evaluation: Oral and Oropharyngeal cancer evaluation. Large apthous ulcer Rt buccal mucosa and lateral border L tongue    REFERRALS: no referrals provided    CARIES FINDINGS:2-O (amalgam fracture)    Nv- 2-O, 15- MO  Next Recall: 3 month perio maintenance    Last BWX: 3/4/24  Last Panorex/ FMX : 11/9/22

## 2024-03-04 NOTE — DENTAL PROCEDURE DETAILS
Cresencio Reyes presents for a Periodic exam. Verbal consent for treatment given in addition to the forms.     Reviewed health history - Patient is ASA I  Consents signed: Yes     Perio: Normal  Pain Scale: 0  Caries Assessment: Low  Radiographs: Bitewings x4          3 MTH PERIO MAINTENANCE, periodic exam, 4 bwx   REVIEWED MED HX: meds, allergies, health changes reviewed in EPIC  CHIEF CONCERN:  no dental pain. Pt reports getting repeated ulcers in mouth  PAIN SCALE:  0  ASA CLASS:  I  PLAQUE:    moderate    CALCULUS:   light    BLEEDING:      light     STAIN :   none     ORAL HYGIENE:  good    PERIO: gingiva appear healthy    Hand scaled, polished and flossed.     Visual and Tactile Intraoral/ Extraoral evaluation: Oral and Oropharyngeal cancer evaluation. Large apthous ulcer Rt buccal mucosa and lateral border L tongue    REFERRALS: no referrals provided    CARIES FINDINGS:2-O (amalgam fracture)    Nv- 2-O, 15- MO  Next Recall: 3 month perio maintenance    Last BWX: 3/4/24  Last Panorex/ FMX : 11/9/22

## 2024-03-08 ENCOUNTER — TELEPHONE (OUTPATIENT)
Dept: SURGERY | Facility: CLINIC | Age: 54
End: 2024-03-08

## 2024-03-22 DIAGNOSIS — F50.81 BINGE EATING DISORDER: ICD-10-CM

## 2024-03-25 RX ORDER — NALTREXONE HYDROCHLORIDE AND BUPROPION HYDROCHLORIDE 8; 90 MG/1; MG/1
2 TABLET, EXTENDED RELEASE ORAL 2 TIMES DAILY
Qty: 120 TABLET | Refills: 2 | Status: SHIPPED | OUTPATIENT
Start: 2024-03-25

## 2024-04-12 NOTE — PROGRESS NOTES
History of Present Illness    Assessment:  Oral Health Questionnaire  Nutrition Diagnosis No Nutrition Diagnosis Now   Pt indicates no oral health issues at this time and maintains regular check-up and care every 6 months or as needed  Currently pt is seen at [facility] clinic but received regular care prior to moving here as well  Pt is cased managed by Mariusz Allan  Active Problems    1  History of Anxiety (300 00) (F41 9)   2  Encounter for screening examination for sexually transmitted disease (V74 5) (Z11 3)   3  History of stomatitis (V12 79) (Z87 19)   4  HIV disease (042) (B20)   5  MRSA infection (041 12) (A49 02)   6  Musculoskeletal pain (729 1) (M79 1)   7  Need for prophylactic vaccination and inoculation against influenza (V04 81) (Z23)   8  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)   9  Syphilis (097 9) (A53 9)    Past Medical History    1  History of Anxiety (300 00) (F41 9)   2  History of Chest tightness (786 59) (R07 89)   3  History of Cough (786 2) (R05)   4  History of drug abuse (305 93) (Z87 898)   5  History of stomatitis (V12 79) (Z87 19)   6  History of syphilis (V12 09) (Z86 19)   7  History of tuberculosis (V12 01) (Z86 11)   8  History of Wound of right leg (891 0) (S81 801A)    Family History  Mother    1  Adopted (V68 89) (Z02 82)  Father    2  Adopted (V68 89) (Z02 82)    Social History    · Alcohol use (V49 89) (Z78 9)   · History of drug use (305 93) (F19 21)   · Housing   · Never a smoker   · No drug use   · Sexual abstinence   · Vegan (V49 89) (Z78 9)    Current Meds   1  Descovy 200-25 MG Oral Tablet; Take 1 tablet daily; Therapy: 05Fze8599 to (Evaluate:51Qdd0595)  Requested for: 69HGU4057; Last Rx:[id number]   Ordered   2  Tivicay 50 MG Oral Tablet; take 1 tablet by mouth daily;    Therapy: 10Bww9301 to (Evaluate:61Iwo6138)  Requested for: 58Nra3463; Last Rx:[id number]   Ordered    Allergies    1  codeine    Future No care due was identified.  [facility] Embedded Care Due Messages. Reference number: [id number].   4/12/2024 11:07:43 AM CDT   Appointments    Date/Time Provider Specialty Site   01/03/2017 04:15 PM Suleiman Ho MD Internal Medicine [facility] AT [facility]   11/23/2016 09:30 AM ROMAN Hanson [facility] AT [id number] [id number]   Electronically signed by :  JAZMIN Llanos; Nov 2 2016 12:17PM EST                       (Author)

## 2024-05-20 ENCOUNTER — TELEPHONE (OUTPATIENT)
Dept: ADMINISTRATIVE | Facility: OTHER | Age: 54
End: 2024-05-20

## 2024-05-20 ENCOUNTER — TELEPHONE (OUTPATIENT)
Dept: SURGERY | Facility: CLINIC | Age: 54
End: 2024-05-20

## 2024-05-20 NOTE — TELEPHONE ENCOUNTER
----- Message from Wendy RUSSO sent at 5/20/2024  9:41 AM EDT -----  Regarding: Colonoscopy Request  05/20/24 9:42 AM    Hello, our patient Cresencio Reyes has had CRC: Colonoscopy completed/performed. Please assist in updating the patient chart by pulling the Care Everywhere (CE) document. The date of service is 1/23/2023.     Thank you,  Wendy LEO

## 2024-05-20 NOTE — TELEPHONE ENCOUNTER
"[facility] Annual Nutrition Assessment  Annual nutrition assessment done via phone.     Pt is established patient (last annual was 03/15/2023)    PMHx:  TMJ, insomnia, binge eating disorder, ulcer, hernia      Clinical Data/Client History    CD4 count:  407  Viral load:  <20  ART:   Biktarvy    , Patient Navigator: Chiquis     Food assistance:  BARB    Living situation: House or apartment     Psychosocial factors:  Not discussed    Mobility:  self ambulates    Physical activity: 3 days of weight training, 4 days of cardio       Oral health concerns: denied oral health concerns       Typical food/beverage intake:    Breakfast avocado toast  Lunch cup of mixed beans, tofu, tomato & carrots   Dinner same-plant protein, vegetables   Snacks protein shakes  Beverages water, coffee, sugar free red bull     Appetite: Unchanged from normal    OTC vitamin, mineral, herbal supplements: N/A    Oral/enteral nutrition supplements:  Protein smoothie     GI problems: denied n/v/d/c    Food allergies/intolerances:  vegan     Weight history:  Wt Readings from Last 3 Encounters:   01/03/24 93.4 kg (206 lb)   12/12/23 93.5 kg (206 lb 3.2 oz)   11/21/23 93.4 kg (205 lb 12.8 oz)    Pt reports weight at home is 196. Will use last chart weight.       Current body weight:  206  Height:  72\"  BMI:  27  IBW:  178  %IBW  116    Weight change: N/A- pt reported desirable weight loss since last doctor visit       Nutrition-related labs:    Lab Results   Component Value Date    HGBA1C 5.3 06/16/2023      Lab Results   Component Value Date    SODIUM 137 12/07/2023    K 4.1 12/07/2023     12/07/2023    CO2 27 12/07/2023    BUN 16 12/07/2023    CREATININE 1.04 12/07/2023    GLUC 79 11/28/2022    CALCIUM 9.6 12/07/2023      Lab Results   Component Value Date    CHOLESTEROL 182 06/16/2023    CHOLESTEROL 164 09/09/2021    CHOLESTEROL 168 09/09/2020     Lab Results   Component Value Date    HDL 42 06/16/2023    HDL 35 (L) 09/09/2021    HDL 39 " "(L) 09/09/2020     Lab Results   Component Value Date    TRIG 96 06/16/2023    TRIG 125.8 09/09/2021    TRIG 91 09/09/2020     No results found for: \"NONHDLC\"     Current medications:   Contrave    Physical findings/skin integrity:  Skin intact       Nutrition Diagnosis    Problem: no nutrition diagnosis at this time      Estimated Nutritional Needs    [address] REE: CBW 93kg     ~2140 kcal (based on: [place])  ~93g/pro protein (based on: 1g/kg-weight training )  ~2300mlfluid (based on: 25ml/kg/day)      Current intake estimation: meeting needs       Nutrition Intervention/Recommendations    Nutrition education intervention: not indicated       Goals:  No significant weight changes  Continue current healthy life style habits       Visit Summary  Pt doing well with exercise and current diet. He has been following a vegan body building meal plan. Weight & labs are stable. Pt has no nutrition questions or concerns at this time.   -Cynthia Minor RDN,LDN    "

## 2024-05-20 NOTE — TELEPHONE ENCOUNTER
Upon review of the In Basket request we were able to locate, review, and update the patient chart as requested for CRC: Colonoscopy.    Any additional questions or concerns should be emailed to the Practice Liaisons via the appropriate education email address, please do not reply via In Basket.    Thank you  Alize Reddy

## 2024-05-21 ENCOUNTER — PATIENT OUTREACH (OUTPATIENT)
Dept: SURGERY | Facility: CLINIC | Age: 54
End: 2024-05-21

## 2024-05-21 NOTE — PROGRESS NOTES
Cm called pt to schedule his jeffery white renewal, pt did not answer the phone so cm left a detailed message including her number.

## 2024-05-28 ENCOUNTER — OFFICE VISIT (OUTPATIENT)
Dept: DENTISTRY | Facility: CLINIC | Age: 54
End: 2024-05-28

## 2024-05-28 VITALS — HEART RATE: 80 BPM | DIASTOLIC BLOOD PRESSURE: 72 MMHG | SYSTOLIC BLOOD PRESSURE: 101 MMHG

## 2024-05-28 DIAGNOSIS — K02.9 DENTAL CARIES: Primary | ICD-10-CM

## 2024-05-28 DIAGNOSIS — R68.82 LOW LIBIDO: ICD-10-CM

## 2024-05-28 DIAGNOSIS — R79.89 LOW TESTOSTERONE IN MALE: ICD-10-CM

## 2024-05-28 PROCEDURE — D2392 RESIN-BASED COMPOSITE - 2 SURFACES, POSTERIOR: HCPCS

## 2024-05-28 RX ORDER — TESTOSTERONE 20.25 MG/1.25G
GEL TOPICAL
Qty: 75 G | Refills: 2 | Status: SHIPPED | OUTPATIENT
Start: 2024-05-28

## 2024-05-28 NOTE — DENTAL PROCEDURE DETAILS
Composite Restoration #15-MO    Cresencio Reyes 53 y.o. male presents with self to [place] for composite restoration  PMH reviewed, no changes, ASA II.   Pain level 0/10  Reviewed diagnosis of caries and tx plan for composite restorations.  Patient understands and consents.    Anesthesia:  Topical 20% benzocaine.  1 carps 2% Lidocaine 1:100k epi via buccal infiltration.    Procedure details:  Isolation: Cotton rolls and High volume suction  Prepped teeth #15-MO with high speed handpiece.  Caries removed with round carbide on slow speed.  Placed Kruger matrix.   Etch with 37% H2PO4 15 seconds. Rinsed and suctioned.  Applied Ivoclar Adhesive universal  with 20 second scrub, air dried, and light cured.  Restored with Ivoclar Tetric Evoceram and Tetric Evoflow Shade A2 and light cured.  Checked occlusion and adjusted with finishing burs.  Checked contacts with floss  Polished with enhance point.  Verified occlusion and contacts.    Patient dismissed ambulatory and alert.    Implant Interest  Patient reports interest to replace #3  History: Extracted by [facility] few months ago with bone graft  Clinically, buccal-lingual width of edentulous ridge is adequate for implant placement  Patient informed of Implant process at the residency including fees and consult/treatment at [facility]. Patient expressed interest to be scheduled at [facility]. Email sent to Dr. HOLLIS and cc Dr. Lopez.    Attending: Dr. Lopez    NV: Implant Consult at [facility]

## 2024-05-29 NOTE — PROGRESS NOTES
Called pt and notified him to bring the bone graft information that was placed by the oral surgeon to the consultation visit at [facility].     Roger

## 2024-06-04 ENCOUNTER — TELEPHONE (OUTPATIENT)
Dept: SURGERY | Facility: CLINIC | Age: 54
End: 2024-06-04

## 2024-06-05 ENCOUNTER — APPOINTMENT (OUTPATIENT)
Dept: LAB | Facility: CLINIC | Age: 54
End: 2024-06-05
Payer: COMMERCIAL

## 2024-06-05 LAB
ALBUMIN SERPL BCP-MCNC: 4.2 G/DL (ref 3.5–5)
ALP SERPL-CCNC: 58 U/L (ref 34–104)
ALT SERPL W P-5'-P-CCNC: 17 U/L (ref 7–52)
ANION GAP SERPL CALCULATED.3IONS-SCNC: 7 MMOL/L (ref 4–13)
AST SERPL W P-5'-P-CCNC: 16 U/L (ref 13–39)
BASOPHILS # BLD AUTO: 0.05 THOUSANDS/ÂΜL (ref 0–0.1)
BASOPHILS NFR BLD AUTO: 1 % (ref 0–1)
BILIRUB SERPL-MCNC: 0.69 MG/DL (ref 0.2–1)
BUN SERPL-MCNC: 14 MG/DL (ref 5–25)
CALCIUM SERPL-MCNC: 9.5 MG/DL (ref 8.4–10.2)
CHLORIDE SERPL-SCNC: 105 MMOL/L (ref 96–108)
CHOLEST SERPL-MCNC: 169 MG/DL
CO2 SERPL-SCNC: 27 MMOL/L (ref 21–32)
CREAT SERPL-MCNC: 1.04 MG/DL (ref 0.6–1.3)
EOSINOPHIL # BLD AUTO: 0.12 THOUSAND/ÂΜL (ref 0–0.61)
EOSINOPHIL NFR BLD AUTO: 2 % (ref 0–6)
ERYTHROCYTE [DISTWIDTH] IN BLOOD BY AUTOMATED COUNT: 13.1 % (ref 11.6–15.1)
EST. AVERAGE GLUCOSE BLD GHB EST-MCNC: 108 MG/DL
GFR SERPL CREATININE-BSD FRML MDRD: 82 ML/MIN/1.73SQ M
GLUCOSE SERPL-MCNC: 91 MG/DL (ref 65–140)
HBA1C MFR BLD: 5.4 %
HCT VFR BLD AUTO: 45.3 % (ref 36.5–49.3)
HDLC SERPL-MCNC: 38 MG/DL
HGB BLD-MCNC: 15.1 G/DL (ref 12–17)
IMM GRANULOCYTES # BLD AUTO: 0 THOUSAND/UL (ref 0–0.2)
IMM GRANULOCYTES NFR BLD AUTO: 0 % (ref 0–2)
LDLC SERPL CALC-MCNC: 115 MG/DL (ref 0–100)
LYMPHOCYTES # BLD AUTO: 2.13 THOUSANDS/ÂΜL (ref 0.6–4.47)
LYMPHOCYTES NFR BLD AUTO: 41 % (ref 14–44)
MCH RBC QN AUTO: 31 PG (ref 26.8–34.3)
MCHC RBC AUTO-ENTMCNC: 33.3 G/DL (ref 31.4–37.4)
MCV RBC AUTO: 93 FL (ref 82–98)
MONOCYTES # BLD AUTO: 0.48 THOUSAND/ÂΜL (ref 0.17–1.22)
MONOCYTES NFR BLD AUTO: 9 % (ref 4–12)
NEUTROPHILS # BLD AUTO: 2.47 THOUSANDS/ÂΜL (ref 1.85–7.62)
NEUTS SEG NFR BLD AUTO: 47 % (ref 43–75)
NRBC BLD AUTO-RTO: 0 /100 WBCS
PLATELET # BLD AUTO: 271 THOUSANDS/UL (ref 149–390)
PMV BLD AUTO: 10.5 FL (ref 8.9–12.7)
POTASSIUM SERPL-SCNC: 4.7 MMOL/L (ref 3.5–5.3)
PROT SERPL-MCNC: 7.2 G/DL (ref 6.4–8.4)
RBC # BLD AUTO: 4.87 MILLION/UL (ref 3.88–5.62)
SODIUM SERPL-SCNC: 139 MMOL/L (ref 135–147)
TRIGL SERPL-MCNC: 81 MG/DL
WBC # BLD AUTO: 5.25 THOUSAND/UL (ref 4.31–10.16)

## 2024-06-06 LAB
BASOPHILS # BLD AUTO: 0.1 X10E3/UL (ref 0–0.2)
BASOPHILS NFR BLD AUTO: 1 %
CD3+CD4+ CELLS # BLD: 446 /UL (ref 359–1519)
CD3+CD4+ CELLS NFR BLD: 22.3 % (ref 30.8–58.5)
CD3+CD4+ CELLS/CD3+CD8+ CLL BLD: 0.67 % (ref 0.92–3.72)
CD3+CD8+ CELLS # BLD: 666 /UL (ref 109–897)
CD3+CD8+ CELLS NFR BLD: 33.3 % (ref 12–35.5)
EOSINOPHIL # BLD AUTO: 0.1 X10E3/UL (ref 0–0.4)
EOSINOPHIL NFR BLD AUTO: 2 %
ERYTHROCYTE [DISTWIDTH] IN BLOOD BY AUTOMATED COUNT: 13.1 % (ref 11.6–15.4)
HCT VFR BLD AUTO: 40.9 % (ref 37.5–51)
HGB BLD-MCNC: 13.8 G/DL (ref 13–17.7)
IMM GRANULOCYTES # BLD: 0 X10E3/UL (ref 0–0.1)
IMM GRANULOCYTES NFR BLD: 0 %
LYMPHOCYTES # BLD AUTO: 2 X10E3/UL (ref 0.7–3.1)
LYMPHOCYTES NFR BLD AUTO: 38 %
MCH RBC QN AUTO: 31.6 PG (ref 26.6–33)
MCHC RBC AUTO-ENTMCNC: 33.7 G/DL (ref 31.5–35.7)
MCV RBC AUTO: 94 FL (ref 79–97)
MONOCYTES # BLD AUTO: 0.5 X10E3/UL (ref 0.1–0.9)
MONOCYTES NFR BLD AUTO: 10 %
NEUTROPHILS # BLD AUTO: 2.6 X10E3/UL (ref 1.4–7)
NEUTROPHILS NFR BLD AUTO: 49 %
PLATELET # BLD AUTO: 287 X10E3/UL (ref 150–450)
RBC # BLD AUTO: 4.37 X10E6/UL (ref 4.14–5.8)
RPR SER QL: REACTIVE
RPR SER-TITR: ABNORMAL {TITER}
TREPONEMA PALLIDUM IGG+IGM AB [PRESENCE] IN SERUM OR PLASMA BY IMMUNOASSAY: REACTIVE
WBC # BLD AUTO: 5.3 X10E3/UL (ref 3.4–10.8)

## 2024-06-07 LAB
HIV1 RNA # PLAS NAA DL=20: 26 CP/ML
HIV1 RNA # PLAS NAA DL=20: DETECTED {COPIES}/ML

## 2024-06-11 DIAGNOSIS — A53.9 SYPHILIS: Primary | ICD-10-CM

## 2024-06-21 DIAGNOSIS — D72.89 OTHER SPECIFIED DISORDERS OF WHITE BLOOD CELLS: ICD-10-CM

## 2024-06-21 DIAGNOSIS — Z20.2 CONTACT WITH AND (SUSPECTED) EXPOSURE TO INFECTIONS WITH A PREDOMINANTLY SEXUAL MODE OF TRANSMISSION: ICD-10-CM

## 2024-06-21 DIAGNOSIS — B20 HIV DISEASE (HCC): Primary | ICD-10-CM

## 2024-06-21 DIAGNOSIS — Z11.3 ENCOUNTER FOR SCREENING FOR BACTERIAL SEXUALLY TRANSMITTED DISEASE: ICD-10-CM

## 2024-06-21 DIAGNOSIS — Z72.89 OTHER PROBLEMS RELATED TO LIFESTYLE: ICD-10-CM

## 2024-06-25 ENCOUNTER — DOCUMENTATION (OUTPATIENT)
Dept: SURGERY | Facility: CLINIC | Age: 54
End: 2024-06-25

## 2024-06-25 ENCOUNTER — OFFICE VISIT (OUTPATIENT)
Dept: SURGERY | Facility: CLINIC | Age: 54
End: 2024-06-25
Payer: COMMERCIAL

## 2024-06-25 VITALS
BODY MASS INDEX: 26.43 KG/M2 | TEMPERATURE: 97.9 F | HEART RATE: 68 BPM | OXYGEN SATURATION: 98 % | SYSTOLIC BLOOD PRESSURE: 116 MMHG | WEIGHT: 199.4 LBS | DIASTOLIC BLOOD PRESSURE: 79 MMHG | HEIGHT: 73 IN

## 2024-06-25 DIAGNOSIS — B20 HIV DISEASE (HCC): Primary | ICD-10-CM

## 2024-06-25 DIAGNOSIS — A53.9 SYPHILIS: ICD-10-CM

## 2024-06-25 DIAGNOSIS — F50.81 BINGE EATING DISORDER: ICD-10-CM

## 2024-06-25 PROCEDURE — 99214 OFFICE O/P EST MOD 30 MIN: CPT | Performed by: INTERNAL MEDICINE

## 2024-06-25 NOTE — PROGRESS NOTES
[facility] Annual Sexual Health Assessment     Magdy was seen by Prevention Nurse in conjunction with Infectious Disease appointment.    Pt is established patient.    Explained to patient that we complete a sexual health assessment once yearly. He was agreeable to talk with me today.     Magdy reports he is  and in a monogamous relationship with his . 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 Feels like he is in a great place in his life at this time.

## 2024-06-25 NOTE — ASSESSMENT & PLAN NOTE
Doing well on Biktarvy with a near undetectable viral load and a CD4 of 446.  Continue ART, recheck CBC with differential, CMP, HIV RNA, CD4 in 5 months and follow-up in 6 months.  Stressed adherence

## 2024-06-25 NOTE — PROGRESS NOTES
"Progress Note - Infectious Disease   Cresencio Reyes 53 y.o. male MRN: [id number]  Unit/Bed#:  Encounter: [id number]      Impression/Plan:  HIV disease (HCC)  Doing well on Biktarvy with a near undetectable viral load and a CD4 of 446.  Continue ART, recheck CBC with differential, CMP, HIV RNA, CD4 in 5 months and follow-up in 6 months.  Stressed adherence    Syphilis  Patient remains serofast at 1: 2 after treatment.  Annual RPR titer to continue    Binge eating disorder  On Contrave.  Continue.      Patient was provided medication, adherence and prevention education    Subjective:  Routine follow-up for HIV.  Patient claims 100% adherence with Biktarvy. Patient denies any notable side effects.  Overall the feeling well.  The patient denies any fever chills or sweats, denies any nausea vomiting or diarrhea, denies any cough or shortness of breath.    ROS:  A complete review of systems is negative other than that noted above in the subjective    Followup portions patient history reviewed and updated as:  Allergies, current medications, past medical history, past social history, past surgical history, and the problem list    Objective:  Vitals:  Vitals:    06/25/24 1657   BP: 116/79   Pulse: 68   Temp: 97.9 °F (36.6 °C)   SpO2: 98%   Weight: 90.4 kg (199 lb 6.4 oz)   Height: 6' 1\" (1.854 m)       Physical Exam:   General Appearance:  Alert, interactive, appearing well,  nontoxic, no acute distress.   Neck:   Supple without lymphadenopathy, no thyromegaly or masses   Throat: Oropharynx moist without lesions.    Lungs:   Clear to auscultation bilaterally; no wheezes, rhonchi or rales; respirations unlabored   Heart:  RRR; no murmur, rub or gallop   Abdomen:   Soft, non-tender, non-distended, positive bowel sounds.     Extremities: No clubbing, cyanosis or edema   Skin: No new rashes or lesions. No draining wounds noted.       Labs, Imaging, & Other studies:   All pertinent labs and imaging studies were personally " reviewed    Lab Results   Component Value Date    K 4.7 06/05/2024     06/05/2024    CO2 27 06/05/2024    BUN 14 06/05/2024    CREATININE 1.04 06/05/2024    GLUF 79 12/07/2023    CALCIUM 9.5 06/05/2024    CORRECTEDCA 9.7 11/28/2022    AST 16 06/05/2024    ALT 17 06/05/2024    ALKPHOS 58 06/05/2024    EGFR 82 06/05/2024     Lab Results   Component Value Date    WBC 5.25 06/05/2024    WBC 5.3 06/05/2024    HGB 15.1 06/05/2024    HGB 13.8 06/05/2024    HCT 45.3 06/05/2024    HCT 40.9 06/05/2024    MCV 93 06/05/2024    MCV 94 06/05/2024     06/05/2024     06/05/2024     Lab Results   Component Value Date    HEPCAB Non-reactive 12/07/2023     Lab Results   Component Value Date    HEPCAB Non-reactive 12/07/2023     Lab Results   Component Value Date    RPR Reactive (A) 06/05/2024    RPR Reactive 2 dils (A) 06/05/2024     CD4 ABS   Date/Time Value Ref Range Status   06/05/2024 09:31  359 - 1519 /uL Final     HIV-1 RNA by PCR, Qn   Date/Time Value Ref Range Status   06/16/2023 07:27 AM <20 copies/mL Final     Comment:     HIV-1 RNA detected  The reportable range for this assay is 20 to 10,000,000  copies HIV-1 RNA/mL.     HIV-1 TARGET   Date/Time Value Ref Range Status   06/05/2024 09:31 AM Detected (A) Not Detected Final     HIV RNA 26 copies      Current Outpatient Medications:     bictegravir-emtricitab-tenofovir alafenamide (Biktarvy) -25 MG tablet, Take 1 tablet by mouth daily, Disp: 30 tablet, Rfl: 5    Contrave 8-90 MG TB12, TAKE TWO TABLETS BY MOUTH TWICE A DAY, Disp: 120 tablet, Rfl: 2    Testosterone 1.62 % GEL, APPLY 2 PUMPS ON THE SKIN EVERY MORNING AS DIRECTED, Disp: 75 g, Rfl: 2

## 2024-06-27 DIAGNOSIS — F50.81 BINGE EATING DISORDER: ICD-10-CM

## 2024-06-27 DIAGNOSIS — B20 HIV (HUMAN IMMUNODEFICIENCY VIRUS INFECTION) (HCC): ICD-10-CM

## 2024-06-28 RX ORDER — BICTEGRAVIR SODIUM, EMTRICITABINE, AND TENOFOVIR ALAFENAMIDE FUMARATE 50; 200; 25 MG/1; MG/1; MG/1
1 TABLET ORAL DAILY
Qty: 30 TABLET | Refills: 5 | Status: SHIPPED | OUTPATIENT
Start: 2024-06-28

## 2024-06-28 RX ORDER — NALTREXONE HYDROCHLORIDE AND BUPROPION HYDROCHLORIDE 8; 90 MG/1; MG/1
2 TABLET, EXTENDED RELEASE ORAL 2 TIMES DAILY
Qty: 120 TABLET | Refills: 2 | Status: SHIPPED | OUTPATIENT
Start: 2024-06-28

## 2024-07-01 NOTE — PROGRESS NOTES
Assessment/Plan: [facility] met with pt during ID clinic to update annual PHQ-9 assessment.  Pt scored 0 no MH issues at this time.  Pt stated since he had met with this [facility] he has learned new coping skills that have helped him manage his stress levels.Pt is a former smoker.     Counts include 234 beds at the [facility]   Today patient present with   Chief Complaint   Patient presents with    Follow-up     Pt offers no c/o at this time.     Patient would likely benefit from annual PHQ-9 assessments and periodic check in for MH.  Consider/focus/continue Monitoring pts emotional, behavioral and cognitive display of stability.   Stage of change: Maintenance  Plan/ Behavioral Recommendations: ongoing  interventions as per pts coordinated care and/or pts request for services.      Diagnoses and all orders for this visit:    HIV disease (HCC)    Syphilis    Binge eating disorder          Subjective:     Patient ID: Cresencio Reyes is a 53 y.o. male.    HPI    History of Present Illness:      Patient is being seen for annual behavioral health assessment.       [unfilled]       Review of Systems      Objective:     Physical Exam      [facility]    Orientation     Person: yes    Place: yes    Time: yes    Appearance    Well Developed: yes healthy    Uncomfortable: no    Normal Body Odor: yes    Smells of Feces: no    Smells of Urine: no    Disheveled: no    Well Nourished: yes weight WNL of ideal    Grooming Unkempt: no    Poor Eye Contact: no    Hirsute: no    Looks Tired: no    Acutely Exhausted: noappearance reflects stated age    Mood and Affect:     Appropriate: yes    Euthymicyes    Irritable: no    Angry: no    Anxious: no    Depressed:no    Blunted:no    Labile: no    Restricted: no    Harm to Self or Others: pt denies SI/Hi no signs or symptoms noted    Substance Abuse: Pt denies

## 2024-07-02 ENCOUNTER — PATIENT OUTREACH (OUTPATIENT)
Dept: SURGERY | Facility: CLINIC | Age: 54
End: 2024-07-02

## 2024-07-02 NOTE — PROGRESS NOTES
Cm sent client a text message informing him that an appointment is needed due to his recert/rw being .

## 2024-07-10 ENCOUNTER — OFFICE VISIT (OUTPATIENT)
Dept: SURGERY | Facility: CLINIC | Age: 54
End: 2024-07-10
Payer: COMMERCIAL

## 2024-07-10 ENCOUNTER — OFFICE VISIT (OUTPATIENT)
Dept: DENTISTRY | Facility: CLINIC | Age: 54
End: 2024-07-10

## 2024-07-10 VITALS
BODY MASS INDEX: 26.61 KG/M2 | HEIGHT: 73 IN | OXYGEN SATURATION: 98 % | TEMPERATURE: 98.7 F | HEART RATE: 76 BPM | DIASTOLIC BLOOD PRESSURE: 78 MMHG | WEIGHT: 200.8 LBS | SYSTOLIC BLOOD PRESSURE: 126 MMHG

## 2024-07-10 VITALS — DIASTOLIC BLOOD PRESSURE: 75 MMHG | SYSTOLIC BLOOD PRESSURE: 150 MMHG

## 2024-07-10 DIAGNOSIS — Z51.81 ENCOUNTER FOR MONITORING TESTOSTERONE REPLACEMENT THERAPY: ICD-10-CM

## 2024-07-10 DIAGNOSIS — B20 HIV DISEASE (HCC): Primary | ICD-10-CM

## 2024-07-10 DIAGNOSIS — Z00.00 ANNUAL PHYSICAL EXAM: ICD-10-CM

## 2024-07-10 DIAGNOSIS — F50.81 BINGE EATING DISORDER: ICD-10-CM

## 2024-07-10 DIAGNOSIS — Z79.890 ENCOUNTER FOR MONITORING TESTOSTERONE REPLACEMENT THERAPY: ICD-10-CM

## 2024-07-10 DIAGNOSIS — R79.89 LOW TESTOSTERONE IN MALE: ICD-10-CM

## 2024-07-10 DIAGNOSIS — R68.82 LOW LIBIDO: ICD-10-CM

## 2024-07-10 DIAGNOSIS — A53.9 SYPHILIS: ICD-10-CM

## 2024-07-10 DIAGNOSIS — K05.6 PERIODONTAL DISEASE: Primary | ICD-10-CM

## 2024-07-10 PROCEDURE — 99396 PREV VISIT EST AGE 40-64: CPT | Performed by: NURSE PRACTITIONER

## 2024-07-10 PROCEDURE — D4910 PERIODONTAL MAINTENANCE: HCPCS

## 2024-07-10 NOTE — PATIENT INSTRUCTIONS
"Patient Education     Routine physical for adults   The Basics   Written by the doctors and editors at [facility]   What is a physical? -- A physical is a routine visit, or \"check-up,\" with your doctor. You might also hear it called a \"wellness visit\" or \"preventive visit.\"  During each visit, the doctor will:   Ask about your physical and mental health   Ask about your habits, behaviors, and lifestyle   Do an exam   Give you vaccines if needed   Talk to you about any medicines you take   Give advice about your health   Answer your questions  Getting regular check-ups is an important part of taking care of your health. It can help your doctor find and treat any problems you have. But it's also important for preventing health problems.  A routine physical is different from a \"sick visit.\" A sick visit is when you see a doctor because of a health concern or problem. Since physicals are scheduled ahead of time, you can think about what you want to ask the doctor.  How often should I get a physical? -- It depends on your age and health. In general, for people age 21 years and older:   If you are younger than 50 years, you might be able to get a physical every 3 years.   If you are 50 years or older, your doctor might recommend a physical every year.  If you have an ongoing health condition, like diabetes or high blood pressure, your doctor will probably want to see you more often.  What happens during a physical? -- In general, each visit will include:   Physical exam - The doctor or nurse will check your height, weight, heart rate, and blood pressure. They will also look at your eyes and ears. They will ask about how you are feeling and whether you have any symptoms that bother you.   Medicines - It's a good idea to bring a list of all the medicines you take to each doctor visit. Your doctor will talk to you about your medicines and answer any questions. Tell them if you are having any side effects that bother you. You " "should also tell them if you are having trouble paying for any of your medicines.   Habits and behaviors - This includes:   Your diet   Your exercise habits   Whether you smoke, drink alcohol, or use drugs   Whether you are sexually active   Whether you feel safe at home  Your doctor will talk to you about things you can do to improve your health and lower your risk of health problems. They will also offer help and support. For example, if you want to quit smoking, they can give you advice and might prescribe medicines. If you want to improve your diet or get more physical activity, they can help you with this, too.   Lab tests, if needed - The tests you get will depend on your age and situation. For example, your doctor might want to check your:   Cholesterol   Blood sugar   Iron level   Vaccines - The recommended vaccines will depend on your age, health, and what vaccines you already had. Vaccines are very important because they can prevent certain serious or deadly infections.   Discussion of screening - \"Screening\" means checking for diseases or other health problems before they cause symptoms. Your doctor can recommend screening based on your age, risk, and preferences. This might include tests to check for:   Cancer, such as breast, prostate, cervical, ovarian, colorectal, prostate, lung, or skin cancer   Sexually transmitted infections, such as chlamydia and gonorrhea   Mental health conditions like depression and anxiety  Your doctor will talk to you about the different types of screening tests. They can help you decide which screenings to have. They can also explain what the results might mean.   Answering questions - The physical is a good time to ask the doctor or nurse questions about your health. If needed, they can refer you to other doctors or specialists, too.  Adults older than 65 years often need other care, too. As you get older, your doctor will talk to you about:   How to prevent falling at " home   Hearing or vision tests   Memory testing   How to take your medicines safely   Making sure that you have the help and support you need at home  All topics are updated as new evidence becomes available and our peer review process is complete.  This topic retrieved from [employer] on: May 02, 2024.  Topic 072421 Version 1.0  Release: 32.4.3 - C32.122  © 2024 UpToDate, Inc. and/or its affiliates. All rights reserved.  Consumer Information Use and Disclaimer   Disclaimer: This generalized information is a limited summary of diagnosis, treatment, and/or medication information. It is not meant to be comprehensive and should be used as a tool to help the user understand and/or assess potential diagnostic and treatment options. It does NOT include all information about conditions, treatments, medications, side effects, or risks that may apply to a specific patient. It is not intended to be medical advice or a substitute for the medical advice, diagnosis, or treatment of a health care provider based on the health care provider's examination and assessment of a patient's specific and unique circumstances. Patients must speak with a health care provider for complete information about their health, medical questions, and treatment options, including any risks or benefits regarding use of medications. This information does not endorse any treatments or medications as safe, effective, or approved for treating a specific patient. UpToDate, Inc. and its affiliates disclaim any warranty or liability relating to this information or the use thereof.The use of this information is governed by the Terms of Use, available at https://www.woltersStonewedgeuwer.com/en/know/clinical-effectiveness-terms. 2024© UpToDate, Inc. and its affiliates and/or licensors. All rights reserved.  Copyright   © 2024 UpToDate, Inc. and/or its affiliates. All rights reserved.

## 2024-07-10 NOTE — PROGRESS NOTES
"Adult Annual Physical  Name: Cresencio Reyes      : 1970      MRN: [id number]  Encounter Provider: ROMAN Wing  Encounter Date: 7/10/2024   Encounter department: ASC AT [facility]    Assessment & Plan   1. HIV disease (HCC)  Assessment & Plan:  No results found for: \"AO2TEPZ\"  CD4 ABS   Date/Time Value Ref Range Status   2024 09:31  359 - 1519 /uL Final     HIV-1 RNA by PCR, Qn   Date/Time Value Ref Range Status   2023 07:27 AM <20 copies/mL Final     Comment:     HIV-1 RNA detected  The reportable range for this assay is 20 to 10,000,000  copies HIV-1 RNA/mL.     HIV-1 RNA Viral Load Log   Date/Time Value Ref Range Status   2023 07:27 AM COMMENT [id number]/mL Final     Comment:     Unable to calculate result since non-numeric result obtained for  component test.         ART: Biktarvy        Denies side effects. Stressed the importance of adherence.  Continue follow up with ID clinic.       Reviewed most recent labs, including Cd4 and viral load. Discussed the risks and benefits of treatment options, instructions for management, importance of treatment adherence, and reduction of risk factor.Educated on possible  medication side effects.  Reviewed last ID visit.  Collaborated with ID to optimize medical treatment.    Counseled on routes of HIV transmission, including the risk of  infection. Emphasized that viral suppression is the best method to prevent HIV transmission.  At this time pt.denies the need for HIV testing of anyone in their life.     Total encounter time was 45 minutes. Greater then 20 minutes were spent on counseling and patient education. Pt voices understanding and agreement with treatment plan.      2. Low testosterone in male  -     Testosterone, free, total; Future  3. Low libido  -     Testosterone, free, total; Future  4. Binge eating disorder  Assessment & Plan:  Discontinuing Contrave at this time.  Educated patient on weaning off " medication.  Contact clinic if he is unable to tolerate discontinuing the medications and symptoms return.  5. Encounter for monitoring testosterone replacement therapy  Assessment & Plan:  Tolerating topical testosterone.  Check testosterone levels with next labs.  6. Syphilis  Assessment & Plan:  Status post treatment.  Currently serofast with a titer of 1-2.  Check RPR titer annually.  7. Annual physical exam  8. BMI 26.0-26.9,adult    Immunizations and preventive care screenings were discussed with patient today. Appropriate education was printed on patient's after visit summary.    Discussed risks and benefits of prostate cancer screening. We discussed the controversial history of PSA screening for prostate cancer in the [place] as well as the risk of over detection and over treatment of prostate cancer by way of PSA screening.  The patient understands that PSA blood testing is an imperfect way to screen for prostate cancer and that elevated PSA levels in the blood may also be caused by infection, inflammation, prostatic trauma or manipulation, urological procedures, or by benign prostatic enlargement.    The role of the digital rectal examination in prostate cancer screening was also discussed and I discussed with him that there is large interobserver variability in the findings of digital rectal examination.    Counseling:  Alcohol/drug use: discussed moderation in alcohol intake, the recommendations for healthy alcohol use, and avoidance of illicit drug use.  Dental Health: discussed importance of regular tooth brushing, flossing, and dental visits.  Injury prevention: discussed safety/seat belts, safety helmets, smoke detectors, carbon dioxide detectors, and smoking near bedding or upholstery.  Exercise: the importance of regular exercise/physical activity was discussed. Recommend exercise 3-5 times per week for at least 30 minutes.          History of Present Illness     Adult Annual  Physical:  Patient presents for annual physical.     Diet and Physical Activity:  - Diet/Nutrition: well balanced diet and consuming 3-5 servings of fruits/vegetables daily.  - Exercise: moderate cardiovascular exercise and 1-2 times a week on average.    General Health:  - Sleep: sleeps well.  - Hearing: normal hearing bilateral ears.  - Vision: most recent eye exam < 1 year ago and wears glasses.  - Dental: regular dental visits.     Health:  - History of STDs: no.   - Urinary symptoms: none.     Advanced Care Planning:  - Has an advanced directive?: yes    - Has a durable medical POA?: yes    - ACP document given to patient?: no      Review of Systems   Constitutional:  Negative for chills and fever.   HENT:  Negative for ear pain and sore throat.    Eyes:  Negative for pain and visual disturbance.   Respiratory:  Negative for cough and shortness of breath.    Cardiovascular:  Negative for chest pain and palpitations.   Gastrointestinal:  Negative for abdominal pain and vomiting.   Genitourinary:  Negative for dysuria and hematuria.   Musculoskeletal:  Negative for arthralgias and back pain.   Skin:  Negative for color change and rash.   Neurological:  Negative for seizures and syncope.   All other systems reviewed and are negative.    Medical History Reviewed by provider this encounter:  Tobacco  Allergies  Meds  Problems  Med Hx  Surg Hx  Fam Hx       Past Medical History   Past Medical History:   Diagnosis Date    Anxiety     last assessed 05/14/2015    Chest tightness     Drug abuse (HCC)     including heroin    HIV disease (HCC)     Stomatitis     last assessed 05/14/15    Syphilis     2009,2012,2013 treated    TB (tuberculosis) 2009     History reviewed. No pertinent surgical history.  History reviewed. No pertinent family history.  Current Outpatient Medications on File Prior to Visit   Medication Sig Dispense Refill    Biktarvy -25 MG tablet TAKE 1 TABLET BY MOUTH DAILY 30 tablet 5     "Testosterone 1.62 % GEL APPLY 2 PUMPS ON THE SKIN EVERY MORNING AS DIRECTED 75 g 2    [DISCONTINUED] Contrave 8-90 MG TB12 TAKE TWO TABLETS BY MOUTH TWICE A  tablet 2     No current facility-administered medications on file prior to visit.     Allergies   Allergen Reactions    Codeine Confusion    Ibuprofen-Acetaminophen Hives      Current Outpatient Medications on File Prior to Visit   Medication Sig Dispense Refill    Biktarvy -25 MG tablet TAKE 1 TABLET BY MOUTH DAILY 30 tablet 5    Testosterone 1.62 % GEL APPLY 2 PUMPS ON THE SKIN EVERY MORNING AS DIRECTED 75 g 2    [DISCONTINUED] Contrave 8-90 MG TB12 TAKE TWO TABLETS BY MOUTH TWICE A  tablet 2     No current facility-administered medications on file prior to visit.      Social History     Tobacco Use    Smoking status: Former     Current packs/day: 0.00     Types: Cigarettes     Start date: 1988     Quit date: 2018     Years since quittin.3    Smokeless tobacco: Never   Vaping Use    Vaping status: Never Used   Substance and Sexual Activity    Alcohol use: No    Drug use: Not Currently    Sexual activity: Yes     Partners: Male     Birth control/protection: None       Objective     /78   Pulse 76   Temp 98.7 °F (37.1 °C)   Ht 6' 1\" (1.854 m)   Wt 91.1 kg (200 lb 12.8 oz)   SpO2 98%   BMI 26.49 kg/m²     Physical Exam  Constitutional:       General: He is not in acute distress.     Appearance: He is well-developed.   HENT:      Head: Normocephalic.      Right Ear: External ear normal.      Left Ear: External ear normal.      Nose: Nose normal.      Mouth/Throat:      Pharynx: No oropharyngeal exudate.   Eyes:      General:         Right eye: No discharge.         Left eye: No discharge.      Conjunctiva/sclera: Conjunctivae normal.      Pupils: Pupils are equal, round, and reactive to light.   Neck:      Thyroid: No thyromegaly.   Cardiovascular:      Rate and Rhythm: Normal rate and regular rhythm.      Heart " sounds: Normal heart sounds. No murmur heard.  Pulmonary:      Effort: Pulmonary effort is normal.      Breath sounds: Normal breath sounds. No wheezing.   Abdominal:      General: Bowel sounds are normal.      Palpations: Abdomen is soft. There is no mass.      Tenderness: There is no abdominal tenderness.   Musculoskeletal:         General: No tenderness. Normal range of motion.      Cervical back: Normal range of motion.   Lymphadenopathy:      Cervical: No cervical adenopathy.   Skin:     General: Skin is warm and dry.      Findings: No rash.   Neurological:      Mental Status: He is alert and oriented to person, place, and time.   Psychiatric:         Behavior: Behavior normal.       Administrative Statements   I have spent a total time of 30   minutes in caring for this patient on the day of the visit/encounter including Diagnostic results, Prognosis, Risks and benefits of tx options, Instructions for management, Patient and family education, Impressions, and Counseling / Coordination of care.

## 2024-07-10 NOTE — ASSESSMENT & PLAN NOTE
Discontinuing Contrave at this time.  Educated patient on weaning off medication.  Contact clinic if he is unable to tolerate discontinuing the medications and symptoms return.

## 2024-07-10 NOTE — DENTAL PROCEDURE DETAILS
3 Month Periodontal Maintenance     REVIEWED MED HX: meds, allergies, health changes reviewed in EPIC  CHIEF CONCERN:  none   PAIN SCALE:  0  ASA CLASS:  ASA 2 - Patient with mild systemic disease with no functional limitations  PLAQUE:  moderate  CALCULUS:  Light  BLEEDING:  light  STAIN :   None  PERIO: gums appear stable    Hygiene Procedures: Scaled, Polished, Flossed and Used Cavitron    Oral Hygiene Instruction: Brushing Minimum 2x daily for 2 minutes, daily flossing    Visual and Tactile Intraoral/ Extraoral evaluation: Oral and Oropharyngeal cancer evaluation. No findings     REFERRALS: none    EXAM: Dr. Lopez  NV- 15-MO replace (redo/ unsealed margins)     CARIES FINDINGS: no decay    Next Recall: 3 month perio maintenance/periodic exam    Last BWX: 3/4/24  Last Panorex/ FMX : 11/9/22

## 2024-07-10 NOTE — ASSESSMENT & PLAN NOTE
"No results found for: \"JF2VRBT\"  CD4 ABS   Date/Time Value Ref Range Status   2024 09:31  359 - 1519 /uL Final     HIV-1 RNA by PCR, Qn   Date/Time Value Ref Range Status   2023 07:27 AM <20 copies/mL Final     Comment:     HIV-1 RNA detected  The reportable range for this assay is 20 to 10,000,000  copies HIV-1 RNA/mL.     HIV-1 RNA Viral Load Log   Date/Time Value Ref Range Status   2023 07:27 AM COMMENT [id number]/mL Final     Comment:     Unable to calculate result since non-numeric result obtained for  component test.         ART: Biktarvy        Denies side effects. Stressed the importance of adherence.  Continue follow up with ID clinic.       Reviewed most recent labs, including Cd4 and viral load. Discussed the risks and benefits of treatment options, instructions for management, importance of treatment adherence, and reduction of risk factor.Educated on possible  medication side effects.  Reviewed last ID visit.  Collaborated with ID to optimize medical treatment.    Counseled on routes of HIV transmission, including the risk of  infection. Emphasized that viral suppression is the best method to prevent HIV transmission.  At this time pt.denies the need for HIV testing of anyone in their life.     Total encounter time was 45 minutes. Greater then 20 minutes were spent on counseling and patient education. Pt voices understanding and agreement with treatment plan.      "

## 2024-07-10 NOTE — PROGRESS NOTES
3 Month Periodontal Maintenance     REVIEWED MED HX: meds, allergies, health changes reviewed in EPIC  CHIEF CONCERN:  none   PAIN SCALE:  0  ASA CLASS:  ASA 2 - Patient with mild systemic disease with no functional limitations  PLAQUE:  {Plaque Level:24009}  CALCULUS:  {Calculus:75638}  BLEEDING:  {Bleedin}  STAIN :   {Stain:}  PERIO:     Hygiene Procedures: Scaled, Polished, Flossed and Used Cavitron    Oral Hygiene Instruction: Brushing Minimum 2x daily for 2 minutes, daily flossing    Visual and Tactile Intraoral/ Extraoral evaluation: Oral and Oropharyngeal cancer evaluation. No findings     REFERRALS: none    EXAM: Dr. Lopez    CARIES FINDINGS:     Next Recall: 3 month perio maintenance/periodic exam    Last BWX:   Last Panorex/ FMX :

## 2024-07-11 ENCOUNTER — PATIENT OUTREACH (OUTPATIENT)
Dept: SURGERY | Facility: CLINIC | Age: 54
End: 2024-07-11

## 2024-07-15 ENCOUNTER — PATIENT OUTREACH (OUTPATIENT)
Dept: SURGERY | Facility: CLINIC | Age: 54
End: 2024-07-15

## 2024-07-15 NOTE — PROGRESS NOTES
Robbin met with ct to complete the recert, RW sliding fee scale application, and assessment. Ct reported that he is taking his medication consistently but recently had a viral count of 26. Ct reported that he does not drink or use drugs and has been with his partner for years. Ct reports that they are monogamous. Ct reports that he does not use protection with his partner. Ct reported that he received a packet in the mail and assumed it had to do with the recert. Cm encouraged ct to take a look at the packet again because it may be about his MA. After the appointment ct reached out to robbin to explain that the packet was from MA and that he was do for a renewal. Ct requested an appointment with robbin, appointment scheduled for July 22nd 11AM.

## 2024-07-25 ENCOUNTER — PATIENT OUTREACH (OUTPATIENT)
Dept: SURGERY | Facility: CLINIC | Age: 54
End: 2024-07-25

## 2024-07-29 ENCOUNTER — TELEPHONE (OUTPATIENT)
Dept: SURGERY | Facility: CLINIC | Age: 54
End: 2024-07-29
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
## 2024-07-29 NOTE — TELEPHONE ENCOUNTER
I will place the order. Edel Please call him and let him know he can go to any [facility]'s lab and complete it.

## 2024-08-08 ENCOUNTER — PATIENT OUTREACH (OUTPATIENT)
Dept: SURGERY | Facility: CLINIC | Age: 54
End: 2024-08-08

## 2024-08-08 NOTE — PROGRESS NOTES
Ct reached out to cm to inform her that he received the letter in the mail confirming that he was approved for MA and provided cm with a photo of the letter he received. See media for letter.

## 2024-08-21 ENCOUNTER — OFFICE VISIT (OUTPATIENT)
Dept: DENTISTRY | Facility: CLINIC | Age: 54
End: 2024-08-21

## 2024-08-21 VITALS — SYSTOLIC BLOOD PRESSURE: 117 MMHG | HEART RATE: 78 BPM | DIASTOLIC BLOOD PRESSURE: 78 MMHG

## 2024-08-21 DIAGNOSIS — K02.62 DENTAL CARIES EXTENDING INTO DENTIN: Primary | ICD-10-CM

## 2024-08-21 PROCEDURE — WIS2002 PR RESTORE REDO <1YR: Performed by: DENTIST

## 2024-08-21 NOTE — DENTAL PROCEDURE DETAILS
Patient presents for a dental restoration and verbally consents for treatment:  Reviewed health history-  Pt is ASA type III  Treatment consents signed: Yes  Perio: plaque  Pain Scale:0  Caries Assessment: high  Radiographs: Films are current  Oral Hygiene instruction reviewed and given  Hygiene recall visits recommended to the patient  Oral cancer screening done and no pathology noted on ROM, FOM , Palate,soft tissue or tongue.    Pt disclosed that tooth #15 filling that was done last time is loose. It was explained to pt that he would need to replace the filling. Patient agrees with the diagnosis and the proposed treatment plan for the resin restoration:  Tooth #15-MO  Discussed with patient need for RCT if pulp exposure occurs or in the future if pulp is inflamed. Pt understands and consents.    Dental Anesthesia: 1 Carpule 2% Lidocaine 1:100K epi infiltrations. Removed the loose restoration.  Excavated caries with high speed and round bur on slow speed.  Material:  Selective etch and rinsed. Ivoclar pak placed and EvoCeram resin placed and cured.  Shade: A2  Polished with finishing burs and Enhance. Occlusion adjusted and contact good and adequate.   Gave post op instructions to avoid chewing till numbness goes away.    Recommended full coverage crown on #14 due to large restoration and to prevent fracture.     All questions answered. Pt left satisfied and in good health.    Prognosis is Good.   Referrals Needed: No      Next visit: recall    Roger

## 2024-08-26 DIAGNOSIS — R79.89 LOW TESTOSTERONE IN MALE: ICD-10-CM

## 2024-08-26 DIAGNOSIS — R68.82 LOW LIBIDO: ICD-10-CM

## 2024-08-27 RX ORDER — TESTOSTERONE 20.25 MG/1.25G
GEL TOPICAL
Qty: 75 G | Refills: 2 | Status: SHIPPED | OUTPATIENT
Start: 2024-08-27

## 2024-10-14 NOTE — PROGRESS NOTES
Periodic Exam and 3 Month Periodontal Maintenance     REVIEWED MED HX: meds, allergies, health changes reviewed in EPIC  CHIEF CONCERN:  none   PAIN SCALE:  0  ASA CLASS:  ASA 2  PLAQUE:  {Plaque Level:21446}  CALCULUS:  {Calculus:36763}  BLEEDING:  {Bleedin}  STAIN :   {Stain:}  PERIO:     Hygiene Procedures: Scaled, Polished, Flossed and Used Cavitron    Oral Hygiene Instruction: Brushing Minimum 2x daily for 2 minutes, daily flossing and Reviewed dietary precautions    Visual and Tactile Intraoral/ Extraoral evaluation: Oral and Oropharyngeal cancer evaluation. No findings     REFERRALS: none    EXAM: {perio maintenance exam:31832}    CARIES FINDINGS: #2-O, #29-O  NV: restorative   Next Recall: 3 month perio maintenance    Last BWX: 3/4/24  Last Panorex/ FMX : 22

## 2024-10-15 ENCOUNTER — OFFICE VISIT (OUTPATIENT)
Dept: DENTISTRY | Facility: CLINIC | Age: 54
End: 2024-10-15

## 2024-10-15 VITALS — HEART RATE: 78 BPM | SYSTOLIC BLOOD PRESSURE: 157 MMHG | DIASTOLIC BLOOD PRESSURE: 81 MMHG

## 2024-10-15 DIAGNOSIS — K05.6 PERIODONTAL DISEASE: Primary | ICD-10-CM

## 2024-10-15 PROCEDURE — D4910 PERIODONTAL MAINTENANCE: HCPCS

## 2024-10-15 PROCEDURE — D0120 PERIODIC ORAL EVALUATION - ESTABLISHED PATIENT: HCPCS

## 2024-10-15 NOTE — DENTAL PROCEDURE DETAILS
Periodic Exam and 3 Month Periodontal Maintenance    REVIEWED MED HX: meds, allergies, health changes reviewed in EPIC  CHIEF CONCERN:  none   PAIN SCALE:  0  ASA CLASS:  ASA 2  PLAQUE:  mild  CALCULUS:  Light  BLEEDING:  light  STAIN :   None  PERIO: perio appear stable    Hygiene Procedures: Scaled, Polished, Flossed and Used Cavitron    Oral Hygiene Instruction: Brushing Minimum 2x daily for 2 minutes, daily flossing and Reviewed dietary precautions    Visual and Tactile Intraoral/ Extraoral evaluation: Oral and Oropharyngeal cancer evaluation. No findings     REFERRALS: none    EXAM: Dr. Castañeda    CARIES FINDINGS: no decay noted    Next Recall: 3 month perio maintenance    Last BWX: 3/4/24  Last Panorex/ FMX : 11/9/22

## 2024-11-22 DIAGNOSIS — R79.89 LOW TESTOSTERONE IN MALE: ICD-10-CM

## 2024-11-22 DIAGNOSIS — R68.82 LOW LIBIDO: ICD-10-CM

## 2024-11-22 RX ORDER — TESTOSTERONE 20.25 MG/1.25G
GEL TOPICAL
Qty: 75 G | Refills: 2 | Status: SHIPPED | OUTPATIENT
Start: 2024-11-22

## 2024-12-02 ENCOUNTER — TELEPHONE (OUTPATIENT)
Dept: SURGERY | Facility: CLINIC | Age: 54
End: 2024-12-02

## 2024-12-02 NOTE — TELEPHONE ENCOUNTER
Pt called and asked for a referral for physical therapy. His right knee is popping and cracking when he works out.

## 2024-12-04 DIAGNOSIS — M25.561 ACUTE PAIN OF RIGHT KNEE: Primary | ICD-10-CM

## 2024-12-12 ENCOUNTER — EVALUATION (OUTPATIENT)
Dept: PHYSICAL THERAPY | Facility: CLINIC | Age: 54
End: 2024-12-12
Payer: COMMERCIAL

## 2024-12-12 DIAGNOSIS — M25.561 ACUTE PAIN OF RIGHT KNEE: ICD-10-CM

## 2024-12-12 DIAGNOSIS — M22.2X1 PATELLOFEMORAL PAIN SYNDROME OF RIGHT KNEE: Primary | ICD-10-CM

## 2024-12-12 PROCEDURE — 97162 PT EVAL MOD COMPLEX 30 MIN: CPT | Performed by: PHYSICAL THERAPIST

## 2024-12-12 PROCEDURE — 97110 THERAPEUTIC EXERCISES: CPT | Performed by: PHYSICAL THERAPIST

## 2024-12-12 NOTE — PROGRESS NOTES
PT Evaluation     Today's date: 2024  Patient name: Cresencio Reyes  : 1970  MRN: [id number]  Referring provider: Amelie Trujillo CRNP  Dx:   Encounter Diagnosis     ICD-10-CM    1. Acute pain of right knee  M25.561 Ambulatory Referral to Physical Therapy                     Assessment  Impairments: abnormal or restricted ROM, activity intolerance, impaired physical strength, pain with function and weight-bearing intolerance    Assessment details: Signs and symptoms are consistent with right PFPS due to decreased knee ROM, impaired RLE strength and genu valgum during tap downs.  The patient has difficulty with squatting and side to side movements and this is limiting his ability to complete work activities and to exercise. The patient has slight limitations in his knee ROM so this will be the first thing we focus on as we begin PT.  As this improves we will then focus on improving the patients hip and knee strength to better support his RLE alignment during functional activities. The patient would benefit from PT to help restore ROM, strength and help with proper LE alignment.      Understanding of Dx/Px/POC: excellent     Prognosis: excellent    Goals  STG : (2 weeks) - Patient demonstrates independence with HEP to be able to transition to HEP in the long term.  (4 weeks) - Decrease patient's pain by 50%.      LTG: (6 weeks) - Improve knee flexion ROM to greater than 140.  (6 weeks) - Improve patellar stability to WFL.    (8 weeks) - Improve patient's RLE strength to at least 4/5 to allow the patient to squat without pain.    Plan    Planned therapy interventions: therapeutic activities, therapeutic exercise, manual therapy, neuromuscular re-education and gait training    Frequency: 2x week  Duration in weeks: 8        Subjective Evaluation    History of Present Illness  Mechanism of injury: The patient reports that about a year ago he started to notice pain in the R knee that would be on and off.   Recently the pain has been more consistent and he always notices it.  He is an avid gym goer and he wants to keep working out regularly.  He notices pain in the knee with side to side movements and he will feel pain in the medial and lateral knee with this.  He also notices that when he squats that will feel pain underneath his knee cap.  It does not matter the depth of his squats.  He states that the leg press, hamstring curls, and knee extensions do not bother him and its mainly just squats and shifting his weight side to side.  The patient denies any numbness or tingling.  He will take tylenol for his knee depending on how it feels and he will put aspercreme on his knee.  The patient notices that during the summer when he is doing more yoga moves the knee does feel better but bending his knee tight towards his chest is giving him issues and when he stretches his hip into ER his knee will bother him.    Patient Goals  Patient goal: The patient wants to be able to continue to workout and he wants to avoid knee surgery.  Pain  Current pain ratin  At worst pain ratin  Location: R knee  Quality: tight and discomfort          Objective     Active Range of Motion   Left Knee   Flexion: 141 degrees   Extension: 3 degrees     Right Knee   Flexion: 136 degrees   Extension: 0 degrees     Mobility   Patellar Mobility:   Left Knee   WFL: medial, lateral, superior and inferior.     Right Knee   WFL: superior and inferior  Hypermobile: medial and lateral     Strength/Myotome Testing     Left Hip   Planes of Motion   Extension: 4  Abduction: 4-  External rotation: 4+  Internal rotation: 4+    Right Hip   Planes of Motion   Extension: 4  Abduction: 3+  External rotation: 4-  Internal rotation: 4-    Left Knee   Flexion: 4+  Extension: 4+    Right Knee   Flexion: 4  Extension: 4+    Functional Assessment      Squat    Left within functional limits and right within functional limits.     Single Leg Squat   Left Leg  Within  "functional limits.     Right Leg  Pain.     Forward Step Down 8\"   Left Leg  Within functional limits.     Right Leg  Pain and valgus.              Precautions:      POC Expires Auth Status Start Date Expiration Date PT Visit Limit           Date 12/12       Used 1       Remaining           Diagnosis:    Precautions:    Manuals 12/12                                               There Ex        S/L clams GTB 2x10       S/L reverse clams GTB 2x10       S/L hip ABD GTB 2x10                                                       Neuro Re-Ed                                                                                                                 Ther Act                                         Modalities                                                              "

## 2024-12-13 ENCOUNTER — TELEPHONE (OUTPATIENT)
Dept: SURGERY | Facility: CLINIC | Age: 54
End: 2024-12-13

## 2024-12-13 NOTE — TELEPHONE ENCOUNTER
Pt left voice mail c/o left eye twitching x past few months and would like to know who he should see for this.

## 2024-12-13 NOTE — TELEPHONE ENCOUNTER
Let pt know this is a harmless condition brought on by stress. He can follow up with  the eye doctor.

## 2024-12-17 ENCOUNTER — OFFICE VISIT (OUTPATIENT)
Dept: PHYSICAL THERAPY | Facility: CLINIC | Age: 54
End: 2024-12-17
Payer: COMMERCIAL

## 2024-12-17 DIAGNOSIS — M22.2X1 PATELLOFEMORAL PAIN SYNDROME OF RIGHT KNEE: ICD-10-CM

## 2024-12-17 DIAGNOSIS — M25.561 ACUTE PAIN OF RIGHT KNEE: Primary | ICD-10-CM

## 2024-12-17 PROCEDURE — 97530 THERAPEUTIC ACTIVITIES: CPT | Performed by: PHYSICAL THERAPIST

## 2024-12-17 PROCEDURE — 97110 THERAPEUTIC EXERCISES: CPT | Performed by: PHYSICAL THERAPIST

## 2024-12-17 NOTE — PROGRESS NOTES
"Daily Note     Today's date: 2024  Patient name: Cresencio Reyes  : 1970  MRN: [id number]  Referring provider: Amelie Trujillo CRNP  Dx:   Encounter Diagnosis     ICD-10-CM    1. Acute pain of right knee  M25.561       2. Patellofemoral pain syndrome of right knee  M22.2X1                      Subjective: The patient reports that the green band at home is humbling during his HEP.        Objective: See treatment diary below      Assessment: Tolerated treatment well. Patient demonstrated fatigue post treatment and would benefit from continued PT    The patient did well for his first treatment session.  He did not seem to have any knee pain today but he did fatigue as the session went on.  We worked on his knee control during tap downs and this was one of his more difficult exercises.  The patient stated that he can already tell his knee is more stable.    Plan: Continue per plan of care.      Precautions:      POC Expires Auth Status Start Date Expiration Date PT Visit Limit           Date       Used 1 2      Remaining           Diagnosis:    Precautions:    Manuals       R knee PROM  AB                                      There Ex        S/L clams GTB 2x10 GTB 2x10      S/L reverse clams GTB 2x10 GTB 2x10      S/L hip ABD GTB 2x10 GTB 2x10      Bridge W/ band  GTB 2x10      SL bridge  2x10ea                                      Neuro Re-Ed                                                                                                                 Ther Act              Tap downs    6\" 2x10         Donkey Kicks  12.5# x20ea      Lunge with hip ER        SL leg press         X walks    GTB 40' 2 laps         Modalities                                                              "

## 2024-12-18 DIAGNOSIS — Z21 HIV (HUMAN IMMUNODEFICIENCY VIRUS INFECTION) (HCC): ICD-10-CM

## 2024-12-19 ENCOUNTER — APPOINTMENT (OUTPATIENT)
Dept: LAB | Facility: CLINIC | Age: 54
End: 2024-12-19
Payer: COMMERCIAL

## 2024-12-19 ENCOUNTER — TRANSCRIBE ORDERS (OUTPATIENT)
Dept: LAB | Facility: CLINIC | Age: 54
End: 2024-12-19

## 2024-12-19 ENCOUNTER — OFFICE VISIT (OUTPATIENT)
Dept: PHYSICAL THERAPY | Facility: CLINIC | Age: 54
End: 2024-12-19
Payer: COMMERCIAL

## 2024-12-19 DIAGNOSIS — M22.2X1 PATELLOFEMORAL PAIN SYNDROME OF RIGHT KNEE: ICD-10-CM

## 2024-12-19 DIAGNOSIS — B35.1 DERMATOPHYTOSIS OF NAIL: Primary | ICD-10-CM

## 2024-12-19 DIAGNOSIS — R79.89 LOW TESTOSTERONE IN MALE: ICD-10-CM

## 2024-12-19 DIAGNOSIS — B35.1 DERMATOPHYTOSIS OF NAIL: ICD-10-CM
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
## 2024-12-19 DIAGNOSIS — R68.82 LOW LIBIDO: ICD-10-CM

## 2024-12-19 DIAGNOSIS — M25.561 ACUTE PAIN OF RIGHT KNEE: Primary | ICD-10-CM

## 2024-12-19 LAB
ALBUMIN SERPL BCG-MCNC: 4.4 G/DL (ref 3.5–5)
ALP SERPL-CCNC: 57 U/L (ref 34–104)
ALT SERPL W P-5'-P-CCNC: 38 U/L (ref 7–52)
ANION GAP SERPL CALCULATED.3IONS-SCNC: 7 MMOL/L (ref 4–13)
AST SERPL W P-5'-P-CCNC: 27 U/L (ref 13–39)
B BURGDOR IGG+IGM SER QL IA: NEGATIVE
BASOPHILS # BLD AUTO: 0.05 THOUSANDS/ÂΜL (ref 0–0.1)
BASOPHILS NFR BLD AUTO: 1 % (ref 0–1)
BILIRUB DIRECT SERPL-MCNC: 0.15 MG/DL (ref 0–0.2)
BILIRUB SERPL-MCNC: 0.69 MG/DL (ref 0.2–1)
BUN SERPL-MCNC: 20 MG/DL (ref 5–25)
CALCIUM SERPL-MCNC: 9.6 MG/DL (ref 8.4–10.2)
CHLORIDE SERPL-SCNC: 103 MMOL/L (ref 96–108)
CO2 SERPL-SCNC: 27 MMOL/L (ref 21–32)
CREAT SERPL-MCNC: 1.13 MG/DL (ref 0.6–1.3)
EOSINOPHIL # BLD AUTO: 0.07 THOUSAND/ÂΜL (ref 0–0.61)
EOSINOPHIL NFR BLD AUTO: 1 % (ref 0–6)
ERYTHROCYTE [DISTWIDTH] IN BLOOD BY AUTOMATED COUNT: 12.9 % (ref 11.6–15.1)
GFR SERPL CREATININE-BSD FRML MDRD: 74 ML/MIN/1.73SQ M
GLUCOSE P FAST SERPL-MCNC: 75 MG/DL (ref 65–99)
HCT VFR BLD AUTO: 47.3 % (ref 36.5–49.3)
HCV AB SER QL: NORMAL
HGB BLD-MCNC: 15.6 G/DL (ref 12–17)
IMM GRANULOCYTES # BLD AUTO: 0.02 THOUSAND/UL (ref 0–0.2)
IMM GRANULOCYTES NFR BLD AUTO: 0 % (ref 0–2)
LYMPHOCYTES # BLD AUTO: 1.78 THOUSANDS/ÂΜL (ref 0.6–4.47)
LYMPHOCYTES NFR BLD AUTO: 29 % (ref 14–44)
MCH RBC QN AUTO: 31.2 PG (ref 26.8–34.3)
MCHC RBC AUTO-ENTMCNC: 33 G/DL (ref 31.4–37.4)
MCV RBC AUTO: 95 FL (ref 82–98)
MONOCYTES # BLD AUTO: 0.6 THOUSAND/ÂΜL (ref 0.17–1.22)
MONOCYTES NFR BLD AUTO: 10 % (ref 4–12)
NEUTROPHILS # BLD AUTO: 3.64 THOUSANDS/ÂΜL (ref 1.85–7.62)
NEUTS SEG NFR BLD AUTO: 59 % (ref 43–75)
NRBC BLD AUTO-RTO: 0 /100 WBCS
PLATELET # BLD AUTO: 274 THOUSANDS/UL (ref 149–390)
PMV BLD AUTO: 10.5 FL (ref 8.9–12.7)
POTASSIUM SERPL-SCNC: 4.6 MMOL/L (ref 3.5–5.3)
PROT SERPL-MCNC: 7 G/DL (ref 6.4–8.4)
RBC # BLD AUTO: 5 MILLION/UL (ref 3.88–5.62)
SODIUM SERPL-SCNC: 137 MMOL/L (ref 135–147)
WBC # BLD AUTO: 6.16 THOUSAND/UL (ref 4.31–10.16)

## 2024-12-19 PROCEDURE — 82248 BILIRUBIN DIRECT: CPT

## 2024-12-19 PROCEDURE — 84403 ASSAY OF TOTAL TESTOSTERONE: CPT

## 2024-12-19 PROCEDURE — 97530 THERAPEUTIC ACTIVITIES: CPT | Performed by: PHYSICAL THERAPIST

## 2024-12-19 PROCEDURE — 36415 COLL VENOUS BLD VENIPUNCTURE: CPT

## 2024-12-19 PROCEDURE — 86618 LYME DISEASE ANTIBODY: CPT

## 2024-12-19 PROCEDURE — 84402 ASSAY OF FREE TESTOSTERONE: CPT

## 2024-12-19 PROCEDURE — 97110 THERAPEUTIC EXERCISES: CPT | Performed by: PHYSICAL THERAPIST

## 2024-12-19 PROCEDURE — 97140 MANUAL THERAPY 1/> REGIONS: CPT | Performed by: PHYSICAL THERAPIST

## 2024-12-19 NOTE — PROGRESS NOTES
"Daily Note     Today's date: 2024  Patient name: Cresencio Reyes  : 1970  MRN: [id number]  Referring provider: Amelie Trujillo CRNP  Dx:   Encounter Diagnosis     ICD-10-CM    1. Acute pain of right knee  M25.561       2. Patellofemoral pain syndrome of right knee  M22.2X1                      Subjective: The patient reports that he could feel his knee after last session but it was not really sore.        Objective: See treatment diary below      Assessment: Tolerated treatment well. Patient demonstrated fatigue post treatment and would benefit from continued PT    The patient tolerated last session well so I continued to add in new exercises today.  Monster walks and a lunge with ER were both added in today.  The monster walks required slightly more cueing but overall he did very well with both new exercises.      Plan: Continue per plan of care.      Precautions:      POC Expires Auth Status Start Date Expiration Date PT Visit Limit           Date      Used 1 2 3     Remaining           Diagnosis:    Precautions:    Manuals      R knee PROM  AB AB                                     There Ex        S/L clams GTB 2x10 GTB 2x10 GTB 2x10      S/L reverse clams GTB 2x10 GTB 2x10 GTB 2x10     S/L hip ABD GTB 2x10 GTB 2x10 GTB 2x10     Bridge W/ band  GTB 2x10 GTB 2x10     SL bridge  2x10ea                                      Neuro Re-Ed                                                                                                                 Ther Act              Tap downs    6\" 2x10 6\" 2x10       Donkey Kicks  12.5# x20ea 12.5# x20ea     Lunge with hip ER   BTB 2x10ea     Monster walks   GTB 40' 2 laps     SL leg press         X walks    GTB 40' 2 laps  GTB 40' 2 laps       Modalities                                                                "

## 2024-12-20 ENCOUNTER — RESULTS FOLLOW-UP (OUTPATIENT)
Dept: SURGERY | Facility: CLINIC | Age: 54
End: 2024-12-20

## 2024-12-20 ENCOUNTER — APPOINTMENT (OUTPATIENT)
Dept: LAB | Facility: CLINIC | Age: 54
End: 2024-12-20
Payer: COMMERCIAL

## 2024-12-20 LAB
C TRACH DNA SPEC QL NAA+PROBE: NEGATIVE
HIV1 RNA # PLAS NAA DL=20: ABNORMAL {COPIES}/ML
N GONORRHOEA DNA SPEC QL NAA+PROBE: NEGATIVE
RPR SER QL: REACTIVE
RPR SER-TITR: ABNORMAL {TITER}

## 2024-12-20 RX ORDER — BICTEGRAVIR SODIUM, EMTRICITABINE, AND TENOFOVIR ALAFENAMIDE FUMARATE 50; 200; 25 MG/1; MG/1; MG/1
1 TABLET ORAL DAILY
Qty: 30 TABLET | Refills: 5 | Status: SHIPPED | OUTPATIENT
Start: 2024-12-20

## 2024-12-21 LAB
BASOPHILS # BLD AUTO: 0 X10E3/UL (ref 0–0.2)
BASOPHILS NFR BLD AUTO: 1 %
CD3+CD4+ CELLS # BLD: 518 /UL (ref 359–1519)
CD3+CD4+ CELLS NFR BLD: 25.9 % (ref 30.8–58.5)
CD3+CD4+ CELLS/CD3+CD8+ CLL BLD: 0.73 % (ref 0.92–3.72)
CD3+CD8+ CELLS # BLD: 706 /UL (ref 109–897)
CD3+CD8+ CELLS NFR BLD: 35.3 % (ref 12–35.5)
EOSINOPHIL # BLD AUTO: 0.1 X10E3/UL (ref 0–0.4)
EOSINOPHIL NFR BLD AUTO: 2 %
ERYTHROCYTE [DISTWIDTH] IN BLOOD BY AUTOMATED COUNT: 12.8 % (ref 11.6–15.4)
HCT VFR BLD AUTO: 48.9 % (ref 37.5–51)
HGB BLD-MCNC: 15.7 G/DL (ref 13–17.7)
IMM GRANULOCYTES # BLD: 0 X10E3/UL (ref 0–0.1)
IMM GRANULOCYTES NFR BLD: 0 %
LYMPHOCYTES # BLD AUTO: 2 X10E3/UL (ref 0.7–3.1)
LYMPHOCYTES NFR BLD AUTO: 35 %
MCH RBC QN AUTO: 31 PG (ref 26.6–33)
MCHC RBC AUTO-ENTMCNC: 32.1 G/DL (ref 31.5–35.7)
MCV RBC AUTO: 97 FL (ref 79–97)
MONOCYTES # BLD AUTO: 0.6 X10E3/UL (ref 0.1–0.9)
MONOCYTES NFR BLD AUTO: 10 %
NEUTROPHILS # BLD AUTO: 3 X10E3/UL (ref 1.4–7)
NEUTROPHILS NFR BLD AUTO: 52 %
PLATELET # BLD AUTO: 261 X10E3/UL (ref 150–450)
RBC # BLD AUTO: 5.06 X10E6/UL (ref 4.14–5.8)
TESTOST FREE SERPL-MCNC: 12.7 PG/ML (ref 7.2–24)
TESTOST SERPL-MCNC: 1081 NG/DL (ref 264–916)
WBC # BLD AUTO: 5.8 X10E3/UL (ref 3.4–10.8)

## 2024-12-24 ENCOUNTER — OFFICE VISIT (OUTPATIENT)
Dept: PHYSICAL THERAPY | Facility: CLINIC | Age: 54
End: 2024-12-24
Payer: COMMERCIAL

## 2024-12-24 DIAGNOSIS — M22.2X1 PATELLOFEMORAL PAIN SYNDROME OF RIGHT KNEE: ICD-10-CM

## 2024-12-24 DIAGNOSIS — M25.561 ACUTE PAIN OF RIGHT KNEE: Primary | ICD-10-CM

## 2024-12-24 PROCEDURE — 97110 THERAPEUTIC EXERCISES: CPT | Performed by: PHYSICAL THERAPIST

## 2024-12-24 PROCEDURE — 97530 THERAPEUTIC ACTIVITIES: CPT | Performed by: PHYSICAL THERAPIST

## 2024-12-24 PROCEDURE — 97140 MANUAL THERAPY 1/> REGIONS: CPT | Performed by: PHYSICAL THERAPIST

## 2024-12-24 NOTE — PROGRESS NOTES
Daily Note     Today's date: 2024  Patient name: Cresencio Reyes  : 1970  MRN: [id number]  Referring provider: Amelie Trujillo CRNP  Dx:   Encounter Diagnosis     ICD-10-CM    1. Acute pain of right knee  M25.561       2. Patellofemoral pain syndrome of right knee  M22.2X1                      Opened in error.  See above note.

## 2024-12-24 NOTE — PROGRESS NOTES
"Daily Note     Today's date: 2024  Patient name: Cresencio Reyes  : 1970  MRN: [id number]  Referring provider: Amelie Trujillo CRNP  Dx:   Encounter Diagnosis     ICD-10-CM    1. Acute pain of right knee  M25.561       2. Patellofemoral pain syndrome of right knee  M22.2X1                      Subjective: The patient reports that his knee has been feeling good after PT.  He went to the gym yesterday and did legs.  He went light and did not have pain during but today he has a 1-2 pain.        Objective: See treatment diary below      Assessment: Tolerated treatment well. Patient demonstrated fatigue post treatment and would benefit from continued PT    The patient has been doing great with his HEP and I can see good improvements with his strength during the sessions.  Single leg leg press was added in today and the patient did a good job of maintaining good knee alignment.  I could tell that the exercise fatigued him.      Plan: Continue per plan of care.      Precautions:      POC Expires Auth Status Start Date Expiration Date PT Visit Limit           Date     Used 1 2 3 4    Remaining           Diagnosis:    Precautions:    Manuals     R knee PROM  AB AB AB                                    There Ex        S/L clams GTB 2x10 GTB 2x10 GTB 2x10  GTB 2x10     S/L reverse clams GTB 2x10 GTB 2x10 GTB 2x10 GTB 2x10     S/L hip ABD GTB 2x10 GTB 2x10 GTB 2x10 GTB 2x10     Bridge W/ band  GTB 2x10 GTB 2x10 GTB 2x10     SL bridge  2x10ea                                      Neuro Re-Ed        Grid reach                                                                                                         Ther Act              Tap downs    6\" 2x10 6\" 2x10       Donkey Kicks  12.5# x20ea 12.5# x20ea 12.5# x20ea    Lunge with hip ER   BTB 2x10ea BTB 2x10ea    Monster walks   GTB 40' 2 laps GTB 40' 2 laps    SL leg press    90# 2x10ea     X walks    GTB 40' 2 laps  " GTB 40' 2 laps  GTB 40' 2 laps     Modalities

## 2024-12-26 ENCOUNTER — OFFICE VISIT (OUTPATIENT)
Dept: PHYSICAL THERAPY | Facility: CLINIC | Age: 54
End: 2024-12-26
Payer: COMMERCIAL

## 2024-12-26 DIAGNOSIS — M22.2X1 PATELLOFEMORAL PAIN SYNDROME OF RIGHT KNEE: ICD-10-CM

## 2024-12-26 DIAGNOSIS — M25.561 ACUTE PAIN OF RIGHT KNEE: Primary | ICD-10-CM

## 2024-12-26 PROCEDURE — 97110 THERAPEUTIC EXERCISES: CPT | Performed by: PHYSICAL THERAPIST

## 2024-12-26 PROCEDURE — 97530 THERAPEUTIC ACTIVITIES: CPT | Performed by: PHYSICAL THERAPIST

## 2024-12-26 PROCEDURE — 97112 NEUROMUSCULAR REEDUCATION: CPT | Performed by: PHYSICAL THERAPIST

## 2024-12-26 NOTE — PROGRESS NOTES
"Daily Note     Today's date: 2024  Patient name: Cresencio Reyes  : 1970  MRN: [id number]  Referring provider: Amelie Trujillo CRNP  Dx:   Encounter Diagnosis     ICD-10-CM    1. Acute pain of right knee  M25.561       2. Patellofemoral pain syndrome of right knee  M22.2X1                      Subjective: The patient reports that he even did his HEP on isaiah.        Objective: See treatment diary below      Assessment: Tolerated treatment well. Patient demonstrated fatigue post treatment and would benefit from continued PT    Today we worked a little more on his balance along with hip and quad strengthening.  The patient could tell how much more stable he was on the L compared to the R during his grid reach exercise.  The patient is doing a much better job of being conscious of his knee alignment.      Plan: Continue per plan of care.      Precautions:      POC Expires Auth Status Start Date Expiration Date PT Visit Limit           Date    Used 1 2 3 4 5   Remaining           Diagnosis:    Precautions:    Manuals    R knee PROM  AB AB AB AB                                   There Ex        S/L clams GTB 2x10 GTB 2x10 GTB 2x10  GTB 2x10  GTB 2x10   S/L reverse clams GTB 2x10 GTB 2x10 GTB 2x10 GTB 2x10  GTB 2x10   S/L hip ABD GTB 2x10 GTB 2x10 GTB 2x10 GTB 2x10  GTB 2x10   Bridge W/ band  GTB 2x10 GTB 2x10 GTB 2x10  GTB 2x10   SL bridge  2x10ea                                      Neuro Re-Ed        Grid reach     x5ea   Bosu Squats     2x10                                                                                            Ther Act              Tap downs    6\" 2x10 6\" 2x10       Donkey Kicks  12.5# x20ea 12.5# x20ea 12.5# x20ea 12.5# x20ea   Lunge with hip ER   BTB 2x10ea BTB 2x10ea    Monster walks   GTB 40' 2 laps GTB 40' 2 laps    SL leg press    90# 2x10ea 90# 2x10ea    X walks    GTB 40' 2 laps  GTB 40' 2 laps  GTB 40' 2 laps  " GTB 40' 2 laps   Modalities

## 2024-12-30 ENCOUNTER — TELEPHONE (OUTPATIENT)
Dept: SURGERY | Facility: CLINIC | Age: 54
End: 2024-12-30

## 2024-12-30 ENCOUNTER — CLINICAL SUPPORT (OUTPATIENT)
Dept: SURGERY | Facility: CLINIC | Age: 54
End: 2024-12-30

## 2024-12-30 ENCOUNTER — DOCUMENTATION (OUTPATIENT)
Dept: SURGERY | Facility: CLINIC | Age: 54
End: 2024-12-30

## 2024-12-30 DIAGNOSIS — A53.9 SYPHILIS: Primary | ICD-10-CM

## 2024-12-30 NOTE — PROGRESS NOTES
Pt is receiving syphilis treatment. Injection # 1. Pt tolerated injection. Observed pt for 10 minutes.  Pt will returned for second injection on 01/07/2025

## 2024-12-31 ENCOUNTER — OFFICE VISIT (OUTPATIENT)
Dept: PHYSICAL THERAPY | Facility: CLINIC | Age: 54
End: 2024-12-31
Payer: COMMERCIAL

## 2024-12-31 DIAGNOSIS — B20 HUMAN IMMUNODEFICIENCY VIRUS (HIV) DISEASE (HCC): ICD-10-CM

## 2024-12-31 DIAGNOSIS — Z23 NEED FOR INFLUENZA VACCINATION: Primary | ICD-10-CM

## 2024-12-31 DIAGNOSIS — Z79.899 OTHER LONG TERM (CURRENT) DRUG THERAPY: Primary | ICD-10-CM

## 2024-12-31 DIAGNOSIS — M22.2X1 PATELLOFEMORAL PAIN SYNDROME OF RIGHT KNEE: ICD-10-CM

## 2024-12-31 DIAGNOSIS — Z13.6 ENCOUNTER FOR LIPID SCREENING FOR CARDIOVASCULAR DISEASE: ICD-10-CM

## 2024-12-31 DIAGNOSIS — Z13.1 SCREENING FOR DIABETES MELLITUS: ICD-10-CM

## 2024-12-31 DIAGNOSIS — Z13.220 ENCOUNTER FOR LIPID SCREENING FOR CARDIOVASCULAR DISEASE: ICD-10-CM

## 2024-12-31 DIAGNOSIS — M25.561 ACUTE PAIN OF RIGHT KNEE: Primary | ICD-10-CM

## 2024-12-31 PROCEDURE — 97530 THERAPEUTIC ACTIVITIES: CPT | Performed by: PHYSICAL THERAPIST

## 2024-12-31 PROCEDURE — 97140 MANUAL THERAPY 1/> REGIONS: CPT | Performed by: PHYSICAL THERAPIST

## 2024-12-31 PROCEDURE — 97110 THERAPEUTIC EXERCISES: CPT | Performed by: PHYSICAL THERAPIST

## 2024-12-31 NOTE — PROGRESS NOTES
"Daily Note     Today's date: 2024  Patient name: Cresencio Reyes  : 1970  MRN: [id number]  Referring provider: Amelie Trujillo CRNP  Dx:   Encounter Diagnosis     ICD-10-CM    1. Acute pain of right knee  M25.561       2. Patellofemoral pain syndrome of right knee  M22.2X1                      Subjective: The patient reports that yesterday his L knee started to bother him yesterday when he was walking around.  It was not really sore but he noticed it.      Objective: See treatment diary below      Assessment: Tolerated treatment well. Patient demonstrated fatigue post treatment and would benefit from continued PT    Today we added a more dynamic exercise to work on his balance and knee control.  After a few reps he got the hang of it and by the end he was doing really well so next session we will progress the weight.  Overall I can tell that his strength and hip control is improving.    Plan: Continue per plan of care.      Precautions:      POC Expires Auth Status Start Date Expiration Date PT Visit Limit           Date    Used 6 2 3 4 5   Remaining           Diagnosis:    Precautions:    Manuals    R knee PROM AB AB AB AB AB                                   There Ex        S/L clams GTB 2x10 GTB 2x10 GTB 2x10  GTB 2x10  GTB 2x10   S/L reverse clams GTB 2x10 GTB 2x10 GTB 2x10 GTB 2x10  GTB 2x10   S/L hip ABD GTB 2x10 GTB 2x10 GTB 2x10 GTB 2x10  GTB 2x10   Bridge W/ band GTB 2x10 GTB 2x10 GTB 2x10 GTB 2x10  GTB 2x10   SL bridge  2x10ea                                      Neuro Re-Ed        Grid reach x5ea    x5ea   Bosu Squats     2x10   Lunge on Bosu                                                                                         Ther Act              Tap downs    6\" 2x10 6\" 2x10       Donkey Kicks  12.5# x20ea 12.5# x20ea 12.5# x20ea 12.5# x20ea   Diag hops @ cables 12.5# x5 laps Progress      Lunge with hip ER BTB 2x10ea  BTB " 2x10ea BTB 2x10ea    Monster walks   GTB 40' 2 laps GTB 40' 2 laps    SL leg press 90# 2x10ea   90# 2x10ea 90# 2x10ea    X walks  GTB 40' 2 laps  GTB 40' 2 laps  GTB 40' 2 laps  GTB 40' 2 laps  GTB 40' 2 laps   Modalities

## 2025-01-02 ENCOUNTER — OFFICE VISIT (OUTPATIENT)
Dept: PHYSICAL THERAPY | Facility: CLINIC | Age: 55
End: 2025-01-02
Payer: COMMERCIAL

## 2025-01-02 DIAGNOSIS — M25.561 ACUTE PAIN OF RIGHT KNEE: Primary | ICD-10-CM

## 2025-01-02 DIAGNOSIS — M22.2X1 PATELLOFEMORAL PAIN SYNDROME OF RIGHT KNEE: ICD-10-CM

## 2025-01-02 PROCEDURE — 97530 THERAPEUTIC ACTIVITIES: CPT | Performed by: PHYSICAL THERAPIST

## 2025-01-02 PROCEDURE — 97112 NEUROMUSCULAR REEDUCATION: CPT | Performed by: PHYSICAL THERAPIST

## 2025-01-02 NOTE — PROGRESS NOTES
"Daily Note     Today's date: 2025  Patient name: Cresencio Reyes  : 1970  MRN: [id number]  Referring provider: Amelie Trujillo CRNP  Dx:   Encounter Diagnosis     ICD-10-CM    1. Acute pain of right knee  M25.561       2. Patellofemoral pain syndrome of right knee  M22.2X1           Start Time: 1115  Stop Time: 1145  Total time in clinic (min): 30 minutes    Subjective: The patient reports that he has noticed some discomfort in the R knee when descending the stairs.       Objective: See treatment diary below      Assessment: Tolerated treatment well. Patient demonstrated fatigue post treatment and would benefit from continued PT. Session focused on hip strengthening and eccentric quad strengthening exercises. Despite reports of pain during stair descent at home, patient able to perform forward and lateral tap downs without increased discomfort this session. Patient has fatigue post session. Will benefit from continued PT in order to return to PLOF.     Plan: Continue per plan of care.      Precautions:      POC Expires Auth Status Start Date Expiration Date PT Visit Limit           Date    Used 6 7 3 4 5   Remaining           Diagnosis:    Precautions:    Manuals    R knee PROM AB  AB AB AB                                   There Ex        S/L clams GTB 2x10 Standing clam GTB 2x10 ea GTB 2x10  GTB 2x10  GTB 2x10   S/L reverse clams GTB 2x10  GTB 2x10 GTB 2x10  GTB 2x10   S/L hip ABD GTB 2x10  GTB 2x10 GTB 2x10  GTB 2x10   Bridge W/ band GTB 2x10  GTB 2x10 GTB 2x10  GTB 2x10   SL bridge  2x10 ea                                      Neuro Re-Ed        Grid reach x5ea    x5ea   Bosu Squats     2x10   Lunge on Bosu                                                                                         Ther Act             Tap downs   6\" 2x10 fwd & lat ea 6\" 2x10       Donkey Kicks   12.5# x20ea 12.5# x20ea 12.5# x20ea   Diag hops @ cables 12.5# x5 laps  "      Lunge with hip ER BTB 2x10ea  BTB 2x10ea BTB 2x10ea    Monster walks   GTB 40' 2 laps GTB 40' 2 laps    SL leg press 90# 2x10ea 105# 2x10 ea- focus on eccentric phase  90# 2x10ea 90# 2x10ea    X walks  GTB 40' 2 laps GTB 40' 2 laps  GTB 40' 2 laps  GTB 40' 2 laps  GTB 40' 2 laps   Modalities

## 2025-01-07 ENCOUNTER — OFFICE VISIT (OUTPATIENT)
Dept: SURGERY | Facility: CLINIC | Age: 55
End: 2025-01-07
Payer: COMMERCIAL

## 2025-01-07 ENCOUNTER — APPOINTMENT (OUTPATIENT)
Dept: PHYSICAL THERAPY | Facility: CLINIC | Age: 55
End: 2025-01-07
Payer: COMMERCIAL

## 2025-01-07 VITALS
WEIGHT: 205.4 LBS | TEMPERATURE: 97.8 F | SYSTOLIC BLOOD PRESSURE: 131 MMHG | BODY MASS INDEX: 27.1 KG/M2 | DIASTOLIC BLOOD PRESSURE: 81 MMHG | HEART RATE: 71 BPM | OXYGEN SATURATION: 95 %

## 2025-01-07 DIAGNOSIS — A53.9 SYPHILIS: Primary | ICD-10-CM

## 2025-01-07 DIAGNOSIS — A53.9 SYPHILIS: ICD-10-CM

## 2025-01-07 DIAGNOSIS — B20 HIV DISEASE (HCC): Primary | ICD-10-CM

## 2025-01-07 PROCEDURE — 90673 RIV3 VACCINE NO PRESERV IM: CPT

## 2025-01-07 PROCEDURE — 99215 OFFICE O/P EST HI 40 MIN: CPT | Performed by: INTERNAL MEDICINE

## 2025-01-07 PROCEDURE — 90471 IMMUNIZATION ADMIN: CPT

## 2025-01-07 PROCEDURE — 96372 THER/PROPH/DIAG INJ SC/IM: CPT

## 2025-01-07 NOTE — ASSESSMENT & PLAN NOTE
Doing well on Biktarvy with undetectable viral load and a CD4 count of 518. Continue ART, recheck CBC with differential, CMP, HIV RNA, and CD4 in 5 months to make sure no developing toxicity or treatment failure and follow-up in 6 months.  Stressed adherence.

## 2025-01-07 NOTE — ASSESSMENT & PLAN NOTE
Patient with bump in the RPR from 1:2-1:8.  This is consistent with reinfection.  Have started treatment with benzathine penicillin 2,400,000 units IM x 3 doses as this is consistent with a late latent syphilis.  Recheck titer at 3, 6, 9, 12 months.  Detailed discussion with the patient about the risk of transmission.

## 2025-01-07 NOTE — PROGRESS NOTES
Name: Cresencio Reyes      : 1970      MRN: [id number]  Encounter Provider: Byron Umana MD  Encounter Date: 2025   Encounter department: [facility]  :  Assessment & Plan  HIV disease (HCC)  Doing well on Biktarvy with undetectable viral load and a CD4 count of 518. Continue ART, recheck CBC with differential, CMP, HIV RNA, and CD4 in 5 months to make sure no developing toxicity or treatment failure and follow-up in 6 months.  Stressed adherence.        Syphilis  Patient with bump in the RPR from 1:2-1:8.  This is consistent with reinfection.  Have started treatment with benzathine penicillin 2,400,000 units IM x 3 doses as this is consistent with a late latent syphilis.  Recheck titer at 3, 6, 9, 12 months.  Detailed discussion with the patient about the risk of transmission.         Patient was provided medication, adherence and prevention education.    History of Present Illness   Routine follow-up for HIV.  Patient claims 100% adherence with Biktarvy. Patient denies any notable side effects.  Overall the feeling well.  The patient denies any fever chills or sweats, denies any nausea vomiting or diarrhea, denies any cough or shortness of breath.    ROS: A complete review of systems are negative other than that noted in the HPI        Objective   /81   Pulse 71   Temp 97.8 °F (36.6 °C)   Wt 93.2 kg (205 lb 6.4 oz)   SpO2 95%   BMI 27.10 kg/m²     General: Alert, interactive, appearing well, nontoxic, no acute distress.  Neck: Supple without lymphadenopathy, no thyromegaly or masses  Throat: Oropharynx moist without lesions.   Lungs: Clear to auscultation bilaterally; no wheezes, rhonchi or rales; respirations unlabored  Heart: RRR; no murmur, rub or gallop  Abdomen: Soft, non-tender, non-distended, positive bowel sounds.    Extremities: No clubbing, cyanosis or edema  Skin: No new rashes or lesions. No draining wounds noted.    Lab Results: I have personally reviewed  pertinent labs.  Lab Results   Component Value Date    K 4.6 12/19/2024     12/19/2024    CO2 27 12/19/2024    BUN 20 12/19/2024    CREATININE 1.13 12/19/2024    GLUF 75 12/19/2024    CALCIUM 9.6 12/19/2024    CORRECTEDCA 9.7 11/28/2022    AST 27 12/19/2024    ALT 38 12/19/2024    ALKPHOS 57 12/19/2024    EGFR 74 12/19/2024     Lab Results   Component Value Date    WBC 5.8 12/20/2024    HGB 15.7 12/20/2024    HCT 48.9 12/20/2024    MCV 97 12/20/2024     12/20/2024     Lab Results   Component Value Date    HEPCAB Non-reactive 12/19/2024     Lab Results   Component Value Date    HEPCAB Non-reactive 12/19/2024     Lab Results   Component Value Date    RPR Reactive (A) 12/19/2024    RPR Reactive 8 dils (A) 12/19/2024     CD4 ABS   Date/Time Value Ref Range Status   12/20/2024 09:11  359 - 1519 /uL Final     HIV-1 TARGET   Date/Time Value Ref Range Status   12/19/2024 10:16 AM Detected <20 cp/mL (A) Not Detected Final           Administrative Statements   I have spent a total time of 40 minutes in caring for this patient on the day of the visit/encounter including Diagnostic results, Prognosis, Risks and benefits of tx options, Importance of tx compliance, Risk factor reductions, Impressions, Counseling / Coordination of care, Documenting in the medical record, Reviewing / ordering tests, medicine, procedures  , Obtaining or reviewing history  , and Communicating with other healthcare professionals .

## 2025-01-14 ENCOUNTER — OFFICE VISIT (OUTPATIENT)
Dept: PHYSICAL THERAPY | Facility: CLINIC | Age: 55
End: 2025-01-14
Payer: COMMERCIAL

## 2025-01-14 DIAGNOSIS — M25.561 ACUTE PAIN OF RIGHT KNEE: Primary | ICD-10-CM

## 2025-01-14 DIAGNOSIS — M22.2X1 PATELLOFEMORAL PAIN SYNDROME OF RIGHT KNEE: ICD-10-CM

## 2025-01-14 PROCEDURE — 97110 THERAPEUTIC EXERCISES: CPT | Performed by: PHYSICAL THERAPIST

## 2025-01-14 PROCEDURE — 97140 MANUAL THERAPY 1/> REGIONS: CPT | Performed by: PHYSICAL THERAPIST

## 2025-01-14 PROCEDURE — 97530 THERAPEUTIC ACTIVITIES: CPT | Performed by: PHYSICAL THERAPIST

## 2025-01-14 NOTE — PROGRESS NOTES
PT Re-Evaluation     Today's date: 2025  Patient name: Cresencio Reyes  : 1970  MRN: [id number]  Referring provider: Amelie Trujillo CRNP  Dx:   Encounter Diagnosis     ICD-10-CM    1. Acute pain of right knee  M25.561       2. Patellofemoral pain syndrome of right knee  M22.2X1                      Assessment  Impairments: abnormal or restricted ROM, activity intolerance, impaired physical strength, pain with function and weight-bearing intolerance    Assessment details: The patient has made great progress since the start of PT.  His knee ROM is doing significantly better and he has no pain at end range.  His strength, especially in his hips, is vastly improved and this is helping to better support his knee during his workouts and functional activities.  His pain levels are much lower and he is having less flare ups in his knee.  Overall the patient is doing much better and as we move forward we will continue to work on improving his strength, squatting form and level of function.    Understanding of Dx/Px/POC: excellent     Prognosis: excellent    Goals  STG : (2 weeks) - Patient demonstrates independence with HEP to be able to transition to HEP in the long term.  - 100%  (4 weeks) - Decrease patient's pain by 50%.  - 100%    LTG: (6 weeks) - Improve knee flexion ROM to greater than 140.  - 90%  (6 weeks) - Improve patellar stability to WFL.  - 100%  (8 weeks) - Improve patient's RLE strength to at least 4/5 to allow the patient to squat without pain.  - 100%    Plan  Patient would benefit from: skilled physical therapy    Planned therapy interventions: therapeutic activities, therapeutic exercise, manual therapy, neuromuscular re-education and gait training    Frequency: 2x month  Duration in weeks: 4      Subjective Evaluation    History of Present Illness  Mechanism of injury: The patient reports that about a year ago he started to notice pain in the R knee that would be on and off.  Recently the  pain has been more consistent and he always notices it.  He is an avid gym goer and he wants to keep working out regularly.  He notices pain in the knee with side to side movements and he will feel pain in the medial and lateral knee with this.  He also notices that when he squats that will feel pain underneath his knee cap.  It does not matter the depth of his squats.  He states that the leg press, hamstring curls, and knee extensions do not bother him and its mainly just squats and shifting his weight side to side.  The patient denies any numbness or tingling.  He will take tylenol for his knee depending on how it feels and he will put aspercreme on his knee.  The patient notices that during the summer when he is doing more yoga moves the knee does feel better but bending his knee tight towards his chest is giving him issues and when he stretches his hip into ER his knee will bother him.      Re-eval - The patient reports that his knee has been feeling much better.  He has been doing more squat related workouts but he is still modifying his weight for his exercises.  He had some posterior glut and hamstring pain at the gym the other day but this is not his normal pain.  He did notice some leg pain the other day when going down the stairs but it seems to go away very quickly.    Patient Goals  Patient goal: The patient wants to be able to continue to workout and he wants to avoid knee surgery.  Pain  Current pain ratin  At worst pain ratin  Location: R knee  Quality: tight and discomfort        Objective     Active Range of Motion   Left Knee   Flexion: 141 degrees   Extension: 3 degrees     Right Knee   Flexion: 140 degrees   Extension: 3 degrees     Mobility   Patellar Mobility:   Left Knee   WFL: medial, lateral, superior and inferior.     Right Knee   WFL: medial, lateral, superior and inferior    Strength/Myotome Testing     Left Hip   Planes of Motion   Extension: 4  Abduction: 4  External rotation:  "4+  Internal rotation: 4+    Right Hip   Planes of Motion   Extension: 4  Abduction: 4  External rotation: 4  Internal rotation: 4+    Left Knee   Flexion: 4+  Extension: 4+    Right Knee   Flexion: 4+  Extension: 4+    Functional Assessment      Squat    Left within functional limits and right within functional limits.     Single Leg Squat   Left Leg  Within functional limits.     Right Leg  Pain.     Forward Step Down 8\"   Left Leg  Within functional limits.     Comments  One rep max Leg press laying flat:  R - 160#  L - 160#             Precautions:      POC Expires Auth Status Start Date Expiration Date PT Visit Limit           Date 12/31 1/2 1/14 12/24 12/26   Used 6 7 8 4 5   Remaining           Diagnosis:    Precautions:    Manuals 12/31 1/2 12/19 12/24 12/26   R knee PROM AB  AB AB AB                                   There Ex        S/L clams GTB 2x10 Standing clam GTB 2x10 ea GTB 2x10  GTB 2x10  GTB 2x10   S/L reverse clams GTB 2x10  GTB 2x10 GTB 2x10  GTB 2x10   S/L hip ABD GTB 2x10  GTB 2x10 GTB 2x10  GTB 2x10   Bridge W/ band GTB 2x10  GTB 2x10 GTB 2x10  GTB 2x10   SL bridge  2x10 ea                                      Neuro Re-Ed        Grid reach x5ea    x5ea   Bosu Squats     2x10   Lunge on Bosu                                                                                         Ther Act             Tap downs   6\" 2x10 fwd & lat ea 6\" 2x10       Donkey Kicks   12.5# x20ea 12.5# x20ea 12.5# x20ea   Diag hops @ cables 12.5# x5 laps       Lunge with hip ER BTB 2x10ea  BTB 2x10ea BTB 2x10ea    Monster walks   GTB 40' 2 laps GTB 40' 2 laps    SL leg press 90# 2x10ea 105# 2x10 ea- focus on eccentric phase  90# 2x10ea 90# 2x10ea    X walks  GTB 40' 2 laps GTB 40' 2 laps  GTB 40' 2 laps  GTB 40' 2 laps  GTB 40' 2 laps   Modalities                                                             "

## 2025-01-15 ENCOUNTER — OFFICE VISIT (OUTPATIENT)
Dept: SURGERY | Facility: CLINIC | Age: 55
End: 2025-01-15
Payer: COMMERCIAL

## 2025-01-15 VITALS
DIASTOLIC BLOOD PRESSURE: 74 MMHG | HEIGHT: 73 IN | TEMPERATURE: 99 F | HEART RATE: 79 BPM | BODY MASS INDEX: 26.85 KG/M2 | WEIGHT: 202.6 LBS | SYSTOLIC BLOOD PRESSURE: 129 MMHG | OXYGEN SATURATION: 96 %

## 2025-01-15 DIAGNOSIS — Z79.890 ENCOUNTER FOR MONITORING TESTOSTERONE REPLACEMENT THERAPY: ICD-10-CM

## 2025-01-15 DIAGNOSIS — Z51.81 ENCOUNTER FOR MONITORING TESTOSTERONE REPLACEMENT THERAPY: ICD-10-CM

## 2025-01-15 DIAGNOSIS — B20 HIV DISEASE (HCC): ICD-10-CM

## 2025-01-15 DIAGNOSIS — A53.9 SYPHILIS: ICD-10-CM

## 2025-01-15 DIAGNOSIS — R79.89 LOW TESTOSTERONE IN MALE: ICD-10-CM

## 2025-01-15 DIAGNOSIS — Z23 NEED FOR SHINGLES VACCINE: Primary | ICD-10-CM

## 2025-01-15 PROCEDURE — 96372 THER/PROPH/DIAG INJ SC/IM: CPT

## 2025-01-15 PROCEDURE — 99214 OFFICE O/P EST MOD 30 MIN: CPT | Performed by: NURSE PRACTITIONER

## 2025-01-15 NOTE — ASSESSMENT & PLAN NOTE
Received third injection of Bicillin.  Repeat treatment monitoring RPR in 3 months.    Orders:    Penicillin G Benzathine (BICILLIN L-A) intramuscular injection 2.4 Million Units

## 2025-01-15 NOTE — ASSESSMENT & PLAN NOTE
"No results found for: \"[id number]\"  CD4 ABS   Date/Time Value Ref Range Status   2024 09:11  359 - 1519 /uL Final     HIV-1 RNA by PCR, Qn   Date/Time Value Ref Range Status   2023 07:27 AM <20 copies/mL Final     Comment:     HIV-1 RNA detected  The reportable range for this assay is 20 to 10,000,000  copies HIV-1 RNA/mL.     HIV-1 RNA Viral Load Log   Date/Time Value Ref Range Status   2023 07:27 AM COMMENT [id number]/mL Final     Comment:     Unable to calculate result since non-numeric result obtained for  component test.         ART: Biktarvy        Denies side effects. Stressed the importance of adherence.  Continue follow up with ID clinic.       Reviewed most recent labs, including Cd4 and viral load. Discussed the risks and benefits of treatment options, instructions for management, importance of treatment adherence, and reduction of risk factor.Educated on possible  medication side effects.  Reviewed last ID visit.  Collaborated with ID to optimize medical treatment.    Counseled on routes of HIV transmission, including the risk of  infection. Emphasized that viral suppression is the best method to prevent HIV transmission.  At this time pt.denies the need for HIV testing of anyone in their life.     Total encounter time was 45 minutes. Greater then 20 minutes were spent on counseling and patient education. Pt voices understanding and agreement with treatment plan.             "

## 2025-01-15 NOTE — PROGRESS NOTES
"Name: Cresencio Reyes      : 1970      MRN: [id number]  Encounter Provider: ROMAN Wing  Encounter Date: 1/15/2025   Encounter department: [facility]  :  Assessment & Plan  Need for shingles vaccine  Chose to delay second vaccine at this time due to busy weekend planned.  Will have him vaccine in the near future.  Orders:    Zoster Vaccine Recombinant IM    Low testosterone in male    Orders:    Testosterone, free, total; Future    Syphilis  Received third injection of Bicillin.  Repeat treatment monitoring RPR in 3 months.    Orders:    Penicillin G Benzathine (BICILLIN L-A) intramuscular injection 2.4 Million Units    HIV disease (HCC)  No results found for: \"[id number]\"  CD4 ABS   Date/Time Value Ref Range Status   2024 09:11  359 - 1519 /uL Final     HIV-1 RNA by PCR, Qn   Date/Time Value Ref Range Status   2023 07:27 AM <20 copies/mL Final     Comment:     HIV-1 RNA detected  The reportable range for this assay is 20 to 10,000,000  copies HIV-1 RNA/mL.     HIV-1 RNA Viral Load Log   Date/Time Value Ref Range Status   2023 07:27 AM COMMENT [id number]/mL Final     Comment:     Unable to calculate result since non-numeric result obtained for  component test.         ART: Biktarvy        Denies side effects. Stressed the importance of adherence.  Continue follow up with ID clinic.       Reviewed most recent labs, including Cd4 and viral load. Discussed the risks and benefits of treatment options, instructions for management, importance of treatment adherence, and reduction of risk factor.Educated on possible  medication side effects.  Reviewed last ID visit.  Collaborated with ID to optimize medical treatment.    Counseled on routes of HIV transmission, including the risk of  infection. Emphasized that viral suppression is the best method to prevent HIV transmission.  At this time pt.denies the need for HIV testing of anyone in their life.     Total " encounter time was 45 minutes. Greater then 20 minutes were spent on counseling and patient education. Pt voices understanding and agreement with treatment plan.             Encounter for monitoring testosterone replacement therapy  Total testosterone 1081, slightly elevated free testosterone 12.7.  Continue to use testosterone gel.  Educated on signs of symptoms of too much testosterone.  Magdy denies any insomnia or mood changes.  Repeat testosterone level in 3 months.             History of Present Illness   Magdy presents to the clinic today for primary care follow-up of chronic conditions. .  Past medical history significant for HIV, TB status post treatment, and umbilical hernia. Magdy is doing well and denies acute complaint.        Cresencio Reyes is a 54 y.o. male who presents to the clinic for primary care follow-up.  History obtained from: patient    Review of Systems   Constitutional:  Negative for chills and fever.   HENT:  Negative for ear pain and sore throat.    Eyes:  Negative for pain and visual disturbance.   Respiratory:  Negative for cough and shortness of breath.    Cardiovascular:  Negative for chest pain and palpitations.   Gastrointestinal:  Negative for abdominal pain and vomiting.   Genitourinary:  Negative for dysuria and hematuria.   Musculoskeletal:  Negative for arthralgias and back pain.   Skin:  Negative for color change and rash.   Neurological:  Negative for seizures and syncope.   All other systems reviewed and are negative.    Medical History Reviewed by provider this encounter:  Tobacco  Allergies  Meds  Problems  Med Hx  Surg Hx  Fam Hx     .  Past Medical History   Past Medical History:   Diagnosis Date    Anxiety     last assessed 05/14/2015    Chest tightness     Drug abuse (HCC)     including heroin    HIV disease (HCC)     Stomatitis     last assessed 05/14/15    Syphilis     2009,2012,2013 treated    TB (tuberculosis) 2009     History reviewed. No pertinent surgical  history.  History reviewed. No pertinent family history.   reports that he quit smoking about 6 years ago. His smoking use included cigarettes. He started smoking about 36 years ago. He has never used smokeless tobacco. He reports that he does not currently use drugs. He reports that he does not drink alcohol.  Current Outpatient Medications on File Prior to Visit   Medication Sig Dispense Refill    Biktarvy -25 MG tablet TAKE 1 TABLET BY MOUTH DAILY 30 tablet 5    Penicillin G Benzathine (Bicillin L-A) 2,400,000 units/4 mL intramuscular injection Inject 4 mL (2.4 Million Units total) into a muscle once a week for 3 doses 12 mL 0    Testosterone 1.62 % GEL APPLY 2 PUMPS ON THE SKIN EVERY MORNING AS DIRECTED 75 g 2    [] Penicillin G Benzathine (Bicillin L-A) 2,400,000 units/4 mL intramuscular injection Inject 4 mL (2.4 Million Units total) into a muscle once a week for 3 doses 12 mL 0     No current facility-administered medications on file prior to visit.     Allergies   Allergen Reactions    Codeine Confusion    Ibuprofen-Acetaminophen Hives      Current Outpatient Medications on File Prior to Visit   Medication Sig Dispense Refill    Biktarvy -25 MG tablet TAKE 1 TABLET BY MOUTH DAILY 30 tablet 5    Penicillin G Benzathine (Bicillin L-A) 2,400,000 units/4 mL intramuscular injection Inject 4 mL (2.4 Million Units total) into a muscle once a week for 3 doses 12 mL 0    Testosterone 1.62 % GEL APPLY 2 PUMPS ON THE SKIN EVERY MORNING AS DIRECTED 75 g 2    [] Penicillin G Benzathine (Bicillin L-A) 2,400,000 units/4 mL intramuscular injection Inject 4 mL (2.4 Million Units total) into a muscle once a week for 3 doses 12 mL 0     No current facility-administered medications on file prior to visit.      Social History     Tobacco Use    Smoking status: Former     Current packs/day: 0.00     Types: Cigarettes     Start date: 1988     Quit date: 2018     Years since quittin.8     "Smokeless tobacco: Never   Vaping Use    Vaping status: Never Used   Substance and Sexual Activity    Alcohol use: No    Drug use: Not Currently    Sexual activity: Yes     Partners: Male     Birth control/protection: None        Objective   /74 (BP Location: Left arm, Patient Position: Sitting, Cuff Size: Large)   Pulse 79   Temp 99 °F (37.2 °C) (Temporal)   Ht 6' 1\" (1.854 m)   Wt 91.9 kg (202 lb 9.6 oz)   SpO2 96%   BMI 26.73 kg/m²      Physical Exam  Vitals and nursing note reviewed.   Constitutional:       Appearance: Normal appearance.   HENT:      Head: Normocephalic.      Nose: Nose normal. No congestion or rhinorrhea.      Mouth/Throat:      Mouth: Mucous membranes are moist. Oral lesions present.      Pharynx: Oropharynx is clear.      Comments: Small canker sore on the inside of the lip.  Eyes:      Pupils: Pupils are equal, round, and reactive to light.   Cardiovascular:      Rate and Rhythm: Normal rate and regular rhythm.      Pulses: Normal pulses.      Heart sounds: Normal heart sounds.   Pulmonary:      Effort: Pulmonary effort is normal.      Breath sounds: Normal breath sounds.   Abdominal:      General: Abdomen is flat. Bowel sounds are normal.      Palpations: Abdomen is soft.   Musculoskeletal:         General: No tenderness or signs of injury. Normal range of motion.   Skin:     General: Skin is warm and dry.   Neurological:      Mental Status: He is alert and oriented to person, place, and time.         Administrative Statements   I have spent a total time of 45 minutes in caring for this patient on the day of the visit/encounter including Diagnostic results, Prognosis, Risks and benefits of tx options, Instructions for management, Patient and family education, Importance of tx compliance, Risk factor reductions, Impressions, Counseling / Coordination of care, and Documenting in the medical record. Topics discussed with the patient / family include symptom assessment and " management, medication review, medication adjustment, and goals of care.

## 2025-01-15 NOTE — ASSESSMENT & PLAN NOTE
Total testosterone 1081, slightly elevated free testosterone 12.7.  Continue to use testosterone gel.  Educated on signs of symptoms of too much testosterone.  Magdy denies any insomnia or mood changes.  Repeat testosterone level in 3 months.

## 2025-02-04 ENCOUNTER — TELEPHONE (OUTPATIENT)
Dept: SURGERY | Facility: CLINIC | Age: 55
End: 2025-02-04

## 2025-02-10 NOTE — PROGRESS NOTES
3 Month Periodontal Maintenance     REVIEWED MED HX: meds, allergies, health changes reviewed in EPIC  CHIEF CONCERN:  none   PAIN SCALE:  0  ASA CLASS:  ASA 2 - Patient with mild systemic disease with no functional limitations  PLAQUE:  mild  CALCULUS:  Light  BLEEDING:  light  STAIN :   None  PERIO: gums stable. Past hx of periodontitis.     Hygiene Procedures: Scaled, Polished, Flossed and Used Cavitron    Oral Hygiene Instruction: Brushing Minimum 2x daily for 2 minutes, daily flossing    Visual and Tactile Intraoral/ Extraoral evaluation: Oral and Oropharyngeal cancer evaluation. No findings     REFERRALS: none    EXAM: no exam due today/ exam performed 3 months ago    CARIES FINDINGS:     Next Recall: 3 month perio maintenance/periodic exam    Last BWX: 3/4/24  Last Panorex/ FMX : 11/9/22

## 2025-02-11 ENCOUNTER — OFFICE VISIT (OUTPATIENT)
Dept: DENTISTRY | Facility: CLINIC | Age: 55
End: 2025-02-11

## 2025-02-11 VITALS — SYSTOLIC BLOOD PRESSURE: 144 MMHG | DIASTOLIC BLOOD PRESSURE: 65 MMHG

## 2025-02-11 DIAGNOSIS — K05.6 PERIODONTAL DISEASE: Primary | ICD-10-CM

## 2025-02-11 PROCEDURE — D4910 PERIODONTAL MAINTENANCE: HCPCS

## 2025-02-13 ENCOUNTER — OFFICE VISIT (OUTPATIENT)
Dept: PHYSICAL THERAPY | Facility: CLINIC | Age: 55
End: 2025-02-13
Payer: COMMERCIAL

## 2025-02-13 DIAGNOSIS — M22.2X1 PATELLOFEMORAL PAIN SYNDROME OF RIGHT KNEE: ICD-10-CM

## 2025-02-13 DIAGNOSIS — M25.561 ACUTE PAIN OF RIGHT KNEE: Primary | ICD-10-CM

## 2025-02-13 DIAGNOSIS — R79.89 LOW TESTOSTERONE IN MALE: ICD-10-CM

## 2025-02-13 DIAGNOSIS — R68.82 LOW LIBIDO: ICD-10-CM

## 2025-02-13 PROCEDURE — 97110 THERAPEUTIC EXERCISES: CPT | Performed by: PHYSICAL THERAPIST

## 2025-02-13 NOTE — PROGRESS NOTES
PT Discharge    Today's date: 2025  Patient name: Cresencio Reyes  : 1970  MRN: [id number]  Referring provider: Amelie Trujillo CRNP  Dx:   Encounter Diagnosis     ICD-10-CM    1. Acute pain of right knee  M25.561       2. Patellofemoral pain syndrome of right knee  M22.2X1                        Assessment  Impairments: abnormal or restricted ROM, activity intolerance, impaired physical strength, pain with function and weight-bearing intolerance    Assessment details: The patient reports to PT for a one month follow up for his knee.  The patient was worried because he was recently getting pain in the RLE but his pain appears to be related to a lumbar nerve and not his knee.  I went over a few things that he can try at home to help with his current issue.  He will return to PT if this new issue does not resolve.  I retested his knee and his ROM is looking excellent.  His strength also seems to have fully returned in the RLE.  Me and the patient both agree that he is ready to be discharged to a HEP.      Understanding of Dx/Px/POC: excellent     Prognosis: excellent    Goals  STG : (2 weeks) - Patient demonstrates independence with HEP to be able to transition to HEP in the long term.  - 100%  (4 weeks) - Decrease patient's pain by 50%.  - 100%    LTG: (6 weeks) - Improve knee flexion ROM to greater than 140.  - 100%  (6 weeks) - Improve patellar stability to WFL.  - 100%  (8 weeks) - Improve patient's RLE strength to at least 4/5 to allow the patient to squat without pain.  - 100%    Plan  Patient would benefit from: skilled physical therapy    Planned therapy interventions: therapeutic activities, therapeutic exercise, manual therapy, neuromuscular re-education and gait training    Plan details: Discharge        Subjective Evaluation    History of Present Illness  Mechanism of injury: The patient reports that about a year ago he started to notice pain in the R knee that would be on and off.  Recently  the pain has been more consistent and he always notices it.  He is an avid gym goer and he wants to keep working out regularly.  He notices pain in the knee with side to side movements and he will feel pain in the medial and lateral knee with this.  He also notices that when he squats that will feel pain underneath his knee cap.  It does not matter the depth of his squats.  He states that the leg press, hamstring curls, and knee extensions do not bother him and its mainly just squats and shifting his weight side to side.  The patient denies any numbness or tingling.  He will take tylenol for his knee depending on how it feels and he will put aspercreme on his knee.  The patient notices that during the summer when he is doing more yoga moves the knee does feel better but bending his knee tight towards his chest is giving him issues and when he stretches his hip into ER his knee will bother him.      Discharge - The patient reports that he was feeling really good until he switched up his workouts.  He added single leg French dead lifts and he did lots of repetitions.  This caused pain around the knee and numbness traveling into his shin and his foot.    Patient Goals  Patient goal: The patient wants to be able to continue to workout and he wants to avoid knee surgery.  Pain  Current pain ratin  At worst pain ratin  Location: R knee  Quality: tight and discomfort        Objective     Active Range of Motion   Left Knee   Flexion: 141 degrees   Extension: 3 degrees     Right Knee   Flexion: 140 degrees   Extension: 3 degrees     Mobility   Patellar Mobility:   Left Knee   WFL: medial, lateral, superior and inferior.     Right Knee   WFL: medial, lateral, superior and inferior    Strength/Myotome Testing     Left Hip   Planes of Motion   Flexion: 4+  Extension: 4  Abduction: 4+  External rotation: 4+  Internal rotation: 4+    Right Hip   Planes of Motion   Flexion: 4+  Extension: 4  Abduction: 4+  External  "rotation: 4+  Internal rotation: 4+    Left Knee   Flexion: 4+  Extension: 4+    Right Knee   Flexion: 4+  Extension: 4+    Functional Assessment      Squat    Left within functional limits and right within functional limits.     Single Leg Squat   Left Leg  Within functional limits.     Right Leg  Pain.     Forward Step Down 8\"   Left Leg  Within functional limits.     Comments  One rep max Leg press laying flat:  R - 160#  L - 160#             Precautions:      POC Expires Auth Status Start Date Expiration Date PT Visit Limit           Date 12/31 1/2 1/14 12/24 12/26   Used 6 7 8 4 5   Remaining           Diagnosis:    Precautions:    Manuals 12/31 1/2 12/19 12/24 12/26   R knee PROM AB  AB AB AB                                   There Ex        S/L clams GTB 2x10 Standing clam GTB 2x10 ea GTB 2x10  GTB 2x10  GTB 2x10   S/L reverse clams GTB 2x10  GTB 2x10 GTB 2x10  GTB 2x10   S/L hip ABD GTB 2x10  GTB 2x10 GTB 2x10  GTB 2x10   Bridge W/ band GTB 2x10  GTB 2x10 GTB 2x10  GTB 2x10   SL bridge  2x10 ea                                      Neuro Re-Ed        Grid reach x5ea    x5ea   Bosu Squats     2x10   Lunge on Bosu                                                                                         Ther Act             Tap downs   6\" 2x10 fwd & lat ea 6\" 2x10       Donkey Kicks   12.5# x20ea 12.5# x20ea 12.5# x20ea   Diag hops @ cables 12.5# x5 laps       Lunge with hip ER BTB 2x10ea  BTB 2x10ea BTB 2x10ea    Monster walks   GTB 40' 2 laps GTB 40' 2 laps    SL leg press 90# 2x10ea 105# 2x10 ea- focus on eccentric phase  90# 2x10ea 90# 2x10ea    X walks  GTB 40' 2 laps GTB 40' 2 laps  GTB 40' 2 laps  GTB 40' 2 laps  GTB 40' 2 laps   Modalities                                                             "

## 2025-02-21 RX ORDER — TESTOSTERONE 20.25 MG/1.25G
GEL TOPICAL
Qty: 75 G | Refills: 1 | Status: SHIPPED | OUTPATIENT
Start: 2025-02-21

## 2025-04-03 DIAGNOSIS — R79.89 LOW TESTOSTERONE IN MALE: ICD-10-CM

## 2025-04-03 DIAGNOSIS — R68.82 LOW LIBIDO: ICD-10-CM

## 2025-04-04 RX ORDER — TESTOSTERONE 20.25 MG/1.25G
GEL TOPICAL
Qty: 75 G | Refills: 2 | Status: SHIPPED | OUTPATIENT
Start: 2025-04-04

## 2025-04-16 ENCOUNTER — OFFICE VISIT (OUTPATIENT)
Dept: SURGERY | Facility: CLINIC | Age: 55
End: 2025-04-16
Payer: COMMERCIAL

## 2025-04-16 VITALS
OXYGEN SATURATION: 98 % | HEIGHT: 73 IN | WEIGHT: 197.4 LBS | DIASTOLIC BLOOD PRESSURE: 69 MMHG | HEART RATE: 73 BPM | BODY MASS INDEX: 26.16 KG/M2 | TEMPERATURE: 97.8 F | SYSTOLIC BLOOD PRESSURE: 118 MMHG

## 2025-04-16 DIAGNOSIS — Z23 NEED FOR SHINGLES VACCINE: Primary | ICD-10-CM

## 2025-04-16 DIAGNOSIS — A53.9 SYPHILIS: ICD-10-CM

## 2025-04-16 DIAGNOSIS — R79.89 LOW TESTOSTERONE IN MALE: ICD-10-CM

## 2025-04-16 DIAGNOSIS — B20 HIV DISEASE (HCC): ICD-10-CM

## 2025-04-16 DIAGNOSIS — R68.82 LOW LIBIDO: ICD-10-CM

## 2025-04-16 DIAGNOSIS — Z11.59 MEASLES SCREENING: ICD-10-CM

## 2025-04-16 DIAGNOSIS — Z21 HIV (HUMAN IMMUNODEFICIENCY VIRUS INFECTION) (HCC): ICD-10-CM

## 2025-04-16 PROCEDURE — 99214 OFFICE O/P EST MOD 30 MIN: CPT | Performed by: NURSE PRACTITIONER

## 2025-04-16 RX ORDER — TESTOSTERONE 20.25 MG/1.25G
75 GEL TOPICAL DAILY
Qty: 75 G | Refills: 2 | Status: SHIPPED | OUTPATIENT
Start: 2025-04-16

## 2025-04-16 RX ORDER — BICTEGRAVIR SODIUM, EMTRICITABINE, AND TENOFOVIR ALAFENAMIDE FUMARATE 50; 200; 25 MG/1; MG/1; MG/1
1 TABLET ORAL DAILY
Qty: 30 TABLET | Refills: 5 | Status: SHIPPED | OUTPATIENT
Start: 2025-04-16

## 2025-04-16 RX ORDER — KETOCONAZOLE 20 MG/G
CREAM TOPICAL
COMMUNITY
Start: 2025-01-17

## 2025-04-16 NOTE — ASSESSMENT & PLAN NOTE
"No results found for: \"[id number]\"  CD4 ABS   Date/Time Value Ref Range Status   2024 09:11  359 - 1519 /uL Final     HIV-1 RNA by PCR, Qn   Date/Time Value Ref Range Status   2023 07:27 AM <20 copies/mL Final     Comment:     HIV-1 RNA detected  The reportable range for this assay is 20 to 10,000,000  copies HIV-1 RNA/mL.     HIV-1 RNA Viral Load Log   Date/Time Value Ref Range Status   2023 07:27 AM COMMENT [id number]/mL Final     Comment:     Unable to calculate result since non-numeric result obtained for  component test.         ART: Biktarvy        Denies side effects. Stressed the importance of adherence.  Continue follow up with ID clinic.       Reviewed most recent labs, including Cd4 and viral load. Discussed the risks and benefits of treatment options, instructions for management, importance of treatment adherence, and reduction of risk factor.Educated on possible  medication side effects.  Reviewed last ID visit.  Collaborated with ID to optimize medical treatment.    Counseled on routes of HIV transmission, including the risk of  infection. Emphasized that viral suppression is the best method to prevent HIV transmission.  At this time pt.denies the need for HIV testing of anyone in their life.     Total encounter time was 45 minutes. Greater then 20 minutes were spent on counseling and patient education. Pt voices understanding and agreement with treatment plan.             "

## 2025-04-16 NOTE — PROGRESS NOTES
"Name: Cresencio Reyes      : 1970      MRN: [id number]  Encounter Provider: ROMAN Wing  Encounter Date: 2025   Encounter department: ASC AT [facility]  :  Assessment & Plan  Need for shingles vaccine    Orders:    Zoster Vaccine Recombinant IM    Measles screening    Orders:    Measles/Mumps/Rubella Immunity; Future    HIV disease (HCC)  No results found for: \"GH3HCAI\"  CD4 ABS   Date/Time Value Ref Range Status   2024 09:11  359 - 1519 /uL Final     HIV-1 RNA by PCR, Qn   Date/Time Value Ref Range Status   2023 07:27 AM <20 copies/mL Final     Comment:     HIV-1 RNA detected  The reportable range for this assay is 20 to 10,000,000  copies HIV-1 RNA/mL.     HIV-1 RNA Viral Load Log   Date/Time Value Ref Range Status   2023 07:27 AM COMMENT [id number]/mL Final     Comment:     Unable to calculate result since non-numeric result obtained for  component test.         ART: Biktarvy        Denies side effects. Stressed the importance of adherence.  Continue follow up with ID clinic.       Reviewed most recent labs, including Cd4 and viral load. Discussed the risks and benefits of treatment options, instructions for management, importance of treatment adherence, and reduction of risk factor.Educated on possible  medication side effects.  Reviewed last ID visit.  Collaborated with ID to optimize medical treatment.    Counseled on routes of HIV transmission, including the risk of  infection. Emphasized that viral suppression is the best method to prevent HIV transmission.  At this time pt.denies the need for HIV testing of anyone in their life.     Total encounter time was 45 minutes. Greater then 20 minutes were spent on counseling and patient education. Pt voices understanding and agreement with treatment plan.             Syphilis         HIV (human immunodeficiency virus infection) (HCC)    Orders:    bictegravir-emtricitab-tenofovir alafenamide (Biktarvy) " -25 MG tablet; Take 1 tablet by mouth daily    Low libido    Orders:    Testosterone 1.62 % GEL; Place 75 g on the skin in the morning    Low testosterone in male    Orders:    Testosterone 1.62 % GEL; Place 75 g on the skin in the morning        History of Present Illness   Magdy presents to the clinic today for primary care follow-up of chronic conditions. .  Past medical history significant for HIV, TB status post treatment, and umbilical hernia. Magdy is doing well and denies acute complaint.        Cresencio Reyes is a 54 y.o. male who presents for PCP follow up  History obtained from: patient    Review of Systems   Constitutional:  Negative for chills and fever.   HENT:  Negative for ear pain and sore throat.    Eyes:  Negative for pain and visual disturbance.   Respiratory:  Negative for cough and shortness of breath.    Cardiovascular:  Negative for chest pain and palpitations.   Gastrointestinal:  Negative for abdominal pain and vomiting.   Genitourinary:  Negative for dysuria and hematuria.   Musculoskeletal:  Negative for arthralgias and back pain.   Skin:  Negative for color change and rash.   Neurological:  Negative for seizures and syncope.   All other systems reviewed and are negative.    Medical History Reviewed by provider this encounter:     .  Past Medical History   Past Medical History:   Diagnosis Date    Anxiety     last assessed 05/14/2015    Chest tightness     Drug abuse (HCC)     including heroin    HIV disease (HCC)     Stomatitis     last assessed 05/14/15    Syphilis     2009,2012,2013 treated    TB (tuberculosis) 2009     No past surgical history on file.  No family history on file.   reports that he quit smoking about 7 years ago. His smoking use included cigarettes. He started smoking about 37 years ago. He has never used smokeless tobacco. He reports that he does not currently use drugs. He reports that he does not drink alcohol.  Current Outpatient Medications   Medication  "Instructions    bictegravir-emtricitab-tenofovir alafenamide (Biktarvy) -25 MG tablet 1 tablet, Oral, Daily    ketoconazole (NIZORAL) 2 % cream APPLY TO SCALY SKIN ON FEET 1 TO 2 TIMES DAILY    Testosterone 75 g, Transdermal, Daily     Allergies   Allergen Reactions    Codeine Confusion    Ibuprofen-Acetaminophen Hives      Current Outpatient Medications on File Prior to Visit   Medication Sig Dispense Refill    ketoconazole (NIZORAL) 2 % cream APPLY TO SCALY SKIN ON FEET 1 TO 2 TIMES DAILY      [DISCONTINUED] Biktarvy -25 MG tablet TAKE 1 TABLET BY MOUTH DAILY 30 tablet 5    [DISCONTINUED] Testosterone 1.62 % GEL APPLY 2 PUMPS ON THE SKIN EVERY MORNING AS DIRECTED 75 g 2     No current facility-administered medications on file prior to visit.      Social History     Tobacco Use    Smoking status: Former     Current packs/day: 0.00     Types: Cigarettes     Start date: 1988     Quit date: 2018     Years since quittin.1    Smokeless tobacco: Never   Vaping Use    Vaping status: Never Used   Substance and Sexual Activity    Alcohol use: No    Drug use: Not Currently    Sexual activity: Yes     Partners: Male     Birth control/protection: None        Objective   /69 (BP Location: Right arm, Patient Position: Sitting, Cuff Size: Large)   Pulse 73   Temp 97.8 °F (36.6 °C) (Tympanic)   Ht 6' 1\" (1.854 m)   Wt 89.5 kg (197 lb 6.4 oz)   SpO2 98%   BMI 26.04 kg/m²      Physical Exam  Vitals and nursing note reviewed.   Constitutional:       General: He is not in acute distress.     Appearance: He is well-developed.   HENT:      Head: Normocephalic and atraumatic.   Eyes:      Conjunctiva/sclera: Conjunctivae normal.   Cardiovascular:      Rate and Rhythm: Normal rate and regular rhythm.      Heart sounds: No murmur heard.  Pulmonary:      Effort: Pulmonary effort is normal. No respiratory distress.      Breath sounds: Normal breath sounds.   Abdominal:      Palpations: Abdomen is soft. "      Tenderness: There is no abdominal tenderness.   Musculoskeletal:         General: No swelling.      Cervical back: Neck supple.   Skin:     General: Skin is warm and dry.      Capillary Refill: Capillary refill takes less than 2 seconds.   Neurological:      Mental Status: He is alert.   Psychiatric:         Mood and Affect: Mood normal.         Administrative Statements   I have spent a total time of 45 minutes in caring for this patient on the day of the visit/encounter including Diagnostic results, Prognosis, Risks and benefits of tx options, Instructions for management, Patient and family education, Importance of tx compliance, Risk factor reductions, Impressions, Counseling / Coordination of care, and Documenting in the medical record.

## 2025-04-28 ENCOUNTER — CLINICAL SUPPORT (OUTPATIENT)
Dept: SURGERY | Facility: CLINIC | Age: 55
End: 2025-04-28
Payer: COMMERCIAL

## 2025-04-28 DIAGNOSIS — B20 HIV DISEASE (HCC): Primary | ICD-10-CM

## 2025-04-28 PROCEDURE — 90471 IMMUNIZATION ADMIN: CPT

## 2025-04-28 PROCEDURE — 90750 HZV VACC RECOMBINANT IM: CPT

## 2025-06-18 NOTE — PROGRESS NOTES
Periodic Exam, 3 Month Periodontal Maintenance, and 4 BWX   REVIEWED MED HX: meds, allergies, health changes reviewed in EPIC  CHIEF CONCERN:  none   PAIN SCALE:  0  ASA CLASS:  ASA 2 - Patient with mild systemic disease with no functional limitations  PLAQUE:  mild  CALCULUS:  Light  BLEEDING:  light  STAIN :   None  PERIO: perio appears stable    Hygiene Procedures: Scaled, Polished, Flossed and Used Cavitron    Oral Hygiene Instruction: Brushing Minimum 2x daily for 2 minutes, daily flossing    Visual and Tactile Intraoral/ Extraoral evaluation: Oral and Oropharyngeal cancer evaluation. No findings     REFERRALS: none    EXAM: Dr. Wyatt    CARIES FINDINGS: #'s 6-DL    Next Recall: 3 month periodontal maintenance    Last BWX: 6/20/25  Last Panorex/ FMX : 11/9/22

## 2025-06-20 ENCOUNTER — OFFICE VISIT (OUTPATIENT)
Dept: DENTISTRY | Facility: CLINIC | Age: 55
End: 2025-06-20

## 2025-06-20 VITALS — SYSTOLIC BLOOD PRESSURE: 120 MMHG | DIASTOLIC BLOOD PRESSURE: 66 MMHG

## 2025-06-20 DIAGNOSIS — K05.6 PERIODONTAL DISEASE: Primary | ICD-10-CM

## 2025-06-20 PROCEDURE — D0274 BITEWINGS - 4 RADIOGRAPHIC IMAGES: HCPCS

## 2025-06-20 PROCEDURE — D0120 PERIODIC ORAL EVALUATION - ESTABLISHED PATIENT: HCPCS

## 2025-06-20 PROCEDURE — D4910 PERIODONTAL MAINTENANCE: HCPCS

## 2025-06-30 ENCOUNTER — TELEPHONE (OUTPATIENT)
Dept: SURGERY | Facility: CLINIC | Age: 55
End: 2025-06-30

## 2025-07-03 ENCOUNTER — PATIENT OUTREACH (OUTPATIENT)
Dept: SURGERY | Facility: CLINIC | Age: 55
End: 2025-07-03

## 2025-07-07 ENCOUNTER — APPOINTMENT (OUTPATIENT)
Dept: LAB | Facility: CLINIC | Age: 55
End: 2025-07-07
Payer: COMMERCIAL

## 2025-07-07 DIAGNOSIS — Z11.59 MEASLES SCREENING: ICD-10-CM

## 2025-07-07 DIAGNOSIS — A53.9 SYPHILIS: ICD-10-CM

## 2025-07-07 DIAGNOSIS — R79.89 LOW TESTOSTERONE IN MALE: ICD-10-CM

## 2025-07-07 LAB
ALBUMIN SERPL BCG-MCNC: 4.4 G/DL (ref 3.5–5)
ALP SERPL-CCNC: 72 U/L (ref 34–104)
ALT SERPL W P-5'-P-CCNC: 23 U/L (ref 7–52)
ANION GAP SERPL CALCULATED.3IONS-SCNC: 8 MMOL/L (ref 4–13)
AST SERPL W P-5'-P-CCNC: 23 U/L (ref 13–39)
BASOPHILS # BLD AUTO: 0.06 THOUSANDS/ÂΜL (ref 0–0.1)
BASOPHILS NFR BLD AUTO: 1 % (ref 0–1)
BILIRUB SERPL-MCNC: 0.5 MG/DL (ref 0.2–1)
BUN SERPL-MCNC: 14 MG/DL (ref 5–25)
CALCIUM SERPL-MCNC: 9.6 MG/DL (ref 8.4–10.2)
CHLORIDE SERPL-SCNC: 102 MMOL/L (ref 96–108)
CHOLEST SERPL-MCNC: 176 MG/DL (ref ?–200)
CO2 SERPL-SCNC: 28 MMOL/L (ref 21–32)
CREAT SERPL-MCNC: 1.26 MG/DL (ref 0.6–1.3)
EOSINOPHIL # BLD AUTO: 0.13 THOUSAND/ÂΜL (ref 0–0.61)
EOSINOPHIL NFR BLD AUTO: 2 % (ref 0–6)
ERYTHROCYTE [DISTWIDTH] IN BLOOD BY AUTOMATED COUNT: 12.2 % (ref 11.6–15.1)
GFR SERPL CREATININE-BSD FRML MDRD: 65 ML/MIN/1.73SQ M
GLUCOSE P FAST SERPL-MCNC: 82 MG/DL (ref 65–99)
HCT VFR BLD AUTO: 45.3 % (ref 36.5–49.3)
HDLC SERPL-MCNC: 42 MG/DL
HGB BLD-MCNC: 15.2 G/DL (ref 12–17)
IMM GRANULOCYTES # BLD AUTO: 0.02 THOUSAND/UL (ref 0–0.2)
IMM GRANULOCYTES NFR BLD AUTO: 0 % (ref 0–2)
LDLC SERPL CALC-MCNC: 105 MG/DL (ref 0–100)
LYMPHOCYTES # BLD AUTO: 1.96 THOUSANDS/ÂΜL (ref 0.6–4.47)
LYMPHOCYTES NFR BLD AUTO: 35 % (ref 14–44)
MCH RBC QN AUTO: 32.1 PG (ref 26.8–34.3)
MCHC RBC AUTO-ENTMCNC: 33.6 G/DL (ref 31.4–37.4)
MCV RBC AUTO: 96 FL (ref 82–98)
MONOCYTES # BLD AUTO: 0.52 THOUSAND/ÂΜL (ref 0.17–1.22)
MONOCYTES NFR BLD AUTO: 9 % (ref 4–12)
NEUTROPHILS # BLD AUTO: 2.86 THOUSANDS/ÂΜL (ref 1.85–7.62)
NEUTS SEG NFR BLD AUTO: 53 % (ref 43–75)
NRBC BLD AUTO-RTO: 0 /100 WBCS
PLATELET # BLD AUTO: 275 THOUSANDS/UL (ref 149–390)
PMV BLD AUTO: 10.1 FL (ref 8.9–12.7)
POTASSIUM SERPL-SCNC: 4.6 MMOL/L (ref 3.5–5.3)
PROT SERPL-MCNC: 7.1 G/DL (ref 6.4–8.4)
RBC # BLD AUTO: 4.74 MILLION/UL (ref 3.88–5.62)
SODIUM SERPL-SCNC: 138 MMOL/L (ref 135–147)
TRIGL SERPL-MCNC: 145 MG/DL (ref ?–150)
WBC # BLD AUTO: 5.55 THOUSAND/UL (ref 4.31–10.16)

## 2025-07-07 PROCEDURE — 86593 SYPHILIS TEST NON-TREP QUANT: CPT

## 2025-07-07 PROCEDURE — 84403 ASSAY OF TOTAL TESTOSTERONE: CPT

## 2025-07-07 PROCEDURE — 86762 RUBELLA ANTIBODY: CPT

## 2025-07-07 PROCEDURE — 86592 SYPHILIS TEST NON-TREP QUAL: CPT

## 2025-07-07 PROCEDURE — 86765 RUBEOLA ANTIBODY: CPT

## 2025-07-07 PROCEDURE — 36415 COLL VENOUS BLD VENIPUNCTURE: CPT

## 2025-07-07 PROCEDURE — 84402 ASSAY OF FREE TESTOSTERONE: CPT

## 2025-07-07 PROCEDURE — 86735 MUMPS ANTIBODY: CPT

## 2025-07-07 NOTE — PROGRESS NOTES
Cm completed the recert, six month assessment, and RW sliding fee scale application with the ct. Ct also requested assistance in completing his insurance renewal. Cm reviewed all documents and explained that he may no longer qualify for MA due to his income increase from his new marriage. Cm explained that they could try and if did not work, he may still qualify for MAWD instead. Cm explained how MAWD worked. Ct agreed and cm assisted ct in completing the renewal and uploading documents that were requested. Cm and ct spoke with the [facility] CM who reported that he had reviewed the application and informed the ct of what else was needed. The CM confirmed that he would qualify for MAWD. The CM explained that he would be reviewing the application in the next few days and would wait for the remaining documents to be uploaded. See media for documents.

## 2025-07-08 ENCOUNTER — OFFICE VISIT (OUTPATIENT)
Dept: SURGERY | Facility: CLINIC | Age: 55
End: 2025-07-08
Payer: COMMERCIAL

## 2025-07-08 VITALS
TEMPERATURE: 98.5 F | HEART RATE: 68 BPM | SYSTOLIC BLOOD PRESSURE: 126 MMHG | DIASTOLIC BLOOD PRESSURE: 80 MMHG | OXYGEN SATURATION: 98 % | HEIGHT: 73 IN | BODY MASS INDEX: 26.11 KG/M2 | WEIGHT: 197 LBS

## 2025-07-08 DIAGNOSIS — B20 HIV DISEASE (HCC): Primary | ICD-10-CM

## 2025-07-08 DIAGNOSIS — A53.9 SYPHILIS: ICD-10-CM

## 2025-07-08 LAB
BASOPHILS # BLD AUTO: 0.1 X10E3/UL (ref 0–0.2)
BASOPHILS NFR BLD AUTO: 1 %
CD3+CD4+ CELLS # BLD: 428 /UL (ref 359–1519)
CD3+CD4+ CELLS NFR BLD: 25.2 % (ref 30.8–58.5)
CD3+CD4+ CELLS/CD3+CD8+ CLL BLD: 0.69 % (ref 0.92–3.72)
CD3+CD8+ CELLS # BLD: 621 /UL (ref 109–897)
CD3+CD8+ CELLS NFR BLD: 36.5 % (ref 12–35.5)
EOSINOPHIL # BLD AUTO: 0.2 X10E3/UL (ref 0–0.4)
EOSINOPHIL NFR BLD AUTO: 3 %
ERYTHROCYTE [DISTWIDTH] IN BLOOD BY AUTOMATED COUNT: 12.8 % (ref 11.6–15.4)
EST. AVERAGE GLUCOSE BLD GHB EST-MCNC: 111 MG/DL
HBA1C MFR BLD: 5.5 %
HCT VFR BLD AUTO: 38.4 % (ref 37.5–51)
HGB BLD-MCNC: 12.3 G/DL (ref 13–17.7)
IMM GRANULOCYTES # BLD: 0 X10E3/UL (ref 0–0.1)
IMM GRANULOCYTES NFR BLD: 0 %
LYMPHOCYTES # BLD AUTO: 1.7 X10E3/UL (ref 0.7–3.1)
LYMPHOCYTES NFR BLD AUTO: 31 %
MCH RBC QN AUTO: 31.9 PG (ref 26.6–33)
MCHC RBC AUTO-ENTMCNC: 32 G/DL (ref 31.5–35.7)
MCV RBC AUTO: 100 FL (ref 79–97)
MEV IGG SER IA-ACNC: <13.5 AU/ML
MONOCYTES # BLD AUTO: 0.5 X10E3/UL (ref 0.1–0.9)
MONOCYTES NFR BLD AUTO: 9 %
MUV IGG SER IA-ACNC: 173 AU/ML
NEUTROPHILS # BLD AUTO: 3.1 X10E3/UL (ref 1.4–7)
NEUTROPHILS NFR BLD AUTO: 56 %
PLATELET # BLD AUTO: 355 X10E3/UL (ref 150–450)
RBC # BLD AUTO: 3.85 X10E6/UL (ref 4.14–5.8)
RPR SER QL: REACTIVE
RPR SER-TITR: ABNORMAL {TITER}
RUBV IGG SERPL IA-ACNC: <0.9 INDEX
WBC # BLD AUTO: 5.5 X10E3/UL (ref 3.4–10.8)

## 2025-07-08 PROCEDURE — 99214 OFFICE O/P EST MOD 30 MIN: CPT | Performed by: INTERNAL MEDICINE

## 2025-07-08 NOTE — PROGRESS NOTES
"Name: Cresencio Reyes      : 1970      MRN: [id number]  Encounter Provider: Byron Umana MD  Encounter Date: 2025   Encounter department: ASC AT [facility]  :  Assessment & Plan  HIV disease (HCC)  Doing well clinically on Biktarvy but viral load is still pending.  CD4 count 428.  As long as he remains undetectable,Continue ART, recheck CBC with differential, CMP, HIV RNA, and CD4 in 5 months to make sure no developing toxicity or treatment failure and follow-up in 6 months.  Stressed adherence.          Syphilis  Status post repeat infection with a titer of RPR that went up to 1:8 but has now dropped to 1:2 with treatment for late latent syphilis with benzathine penicillin 2,400,000 units IM x 3.  Continue to monitor RPR at least annually.             Patient was provided medication, adherence and prevention education.    History of Present Illness   Routine follow-up for HIV.  Patient claims 100% adherence with Biktarvy. Patient denies any notable side effects.  Overall the feeling well.  The patient denies any fever chills or sweats, denies any nausea vomiting or diarrhea, denies any cough or shortness of breath.    ROS: A complete review of systems are negative other than that noted in the HPI        Objective   /80 (BP Location: Right arm, Patient Position: Sitting, Cuff Size: Standard)   Pulse 68   Temp 98.5 °F (36.9 °C) (Tympanic)   Ht 6' 1\" (1.854 m)   Wt 89.4 kg (197 lb)   SpO2 98%   BMI 25.99 kg/m²     General: Alert, interactive, appearing well, nontoxic, no acute distress.  Neck: Supple without lymphadenopathy, no thyromegaly or masses  Throat: Oropharynx moist without lesions.   Lungs: Clear to auscultation bilaterally; no wheezes, rhonchi or rales; respirations unlabored  Heart: RRR; no murmur, rub or gallop  Abdomen: Soft, non-tender, non-distended, positive bowel sounds.    Extremities: No clubbing, cyanosis or edema  Skin: No new rashes or lesions. No draining wounds " noted.    Lab Results: I have personally reviewed pertinent labs.  Lab Results   Component Value Date    K 4.6 07/07/2025     07/07/2025    CO2 28 07/07/2025    BUN 14 07/07/2025    CREATININE 1.26 07/07/2025    GLUF 82 07/07/2025    CALCIUM 9.6 07/07/2025    CORRECTEDCA 9.7 11/28/2022    AST 23 07/07/2025    ALT 23 07/07/2025    ALKPHOS 72 07/07/2025    EGFR 65 07/07/2025     Lab Results   Component Value Date    WBC 5.5 07/07/2025    WBC 5.55 07/07/2025    HGB 12.3 (L) 07/07/2025    HGB 15.2 07/07/2025    HCT 38.4 07/07/2025    HCT 45.3 07/07/2025     (H) 07/07/2025    MCV 96 07/07/2025     07/07/2025     07/07/2025     Lab Results   Component Value Date    HEPCAB Non-reactive 12/19/2024     Lab Results   Component Value Date    HEPCAB Non-reactive 12/19/2024     Lab Results   Component Value Date    RPR Reactive (A) 07/07/2025    RPR Reactive 2 dils (A) 07/07/2025     CD4 ABS   Date/Time Value Ref Range Status   07/07/2025 09:50  359 - 1519 /uL Final     HIV RNA pending

## 2025-07-08 NOTE — ASSESSMENT & PLAN NOTE
Status post repeat infection with a titer of RPR that went up to 1:8 but has now dropped to 1:2 with treatment for late latent syphilis with benzathine penicillin 2,400,000 units IM x 3.  Continue to monitor RPR at least annually.

## 2025-07-08 NOTE — ASSESSMENT & PLAN NOTE
Doing well clinically on Biktarvy but viral load is still pending.  CD4 count 428.  As long as he remains undetectable,Continue ART, recheck CBC with differential, CMP, HIV RNA, and CD4 in 5 months to make sure no developing toxicity or treatment failure and follow-up in 6 months.  Stressed adherence.

## 2025-07-09 LAB
HIV1 RNA # PLAS NAA DL=20: ABNORMAL {COPIES}/ML
TESTOST FREE SERPL-MCNC: 5.7 PG/ML (ref 7.2–24)
TESTOST SERPL-MCNC: 568 NG/DL (ref 264–916)

## 2025-07-11 ENCOUNTER — PATIENT OUTREACH (OUTPATIENT)
Dept: SURGERY | Facility: CLINIC | Age: 55
End: 2025-07-11

## 2025-07-14 ENCOUNTER — PATIENT OUTREACH (OUTPATIENT)
Dept: SURGERY | Facility: CLINIC | Age: 55
End: 2025-07-14

## 2025-07-14 DIAGNOSIS — R79.89 LOW TESTOSTERONE IN MALE: ICD-10-CM

## 2025-07-14 DIAGNOSIS — R68.82 LOW LIBIDO: ICD-10-CM

## 2025-07-14 RX ORDER — TESTOSTERONE 20.25 MG/1.25G
75 GEL TOPICAL DAILY
Qty: 75 G | Refills: 2 | Status: SHIPPED | OUTPATIENT
Start: 2025-07-14

## 2025-07-23 ENCOUNTER — PATIENT OUTREACH (OUTPATIENT)
Dept: SURGERY | Facility: CLINIC | Age: 55
End: 2025-07-23

## 2025-07-23 NOTE — PROGRESS NOTES
Ct called robbin stating that he received a letter from the [facility] department explaining that he is approved and that his premium is $37 dollars. Robbin assisted ct with getting into his compass and [employer] in order to pay his [facility] bill. Ct was able to pay his bill successfully and stated that he set it up for auto-pay.

## 2025-08-04 DIAGNOSIS — K42.9 UMBILICAL HERNIA WITHOUT OBSTRUCTION AND WITHOUT GANGRENE: Primary | ICD-10-CM

## 2025-08-05 DIAGNOSIS — R79.89 LOW TESTOSTERONE IN MALE: ICD-10-CM

## 2025-08-05 DIAGNOSIS — R68.82 LOW LIBIDO: ICD-10-CM

## 2025-08-05 RX ORDER — TESTOSTERONE 20.25 MG/1.25G
GEL TOPICAL
Qty: 75 G | Refills: 2 | Status: SHIPPED | OUTPATIENT
Start: 2025-08-05